# Patient Record
Sex: MALE | Race: WHITE | NOT HISPANIC OR LATINO | Employment: OTHER | URBAN - METROPOLITAN AREA
[De-identification: names, ages, dates, MRNs, and addresses within clinical notes are randomized per-mention and may not be internally consistent; named-entity substitution may affect disease eponyms.]

---

## 2017-02-06 ENCOUNTER — TELEPHONE (OUTPATIENT)
Dept: MEDICAL GROUP | Facility: MEDICAL CENTER | Age: 82
End: 2017-02-06

## 2017-02-06 DIAGNOSIS — I47.10 PAROXYSMAL SUPRAVENTRICULAR TACHYCARDIA: ICD-10-CM

## 2017-02-06 DIAGNOSIS — I47.10 SVT (SUPRAVENTRICULAR TACHYCARDIA): ICD-10-CM

## 2017-02-06 DIAGNOSIS — I47.10 SUPRAVENTRICULAR TACHYCARDIA: ICD-10-CM

## 2017-02-06 NOTE — TELEPHONE ENCOUNTER
1. Caller Name: Pt                                         Call Back Number: [phone] (home)       Patient approves a detailed voicemail message: no    2. SPECIFIC Action To Be Taken: Referral pending, please sign.    3. Diagnosis/Clinical Reason for Request: supraventricular tachycardia    4. Specialty & Provider Name/Lab/Imaging Location: Cardiology-Dr Velasquez    5. Is appointment scheduled for requested order/referral: no    Patient informed they will receive a return phone call from the office ONLY if there are any questions before processing their request. Advised to call back if they haven't received a call from the referral department in 5 days.

## 2017-02-08 ENCOUNTER — APPOINTMENT (OUTPATIENT)
Dept: RADIOLOGY | Facility: MEDICAL CENTER | Age: 82
End: 2017-02-08
Attending: PSYCHIATRY & NEUROLOGY
Payer: MEDICARE

## 2017-02-16 ENCOUNTER — HOSPITAL ENCOUNTER (OUTPATIENT)
Dept: RADIOLOGY | Facility: MEDICAL CENTER | Age: 82
End: 2017-02-16
Attending: PSYCHIATRY & NEUROLOGY
Payer: COMMERCIAL

## 2017-02-16 DIAGNOSIS — G40.409 CENTRENCEPHALIC EPILEPSY (HCC): ICD-10-CM

## 2017-02-16 PROCEDURE — 70551 MRI BRAIN STEM W/O DYE: CPT

## 2017-03-24 DIAGNOSIS — I47.10 SVT (SUPRAVENTRICULAR TACHYCARDIA): ICD-10-CM

## 2017-05-01 ENCOUNTER — OFFICE VISIT (OUTPATIENT)
Dept: CARDIOLOGY | Facility: MEDICAL CENTER | Age: 82
End: 2017-05-01
Payer: COMMERCIAL

## 2017-05-01 VITALS
BODY MASS INDEX: 26.52 KG/M2 | DIASTOLIC BLOOD PRESSURE: 68 MMHG | HEART RATE: 80 BPM | SYSTOLIC BLOOD PRESSURE: 130 MMHG | WEIGHT: 175 LBS | OXYGEN SATURATION: 95 % | HEIGHT: 68 IN

## 2017-05-01 DIAGNOSIS — I47.10 SVT (SUPRAVENTRICULAR TACHYCARDIA): ICD-10-CM

## 2017-05-01 DIAGNOSIS — I45.10 RBBB: ICD-10-CM

## 2017-05-01 DIAGNOSIS — I44.0 FIRST DEGREE ATRIOVENTRICULAR BLOCK: ICD-10-CM

## 2017-05-01 DIAGNOSIS — R01.1 UNDIAGNOSED CARDIAC MURMURS: ICD-10-CM

## 2017-05-01 PROCEDURE — 1036F TOBACCO NON-USER: CPT | Performed by: INTERNAL MEDICINE

## 2017-05-01 PROCEDURE — G8432 DEP SCR NOT DOC, RNG: HCPCS | Performed by: INTERNAL MEDICINE

## 2017-05-01 PROCEDURE — 99214 OFFICE O/P EST MOD 30 MIN: CPT | Performed by: INTERNAL MEDICINE

## 2017-05-01 PROCEDURE — G8419 CALC BMI OUT NRM PARAM NOF/U: HCPCS | Performed by: INTERNAL MEDICINE

## 2017-05-01 PROCEDURE — 1101F PT FALLS ASSESS-DOCD LE1/YR: CPT | Performed by: INTERNAL MEDICINE

## 2017-05-01 PROCEDURE — 4040F PNEUMOC VAC/ADMIN/RCVD: CPT | Performed by: INTERNAL MEDICINE

## 2017-05-01 ASSESSMENT — ENCOUNTER SYMPTOMS
FOCAL WEAKNESS: 0
FALLS: 0
CHILLS: 0
CLAUDICATION: 0
LOSS OF CONSCIOUSNESS: 0
HEADACHES: 0
BACK PAIN: 1
SHORTNESS OF BREATH: 0
FEVER: 0
WEAKNESS: 0
ABDOMINAL PAIN: 0
DIZZINESS: 0
BRUISES/BLEEDS EASILY: 0
PND: 0
SORE THROAT: 0
NAUSEA: 0
PALPITATIONS: 0
COUGH: 0
BLURRED VISION: 0

## 2017-05-01 NOTE — Clinical Note
[facility]   [address]   [address]  [place] [address]  Phone: [phone]  Fax: [phone]              Homa Alonso  4/9/1927    Encounter Date: 5/1/2017    Colin Garza M.D.          PROGRESS NOTE:  Subjective:   Homa Alonso is a 89 y.o. male who presents today seeking cardiology follow-up for abnormal EKG and heart murmur.  This very healthy 89-year-old man who continues physical exercise has no symptoms of chest discomfort and lightheadedness palpitations or shortness of breath with effort.  He is known to have a sinus rhythm with first-degree AV block and right bundle branch block. A heart murmur has been present for years.  After successful lumbar surgery in May 2012, he had a single episode of atrial flutter with 2-1 AV block with no evidence of clinical recurrence. Because this occurred after stress, he is not taking any oral anticoagulant that    An echo was last done 11 months ago.  This is unable to be reviewed at this time  Recent lab work reviewed and all appears satisfactory with an LDL of 50. EKG  demonstrates sinus rhythm with first-degree AV block and right bundle branch block. PVCs in a trigeminal pattern documented        Past Medical History   Diagnosis Date   • Gall stones    • Unspecified hemorrhagic conditions (CMS-HCC)      GI Bleed when taking Celebrex   • Allergy, unspecified not elsewhere classified    • Hyperlipidemia    • Arthritis      osteo finger joints lower back   • Cancer (CMS-[facility]) 1991     Prostate   • Unspecified cataract      oneal IOL surgery   • Glaucoma      right eye   • Pain      low back   • Seizure (CMS-[facility]) 06/13/2014     once  on Keppra     Past Surgical History   Procedure Laterality Date   • Other orthopedic surgery     • Prostatectomy, radial     • Aster by laparoscopy  12/3/2013     Performed by Braden Garner M.D. at [facility]   • Appendectomy     • Eye surgery      • Athroplasty     • Other       artificial urinary spincter   • Lumbar fusion o-arm Bilateral 5/18/2015     Procedure: LUMBAR FUSION O-ARM [83.10B] L3-5;  Surgeon: Francesco Joe M.D.;  Location: SURGERY [facility];  Service:    • Lumbar decompression  5/18/2015     Procedure: LUMBAR DECOMPRESSION;  Surgeon: Francesco Joe M.D.;  Location: SURGERY [facility];  Service:      Family History   Problem Relation Age of Onset   • Heart Disease Mother      STROKE   • Stroke Mother    • Cancer Father    • Arthritis Sister      History   Smoking status   • Former Smoker   • Quit date: 01/01/1964   Smokeless tobacco   • Never Used     Allergies   Allergen Reactions   • Pcn [Penicillins]      Outpatient Encounter Prescriptions as of 5/1/2017   Medication Sig Dispense Refill   • Cholecalciferol (VITAMIN D3) 2000 UNIT CAPS Take  by mouth.     • CALCIUM CITRATE PO Take 500 mg by mouth every day.     • levetiracetam (KEPPRA) 500 MG TABS Take 500 mg by mouth every day.     • tramadol (ULTRAM) 50 MG TABS Take  mg by mouth every 6 hours as needed.     • atorvastatin (LIPITOR) 20 MG TABS Take 20 mg by mouth every evening.     • bimatoprost (LUMIGAN) 0.01 % SOLN Place 1 Drop in right eye every bedtime.     • niacin (SLO-NIACIN) 500 MG tablet Take 1 Tab by mouth every day. 90 Each 3   • testosterone (ANDROGEL) 1 % GEL Apply 0.5 Packets to skin as directed every day. 90 Package 3   • VITAMIN C CR PO Take  by mouth. OTC VIT C      • MAGNESIUM CITRATE PO Take 250 mg by mouth every day.     • Cholecalciferol (VITAMIN D3) Take 2,000 Units by mouth every day.       No facility-administered encounter medications on file as of 5/1/2017.     Review of Systems   Constitutional: Negative for fever and chills.   HENT: Negative for sore throat.    Eyes: Negative for blurred vision.   Respiratory: Negative for cough and shortness of breath.    Cardiovascular: Negative for chest pain, palpitations, claudication, leg swelling  "and PND.   Gastrointestinal: Negative for nausea and abdominal pain.   Musculoskeletal: Positive for back pain (significantly improved) and joint pain. Negative for falls.   Skin: Negative for rash.   Neurological: Negative for dizziness, focal weakness, loss of consciousness, weakness and headaches.   Endo/Heme/Allergies: Does not bruise/bleed easily.        Objective:   /68 mmHg  Pulse 80  Ht 1.727 m (5' 7.99\")  Wt 79.379 kg (175 lb)  BMI 26.61 kg/m2  SpO2 95%    Physical Exam   Constitutional: He is oriented to person, place, and time. He appears well-developed and well-nourished.   HENT:   Head: Normocephalic and atraumatic.   Eyes: Conjunctivae and EOM are normal. No scleral icterus.   Neck: Neck supple. No JVD present. No thyromegaly present.   Cardiovascular: Normal rate and regular rhythm.  Exam reveals no gallop and no friction rub.    Murmur heard.   Systolic murmur is present with a grade of 2/6   Pulmonary/Chest: Effort normal and breath sounds normal. No respiratory distress. He has no wheezes. He has no rales. He exhibits no tenderness.   Abdominal: Soft. Bowel sounds are normal. He exhibits no distension and no mass. There is no tenderness.   Musculoskeletal: He exhibits edema.   Bilateral pitting edema of ankles, varicose veins right leg. Venous stripping in the remote past and left leg   Neurological: He is alert and oriented to person, place, and time. Coordination normal.   Skin: Skin is warm and dry. No rash noted. No pallor.   Psychiatric: He has a normal mood and affect. His behavior is normal. Judgment and thought content normal.       Assessment:     1. SVT (supraventricular tachycardia) (CMS-HCC)  [facility] EP[facility]ANY EKG (Clinic Performed)    CANCELED: EKG   2. First degree atrioventricular block     3. RBBB     4. Undiagnosed cardiac murmurs         Medical Decision Making:  Today's Assessment / Status / Plan:   We will obtain the echocardiogram done 11 months ago  No change in " medication  Return in 6 months for further evaluation.        Jomar Lazaro PA-C  [address]  [address]  [facility] [address]  VIA In Basket

## 2017-05-01 NOTE — PROGRESS NOTES
Subjective:   Homa Alonso is a 89 y.o. male who presents today seeking cardiology follow-up for abnormal EKG and heart murmur.  This very healthy 89-year-old man who continues physical exercise has no symptoms of chest discomfort and lightheadedness palpitations or shortness of breath with effort.  He is known to have a sinus rhythm with first-degree AV block and right bundle branch block. A heart murmur has been present for years.  After successful lumbar surgery in May 2012, he had a single episode of atrial flutter with 2-1 AV block with no evidence of clinical recurrence. Because this occurred after stress, he is not taking any oral anticoagulant that    An echo was last done 11 months ago.  This is unable to be reviewed at this time  Recent lab work reviewed and all appears satisfactory with an LDL of 50. EKG  demonstrates sinus rhythm with first-degree AV block and right bundle branch block. PVCs in a trigeminal pattern documented        Past Medical History   Diagnosis Date   • Gall stones    • Unspecified hemorrhagic conditions (CMS-HCC)      GI Bleed when taking Celebrex   • Allergy, unspecified not elsewhere classified    • Hyperlipidemia    • Arthritis      osteo finger joints lower back   • Cancer (CMS-HCC) 1991     Prostate   • Unspecified cataract      oneal IOL surgery   • Glaucoma      right eye   • Pain      low back   • Seizure (CMS-HCC) 06/13/2014     once  on Keppra     Past Surgical History   Procedure Laterality Date   • Other orthopedic surgery     • Prostatectomy, radial     • Aster by laparoscopy  12/3/2013     Performed by Braden [facility] M[facility]   • Appendectomy     • Eye surgery     • Athroplasty     • Other       artificial urinary spincter   • Lumbar fusion o-arm Bilateral 5/18/2015     Procedure: LUMBAR FUSION O-ARM [83.10B] L3-5;  Surgeon: Francesco Joe M.D.;  Location: [facility];  Service:    • Lumbar decompression  5/18/2015      Procedure: LUMBAR DECOMPRESSION;  Surgeon: Francesco Joe M.D.;  Location: SURGERY [facility];  Service:      Family History   Problem Relation Age of Onset   • Heart Disease Mother      STROKE   • Stroke Mother    • Cancer Father    • Arthritis Sister      History   Smoking status   • Former Smoker   • Quit date: 01/01/1964   Smokeless tobacco   • Never Used     Allergies   Allergen Reactions   • Pcn [Penicillins]      Outpatient Encounter Prescriptions as of 5/1/2017   Medication Sig Dispense Refill   • Cholecalciferol (VITAMIN D3) 2000 UNIT CAPS Take  by mouth.     • CALCIUM CITRATE PO Take 500 mg by mouth every day.     • levetiracetam (KEPPRA) 500 MG TABS Take 500 mg by mouth every day.     • tramadol (ULTRAM) 50 MG TABS Take  mg by mouth every 6 hours as needed.     • atorvastatin (LIPITOR) 20 MG TABS Take 20 mg by mouth every evening.     • bimatoprost (LUMIGAN) 0.01 % SOLN Place 1 Drop in right eye every bedtime.     • niacin (SLO-NIACIN) 500 MG tablet Take 1 Tab by mouth every day. 90 Each 3   • testosterone (ANDROGEL) 1 % GEL Apply 0.5 Packets to skin as directed every day. 90 Package 3   • VITAMIN C CR PO Take  by mouth. OTC VIT C      • MAGNESIUM CITRATE PO Take 250 mg by mouth every day.     • Cholecalciferol (VITAMIN D3) Take 2,000 Units by mouth every day.       No facility-administered encounter medications on file as of 5/1/2017.     Review of Systems   Constitutional: Negative for fever and chills.   HENT: Negative for sore throat.    Eyes: Negative for blurred vision.   Respiratory: Negative for cough and shortness of breath.    Cardiovascular: Negative for chest pain, palpitations, claudication, leg swelling and PND.   Gastrointestinal: Negative for nausea and abdominal pain.   Musculoskeletal: Positive for back pain (significantly improved) and joint pain. Negative for falls.   Skin: Negative for rash.   Neurological: Negative for dizziness, focal weakness, loss of consciousness,  "weakness and headaches.   Endo/Heme/Allergies: Does not bruise/bleed easily.        Objective:   /68 mmHg  Pulse 80  Ht 1.727 m (5' 7.99\")  Wt 79.379 kg (175 lb)  BMI 26.61 kg/m2  SpO2 95%    Physical Exam   Constitutional: He is oriented to person, place, and time. He appears well-developed and well-nourished.   HENT:   Head: Normocephalic and atraumatic.   Eyes: Conjunctivae and EOM are normal. No scleral icterus.   Neck: Neck supple. No JVD present. No thyromegaly present.   Cardiovascular: Normal rate and regular rhythm.  Exam reveals no gallop and no friction rub.    Murmur heard.   Systolic murmur is present with a grade of 2/6   Pulmonary/Chest: Effort normal and breath sounds normal. No respiratory distress. He has no wheezes. He has no rales. He exhibits no tenderness.   Abdominal: Soft. Bowel sounds are normal. He exhibits no distension and no mass. There is no tenderness.   Musculoskeletal: He exhibits edema.   Bilateral pitting edema of ankles, varicose veins right leg. Venous stripping in the remote past and left leg   Neurological: He is alert and oriented to person, place, and time. Coordination normal.   Skin: Skin is warm and dry. No rash noted. No pallor.   Psychiatric: He has a normal mood and affect. His behavior is normal. Judgment and thought content normal.       Assessment:     1. SVT (supraventricular tachycardia) (CMS-HCC)  [facility]ANY EKG (Clinic Performed)    CANCELED: EKG   2. First degree atrioventricular block     3. RBBB     4. Undiagnosed cardiac murmurs         Medical Decision Making:  Today's Assessment / Status / Plan:   We will obtain the echocardiogram done 11 months ago  No change in medication  Return in 6 months for further evaluation.    "

## 2017-05-01 NOTE — MR AVS SNAPSHOT
"        Homa Spearst   2017 8:30 AM   Office Visit   MRN: [id number]    Department:  Heart [facility]   Dept Phone:  [phone]    Description:  Male : 1927   Provider:  Colin Garza M.D.           Reason for Visit     New Patient           Allergies as of 2017     Allergen Noted Reactions    Pcn [Penicillins] 2009         You were diagnosed with     SVT (supraventricular tachycardia) (CMS-[facility])   [395406]       First degree atrioventricular block   [426.11.ICD-9-CM]       RBBB   [659665]       Undiagnosed cardiac murmurs   [785.2.ICD-9-CM]         Vital Signs     Blood Pressure Pulse Height Weight Body Mass Index Oxygen Saturation    130/68 mmHg 80 1.727 m (5' 7.99\") 79.379 kg (175 lb) 26.61 kg/m2 95%    Smoking Status                   Former Smoker           Basic Information     Date Of Birth Sex Race Ethnicity Preferred Language    1927 Male White Non- English      Your appointments     2017  9:00 AM   FOLLOW UP with Colin Garza M.D.   [facility]    [address]  [address]  [facility] [address] [phone]              Problem List              ICD-10-CM Priority Class Noted - Resolved    Dyslipidemia E78.5   2011 - Present    Chronic bilateral low back pain M54.5, G89.29 High  2015 - Present    SVT (supraventricular tachycardia) (CMS-HCC) I47.1 Medium  2015 - Present    History of seizure disorder Z86.69 Low  2015 - Present    Hypogonadism in male E29.1   2016 - Present    History of prostate cancer Z85.46   2016 - Present    Preventative health care Z00.00   2016 - Present    Glaucoma H40.9   2016 - Present      Health Maintenance        Date Due Completion Dates    IMM DTaP/Tdap/Td Vaccine (1 - Tdap) 1946 ---    IMM ZOSTER VACCINE 1987 ---    IMM PNEUMOCOCCAL 65+ (ADULT) LOW/MEDIUM RISK SERIES (2 of 2 - PCV13) 10/1/2015 10/1/2014  "    COLONOSCOPY 11/22/2026 11/22/2016 (Done)    Override on 11/22/2016: Done            Current Immunizations     Influenza TIV (IM) 10/1/2014    Influenza Vaccine Adult HD 10/20/2015    Influenza Vaccine Quad Inj (Pf) 12/7/2012    Influenza Vaccine Quad Inj (Preserved) 11/5/2014, 1/11/2014    Pneumococcal polysaccharide vaccine (PPSV-23) 10/1/2014      Below and/or attached are the medications your provider expects you to take. Review all of your home medications and newly ordered medications with your provider and/or pharmacist. Follow medication instructions as directed by your provider and/or pharmacist. Please keep your medication list with you and share with your provider. Update the information when medications are discontinued, doses are changed, or new medications (including over-the-counter products) are added; and carry medication information at all times in the event of emergency situations     Allergies:  PCN - (reactions not documented)               Medications  Valid as of: May 01, 2017 -  9:32 AM    Generic Name Brand Name Tablet Size Instructions for use    Ascorbic Acid   Take  by mouth. OTC VIT C         Atorvastatin Calcium (Tab) LIPITOR 20 MG Take 20 mg by mouth every evening.        Bimatoprost (Solution) LUMIGAN 0.01 % Place 1 Drop in right eye every bedtime.        Calcium Citrate   Take 500 mg by mouth every day.        Cholecalciferol   Take 2,000 Units by mouth every day.        Cholecalciferol (Cap) Vitamin D3 2000 UNIT Take  by mouth.        LevETIRAcetam (Tab) KEPPRA 500 MG Take 500 mg by mouth every day.        Magnesium Citrate   Take 250 mg by mouth every day.        Niacin (Tab CR) SLO-NIACIN 500 MG Take 1 Tab by mouth every day.        Testosterone (Gel) ANDROGEL 1 % Apply 0.5 Packets to skin as directed every day.        TraMADol HCl (Tab) ULTRAM 50 MG Take  mg by mouth every 6 hours as needed.        .                 Medicines prescribed today were sent to:     [id number]  [place] - [address] [facility]    [address] [place] [address] [place] [address]    Phone: [phone] Fax: [phone]    Open 24 Hours?: No      Medication refill instructions:       If your prescription bottle indicates you have medication refills left, it is not necessary to call your provider’s office. Please contact your pharmacy and they will refill your medication.    If your prescription bottle indicates you do not have any refills left, you may request refills at any time through one of the following ways: The online [facility] system (except Urgent Care), by calling your provider’s office, or by asking your pharmacy to contact your provider’s office with a refill request. Medication refills are processed only during regular business hours and may not be available until the next business day. Your provider may request additional information or to have a follow-up visit with you prior to refilling your medication.   *Please Note: Medication refills are assigned a new Rx number when refilled electronically. Your pharmacy may indicate that no refills were authorized even though a new prescription for the same medication is available at the pharmacy. Please request the medicine by name with the pharmacy before contacting your provider for a refill.           [facility] Access Code: Activation code not generated  Current [facility]und Status: Active

## 2017-05-19 DIAGNOSIS — E25.9 ANDROGENITAL SYNDROME (HCC): ICD-10-CM

## 2017-05-19 RX ORDER — TESTOSTERONE GEL, 1% 10 MG/G
5 GEL TRANSDERMAL DAILY
Qty: 90 EACH | Refills: 0 | Status: SHIPPED
Start: 2017-05-19 | End: 2017-08-29 | Stop reason: SDUPTHER

## 2017-05-19 RX ORDER — TESTOSTERONE GEL, 1% 10 MG/G
5 GEL TRANSDERMAL DAILY
Qty: 90 EACH | Refills: 0 | Status: SHIPPED | OUTPATIENT
Start: 2017-05-19 | End: 2017-05-19 | Stop reason: SDUPTHER

## 2017-05-19 NOTE — TELEPHONE ENCOUNTER
Was the patient seen in the last year in this department? Yes     Does patient have an active prescription for medications requested? No     Received Request Via: Patient     Last Visit: 11/22/16    Last Labs: 5/21/15

## 2017-05-22 ENCOUNTER — TELEPHONE (OUTPATIENT)
Dept: MEDICAL GROUP | Facility: MEDICAL CENTER | Age: 82
End: 2017-05-22

## 2017-05-22 NOTE — TELEPHONE ENCOUNTER
MEDICATION PRIOR AUTHORIZATION NEEDED:    1. Name of Medication: Androgel 50mg/5gm    2. Requested By (Name of Pharmacy): [facility]     3. Is insurance on file current? Yes    4. What is the name & phone number of the 3rd party payor? [employer]    DOCUMENTATION OF PAR STATUS:    1. Name of Medication & Dose: Androgel 50 mg/5 gram     2. Name of Prescription Coverage Company & phone #: [employer]    3. Date Prior Auth Submitted: 5/22/17    4. What information was given to obtain insurance decision? Diagnosis, amount, prescriber's information and if the patient had a trial sample.    5. Prior Auth Status? Pending    6. Patient Notified: yes

## 2017-05-30 NOTE — TELEPHONE ENCOUNTER
FINAL PRIOR AUTHORIZATION STATUS:    1.  Name of Medication & Dose: Androgel 50mg/5gm     2. Prior Auth Status: Approved through [employer]     3. Action Taken: Pharmacy Notified: yes Patient Notified: yes

## 2017-06-07 ENCOUNTER — TELEPHONE (OUTPATIENT)
Dept: MEDICAL GROUP | Facility: MEDICAL CENTER | Age: 82
End: 2017-06-07

## 2017-06-07 ENCOUNTER — PATIENT OUTREACH (OUTPATIENT)
Dept: HEALTH INFORMATION MANAGEMENT | Facility: OTHER | Age: 82
End: 2017-06-07

## 2017-06-07 NOTE — PROGRESS NOTES
Outcome: Patient called to schedule an annual physical due to an upcoming traffic court hearing. Patient was scheduled for an annual physical with PCP and message was sent to PCP to see if he would like to have lab work done prior to this appointment or if he would like to order labs after this visit.

## 2017-06-07 NOTE — TELEPHONE ENCOUNTER
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 He is requesting to know if you would like him to have labs done prior to his annual or if you will speak to him about them during his appointment.   He also wanted to thank the office for their timeliness and efficiency in assisting him on getting his last medication refill for testosterone.    Thank you,  Lorne BOLES  Health    [facility]

## 2017-06-12 ENCOUNTER — OFFICE VISIT (OUTPATIENT)
Dept: MEDICAL GROUP | Facility: MEDICAL CENTER | Age: 82
End: 2017-06-12
Payer: COMMERCIAL

## 2017-06-12 VITALS
HEIGHT: 68 IN | HEART RATE: 81 BPM | BODY MASS INDEX: 27.28 KG/M2 | WEIGHT: 180 LBS | DIASTOLIC BLOOD PRESSURE: 60 MMHG | OXYGEN SATURATION: 98 % | SYSTOLIC BLOOD PRESSURE: 110 MMHG | RESPIRATION RATE: 16 BRPM | TEMPERATURE: 98.4 F

## 2017-06-12 DIAGNOSIS — H40.9 GLAUCOMA OF RIGHT EYE, UNSPECIFIED GLAUCOMA: ICD-10-CM

## 2017-06-12 DIAGNOSIS — Z86.69 HISTORY OF SEIZURE DISORDER: ICD-10-CM

## 2017-06-12 DIAGNOSIS — E78.5 DYSLIPIDEMIA: ICD-10-CM

## 2017-06-12 DIAGNOSIS — Z85.46 HISTORY OF PROSTATE CANCER: ICD-10-CM

## 2017-06-12 DIAGNOSIS — E29.1 HYPOGONADISM IN MALE: ICD-10-CM

## 2017-06-12 DIAGNOSIS — Z00.00 MEDICARE ANNUAL WELLNESS VISIT, INITIAL: ICD-10-CM

## 2017-06-12 PROCEDURE — 99999 PR NO CHARGE: CPT | Performed by: PHYSICIAN ASSISTANT

## 2017-06-12 PROCEDURE — G0439 PPPS, SUBSEQ VISIT: HCPCS | Performed by: PHYSICIAN ASSISTANT

## 2017-06-12 ASSESSMENT — PATIENT HEALTH QUESTIONNAIRE - PHQ9: CLINICAL INTERPRETATION OF PHQ2 SCORE: 0
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
## 2017-08-29 DIAGNOSIS — E25.9 ANDROGENITAL SYNDROME (HCC): ICD-10-CM

## 2017-08-29 RX ORDER — TESTOSTERONE GEL, 1% 10 MG/G
5 GEL TRANSDERMAL DAILY
Qty: 90 EACH | Refills: 0 | Status: SHIPPED
Start: 2017-08-29 | End: 2017-12-20 | Stop reason: SDUPTHER

## 2017-08-29 NOTE — TELEPHONE ENCOUNTER
Was the patient seen in the last year in this department? Yes     Does patient have an active prescription for medications requested? No     Received Request Via: Patient     Last Visit: 6/12/17    Last Labs: 5/21/15

## 2017-11-01 ENCOUNTER — OFFICE VISIT (OUTPATIENT)
Dept: CARDIOLOGY | Facility: MEDICAL CENTER | Age: 82
End: 2017-11-01
Payer: COMMERCIAL

## 2017-11-01 VITALS
HEART RATE: 66 BPM | BODY MASS INDEX: 25.62 KG/M2 | OXYGEN SATURATION: 95 % | SYSTOLIC BLOOD PRESSURE: 132 MMHG | WEIGHT: 179 LBS | HEIGHT: 70 IN | DIASTOLIC BLOOD PRESSURE: 72 MMHG

## 2017-11-01 DIAGNOSIS — I35.8 AORTIC VALVE SCLEROSIS: ICD-10-CM

## 2017-11-01 DIAGNOSIS — R00.2 PALPITATIONS: ICD-10-CM

## 2017-11-01 PROCEDURE — 99213 OFFICE O/P EST LOW 20 MIN: CPT | Performed by: INTERNAL MEDICINE

## 2017-11-01 RX ORDER — CHLORAL HYDRATE 500 MG
1000 CAPSULE ORAL DAILY
COMMUNITY

## 2017-11-01 ASSESSMENT — ENCOUNTER SYMPTOMS
CLAUDICATION: 0
WEAKNESS: 0
SENSORY CHANGE: 1
FALLS: 0
DIZZINESS: 0
PND: 0
NAUSEA: 0
CHILLS: 0
HEADACHES: 0
BLURRED VISION: 0
ABDOMINAL PAIN: 0
FEVER: 0
SORE THROAT: 0
BACK PAIN: 1
PALPITATIONS: 1
SHORTNESS OF BREATH: 0
LOSS OF CONSCIOUSNESS: 0
BRUISES/BLEEDS EASILY: 0
COUGH: 0
FOCAL WEAKNESS: 0

## 2017-11-01 NOTE — LETTER
[facility]   [address]   [address]  [place] [address]  Phone: [phone]  Fax: [phone]              Homa Alonso  4/9/1927    Encounter Date: 11/1/2017    Colin Garza M.D.          PROGRESS NOTE:  Subjective:   Homa Alonso is a 89 y.o. male who presents todayIn follow-up for heart murmur. Echocardiogram in 2011 demonstrated aortic valve sclerosis and mild aortic regurgitation.  He continues to be physically very active.  Some residual numbness and left leg after successful lumbar surgery.  He is on Keppra     He mentions that he has occasional pauses in his heart rate at rest but no irregularity when he exercises. No symptoms to suggest symptoms of heart disease at this time  Echocardiogram of 2011 reviewed    Past Medical History:   Diagnosis Date   • Allergy, unspecified not elsewhere classified    • Arthritis     osteo finger joints lower back   • Cancer (CMS-HCC) 1991    Prostate   • Gall stones    • Glaucoma     right eye   • Hyperlipidemia    • Pain     low back   • Seizure (CMS-[facility]) 06/13/2014    once  on Keppra   • Unspecified cataract     oneal IOL surgery   • Unspecified hemorrhagic conditions     GI Bleed when taking Celebrex     Past Surgical History:   Procedure Laterality Date   • LUMBAR FUSION O-ARM Bilateral 5/18/2015    Procedure: LUMBAR FUSION O-ARM [83.10B] L3-5;  Surgeon: Francesco Joe M.D.;  Location: [facility];  Service:    • LUMBAR DECOMPRESSION  5/18/2015    Procedure: LUMBAR DECOMPRESSION;  Surgeon: Francesco Joe M.D.;  Location: [facility];  Service:    • CHRISTIE BY LAPAROSCOPY  12/3/2013    Performed by Braden Garner M.D. at [facility]   • APPENDECTOMY     • ATHROPLASTY     • EYE SURGERY     • OTHER      artificial urinary spincter   • OTHER ORTHOPEDIC SURGERY     • PROSTATECTOMY, RADIAL       Family History   Problem Relation Age of Onset   • Heart  Disease Mother      STROKE   • Stroke Mother    • Cancer Father    • Arthritis Sister      History   Smoking Status   • Former Smoker   • Quit date: 1/1/1964   Smokeless Tobacco   • Never Used     Allergies   Allergen Reactions   • Pcn [Penicillins]      Outpatient Encounter Prescriptions as of 11/1/2017   Medication Sig Dispense Refill   • Omega-3 Fatty Acids (FISH OIL) 1000 MG Cap capsule Take 1,000 mg by mouth 3 times a day, with meals.     • multivitamin (THERAGRAN) Tab Take 1 Tab by mouth every day.     • testosterone (ANDROGEL) 50 MG/5GM (1%) Gel gel Apply 1 Packet to skin as directed every day. 90 Each 0   • Cholecalciferol (VITAMIN D3) 2000 UNIT CAPS Take  by mouth.     • CALCIUM CITRATE PO Take 500 mg by mouth every day.     • levetiracetam (KEPPRA) 500 MG TABS Take 500 mg by mouth 2 times a day.     • MAGNESIUM CITRATE PO Take 250 mg by mouth every day.     • atorvastatin (LIPITOR) 20 MG TABS Take 20 mg by mouth every evening.     • bimatoprost (LUMIGAN) 0.01 % SOLN Place 1 Drop in right eye every bedtime.     • niacin (SLO-NIACIN) 500 MG tablet Take 1 Tab by mouth every day. 90 Each 3   • VITAMIN C CR PO Take  by mouth. OTC VIT C        No facility-administered encounter medications on file as of 11/1/2017.      Review of Systems   Constitutional: Negative for chills and fever.   HENT: Negative for sore throat.    Eyes: Negative for blurred vision.   Respiratory: Negative for cough and shortness of breath.    Cardiovascular: Positive for palpitations. Negative for chest pain, claudication, leg swelling and PND.   Gastrointestinal: Negative for abdominal pain and nausea.   Musculoskeletal: Positive for back pain and joint pain. Negative for falls.   Skin: Negative for rash.   Neurological: Positive for sensory change. Negative for dizziness, focal weakness, loss of consciousness, weakness and headaches.   Endo/Heme/Allergies: Does not bruise/bleed easily.        Objective:   /72   Pulse 66   Ht  "1.778 m (5' 10\")   Wt 81.2 kg (179 lb)   SpO2 95%   BMI 25.68 kg/m²      Physical Exam   Constitutional: He is oriented to person, place, and time. He appears well-developed and well-nourished.   HENT:   Head: Normocephalic and atraumatic.   Eyes: Conjunctivae and EOM are normal. No scleral icterus.   Neck: Neck supple. No JVD present. No thyromegaly present.   Cardiovascular: Normal rate and regular rhythm.  Frequent extrasystoles are present. Exam reveals no gallop and no friction rub.    Murmur heard.   Systolic murmur is present with a grade of 2/6   Premature beat every 3rd beat on exam     Pulmonary/Chest: Effort normal and breath sounds normal. No respiratory distress. He has no wheezes. He has no rales. He exhibits no tenderness.   Abdominal: Soft. Bowel sounds are normal. He exhibits no distension and no mass. There is no tenderness.   Musculoskeletal: He exhibits edema.   Bilateral pitting edema of ankles, varicose veins right leg. Venous stripping in the remote past and left leg   Neurological: He is alert and oriented to person, place, and time. Coordination normal.   Skin: Skin is warm and dry. No rash noted. No pallor.   Psychiatric: He has a normal mood and affect. His behavior is normal. Judgment and thought content normal.       Assessment:     1. Aortic valve sclerosis     2. Palpitations         Medical Decision Making:  Today's Assessment / Status / Plan:   Other murmurs consistent with aortic valve sclerosis, well documented 6 years ago on an echo  There is no need for repeat echo at this time based on the quality of the heart murmur auscultated today and the lack of symptoms  The premature beats which is felt clinically without any major symptoms and I do not think this needs to be pursued  We'll have him return in 9 months for auscultation of heart murmur.  I recommended stopping niacin based on his lack of efficacy      Ajay Austin M.D.  [address]  [address]  [place] " [id number]  VIA In Basket

## 2017-11-09 NOTE — TELEPHONE ENCOUNTER
Was the patient seen in the last year in this department? Yes     Does patient have an active prescription for medications requested? No     Received Request Via: Pharmacy     Last Visit: 6/2/17    Last Labs: 5/21/15

## 2017-11-10 RX ORDER — ATORVASTATIN CALCIUM 20 MG/1
20 TABLET, FILM COATED ORAL DAILY
Qty: 30 TAB | Refills: 0 | Status: SHIPPED | OUTPATIENT
Start: 2017-11-10 | End: 2017-12-13 | Stop reason: SDUPTHER

## 2017-12-12 DIAGNOSIS — E29.1 HYPOGONADISM IN MALE: ICD-10-CM

## 2017-12-13 RX ORDER — ATORVASTATIN CALCIUM 20 MG/1
TABLET, FILM COATED ORAL
Qty: 30 TAB | Refills: 0 | Status: SHIPPED | OUTPATIENT
Start: 2017-12-13 | End: 2018-01-10 | Stop reason: SDUPTHER

## 2017-12-18 ENCOUNTER — TELEPHONE (OUTPATIENT)
Dept: MEDICAL GROUP | Facility: MEDICAL CENTER | Age: 82
End: 2017-12-18

## 2017-12-18 DIAGNOSIS — E25.9 ANDROGENITAL SYNDROME (HCC): ICD-10-CM

## 2017-12-18 RX ORDER — TESTOSTERONE GEL, 1% 10 MG/G
5 GEL TRANSDERMAL DAILY
Qty: 150000 MG | Refills: 0 | Status: CANCELLED | OUTPATIENT
Start: 2017-12-18 | End: 2018-01-17

## 2017-12-18 NOTE — TELEPHONE ENCOUNTER
----- Message from Guerline Mendez sent at 12/18/2017  8:46 AM PST -----  Then why did christian approve his other med???    ----- Message -----  From: Socorro Wells, Med Ass't  Sent: 12/18/2017   8:21 AM  To: Guerline Mendez    Christian and I got a message via "[employer] stating he is establishing with an internist and won't be our patient. He would need an appointment for that.  ----- Message -----  From: Guerline Mendez  Sent: 12/15/2017   4:02 PM  To: [facility]    1. Caller Name: Homa Metcalf. Call Back Number: [phone]    3. Patient PCP: christian    4. What is the patient requesting: Refill of testosterone (ANDROGEL) 50 MG/5GM (1%) Gel gel, thank you    5. Does patient need immediate contact: no

## 2017-12-20 DIAGNOSIS — E25.9 ANDROGENITAL SYNDROME (HCC): ICD-10-CM

## 2017-12-20 RX ORDER — TESTOSTERONE GEL, 1% 10 MG/G
5 GEL TRANSDERMAL DAILY
Qty: 150000 MG | Refills: 0 | Status: SHIPPED | OUTPATIENT
Start: 2017-12-20 | End: 2018-01-10 | Stop reason: SDUPTHER

## 2018-01-10 ENCOUNTER — OFFICE VISIT (OUTPATIENT)
Dept: MEDICAL GROUP | Facility: MEDICAL CENTER | Age: 83
End: 2018-01-10
Payer: COMMERCIAL

## 2018-01-10 VITALS
TEMPERATURE: 98.2 F | SYSTOLIC BLOOD PRESSURE: 144 MMHG | HEIGHT: 68 IN | DIASTOLIC BLOOD PRESSURE: 78 MMHG | HEART RATE: 77 BPM | WEIGHT: 181.6 LBS | OXYGEN SATURATION: 97 % | BODY MASS INDEX: 27.52 KG/M2

## 2018-01-10 DIAGNOSIS — E29.1 HYPOGONADISM IN MALE: ICD-10-CM

## 2018-01-10 DIAGNOSIS — H40.9 GLAUCOMA OF RIGHT EYE, UNSPECIFIED GLAUCOMA TYPE: ICD-10-CM

## 2018-01-10 DIAGNOSIS — M54.5 CHRONIC BILATERAL LOW BACK PAIN, WITH SCIATICA PRESENCE UNSPECIFIED: ICD-10-CM

## 2018-01-10 DIAGNOSIS — E78.5 DYSLIPIDEMIA: ICD-10-CM

## 2018-01-10 DIAGNOSIS — I47.10 SVT (SUPRAVENTRICULAR TACHYCARDIA): ICD-10-CM

## 2018-01-10 DIAGNOSIS — Z86.69 HISTORY OF SEIZURE DISORDER: ICD-10-CM

## 2018-01-10 DIAGNOSIS — Z76.89 ENCOUNTER TO ESTABLISH CARE WITH NEW DOCTOR: ICD-10-CM

## 2018-01-10 DIAGNOSIS — G89.29 CHRONIC BILATERAL LOW BACK PAIN, WITH SCIATICA PRESENCE UNSPECIFIED: ICD-10-CM

## 2018-01-10 PROCEDURE — 99214 OFFICE O/P EST MOD 30 MIN: CPT | Performed by: FAMILY MEDICINE

## 2018-01-10 RX ORDER — ATORVASTATIN CALCIUM 20 MG/1
20 TABLET, FILM COATED ORAL
Qty: 90 TAB | Refills: 1 | Status: SHIPPED | OUTPATIENT
Start: 2018-01-10 | End: 2018-05-11 | Stop reason: SDUPTHER

## 2018-01-10 RX ORDER — TESTOSTERONE GEL, 1% 10 MG/G
5 GEL TRANSDERMAL DAILY
Qty: 150000 MG | Refills: 5 | Status: SHIPPED | OUTPATIENT
Start: 2018-01-10 | End: 2018-01-24 | Stop reason: SDUPTHER

## 2018-01-10 RX ORDER — LEVETIRACETAM 500 MG/1
500 TABLET ORAL 2 TIMES DAILY
Qty: 180 TAB | Refills: 1 | Status: SHIPPED | OUTPATIENT
Start: 2018-01-10 | End: 2018-05-11 | Stop reason: SDUPTHER

## 2018-01-10 NOTE — ASSESSMENT & PLAN NOTE
Patient with history of seizure disorder, states that this was diagnosed many years ago, had been seeing a neurologist, Dr. Valadez. He has been stable with no seizures on present dosage of Keppra at 500 mg by mouth twice a day

## 2018-01-13 NOTE — ASSESSMENT & PLAN NOTE
Chronic, stable, well controlled on testosterone, one packet daily.    Patient verbalizes understanding that taking testosterone can increase risk of CV disease, CVA, prostate cancer, male pattern baldness, shrinkage of testicles, breast enlargement, increased risk of blood clots, increased mood swings and/or aggression, decreased sperm count, infertility, polycythemia.  Patient willing to assume risks.

## 2018-01-13 NOTE — PROGRESS NOTES
Subjective:   Chief Complaint/History of Present Illness:  Homa Alonso is a 89 y.o. male established patient who presents today to establish primary medical care with me and to discuss the following medical conditions:    History of seizure disorder  Patient with history of seizure disorder, states that this was diagnosed many years ago, had been seeing a neurologist, Dr. Valadez. He has been stable with no seizures on present dosage of Keppra at 500 mg by mouth twice a day    Hypogonadism in male  Chronic, stable, well controlled on testosterone, one packet daily.    Patient verbalizes understanding that taking testosterone can increase risk of CV disease, CVA, prostate cancer, male pattern baldness, shrinkage of testicles, breast enlargement, increased risk of blood clots, increased mood swings and/or aggression, decreased sperm count, infertility, polycythemia.  Patient willing to assume risks.    SVT (supraventricular tachycardia)  Chronic, stable, well controlled, asymptomatic at present time.    ROS is NEGATIVE for dizziness, generalized weakness/fatigue, cold sweats, dizziness,  vision/hearing changes, jaw pain/paresthesias, BUE pain/paresthesias/numbness/weakness, chest pain/pressure, palpitations, dyspnea, nausea, RUQ abdominal pain, oliguria/anuria, BLE edema.    Glaucoma  Chronic, stable, well-controlled on eyedrops, this is managed by his ophthalmologist, Dr. Brisa Nowak.    Dyslipidemia  Patient and I discussed recent labs (see below) and that ASCVD risk is increased based on most recent lipid panel, current blood pressure (non-hypertensive), diabetes status (non-diabetic), and smoking status (non-smoker).    Patient and I then discussed necessary dietary changes to make to address dyslipidemia.  Patient verbalized understanding.    ROS is NEGATIVE for dizziness, generalized weakness/fatigue, vision/hearing changes, jaw pain/paresthesias, BUE pain/paresthesias/numbness/weakness, chest pain/pressure,  palpitations, dyspnea, RUQ abdominal pain, oliguria/anuria, BLE edema.    Lab Results   Component Value Date/Time    CHOLSTRLTOT 195 05/17/2011 09:17 AM     05/17/2011 09:17 AM    HDL 46 05/17/2011 09:17 AM    TRIGLYCERIDE 216 (H) 05/17/2011 09:17 AM       Chronic bilateral low back pain  Chronic, stable, controlled with oral analgesics.    ROS is NEGATIVE for BLE radicular pain, saddle paresthesias, bowel or bladder incontinence, gait instability      Patient Active Problem List    Diagnosis Date Noted   • Chronic bilateral low back pain 05/18/2015     Priority: High   • SVT (supraventricular tachycardia) (CMS-HCC) 05/21/2015     Priority: Medium   • History of seizure disorder 05/21/2015     Priority: Low   • Medicare annual wellness visit, initial 06/12/2017   • Hypogonadism in male 11/22/2016   • History of prostate cancer 11/22/2016   • Encounter to establish care with new doctor 11/22/2016   • Glaucoma 11/22/2016   • Dyslipidemia 07/21/2011       Additional History:   Allergies:    Pcn [penicillins]     Current Medications:     Current Outpatient Prescriptions   Medication Sig Dispense Refill   • testosterone (ANDROGEL) 50 MG/5GM (1%) Gel gel Apply 5,000 mg to skin as directed every day for 30 days. 535318 mg 5   • atorvastatin (LIPITOR) 20 MG Tab Take 1 Tab by mouth every day. 90 Tab 1   • levetiracetam (KEPPRA) 500 MG Tab Take 1 Tab by mouth 2 times a day. 180 Tab 1   • bimatoprost (LUMIGAN) 0.01 % SOLN Place 1 Drop in right eye every bedtime.     • niacin (SLO-NIACIN) 500 MG tablet Take 1 Tab by mouth every day. 90 Each 3   • Omega-3 Fatty Acids (FISH OIL) 1000 MG Cap capsule Take 1,000 mg by mouth 3 times a day, with meals.     • multivitamin (THERAGRAN) Tab Take 1 Tab by mouth every day.     • Cholecalciferol (VITAMIN D3) 2000 UNIT CAPS Take  by mouth.     • CALCIUM CITRATE PO Take 500 mg by mouth every day.     • MAGNESIUM CITRATE PO Take 250 mg by mouth every day.     • VITAMIN C CR PO Take  by  "mouth. OTC VIT C        No current facility-administered medications for this visit.         Social History:     Social History   Substance Use Topics   • Smoking status: Former Smoker     Quit date: 1/1/1964   • Smokeless tobacco: Never Used   • Alcohol use Yes      Comment: one drink per week or less       ROS:     - NOTE: All other systems reviewed and are negative, except as in HPI.     Objective:   Physical Exam:    Vitals: Blood pressure 144/78, pulse 77, temperature 36.8 °C (98.2 °F), height 1.727 m (5' 7.99\"), weight 82.4 kg (181 lb 9.6 oz), SpO2 97 %.   BMI: Body mass index is 27.62 kg/m².   General/Constitutional: Vitals as above, Well nourished, well developed male in no acute distress   Head/Eyes: Head is grossly normal & atraumatic, bilateral conjunctivae clear and not injected, bilateral EOMI, bilateral PERRL   ENT: Bilateral external ears grossly normal in appearance, Hearing grossly intact, External nares normal in appearance and without discharge/bleeding   Respiratory: No respiratory distress, bilateral lungs are clear to ausculation in all lung fields (anterior/lateral/posterior), no wheezing/rhonchi/rales   Cardiovascular: Regular rate and rhythm without murmur/gallops/rubs, distal pulses are intact and equal bilaterally (radial, posterior tibial), no bilateral lower extremity edema   MSK: Gait grossly normal & not antalgic   Integumentary: No apparent rashes   Psych: Judgment grossly appropriate, no apparent depression/anxiety    Health Maintenance:     - Not addressed this visit    Imaging/Labs:     - Chronic, unknown control, patient to get labs as ordered by previous PCP June 2017, as well as testosterone and I will    Assessment and Plan:   1. Encounter to establish care with new doctor  Chronic, stable, patient in great health overall for an 89-year-old male.    2. History of seizure disorder  Chronic, stable, well controlled. Continue Keppra as directed.    3. Hypogonadism in " male  Chronic, unknown control, testosterone lab symptomatic, continue AndroGel in the meantime is refilled below.   - TESTOSTERONE F&T MALE ADULT; Future   - testosterone (ANDROGEL) 50 MG/5GM (1%) Gel gel; Apply 5,000 mg to skin as directed every day for 30 days.  Dispense: 316905 mg; Refill: 5    4. SVT (supraventricular tachycardia) (CMS-HCC)  Chronic, stable, well controlled. Recently not auscultated on today's physical exam. Examine this patient is symptomatic and comes to clinic.    5. Glaucoma of right eye, unspecified glaucoma type  Chronic, unknown control, crest records from Dr. Chapito Nowak.    6. Dyslipidemia  Chronic, unknown control, continue Lipitor, omega-3 fatty acid.    7. Chronic bilateral low back pain, with sciatica presence unspecified  Chronic, stable, well controlled at present time.        RTC: Many of 2018 for Medicare annual wellness visit..    PLEASE NOTE: This dictation was created using voice recognition software. I have made every reasonable attempt to correct obvious errors, but I expect that there are errors of grammar and possibly content that I did not discover before finalizing the note.

## 2018-01-13 NOTE — ASSESSMENT & PLAN NOTE
Chronic, stable, well-controlled on eyedrops, this is managed by his ophthalmologist, Dr. Brisa Nowak.

## 2018-01-13 NOTE — ASSESSMENT & PLAN NOTE
Patient and I discussed recent labs (see below) and that ASCVD risk is increased based on most recent lipid panel, current blood pressure (non-hypertensive), diabetes status (non-diabetic), and smoking status (non-smoker).    Patient and I then discussed necessary dietary changes to make to address dyslipidemia.  Patient verbalized understanding.    ROS is NEGATIVE for dizziness, generalized weakness/fatigue, vision/hearing changes, jaw pain/paresthesias, BUE pain/paresthesias/numbness/weakness, chest pain/pressure, palpitations, dyspnea, RUQ abdominal pain, oliguria/anuria, BLE edema.    Lab Results   Component Value Date/Time    CHOLSTRLTOT 195 05/17/2011 09:17 AM     05/17/2011 09:17 AM    HDL 46 05/17/2011 09:17 AM    TRIGLYCERIDE 216 (H) 05/17/2011 09:17 AM

## 2018-01-13 NOTE — ASSESSMENT & PLAN NOTE
Chronic, stable, well controlled, asymptomatic at present time.    ROS is NEGATIVE for dizziness, generalized weakness/fatigue, cold sweats, dizziness,  vision/hearing changes, jaw pain/paresthesias, BUE pain/paresthesias/numbness/weakness, chest pain/pressure, palpitations, dyspnea, nausea, RUQ abdominal pain, oliguria/anuria, BLE edema.

## 2018-01-13 NOTE — ASSESSMENT & PLAN NOTE
Chronic, stable, controlled with oral analgesics.    ROS is NEGATIVE for BLE radicular pain, saddle paresthesias, bowel or bladder incontinence, gait instability

## 2018-01-17 LAB
TESTOST FREE SERPL-MCNC: 9.4 PG/ML (ref 6.6–18.1)
TESTOST SERPL-MCNC: 539 NG/DL (ref 264–916)

## 2018-01-24 ENCOUNTER — PATIENT MESSAGE (OUTPATIENT)
Dept: MEDICAL GROUP | Facility: MEDICAL CENTER | Age: 83
End: 2018-01-24

## 2018-01-24 DIAGNOSIS — E29.1 HYPOGONADISM IN MALE: ICD-10-CM

## 2018-01-24 RX ORDER — TESTOSTERONE GEL, 1% 10 MG/G
50 GEL TRANSDERMAL DAILY
Qty: 30 PACKET | Refills: 0 | Status: SHIPPED | OUTPATIENT
Start: 2018-01-24 | End: 2018-01-25 | Stop reason: SDUPTHER

## 2018-01-24 RX ORDER — TESTOSTERONE GEL, 1% 10 MG/G
50 GEL TRANSDERMAL DAILY
Qty: 30 PACKET | Refills: 0 | Status: SHIPPED | OUTPATIENT
Start: 2018-01-24 | End: 2018-01-24 | Stop reason: SDUPTHER

## 2018-01-25 NOTE — TELEPHONE ENCOUNTER
"From: Homa Alonso  To: Ajay Austin M.D.  Sent: 1/24/2018 1:06 PM PST  Subject: Prescription Question    Dear Dr. Austin:  I discovered, on arriving here on [facility] for two weeks that I had forgotten to bring my testosterone medication. Would it be possible for you to fax a 14-day special refill prescription to the local [facility] here, fax # [phone], address [address] [place] [address], as soon as possible. Please feel free to call or text me at my mobile phone, [phone], if you have any questions.  Thank you very much. Aside from \"occasional forgetfulness\" we are having a great time!  Homa Alonso  "

## 2018-04-04 ENCOUNTER — APPOINTMENT (OUTPATIENT)
Dept: MEDICAL GROUP | Facility: MEDICAL CENTER | Age: 83
End: 2018-04-04
Payer: COMMERCIAL

## 2018-06-06 ENCOUNTER — TELEPHONE (OUTPATIENT)
Dept: MEDICAL GROUP | Facility: MEDICAL CENTER | Age: 83
End: 2018-06-06

## 2018-06-06 NOTE — TELEPHONE ENCOUNTER
ESTABLISHED PATIENT PRE-VISIT PLANNING     Note: Patient will not be contacted if there is no indication to call.     1.  Reviewed notes from the last few office visits within the medical group: Yes 01/10/2018    2.  If any orders were placed at last visit or intended to be done for this visit (i.e. 6 mos follow-up), do we have Results/Consult Notes?        •  Labs - Labs ordered, NOT completed. Patient advised to complete prior to next appointment.Labs ordered 05/14/2018 pcp does not specify when labs need to be completed    Note: If patient appointment is for lab review and patient did not complete labs, check with provider if OK to reschedule patient until labs completed.       •  Imaging - Imaging was not ordered at last office visit.       •  Referrals - No referrals were ordered at last office visit.    3. Is this appointment scheduled as a Hospital Follow-Up? No    4.  Immunizations were updated in [facility] using WebIZ?: Yes       •  Web Iz Recommendations: PREVNAR (PCV13)  and TDAP    5.  Patient is due for the following Health Maintenance Topics:   Health Maintenance Due   Topic Date Due   • IMM DTaP/Tdap/Td Vaccine (1 - Tdap) 04/09/1946   • IMM PNEUMOCOCCAL 65+ (ADULT) LOW/MEDIUM RISK SERIES (2 of 2 - PCV13) 10/01/2015       6.  MDX printed for Provider? NO    7.  Patient was NOT informed to arrive 15 min prior to their scheduled appointment and bring in their medication bottles.

## 2018-06-11 DIAGNOSIS — E29.1 HYPOGONADISM IN MALE: ICD-10-CM

## 2018-06-11 DIAGNOSIS — E25.9 ANDROGENITAL SYNDROME (HCC): ICD-10-CM

## 2018-06-11 LAB
ALBUMIN SERPL-MCNC: 4.5 G/DL (ref 3.2–4.6)
ALBUMIN/GLOB SERPL: 1.5 {RATIO} (ref 1.2–2.2)
ALP SERPL-CCNC: 63 IU/L (ref 39–117)
ALT SERPL-CCNC: 26 IU/L (ref 0–44)
AST SERPL-CCNC: 28 IU/L (ref 0–40)
BASOPHILS # BLD AUTO: 0 X10E3/UL (ref 0–0.2)
BASOPHILS NFR BLD AUTO: 0 %
BILIRUB SERPL-MCNC: 0.5 MG/DL (ref 0–1.2)
BUN SERPL-MCNC: 19 MG/DL (ref 10–36)
BUN/CREAT SERPL: 16 (ref 10–24)
CALCIUM SERPL-MCNC: 9.4 MG/DL (ref 8.6–10.2)
CHLORIDE SERPL-SCNC: 104 MMOL/L (ref 96–106)
CHOLEST SERPL-MCNC: 161 MG/DL (ref 100–199)
CO2 SERPL-SCNC: 22 MMOL/L (ref 18–29)
CREAT SERPL-MCNC: 1.17 MG/DL (ref 0.76–1.27)
EOSINOPHIL # BLD AUTO: 0.1 X10E3/UL (ref 0–0.4)
EOSINOPHIL NFR BLD AUTO: 1 %
ERYTHROCYTE [DISTWIDTH] IN BLOOD BY AUTOMATED COUNT: 14.1 % (ref 12.3–15.4)
GFR SERPLBLD CREATININE-BSD FMLA CKD-EPI: 54 ML/MIN/1.73
GFR SERPLBLD CREATININE-BSD FMLA CKD-EPI: 63 ML/MIN/1.73
GLOBULIN SER CALC-MCNC: 3.1 G/DL (ref 1.5–4.5)
GLUCOSE SERPL-MCNC: 107 MG/DL (ref 65–99)
HCT VFR BLD AUTO: 51.5 % (ref 37.5–51)
HDLC SERPL-MCNC: 52 MG/DL
HGB BLD-MCNC: 17.5 G/DL (ref 13–17.7)
IMM GRANULOCYTES # BLD: 0 X10E3/UL (ref 0–0.1)
IMM GRANULOCYTES NFR BLD: 1 %
IMMATURE CELLS  115398: ABNORMAL
LABORATORY COMMENT REPORT: ABNORMAL
LDLC SERPL CALC-MCNC: 75 MG/DL (ref 0–99)
LYMPHOCYTES # BLD AUTO: 3.1 X10E3/UL (ref 0.7–3.1)
LYMPHOCYTES NFR BLD AUTO: 36 %
MCH RBC QN AUTO: 30.4 PG (ref 26.6–33)
MCHC RBC AUTO-ENTMCNC: 34 G/DL (ref 31.5–35.7)
MCV RBC AUTO: 90 FL (ref 79–97)
MONOCYTES # BLD AUTO: 0.9 X10E3/UL (ref 0.1–0.9)
MONOCYTES NFR BLD AUTO: 11 %
MORPHOLOGY BLD-IMP: ABNORMAL
NEUTROPHILS # BLD AUTO: 4.4 X10E3/UL (ref 1.4–7)
NEUTROPHILS NFR BLD AUTO: 51 %
NRBC BLD AUTO-RTO: ABNORMAL %
PLATELET # BLD AUTO: 180 X10E3/UL (ref 150–379)
POTASSIUM SERPL-SCNC: 4.7 MMOL/L (ref 3.5–5.2)
PROT SERPL-MCNC: 7.6 G/DL (ref 6–8.5)
PSA SERPL-MCNC: 0.8 NG/ML (ref 0–4)
RBC # BLD AUTO: 5.75 X10E6/UL (ref 4.14–5.8)
SODIUM SERPL-SCNC: 142 MMOL/L (ref 134–144)
TRIGL SERPL-MCNC: 172 MG/DL (ref 0–149)
VLDLC SERPL CALC-MCNC: 34 MG/DL (ref 5–40)
WBC # BLD AUTO: 8.6 X10E3/UL (ref 3.4–10.8)

## 2018-06-11 RX ORDER — TESTOSTERONE GEL, 1% 10 MG/G
5 GEL TRANSDERMAL DAILY
Qty: 450000 MG | Refills: 2 | Status: SHIPPED | OUTPATIENT
Start: 2018-06-11 | End: 2018-08-11 | Stop reason: SDUPTHER

## 2018-06-11 NOTE — TELEPHONE ENCOUNTER
Was the patient seen in the last year in this department? Yes     Does patient have an active prescription for medications requested? No    Received Request Via: Pharmacy

## 2018-06-12 ENCOUNTER — TELEPHONE (OUTPATIENT)
Dept: MEDICAL GROUP | Facility: MEDICAL CENTER | Age: 83
End: 2018-06-12

## 2018-06-12 NOTE — TELEPHONE ENCOUNTER
FINAL PRIOR AUTHORIZATION STATUS:    1.  Name of Medication & Dose: testosterone (ANDROGEL) 50 MG/5GM (1%) Gel gel      2. Prior Auth Status: Approved through 06/12/2019     3. Action Taken: Pharmacy Notified: yes Patient Notified: yes

## 2018-06-12 NOTE — TELEPHONE ENCOUNTER
MEDICATION PRIOR AUTHORIZATION NEEDED:    1. Name of Medication: testosterone (ANDROGEL) 50 MG/5GM (1%) Gel gel     2. Requested By (Name of Pharmacy): [facility]      3. Is insurance on file current? Yes    4. What is the name & phone number of the 3rd party payor?[employer]

## 2018-06-12 NOTE — TELEPHONE ENCOUNTER
DOCUMENTATION OF PAR STATUS:    1. Name of Medication & Dose: testosterone (ANDROGEL) 50 MG/5GM (1%) Gel gel      2. Name of Prescription Coverage Company & phone #: [employer]     3. Date Prior Auth Submitted: 06/12/2018    4. What information was given to obtain insurance decision? In Chart    5. Prior Auth Status? Approved     6. Patient Notified: yes

## 2018-06-13 ENCOUNTER — OFFICE VISIT (OUTPATIENT)
Dept: MEDICAL GROUP | Facility: MEDICAL CENTER | Age: 83
End: 2018-06-13
Payer: COMMERCIAL

## 2018-06-13 VITALS
SYSTOLIC BLOOD PRESSURE: 96 MMHG | HEIGHT: 70 IN | WEIGHT: 181 LBS | BODY MASS INDEX: 25.91 KG/M2 | TEMPERATURE: 98.5 F | DIASTOLIC BLOOD PRESSURE: 70 MMHG | HEART RATE: 82 BPM | OXYGEN SATURATION: 98 %

## 2018-06-13 DIAGNOSIS — R73.01 ELEVATED FASTING GLUCOSE: ICD-10-CM

## 2018-06-13 DIAGNOSIS — Z85.46 HISTORY OF PROSTATE CANCER: ICD-10-CM

## 2018-06-13 DIAGNOSIS — G89.29 CHRONIC BILATERAL LOW BACK PAIN, WITH SCIATICA PRESENCE UNSPECIFIED: ICD-10-CM

## 2018-06-13 DIAGNOSIS — G40.909 SEIZURE DISORDER (HCC): ICD-10-CM

## 2018-06-13 DIAGNOSIS — Z00.00 MEDICARE ANNUAL WELLNESS VISIT, SUBSEQUENT: Primary | ICD-10-CM

## 2018-06-13 DIAGNOSIS — E78.5 DYSLIPIDEMIA: ICD-10-CM

## 2018-06-13 DIAGNOSIS — M54.5 CHRONIC BILATERAL LOW BACK PAIN, WITH SCIATICA PRESENCE UNSPECIFIED: ICD-10-CM

## 2018-06-13 DIAGNOSIS — E29.1 HYPOGONADISM IN MALE: ICD-10-CM

## 2018-06-13 LAB
HBA1C MFR BLD: 5.7 % (ref ?–5.8)
INT CON NEG: NEGATIVE
INT CON POS: POSITIVE

## 2018-06-13 PROCEDURE — G0439 PPPS, SUBSEQ VISIT: HCPCS | Performed by: FAMILY MEDICINE

## 2018-06-13 PROCEDURE — 83036 HEMOGLOBIN GLYCOSYLATED A1C: CPT | Performed by: FAMILY MEDICINE

## 2018-06-13 RX ORDER — ATORVASTATIN CALCIUM 20 MG/1
20 TABLET, FILM COATED ORAL DAILY
Qty: 90 TAB | Refills: 3 | Status: SHIPPED | OUTPATIENT
Start: 2018-06-13 | End: 2019-08-22 | Stop reason: SDUPTHER

## 2018-06-13 ASSESSMENT — ENCOUNTER SYMPTOMS: GENERAL WELL-BEING: EXCELLENT

## 2018-06-13 ASSESSMENT — ACTIVITIES OF DAILY LIVING (ADL): BATHING_REQUIRES_ASSISTANCE: 0

## 2018-06-13 ASSESSMENT — PATIENT HEALTH QUESTIONNAIRE - PHQ9: CLINICAL INTERPRETATION OF PHQ2 SCORE: 0

## 2018-06-13 NOTE — PROGRESS NOTES
Chief Complaint   Patient presents with   • Annual Exam         HPI:  Homa Alonso is a 89 y.o. here for Medicare Annual Wellness Visit     Hypogonadism in male  Chronic, stable, well controlled on testosterone, one packet daily.     Patient verbalizes understanding that taking testosterone can increase risk of CV disease, CVA, prostate cancer, male pattern baldness, shrinkage of testicles, breast enlargement, increased risk of blood clots, increased mood swings and/or aggression, decreased sperm count, infertility, polycythemia.  Patient willing to assume risks.    History of prostate cancer  Chronic, stable, well controlled.  PSA within normal limits.    Seizure disorder (HCC)  Chronic, stable, well-controlled.  Patient continues to take Keppra 500 mg p.o. twice daily, and follows with his neurologist, Dr. Valadez.    Dyslipidemia  Patient and I discussed recent labs (see below; mixed DLD, uncontrolled).    Patient and I then discussed necessary dietary changes to make to address dyslipidemia.  Patient is currently taking cholesterol lowering medication: Atorvastatin, Fish Oil.  Patient verbalized understanding.    ROS is NEGATIVE for dizziness, generalized weakness/fatigue, vision/hearing changes, jaw pain/paresthesias, BUE pain/paresthesias/numbness/weakness, chest pain/pressure, palpitations, dyspnea, RUQ abdominal pain, oliguria/anuria, BLE edema.    Lab Results   Component Value Date/Time    CHOLSTRLTOT 161 06/05/2018 08:39 AM    CHOLSTRLTOT 195 05/17/2011 09:17 AM    LDL 75 06/05/2018 08:39 AM     05/17/2011 09:17 AM    HDL 52 06/05/2018 08:39 AM    HDL 46 05/17/2011 09:17 AM    TRIGLYCERIDE 172 (H) 06/05/2018 08:39 AM    TRIGLYCERIDE 216 (H) 05/17/2011 09:17 AM       Chronic bilateral low back pain  Chronic, stable, well-controlled with oral analgesics.    ROS is NEGATIVE for BLE radicular pain, saddle paresthesias, bowel or bladder incontinence, gait instability       Patient Active Problem List     Diagnosis Date Noted   • Chronic bilateral low back pain 05/18/2015     Priority: High   • SVT (supraventricular tachycardia) ([facility]) 05/21/2015     Priority: Medium   • Seizure disorder ([facility]) 05/21/2015     Priority: Low   • Hypogonadism in male 11/22/2016   • History of prostate cancer 11/22/2016   • Glaucoma 11/22/2016   • Dyslipidemia 07/21/2011       Current Outpatient Prescriptions   Medication Sig Dispense Refill   • atorvastatin (LIPITOR) 20 MG Tab Take 1 Tab by mouth every day. 90 Tab 3   • levETIRAcetam (KEPPRA) 500 MG Tab TAKE ONE TABLET BY MOUTH TWICE DAILY 180 Tab 0   • testosterone (ANDROGEL) 50 MG/5GM (1%) Gel gel Apply 5,000 mg to skin as directed every day for 270 days. [id number] mg 2   • Omega-3 Fatty Acids (FISH OIL) 1000 MG Cap capsule Take 1,000 mg by mouth 3 times a day, with meals.     • multivitamin (THERAGRAN) Tab Take 1 Tab by mouth every day.     • Cholecalciferol (VITAMIN D3) 2000 UNIT CAPS Take  by mouth.     • CALCIUM CITRATE PO Take 500 mg by mouth every day.     • MAGNESIUM CITRATE PO Take 250 mg by mouth every day.     • bimatoprost (LUMIGAN) 0.01 % SOLN Place 1 Drop in right eye every bedtime.     • niacin (SLO-NIACIN) 500 MG tablet Take 1 Tab by mouth every day. 90 Each 3   • VITAMIN C CR PO Take  by mouth. OTC VIT C        No current facility-administered medications for this visit.             Current supplements as per medication list.       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 He has never used smokeless tobacco. He reports that he drinks alcohol. He reports that he does not use drugs.  Counseling given: Not Answered      DPA/Advanced Directive:  Patient has Advanced Directive, Living Will and Durable Power of , but it is not on file. Instructed to bring  in a copy to scan into their chart.    ROS:    Gait: Uses no assistive device  Ostomy: No  Other tubes: No  Amputations: No  Chronic oxygen use: No  Last eye exam: 02/2018 with Ophthalmologist, Dr. Roberts  Wears hearing aids: No   : Denies any urinary leakage during the last 6 months    Screening:  Depression Screening  Little interest or pleasure in doing things?  0 - not at all  Feeling down, depressed , or hopeless? 0 - not at all  Patient Health Questionnaire Score: 0     If depressive symptoms identified deferred to follow up visit unless specifically addressed in assessment and plan.    Interpretation of PHQ-9 Total Score   Score Severity   1-4 No Depression   5-9 Mild Depression   10-14 Moderate Depression   15-19 Moderately Severe Depression   20-27 Severe Depression    Screening for Cognitive Impairment  Three Minute Recall (leader, season, table) 3/3    Wander clock face with all 12 numbers and set the hands to show 10 past 11.  Yes    Cognitive concerns identified deferred for follow up unless specifically addressed in assessment and plan.    Fall Risk Assessment  Has the patient had two or more falls in the last year or any fall with injury in the last year?  No    Safety Assessment  Throw rugs on floor.  No  Handrails on all stairs.  Yes  Good lighting in all hallways.  Yes  Difficulty hearing.  Yes  Patient counseled about all safety risks that were identified.    Functional Assessment ADLs  Are there any barriers preventing you from cooking for yourself or meeting nutritional needs?  No.    Are there any barriers preventing you from driving safely or obtaining transportation?  No.    Are there any barriers preventing you from using a telephone or calling for help?  No.    Are there any barriers preventing you from shopping?  No.    Are there any barriers preventing you from taking care of your own finances?  No.    Are there any barriers preventing you from managing your medications?  No.    Are  there any barriers preventing you from showering, bathing or dressing yourself?  No.    Are you currently engaging in any exercise or physical activity?  Yes.  40min walk 3x per week  What is your perception of your health?  Excellent.      Health Maintenance Summary                IMM DTaP/Tdap/Td Vaccine Overdue 4/9/1946     IMM PNEUMOCOCCAL 65+ (ADULT) LOW/MEDIUM RISK SERIES Overdue 10/1/2015      Done 10/1/2014 Imm Admin: Pneumococcal polysaccharide vaccine (PPSV-23)    Annual Wellness Visit Next Due 5/12/2019      Done 5/11/2018 Visit Dx: Medicare annual wellness visit, subsequent     Patient has more history with this topic...    COLONOSCOPY Next Due 11/22/2026      Done 11/22/2016           Patient Care Team:  Ajay Austin M.D. as PCP - General (Family Medicine)  Ankush Valadez M.D. as Consulting Physician (Neurology)  Brisa Nowak M.D. as Consulting Physician (Ophthalmology)  Colin Garza M.D. as Consulting Physician (Cardiology)        Social History   Substance Use Topics   • Smoking status: Former Smoker     Quit date: 1/1/1964   • Smokeless tobacco: Never Used   • Alcohol use Yes      Comment: one drink per week or less     Family History   Problem Relation Age of Onset   • Heart Disease Mother      STROKE   • Stroke Mother    • Cancer Father    • Arthritis Sister      He  has a past medical history of Allergy, unspecified not elsewhere classified; Arthritis; Cancer ([facility]) (1991); Gall stones; Glaucoma; Hyperlipidemia; Pain; Seizure ([facility]) (06/13/2014); Unspecified cataract; and Unspecified hemorrhagic conditions.   Past Surgical History:   Procedure Laterality Date   • LUMBAR FUSION O-ARM Bilateral 5/18/2015    Procedure: LUMBAR FUSION O-ARM [83.10B] L3-5;  Surgeon: Francesco Joe M.D.;  Location: SURGERY [facility];  Service:    • LUMBAR DECOMPRESSION  5/18/2015    Procedure: LUMBAR DECOMPRESSION;  Surgeon: Francesco Joe M.D.;  Location: SURGERY [facility];  Service:    •  "CHRISTIE BY LAPAROSCOPY  12/3/2013    Performed by Braden Garner M.D. at SURGERY [facility] ORS   • APPENDECTOMY     • ATHROPLASTY     • EYE SURGERY     • KNEE REPLACEMENT, TOTAL Bilateral    • OTHER      artificial urinary spincter   • OTHER ORTHOPEDIC SURGERY     • PROSTATECTOMY, RADIAL         Exam:   Blood pressure (!) 96/70, pulse 82, temperature 36.9 °C (98.5 °F), height 1.778 m (5' 10\"), weight 82.1 kg (181 lb), SpO2 98 %. Body mass index is 25.97 kg/m².    Hearing good.    Dentition good  Alert, oriented in no acute distress.  Eye contact is good, speech goal directed, affect calm    Assessment and Plan. The following treatment and monitoring plan is recommended:    1. Medicare annual wellness visit, subsequent  [place] ANNUAL WELLNESS VISIT-INCLUDES PPPS SUBSEQUE*   2. Seizure disorder (HCC)  [place] ANNUAL WELLNESS VISIT-INCLUDES PPPS SUBSEQUE*   3. Hypogonadism in male  [place] ANNUAL WELLNESS VISIT-INCLUDES PPPS SUBSEQUE*   4. History of prostate cancer  [place] ANNUAL WELLNESS VISIT-INCLUDES PPPS SUBSEQUE*   5. Dyslipidemia  atorvastatin (LIPITOR) 20 MG Tab    [place] ANNUAL WELLNESS VISIT-INCLUDES PPPS SUBSEQUE*   6. Chronic bilateral low back pain, with sciatica presence unspecified  [place] ANNUAL WELLNESS VISIT-INCLUDES PPPS SUBSEQUE*   7. Elevated fasting glucose  POCT  A1C    [place] ANNUAL WELLNESS VISIT-INCLUDES PPPS SUBSEQUE*         Services suggested: No services needed at this time  Health Care Screening: Age-appropriate preventive services recommended by USPTF and ACIP covered by Medicare were discussed today. Services ordered if indicated and agreed upon by the patient.  Referrals offered: Community-based lifestyle interventions to reduce health risks and promote self-management and wellness, fall prevention, nutrition, physical activity, tobacco-use cessation, weight loss, and mental health services as per orders if indicated.    Discussion today about general wellness and lifestyle habits:    · Prevent falls and reduce " trip hazards; Cautioned about securing or removing rugs.  · Have a working fire alarm and carbon monoxide detector;   · Engage in regular physical activity and social activities     Follow-up: Return in about 6 months (around 12/7/2018) for General Checkup.

## 2018-06-15 DIAGNOSIS — Z86.69 HISTORY OF SEIZURE DISORDER: ICD-10-CM

## 2018-06-17 RX ORDER — LEVETIRACETAM 500 MG/1
500 TABLET ORAL 2 TIMES DAILY
Qty: 180 TAB | Refills: 1 | Status: SHIPPED | OUTPATIENT
Start: 2018-06-17 | End: 2019-02-27 | Stop reason: SDUPTHER

## 2018-06-17 NOTE — ASSESSMENT & PLAN NOTE
Patient and I discussed recent labs (see below; mixed DLD, uncontrolled).    Patient and I then discussed necessary dietary changes to make to address dyslipidemia.  Patient is currently taking cholesterol lowering medication: Atorvastatin, Fish Oil.  Patient verbalized understanding.    ROS is NEGATIVE for dizziness, generalized weakness/fatigue, vision/hearing changes, jaw pain/paresthesias, BUE pain/paresthesias/numbness/weakness, chest pain/pressure, palpitations, dyspnea, RUQ abdominal pain, oliguria/anuria, BLE edema.    Lab Results   Component Value Date/Time    CHOLSTRLTOT 161 06/05/2018 08:39 AM    CHOLSTRLTOT 195 05/17/2011 09:17 AM    LDL 75 06/05/2018 08:39 AM     05/17/2011 09:17 AM    HDL 52 06/05/2018 08:39 AM    HDL 46 05/17/2011 09:17 AM    TRIGLYCERIDE 172 (H) 06/05/2018 08:39 AM    TRIGLYCERIDE 216 (H) 05/17/2011 09:17 AM

## 2018-06-17 NOTE — ASSESSMENT & PLAN NOTE
Chronic, stable, well-controlled.  Patient continues to take Keppra 500 mg p.o. twice daily, and follows with his neurologist, Dr. Valadez.

## 2018-06-17 NOTE — ASSESSMENT & PLAN NOTE
Chronic, stable, well-controlled with oral analgesics.    ROS is NEGATIVE for BLE radicular pain, saddle paresthesias, bowel or bladder incontinence, gait instability

## 2018-12-15 NOTE — LETTER
[facility]  Ajay Austin M.D.  [address]  [place] [address]  Fax: [phone]   Authorization for Release/Disclosure of   Protected Health Information   Name: HOMA WALLER : 1927 SSN: [id number]   Address: [address] [place] [address]  [place] [address] Phone:    [phone] (home)    I authorize the entity listed below to release/disclose the PHI below to:   [facility]/Ajay Austin M.D. and Ajay Austin M.D.   Provider or Entity Name:     Address   City, State, Zip   Phone:      Fax:     Reason for request: continuity of care   Information to be released:    [  ] LAST COLONOSCOPY,  including any PATH REPORT and follow-up  [  ] LAST FIT/COLOGUARD RESULT [  ] LAST DEXA  [  ] LAST MAMMOGRAM  [  ] LAST PAP  [  ] LAST LABS [  ] RETINA EXAM REPORT  [  ] IMMUNIZATION RECORDS  [  ] Release all info      [  ] Check here and initial the line next to each item to release ALL health information INCLUDING  _____ Care and treatment for drug and / or alcohol abuse  _____ HIV testing, infection status, or AIDS  _____ Genetic Testing    DATES OF SERVICE OR TIME PERIOD TO BE DISCLOSED: _____________  I understand and acknowledge that:  * This Authorization may be revoked at any time by you in writing, except if your health information has already been used or disclosed.  * Your health information that will be used or disclosed as a result of you signing this authorization could be re-disclosed by the recipient. If this occurs, your re-disclosed health information may no longer be protected by State or Federal laws.  * You may refuse to sign this Authorization. Your refusal will not affect your ability to obtain treatment.  * This Authorization becomes effective upon signing and will  on (date) __________.      If no date is indicated, this Authorization will  one (1) year from the signature date.    Name: Homa Waller    Signature:   Date:     1/10/2018       PLEASE FAX REQUESTED  RECORDS BACK TO: [phone]   complains of pain/discomfort

## 2019-02-27 ENCOUNTER — OFFICE VISIT (OUTPATIENT)
Dept: MEDICAL GROUP | Facility: MEDICAL CENTER | Age: 84
End: 2019-02-27
Payer: COMMERCIAL

## 2019-02-27 VITALS
HEIGHT: 70 IN | HEART RATE: 82 BPM | OXYGEN SATURATION: 94 % | WEIGHT: 179.2 LBS | DIASTOLIC BLOOD PRESSURE: 60 MMHG | BODY MASS INDEX: 25.65 KG/M2 | SYSTOLIC BLOOD PRESSURE: 100 MMHG | TEMPERATURE: 97.6 F

## 2019-02-27 DIAGNOSIS — E78.5 DYSLIPIDEMIA: ICD-10-CM

## 2019-02-27 DIAGNOSIS — Z85.46 HISTORY OF PROSTATE CANCER: ICD-10-CM

## 2019-02-27 DIAGNOSIS — E29.1 HYPOGONADISM IN MALE: ICD-10-CM

## 2019-02-27 DIAGNOSIS — Z00.00 MEDICARE ANNUAL WELLNESS VISIT, SUBSEQUENT: ICD-10-CM

## 2019-02-27 DIAGNOSIS — G40.909 SEIZURE DISORDER (HCC): ICD-10-CM

## 2019-02-27 DIAGNOSIS — Z90.79 H/O RADICAL PROSTATECTOMY: ICD-10-CM

## 2019-02-27 DIAGNOSIS — Z86.69 HISTORY OF SEIZURE DISORDER: ICD-10-CM

## 2019-02-27 PROCEDURE — 99214 OFFICE O/P EST MOD 30 MIN: CPT | Performed by: FAMILY MEDICINE

## 2019-02-27 RX ORDER — LEVETIRACETAM 500 MG/1
500 TABLET ORAL 2 TIMES DAILY
Qty: 180 TAB | Refills: 0 | Status: SHIPPED | OUTPATIENT
Start: 2019-02-27 | End: 2019-07-16

## 2019-02-27 RX ORDER — TESTOSTERONE GEL, 1% 10 MG/G
50 GEL TRANSDERMAL DAILY
Qty: 90 PACKET | Refills: 0 | Status: SHIPPED | OUTPATIENT
Start: 2019-02-27 | End: 2019-05-14 | Stop reason: SDUPTHER

## 2019-02-27 ASSESSMENT — PATIENT HEALTH QUESTIONNAIRE - PHQ9: CLINICAL INTERPRETATION OF PHQ2 SCORE: 0

## 2019-03-04 NOTE — ASSESSMENT & PLAN NOTE
Chronic, unknown control, previously uncontrolled based on labs from June 2018.  Patient was with hypertriglyceridemia at that time, however without gross hyperlipidemia.  Therefore, I am anticipating the patient should have similar upon this recheck.    Patient is without signs or symptoms of ischemic cardiomyopathy:ROS is NEGATIVE for dizziness, generalized weakness/fatigue, cold sweats,  vision/hearing changes, jaw pain/paresthesias, BUE pain/paresthesias/numbness/weakness, chest pain/pressure, palpitations, dyspnea, nausea, RUQ abdominal pain, oliguria/anuria, BLE edema.

## 2019-03-04 NOTE — ASSESSMENT & PLAN NOTE
Chronic, unknown control, patient states that he ran out of his testosterone medication within the last week.  However, we do not have current, up-to-date labs for his testosterone levels.  Last labs were drawn in January 17, 2018, and testosterone levels were normal at that time.    Patient verbalizes understanding that taking testosterone can increase risk of CV disease, CVA, prostate cancer, male pattern baldness, shrinkage of testicles, breast enlargement, increased risk of blood clots, increased mood swings and/or aggression, decreased sperm count, infertility, polycythemia.  Patient is willing to assume risks.    Patient is without signs or symptoms of cerebrovascular accident, nor of ischemic cardiomyopathy, nor of DVT/PE:  ROS is NEGATIVE for confusion, altered mentation, word finding difficulty, memory loss, diplopia, visual scotomas, facial droop, dysarthria, dysphagia, hemiplegia, gait instability, new/abnormal paresthesias/numbness.    ROS is NEGATIVE for dizziness, generalized weakness/fatigue, cold sweats,  vision/hearing changes, jaw pain/paresthesias, BUE pain/paresthesias/numbness/weakness, chest pain/pressure, palpitations, dyspnea, nausea, RUQ abdominal pain, oliguria/anuria, BLE edema.    ROS is NEGATIVE for fevers, chills, generalized weakness/fatigue, dizziness, vertigo, lightheadedness, tachypnea, dyspnea, pain on deep inspiration, tachycardia, palpitations, bilateral lower extremity pain/paresthesias/pallor/poikilothermia/weakness/paralysis.

## 2019-03-04 NOTE — PROGRESS NOTES
Subjective:   Chief Complaint/History of Present Illness:  Homa Alonso is a 89 y.o. male established patient who presents today to discuss medical problems as listed below    Diagnoses of Hypogonadism in male, Dyslipidemia, Seizure disorder (HCC), H/O radical prostatectomy, H/O prostate cancer, Medicare annual wellness visit, subsequent, and History of seizure disorder were pertinent to this visit.    Hypogonadism in male  Chronic, unknown control, patient states that he ran out of his testosterone medication within the last week.  However, we do not have current, up-to-date labs for his testosterone levels.  Last labs were drawn in January 17, 2018, and testosterone levels were normal at that time.    Patient verbalizes understanding that taking testosterone can increase risk of CV disease, CVA, prostate cancer, male pattern baldness, shrinkage of testicles, breast enlargement, increased risk of blood clots, increased mood swings and/or aggression, decreased sperm count, infertility, polycythemia.  Patient is willing to assume risks.    Patient is without signs or symptoms of cerebrovascular accident, nor of ischemic cardiomyopathy, nor of DVT/PE:  ROS is NEGATIVE for confusion, altered mentation, word finding difficulty, memory loss, diplopia, visual scotomas, facial droop, dysarthria, dysphagia, hemiplegia, gait instability, new/abnormal paresthesias/numbness.    ROS is NEGATIVE for dizziness, generalized weakness/fatigue, cold sweats,  vision/hearing changes, jaw pain/paresthesias, BUE pain/paresthesias/numbness/weakness, chest pain/pressure, palpitations, dyspnea, nausea, RUQ abdominal pain, oliguria/anuria, BLE edema.    ROS is NEGATIVE for fevers, chills, generalized weakness/fatigue, dizziness, vertigo, lightheadedness, tachypnea, dyspnea, pain on deep inspiration, tachycardia, palpitations, bilateral lower extremity pain/paresthesias/pallor/poikilothermia/weakness/paralysis.    Dyslipidemia  Chronic,  unknown control, previously uncontrolled based on labs from June 2018.  Patient was with hypertriglyceridemia at that time, however without gross hyperlipidemia.  Therefore, I am anticipating the patient should have similar upon this recheck.    Patient is without signs or symptoms of ischemic cardiomyopathy:ROS is NEGATIVE for dizziness, generalized weakness/fatigue, cold sweats,  vision/hearing changes, jaw pain/paresthesias, BUE pain/paresthesias/numbness/weakness, chest pain/pressure, palpitations, dyspnea, nausea, RUQ abdominal pain, oliguria/anuria, BLE edema.     (HCC) Seizure disorder  Chronic, stable, well-controlled per symptoms.  Patient continues to take Keppra as directed.  We do not have up-to-date labs for his Keppra.  Therefore, we will order them, as below.    ROS negative for loss of consciousness, confusion, uncontrollable movements, tongue biting behavior, bowel or bladder incontinence.    H/O radical prostatectomy  Chronic, stable, well-controlled per symptoms.  Patient's last PSA in 2018 were within normal limits.    H/O prostate cancer  Chronic, stable, well-controlled per symptoms.  Patient's last PSA in 2018 were within normal limits.      Patient Active Problem List    Diagnosis Date Noted   • Chronic bilateral low back pain 05/18/2015     Priority: High   • (HCC) SVT (supraventricular tachycardia) 05/21/2015     Priority: Medium   • (HCC) Seizure disorder 05/21/2015     Priority: Low   • H/O radical prostatectomy 02/27/2019   • Hypogonadism in male 11/22/2016   • H/O prostate cancer 11/22/2016   • Glaucoma 11/22/2016   • Dyslipidemia 07/21/2011       Additional History:   Allergies:    Pcn [penicillins]     Current Medications:     Current Outpatient Prescriptions   Medication Sig Dispense Refill   • levETIRAcetam (KEPPRA) 500 MG Tab Take 1 Tab by mouth 2 times a day. TWICE DAILY 180 Tab 0   • testosterone (TESTIM,ANDROGEL) 50 MG/5GM (1%) Gel gel Apply 50 mg to skin as directed every day  "for 90 days. 90 Packet 0   • atorvastatin (LIPITOR) 20 MG Tab Take 1 Tab by mouth every day. 90 Tab 3   • Omega-3 Fatty Acids (FISH OIL) 1000 MG Cap capsule Take 1,000 mg by mouth 3 times a day, with meals.     • multivitamin (THERAGRAN) Tab Take 1 Tab by mouth every day.     • Cholecalciferol (VITAMIN D3) 2000 UNIT CAPS Take  by mouth.     • CALCIUM CITRATE PO Take 500 mg by mouth every day.     • MAGNESIUM CITRATE PO Take 250 mg by mouth every day.     • bimatoprost (LUMIGAN) 0.01 % SOLN Place 1 Drop in right eye every bedtime.     • niacin (SLO-NIACIN) 500 MG tablet Take 1 Tab by mouth every day. 90 Each 3   • VITAMIN C CR PO Take  by mouth. OTC VIT C        No current facility-administered medications for this visit.         Social History:     Social History   Substance Use Topics   • Smoking status: Former Smoker     Quit date: 1/1/1964   • Smokeless tobacco: Never Used   • Alcohol use Yes      Comment: one drink per week or less       ROS:     - NOTE: All other systems reviewed and are negative, except as in HPI.     Objective:   Physical Exam:    Vitals: Blood pressure 100/60, pulse 82, temperature 36.4 °C (97.6 °F), height 1.778 m (5' 10\"), weight 81.3 kg (179 lb 3.2 oz), SpO2 94 %.   BMI: Body mass index is 25.71 kg/m².   General/Constitutional: Vitals as above, Well nourished, well developed male in no acute distress   Head/Eyes: Head is grossly normal & atraumatic, bilateral conjunctivae clear and not injected, bilateral EOMI, bilateral PERRL   ENT: Bilateral external ears grossly normal in appearance, Hearing grossly intact, External nares normal in appearance and without discharge/bleeding   Respiratory: No respiratory distress, bilateral lungs are clear to ausculation in all lung fields (anterior/lateral/posterior), no wheezing/rhonchi/rales   Cardiovascular: Regular rate and rhythm without murmur/gallops/rubs, distal pulses are intact and equal bilaterally (radial, posterior tibial), no bilateral " lower extremity edema   MSK: Gait grossly normal & not antalgic   Integumentary: No apparent rashes   Psych: Judgment grossly appropriate, no apparent depression/anxiety    Health Maintenance:     - Discussed vaccinations, patient will consider obtaining them at a future visit.    Imaging/Labs:     - 06/11/18 -- HTG, BEATRIZ vs. CKD (recheck BMP), elevated hematocrit, o/w labs WNL (PSA, CBC & Lipid profile & CMP apart from previous assessments)    - 01/17/18 -- testosterone levels are normal    Assessment and Plan:   1. Hypogonadism in male  Chronic, unknown control, evaluate with labs for testosterone levels as well as potential side effects, as below.   - TESTOSTERONE F&T MALE ADULT; Future   - Comp Metabolic Panel; Future   - CBC WITH DIFFERENTIAL; Future   - PROSTATE SPECIFIC AG DIAGNOSTIC; Future   - testosterone (TESTIM,ANDROGEL) 50 MG/5GM (1%) Gel gel; Apply 50 mg to skin as directed every day for 90 days.  Dispense: 90 Packet; Refill: 0    2. Dyslipidemia  Chronic, unknown control, evaluate with labs as below.   - Lipid Profile; Future    3. Seizure disorder (HCC)  4. History of seizure disorder  Chronic, stable, well-controlled.  Continue Keppra, evaluate with Keppra labs, as below.   - Comp Metabolic Panel; Future   - CBC WITH DIFFERENTIAL; Future   - KEPPRA; Future   - levETIRAcetam (KEPPRA) 500 MG Tab; Take 1 Tab by mouth 2 times a day. TWICE DAILY  Dispense: 180 Tab; Refill: 0    5. H/O radical prostatectomy  6. H/O prostate cancer  Chronic, stable, well-controlled per symptoms.  Evaluate with labs, as below.   - TESTOSTERONE F&T MALE ADULT; Future   - Comp Metabolic Panel; Future   - CBC WITH DIFFERENTIAL; Future   - PROSTATE SPECIFIC AG DIAGNOSTIC; Future     7. Medicare annual wellness visit, subsequent  Labs ordered for Medicare annual wellness   - TESTOSTERONE F&T MALE ADULT; Future   - Comp Metabolic Panel; Future   - Lipid Profile; Future   - CBC WITH DIFFERENTIAL; Future   - PROSTATE SPECIFIC AG  DIAGNOSTIC; Future   - KEPPRA; Future        RTC: In 2-1/2 months for Medicare annual wellness visit, testosterone refill.    PLEASE NOTE: This dictation was created using voice recognition software. I have made every reasonable attempt to correct obvious errors, but I expect that there are errors of grammar and possibly content that I did not discover before finalizing the note.

## 2019-03-04 NOTE — ASSESSMENT & PLAN NOTE
Chronic, stable, well-controlled per symptoms.  Patient's last PSA in 2018 were within normal limits.

## 2019-03-04 NOTE — ASSESSMENT & PLAN NOTE
Chronic, stable, well-controlled per symptoms.  Patient continues to take Keppra as directed.  We do not have up-to-date labs for his Keppra.  Therefore, we will order them, as below.    ROS negative for loss of consciousness, confusion, uncontrollable movements, tongue biting behavior, bowel or bladder incontinence.

## 2019-05-05 LAB
ALBUMIN SERPL-MCNC: 4.3 G/DL (ref 3.2–4.6)
ALBUMIN/GLOB SERPL: 1.3 {RATIO} (ref 1.2–2.2)
ALP SERPL-CCNC: 71 IU/L (ref 39–117)
ALT SERPL-CCNC: 23 IU/L (ref 0–44)
AST SERPL-CCNC: 29 IU/L (ref 0–40)
BASOPHILS # BLD AUTO: 0 X10E3/UL (ref 0–0.2)
BASOPHILS NFR BLD AUTO: 0 %
BILIRUB SERPL-MCNC: 0.9 MG/DL (ref 0–1.2)
BUN SERPL-MCNC: 18 MG/DL (ref 10–36)
BUN/CREAT SERPL: 16 (ref 10–24)
CALCIUM SERPL-MCNC: 9.5 MG/DL (ref 8.6–10.2)
CHLORIDE SERPL-SCNC: 103 MMOL/L (ref 96–106)
CHOLEST SERPL-MCNC: 132 MG/DL (ref 100–199)
CO2 SERPL-SCNC: 21 MMOL/L (ref 20–29)
CREAT SERPL-MCNC: 1.13 MG/DL (ref 0.76–1.27)
EOSINOPHIL # BLD AUTO: 0.1 X10E3/UL (ref 0–0.4)
EOSINOPHIL NFR BLD AUTO: 1 %
ERYTHROCYTE [DISTWIDTH] IN BLOOD BY AUTOMATED COUNT: 14 % (ref 12.3–15.4)
GLOBULIN SER CALC-MCNC: 3.2 G/DL (ref 1.5–4.5)
GLUCOSE SERPL-MCNC: 108 MG/DL (ref 65–99)
HCT VFR BLD AUTO: 49.1 % (ref 37.5–51)
HDLC SERPL-MCNC: 51 MG/DL
HGB BLD-MCNC: 16.6 G/DL (ref 13–17.7)
IMM GRANULOCYTES # BLD AUTO: 0 X10E3/UL (ref 0–0.1)
IMM GRANULOCYTES NFR BLD AUTO: 0 %
IMMATURE CELLS  115398: NORMAL
LABORATORY COMMENT REPORT: NORMAL
LDLC SERPL CALC-MCNC: 53 MG/DL (ref 0–99)
LEVETIRACETAM SERPL-MCNC: 9.5 UG/ML (ref 10–40)
LYMPHOCYTES # BLD AUTO: 2.5 X10E3/UL (ref 0.7–3.1)
LYMPHOCYTES NFR BLD AUTO: 29 %
MCH RBC QN AUTO: 31.6 PG (ref 26.6–33)
MCHC RBC AUTO-ENTMCNC: 33.8 G/DL (ref 31.5–35.7)
MCV RBC AUTO: 94 FL (ref 79–97)
MONOCYTES # BLD AUTO: 0.9 X10E3/UL (ref 0.1–0.9)
MONOCYTES NFR BLD AUTO: 10 %
MORPHOLOGY BLD-IMP: NORMAL
NEUTROPHILS # BLD AUTO: 5.1 X10E3/UL (ref 1.4–7)
NEUTROPHILS NFR BLD AUTO: 60 %
NRBC BLD AUTO-RTO: NORMAL %
PLATELET # BLD AUTO: 191 X10E3/UL (ref 150–379)
POTASSIUM SERPL-SCNC: 4.5 MMOL/L (ref 3.5–5.2)
PROT SERPL-MCNC: 7.5 G/DL (ref 6–8.5)
PSA SERPL-MCNC: 0.9 NG/ML (ref 0–4)
RBC # BLD AUTO: 5.25 X10E6/UL (ref 4.14–5.8)
SODIUM SERPL-SCNC: 141 MMOL/L (ref 134–144)
TESTOST FREE SERPL-MCNC: 7.2 PG/ML (ref 6.6–18.1)
TESTOST SERPL-MCNC: 297 NG/DL (ref 264–916)
TRIGL SERPL-MCNC: 139 MG/DL (ref 0–149)
VLDLC SERPL CALC-MCNC: 28 MG/DL (ref 5–40)
WBC # BLD AUTO: 8.6 X10E3/UL (ref 3.4–10.8)

## 2019-05-07 ENCOUNTER — TELEPHONE (OUTPATIENT)
Dept: MEDICAL GROUP | Facility: MEDICAL CENTER | Age: 84
End: 2019-05-07

## 2019-05-07 NOTE — TELEPHONE ENCOUNTER
Future Appointments       Provider Department Center    5/14/2019 11:20 AM Ajay Austin M.D.; [facility]          ANNUAL WELLNESS VISIT PRE-VISIT PLANNING WITHOUT OUTREACH    1.  Reviewed note from last office visit with PCP: YES    2.  If any orders were placed at last visit, do we have Results/Consult Notes?        •  Labs - Labs ordered, completed on 5/1/2019 and results are in chart.  Note: If patient appointment is for lab review and patient did not complete labs, check with provider if OK to reschedule patient until labs completed.       •  Imaging - Imaging was not ordered at last office visit.       •  Referrals - No referrals were ordered at last office visit.    3.  Immunizations were updated in [facility] using WebIZ?: Yes       •  WebIZ Recommendations: PREVNAR (PCV13) , TDAP, SHINGRIX (Shingles) and CPOX        •  Is patient due for Tdap? YES. Patient was notified of copay/out of pocket cost.       •  Is patient due for Shingrix? YES. Patient was notified of copay/out of pocket cost.     4.  Patient is due for the following Health Maintenance Topics:   Health Maintenance Due   Topic Date Due   • IMM DTaP/Tdap/Td Vaccine (1 - Tdap) 04/09/1946   • IMM ZOSTER VACCINES (1 of 2) 04/09/1977   • IMM PNEUMOCOCCAL 65+ (ADULT) LOW/MEDIUM RISK SERIES (2 of 2 - PCV13) 10/01/2015       - Patient already has appointment scheduled for Immunizations: TDAP.  - Patient declines Immunizations: PREVNAR (PCV13) .    5.  Reviewed/Updated the following with patient:       •   Preferred Pharmacy? YES       •   Preferred Lab? YES       •   Preferred Communication? YES       •   Allergies? YES       •   Medications? YES. Was Abstract Encounter opened and chart updated? YES       •   Social History? YES. Was Abstract Encounter opened and chart updated? YES       •   Family History (document living status of immediate family members and if + hx of  cancer,  diabetes, hypertension, hyperlipidemia, heart attack, stroke) YES. Was Abstract Encounter opened and chart updated? YES    6.  Care Team Updated:       •   DME Company (gait device, O2, CPAP, etc.): NO       •   Other Specialists (eye doctor, derm, GYN, cardiology, endo, etc): NO    7. Orders for overdue Health Maintenance topics pended in Pre-Charting? NO    8.  Patient has the following Care Path diagnoses on Problem List:  NONE    9.  Patient was advised: “This is a free wellness visit. The provider will screen for medical conditions to help you stay healthy. If you have other concerns to address you may be asked to discuss these at a separate visit or there may be an additional fee.”     10.  Patient was informed to arrive 15 min prior to their scheduled appointment and bring in their medication bottles.

## 2019-05-14 ENCOUNTER — OFFICE VISIT (OUTPATIENT)
Dept: MEDICAL GROUP | Facility: MEDICAL CENTER | Age: 84
End: 2019-05-14
Payer: COMMERCIAL

## 2019-05-14 VITALS
WEIGHT: 176.2 LBS | BODY MASS INDEX: 26.7 KG/M2 | HEART RATE: 62 BPM | HEIGHT: 68 IN | OXYGEN SATURATION: 94 % | SYSTOLIC BLOOD PRESSURE: 120 MMHG | TEMPERATURE: 98.4 F | DIASTOLIC BLOOD PRESSURE: 62 MMHG

## 2019-05-14 DIAGNOSIS — I35.1 NONRHEUMATIC AORTIC VALVE INSUFFICIENCY: ICD-10-CM

## 2019-05-14 DIAGNOSIS — H40.9 GLAUCOMA OF RIGHT EYE, UNSPECIFIED GLAUCOMA TYPE: ICD-10-CM

## 2019-05-14 DIAGNOSIS — Z85.46 HISTORY OF PROSTATE CANCER: ICD-10-CM

## 2019-05-14 DIAGNOSIS — Z90.79 H/O RADICAL PROSTATECTOMY: ICD-10-CM

## 2019-05-14 DIAGNOSIS — M54.5 CHRONIC BILATERAL LOW BACK PAIN, WITH SCIATICA PRESENCE UNSPECIFIED: ICD-10-CM

## 2019-05-14 DIAGNOSIS — E29.1 HYPOGONADISM IN MALE: ICD-10-CM

## 2019-05-14 DIAGNOSIS — Z23 NEED FOR VACCINATION: ICD-10-CM

## 2019-05-14 DIAGNOSIS — Z00.00 MEDICARE ANNUAL WELLNESS VISIT, SUBSEQUENT: Primary | ICD-10-CM

## 2019-05-14 DIAGNOSIS — E78.5 DYSLIPIDEMIA: ICD-10-CM

## 2019-05-14 DIAGNOSIS — I47.10 SVT (SUPRAVENTRICULAR TACHYCARDIA): ICD-10-CM

## 2019-05-14 DIAGNOSIS — G89.29 CHRONIC BILATERAL LOW BACK PAIN, WITH SCIATICA PRESENCE UNSPECIFIED: ICD-10-CM

## 2019-05-14 DIAGNOSIS — G40.909 SEIZURE DISORDER (HCC): ICD-10-CM

## 2019-05-14 PROCEDURE — G0439 PPPS, SUBSEQ VISIT: HCPCS | Performed by: FAMILY MEDICINE

## 2019-05-14 RX ORDER — TESTOSTERONE GEL, 1% 10 MG/G
50 GEL TRANSDERMAL DAILY
Qty: 90 PACKET | Refills: 0 | Status: SHIPPED | OUTPATIENT
Start: 2019-05-14 | End: 2019-08-06 | Stop reason: SDUPTHER

## 2019-05-14 ASSESSMENT — ENCOUNTER SYMPTOMS: GENERAL WELL-BEING: EXCELLENT

## 2019-05-14 ASSESSMENT — PAIN SCALES - GENERAL: PAINLEVEL: NO PAIN

## 2019-05-14 ASSESSMENT — ACTIVITIES OF DAILY LIVING (ADL): BATHING_REQUIRES_ASSISTANCE: 0

## 2019-05-14 ASSESSMENT — PATIENT HEALTH QUESTIONNAIRE - PHQ9: CLINICAL INTERPRETATION OF PHQ2 SCORE: 0

## 2019-05-14 NOTE — ASSESSMENT & PLAN NOTE
This problem is chronic, stable, and monitored appropriately by history/labs/imaging as needed, and is well-controlled on the current regimen.  Please continue current regimen on Keppra, despite having subtherapeutic level, patient is not having any symptoms.  Patient to have second opinion and establishment of care with [facility] based on patient preference.

## 2019-05-14 NOTE — PROGRESS NOTES
Chief Complaint   Patient presents with   • Annual Wellness Visit         HPI:  Homa is a 89 y.o. here for Medicare Annual Wellness Visit    (HCC) Seizure disorder  This problem is chronic, stable, and monitored appropriately by history/labs/imaging as needed, and is well-controlled on the current regimen.  Please continue current regimen on Keppra, despite having subtherapeutic level, patient is not having any symptoms.  Patient to have second opinion and establishment of care with [facility] based on patient preference.    (HCC) SVT (supraventricular tachycardia)  This problem is chronic, stable, and monitored appropriately by history/labs/imaging as needed, and is well-controlled on the current regimen.  Please continue current regimen, and referral to cardiology for reestablishment of cardiac care.      Nonrheumatic aortic valve insufficiency  This problem is chronic, stable, and monitored appropriately by history/labs/imaging as needed, and is well-controlled on the current regimen.  Please continue current regimen, and referral to cardiology for reestablishment of cardiac care.    Chronic bilateral low back pain  This problem is chronic, stable, and monitored appropriately by history/labs/imaging as needed, and is well-controlled on the current regimen.  Please continue current regimen    Dyslipidemia  This problem is chronic, stable, and monitored appropriately by history/labs/imaging as needed, and is well-controlled on the current regimen.  Please continue current regimen    Glaucoma  This problem is chronic, stable, and monitored appropriately by history/labs/imaging as needed, and is well-controlled on the current regimen.  Please continue current regimen    Hypogonadism in male  This problem is chronic, stable, and monitored appropriately by history/labs/imaging as needed, and is well-controlled on the current regimen.  Please continue current regimen    H/O prostate cancer  This problem is  chronic, stable, and monitored appropriately by history/labs/imaging as needed, and is well-controlled on the current regimen.  Please continue current regimen    H/O radical prostatectomy  This problem is chronic, stable, and monitored appropriately by history/labs/imaging as needed, and is well-controlled on the current regimen.  Please continue current regimen      Patient Active Problem List    Diagnosis Date Noted   • Chronic bilateral low back pain 05/18/2015     Priority: High   • (HCC) SVT (supraventricular tachycardia) 05/21/2015     Priority: Medium   • (HCC) Seizure disorder 05/21/2015     Priority: Low   • Nonrheumatic aortic valve insufficiency 05/14/2019   • H/O radical prostatectomy 02/27/2019   • Hypogonadism in male 11/22/2016   • H/O prostate cancer 11/22/2016   • Glaucoma 11/22/2016   • Dyslipidemia 07/21/2011       Current Outpatient Prescriptions   Medication Sig Dispense Refill   • B Complex Vitamins (B COMPLEX B-12 PO) Take  by mouth.     • tetanus-dipth-acell pertussis (ADACEL) 5-2-15.5 LF-MCG/0.5 Suspension 0.5 mL by Intramuscular route Once PRN for up to 1 dose. 0.5 mL 0   • pneumococcal 13-Harper Conj Vacc (PREVNAR 13) syringe 0.5 mL by Intramuscular route Once PRN for up to 1 dose. 0.5 Each 0   • testosterone (TESTIM,ANDROGEL) 50 MG/5GM (1%) Gel gel Apply 50 mg to skin as directed every day for 90 days. 90 Packet 0   • levETIRAcetam (KEPPRA) 500 MG Tab Take 1 Tab by mouth 2 times a day. TWICE DAILY 180 Tab 0   • atorvastatin (LIPITOR) 20 MG Tab Take 1 Tab by mouth every day. 90 Tab 3   • Omega-3 Fatty Acids (FISH OIL) 1000 MG Cap capsule Take 1,000 mg by mouth 3 times a day, with meals.     • multivitamin (THERAGRAN) Tab Take 1 Tab by mouth every day.     • Cholecalciferol (VITAMIN D3) 2000 UNIT CAPS Take  by mouth.     • CALCIUM CITRATE PO Take 500 mg by mouth every day.     • MAGNESIUM CITRATE PO Take 250 mg by mouth every day.     • bimatoprost (LUMIGAN) 0.01 % SOLN Place 1 Drop in right  eye every bedtime.     • VITAMIN C CR PO Take  by mouth. OTC VIT C      • niacin (SLO-NIACIN) 500 MG tablet Take 1 Tab by mouth every day. (Patient not taking: Reported on 5/9/2019) 90 Each 3     No current facility-administered medications for this visit.         Patient is taking medications as noted in medication list.  Current supplements as per medication list.     Allergies: Pcn [penicillins]    Current social contact/activities:      - Patient reports volunteering      - Sings in [employer] choir     - Meets with [employer] group for breakfast once per month    Is patient current with immunizations? No, due for TDAP. Patient is interested in receiving NONE today.    He  reports that he quit smoking about 55 years ago. He has never used smokeless tobacco. He reports that he drinks about 4.2 oz of alcohol per week . He reports that he does not use drugs.  Counseling given: Not Answered        DPA/Advanced directive: Patient has Living Will, but it is not on file. Instructed to bring in a copy to scan into their chart.    ROS:    Gait: Uses no assistive device   Ostomy: No   Other tubes: No   Amputations: No   Chronic oxygen use No   Last eye exam Patient reports about 6 months ago   Wears hearing aids: No   : Denies any urinary leakage during the last 6 months -- no changes to his normal        Depression Screening    Little interest or pleasure in doing things?  0 - not at all  Feeling down, depressed, or hopeless? 0 - not at all  Patient Health Questionnaire Score: 0    If depressive symptoms identified deferred to follow up visit unless specifically addressed in assessment and plan.    Interpretation of PHQ-9 Total Score   Score Severity   1-4 No Depression   5-9 Mild Depression   10-14 Moderate Depression   15-19 Moderately Severe Depression   20-27 Severe Depression    Screening for Cognitive Impairment    Three Minute Recall (village, kitchen, baby)  2/3    Wander clock face with all 12 numbers and set  the hands to show 10 past 10.  Yes 5/5  If cognitive concerns identified, deferred for follow up unless specifically addressed in assessment and plan.    Fall Risk Assessment    Has the patient had two or more falls in the last year or any fall with injury in the last year?  No  If fall risk identified, deferred for follow up unless specifically addressed in assessment and plan.    Safety Assessment    Throw rugs on floor.  No  Handrails on all stairs.  Yes  Good lighting in all hallways.  Yes  Difficulty hearing.  No  Patient counseled about all safety risks that were identified.    Functional Assessment ADLs    Are there any barriers preventing you from cooking for yourself or meeting nutritional needs?  No.    Are there any barriers preventing you from driving safely or obtaining transportation?  No.    Are there any barriers preventing you from using a telephone or calling for help?  No.    Are there any barriers preventing you from shopping?  No.    Are there any barriers preventing you from taking care of your own finances?  No.    Are there any barriers preventing you from managing your medications?  No.    Are there any barriers preventing you from showering, bathing or dressing yourself?  No.    Are you currently engaging in any exercise or physical activity?  Yes.  Patient reports 2 - 3 times a week 45 min walk  What is your perception of your health?  Excellent.    Health Maintenance Summary                IMM DTaP/Tdap/Td Vaccine Overdue 4/9/1946     IMM ZOSTER VACCINES Overdue 4/9/1977     IMM PNEUMOCOCCAL 65+ (ADULT) LOW/MEDIUM RISK SERIES Overdue 10/1/2015      Done 10/1/2014 Imm Admin: Pneumococcal polysaccharide vaccine (PPSV-23)    Annual Wellness Visit Next Due 2/28/2020      Done 2/27/2019 Visit Dx: Medicare annual wellness visit, subsequent     Patient has more history with this topic...    COLONOSCOPY Next Due 11/22/2026      Done 11/22/2016           Patient Care Team:  Ajay Austin M.D.  "as PCP - General (Family Medicine)  Ankush Valadez as Consulting Physician (Neurology)  Brisa Nowak M.D. as Consulting Physician (Ophthalmology)  Colin Garza M.D. as Consulting Physician (Cardiology)  Fabio Castro D.D.S. as Consulting Physician (Dentistry)    Social History   Substance Use Topics   • Smoking status: Former Smoker     Quit date: 1/1/1964   • Smokeless tobacco: Never Used   • Alcohol use 4.2 oz/week     7 Glasses of wine per week      Comment: one drink per week or less     Family History   Problem Relation Age of Onset   • Heart Disease Mother         STROKE   • Stroke Mother    • Cancer Father    • Arthritis Sister      He  has a past medical history of Allergy, unspecified not elsewhere classified; Arthritis; Cancer ([facility]) (1991); Gall stones; Glaucoma; Hyperlipidemia; Pain; Seizure ([facility]) (06/13/2014); Unspecified cataract; and Unspecified hemorrhagic conditions.   Past Surgical History:   Procedure Laterality Date   • LUMBAR FUSION O-ARM Bilateral 5/18/2015    Procedure: LUMBAR FUSION O-ARM [83.10B] L3-5;  Surgeon: Francesco Joe M.D.;  Location: [facility];  Service:    • LUMBAR DECOMPRESSION  5/18/2015    Procedure: LUMBAR DECOMPRESSION;  Surgeon: Francesco Joe M.D.;  Location: [facility];  Service:    • CHRISTIE BY LAPAROSCOPY  12/3/2013    Performed by Braden Garner M.D. at [facility]   • APPENDECTOMY     • ATHROPLASTY     • EYE SURGERY     • KNEE REPLACEMENT, TOTAL Bilateral    • OTHER      artificial urinary spincter   • OTHER ORTHOPEDIC SURGERY     • PROSTATECTOMY, RADICAL RETRO             Exam:     /62 (BP Location: Left arm, Patient Position: Sitting, BP Cuff Size: Adult)   Pulse 62   Temp 36.9 °C (98.4 °F) (Temporal)   Ht 1.737 m (5' 8.4\")   Wt 79.9 kg (176 lb 3.2 oz)   SpO2 94%  Body mass index is 26.48 kg/m².    Hearing good.    Dentition good -- sees dentist once every 6months  Alert, oriented in no acute " distress.  Eye contact is good, speech goal directed, affect calm      Assessment and Plan. The following treatment and monitoring plan is recommended:    1. Medicare annual wellness visit, subsequent  Subsequent Annual Wellness Visit - Includes PPPS ()   2. (HCC) Seizure disorder  REFERRAL TO NEUROLOGY    Subsequent Annual Wellness Visit - Includes PPPS ()    CANCELED: REFERRAL TO NEUROLOGY   3. (HCC) SVT (supraventricular tachycardia)  REFERRAL TO CARDIOLOGY    Subsequent Annual Wellness Visit - Includes PPPS ()   4. Nonrheumatic aortic valve insufficiency  REFERRAL TO CARDIOLOGY    Subsequent Annual Wellness Visit - Includes PPPS ()   5. Chronic bilateral low back pain, with sciatica presence unspecified  Subsequent Annual Wellness Visit - Includes PPPS ()   6. Dyslipidemia  Subsequent Annual Wellness Visit - Includes PPPS ()   7. Glaucoma of right eye, unspecified glaucoma type  Subsequent Annual Wellness Visit - Includes PPPS ()   8. Hypogonadism in male  Subsequent Annual Wellness Visit - Includes PPPS ()    testosterone (TESTIM,ANDROGEL) 50 MG/5GM (1%) Gel gel   9. H/O prostate cancer  Subsequent Annual Wellness Visit - Includes PPPS ()   10. H/O radical prostatectomy  Subsequent Annual Wellness Visit - Includes PPPS ()   11. Need for vaccination  tetanus-dipth-acell pertussis (ADACEL) 5-2-15.5 LF-MCG/0.5 Suspension    Subsequent Annual Wellness Visit - Includes PPPS ()    pneumococcal 13-Harper Conj Vacc (PREVNAR 13) syringe         Services suggested: No services needed at this time  Health Care Screening recommendations as per orders if indicated.  Referrals offered: PT/OT/Nutrition counseling/Behavioral Health/Smoking cessation as per orders if indicated.    Discussion today about general wellness and lifestyle habits:    · Prevent falls and reduce trip hazards; Cautioned about securing or removing rugs.  · Have a working fire alarm and carbon monoxide  detector;   · Engage in regular physical activity and social activities       Follow-up: Return in about 3 months (around 8/14/2019) for Testosterone Management, General Checkup (f/v on Neuro, Cardio).

## 2019-05-14 NOTE — ASSESSMENT & PLAN NOTE
This problem is chronic, stable, and monitored appropriately by history/labs/imaging as needed, and is well-controlled on the current regimen.  Please continue current regimen, and referral to cardiology for reestablishment of cardiac care.

## 2019-07-15 DIAGNOSIS — Z86.69 HISTORY OF SEIZURE DISORDER: ICD-10-CM

## 2019-07-16 RX ORDER — LEVETIRACETAM 500 MG/1
TABLET ORAL
Qty: 180 TAB | Refills: 1 | Status: SHIPPED | OUTPATIENT
Start: 2019-07-16 | End: 2020-04-21

## 2019-08-06 ENCOUNTER — OFFICE VISIT (OUTPATIENT)
Dept: MEDICAL GROUP | Facility: MEDICAL CENTER | Age: 84
End: 2019-08-06
Payer: COMMERCIAL

## 2019-08-06 VITALS
TEMPERATURE: 98.5 F | HEART RATE: 131 BPM | BODY MASS INDEX: 26.07 KG/M2 | HEIGHT: 68 IN | WEIGHT: 172 LBS | RESPIRATION RATE: 15 BRPM | OXYGEN SATURATION: 95 % | DIASTOLIC BLOOD PRESSURE: 78 MMHG | SYSTOLIC BLOOD PRESSURE: 128 MMHG

## 2019-08-06 DIAGNOSIS — I35.1 NONRHEUMATIC AORTIC VALVE INSUFFICIENCY: ICD-10-CM

## 2019-08-06 DIAGNOSIS — R09.89 BILATERAL CAROTID BRUITS: ICD-10-CM

## 2019-08-06 DIAGNOSIS — Z85.46 HISTORY OF PROSTATE CANCER: ICD-10-CM

## 2019-08-06 DIAGNOSIS — G40.909 SEIZURE DISORDER (HCC): ICD-10-CM

## 2019-08-06 DIAGNOSIS — Z90.79 H/O RADICAL PROSTATECTOMY: ICD-10-CM

## 2019-08-06 DIAGNOSIS — E29.1 HYPOGONADISM IN MALE: ICD-10-CM

## 2019-08-06 PROCEDURE — 99214 OFFICE O/P EST MOD 30 MIN: CPT | Performed by: FAMILY MEDICINE

## 2019-08-06 RX ORDER — TESTOSTERONE GEL, 1% 10 MG/G
50 GEL TRANSDERMAL DAILY
Qty: 90 PACKET | Refills: 3 | Status: SHIPPED | OUTPATIENT
Start: 2019-08-06 | End: 2019-09-03 | Stop reason: SDUPTHER

## 2019-08-06 NOTE — ASSESSMENT & PLAN NOTE
Chronic, stable, well-controlled, taking medication as directed.     Based on our previous conversation, we wanted to pursue further testing to see if patient has diagnostic criteria positive for seizure disorder and/or if he can discontinue his Keppra as this is at a relatively low dosage.      Last seizure was more than 5years ago, and this was during a neurological office visit as reported by his former neurologist.  However, he denies recalling any s/sx of seizures.    ROS negative for s/sx of seizures: loss of consciousness, confusion, uncontrollable movements, tongue biting behavior, bowel or bladder incontinence.

## 2019-08-06 NOTE — PROGRESS NOTES
Subjective:   Chief Complaint/History of Present Illness:  Homa Alonso is a 89 y.o. male established patient who presents today to discuss medical problems as listed below    Diagnoses of (HCC) Seizure disorder, Bilateral carotid bruits, Nonrheumatic aortic valve insufficiency, Hypogonadism in male, H/O prostate cancer, and H/O radical prostatectomy were pertinent to this visit.    (HCC) Seizure disorder  Chronic, stable, well-controlled, taking medication as directed.     Based on our previous conversation, we wanted to pursue further testing to see if patient has diagnostic criteria positive for seizure disorder and/or if he can discontinue his Keppra as this is at a relatively low dosage.      Last seizure was more than 5years ago, and this was during a neurological office visit as reported by his former neurologist.  However, he denies recalling any s/sx of seizures.    ROS negative for s/sx of seizures: loss of consciousness, confusion, uncontrollable movements, tongue biting behavior, bowel or bladder incontinence.     Bilateral carotid bruits  This is a new problem, uncontrolled, patient states that his ophthalmologist, Dr. Nowak, is concerned for his bilateral carotid bruits and that this may be causing him to have worsening vision over time.  Patient does not have any dizziness or presyncopal or syncopal episodes, however does have dyslipidemia.    Nonrheumatic aortic valve insufficiency  Chronic, uncontrolled, patient with increased exercise fatigue, decreased exercise capacity.  We discussed that this could be related to deconditioning over time, however given that he has aortic valve insufficiency diagnosed in the past, we should reevaluate him by echocardiogram to determine if there is a progression of his condition that may need intervention.    ROS is NEGATIVE for dizziness, generalized weakness/fatigue, cold sweats,  vision/hearing changes, jaw pain/paresthesias, BUE  pain/paresthesias/numbness/weakness, chest pain/pressure, palpitations, dyspnea, nausea, RUQ abdominal pain, oliguria/anuria, BLE edema.    Hypogonadism in male  Chronic, stable, well-controlled, taking medication as directed.        Patient Active Problem List    Diagnosis Date Noted   • Bilateral carotid bruits 08/06/2019   • Nonrheumatic aortic valve insufficiency 05/14/2019   • H/O radical prostatectomy 02/27/2019   • Hypogonadism in male 11/22/2016   • H/O prostate cancer 11/22/2016   • Glaucoma 11/22/2016   • (HCC) SVT (supraventricular tachycardia) 05/21/2015   • (HCC) Seizure disorder 05/21/2015   • Chronic bilateral low back pain 05/18/2015   • Dyslipidemia 07/21/2011       Additional History:   Allergies:    Pcn [penicillins]     Current Medications:     Current Outpatient Medications   Medication Sig Dispense Refill   • testosterone (TESTIM,ANDROGEL) 50 MG/5GM (1%) Gel gel Apply 50 mg to skin as directed every day for 90 days. 90 Packet 3   • levETIRAcetam (KEPPRA) 500 MG Tab TAKE 1 TABLET BY MOUTH TWICE DAILY 180 Tab 1   • B Complex Vitamins (B COMPLEX B-12 PO) Take  by mouth.     • tetanus-dipth-acell pertussis (ADACEL) 5-2-15.5 LF-MCG/0.5 Suspension 0.5 mL by Intramuscular route Once PRN for up to 1 dose. 0.5 mL 0   • pneumococcal 13-Harper Conj Vacc (PREVNAR 13) syringe 0.5 mL by Intramuscular route Once PRN for up to 1 dose. 0.5 Each 0   • atorvastatin (LIPITOR) 20 MG Tab Take 1 Tab by mouth every day. 90 Tab 3   • Omega-3 Fatty Acids (FISH OIL) 1000 MG Cap capsule Take 1,000 mg by mouth 3 times a day, with meals.     • multivitamin (THERAGRAN) Tab Take 1 Tab by mouth every day.     • Cholecalciferol (VITAMIN D3) 2000 UNIT CAPS Take  by mouth.     • CALCIUM CITRATE PO Take 500 mg by mouth every day.     • MAGNESIUM CITRATE PO Take 250 mg by mouth every day.     • bimatoprost (LUMIGAN) 0.01 % SOLN Place 1 Drop in right eye every bedtime.     • niacin (SLO-NIACIN) 500 MG tablet Take 1 Tab by mouth every  "day. (Patient not taking: Reported on 2019) 90 Each 3   • VITAMIN C CR PO Take  by mouth. OTC VIT C        No current facility-administered medications for this visit.         Social History:     Social History     Tobacco Use   • Smoking status: Former Smoker     Last attempt to quit: 1964     Years since quittin.6   • Smokeless tobacco: Never Used   Substance Use Topics   • Alcohol use: Yes     Alcohol/week: 4.2 oz     Types: 7 Glasses of wine per week     Comment: one drink per week or less   • Drug use: No       ROS:     - NOTE: All other systems reviewed and are negative, except as in HPI.     Objective:   Physical Exam:    Vitals: /78 (BP Location: Left arm, Patient Position: Sitting, BP Cuff Size: Adult)   Pulse (!) 131   Temp 36.9 °C (98.5 °F) (Temporal)   Resp 15   Ht 1.737 m (5' 8.4\")   Wt 78 kg (172 lb)   SpO2 95%    BMI: Body mass index is 25.85 kg/m².   General/Constitutional: Vitals as above, Well nourished, well developed male in no acute distress   Head/Eyes: Head is grossly normal & atraumatic, bilateral conjunctivae clear and not injected, bilateral EOMI, bilateral PERRL   ENT: Bilateral external ears grossly normal in appearance, Hearing grossly intact, External nares normal in appearance and without discharge/bleeding   Respiratory: No respiratory distress, bilateral lungs are clear to ausculation in all lung fields (anterior/lateral/posterior), no wheezing/rhonchi/rales   Cardiovascular: Regular rate and rhythm without murmur/gallops/rubs, distal pulses are intact and equal bilaterally (radial, posterior tibial), no bilateral lower extremity edema, bilateral carotid bruits (R>L)   MSK: Gait grossly normal & not antalgic   Integumentary: No apparent rashes   Psych: Judgment grossly appropriate, no apparent depression/anxiety    Health Maintenance:     - Not reviewed at this visit    Imaging/Labs:     - 19 -- Keppra levels are low, other labs are normal    Assessment " and Plan:   1. (HCC) Seizure disorder  Chronic, stable, well-controlled.  Continue taking medication as directed.  However, patient would like to discontinue his Keppra if this is no longer necessary.  Therefore, we will refer [facility] to [facility] for further consultation, evaluation, and management.   - REFERRAL TO NEUROLOGY    2. Bilateral carotid bruits  New problem, uncontrolled, patient has symptomatic bilateral carotid artery bruits.  Therefore, we will evaluate with carotid Doppler ultrasound, as below.   - US-CAROTID DOPPLER BILAT; Future    3. Nonrheumatic aortic valve insufficiency  Chronic problem, unknown control, evaluate with echocardiogram as below as patient has increase in symptoms.   - EC-ECHOCARDIOGRAM COMPLETE W/O CONT; Future    4. Hypogonadism in male  Chronic, stable, well-controlled.  Continue taking medication as directed.    - testosterone (TESTIM,ANDROGEL) 50 MG/5GM (1%) Gel gel; Apply 50 mg to skin as directed every day for 90 days.  Dispense: 90 Packet; Refill: 3    5. H/O prostate cancer  6. H/O radical prostatectomy  Chronic, stable, well-controlled.  Patient without any change in urinary symptoms.        RTC: In 4 months for general checkup and follow-up of the above conditions.    PLEASE NOTE: This dictation was created using voice recognition software. I have made every reasonable attempt to correct obvious errors, but I expect that there are errors of grammar and possibly content that I did not discover before finalizing the note.

## 2019-08-06 NOTE — ASSESSMENT & PLAN NOTE
This is a new problem, uncontrolled, patient states that his ophthalmologist, Dr. Nowak, is concerned for his bilateral carotid bruits and that this may be causing him to have worsening vision over time.  Patient does not have any dizziness or presyncopal or syncopal episodes, however does have dyslipidemia.

## 2019-08-06 NOTE — ASSESSMENT & PLAN NOTE
Chronic, uncontrolled, patient with increased exercise fatigue, decreased exercise capacity.  We discussed that this could be related to deconditioning over time, however given that he has aortic valve insufficiency diagnosed in the past, we should reevaluate him by echocardiogram to determine if there is a progression of his condition that may need intervention.    ROS is NEGATIVE for dizziness, generalized weakness/fatigue, cold sweats,  vision/hearing changes, jaw pain/paresthesias, BUE pain/paresthesias/numbness/weakness, chest pain/pressure, palpitations, dyspnea, nausea, RUQ abdominal pain, oliguria/anuria, BLE edema.

## 2019-08-20 ENCOUNTER — HOSPITAL ENCOUNTER (OUTPATIENT)
Dept: CARDIOLOGY | Facility: MEDICAL CENTER | Age: 84
End: 2019-08-20
Attending: FAMILY MEDICINE
Payer: COMMERCIAL

## 2019-08-20 ENCOUNTER — HOSPITAL ENCOUNTER (OUTPATIENT)
Dept: RADIOLOGY | Facility: MEDICAL CENTER | Age: 84
End: 2019-08-20
Attending: FAMILY MEDICINE
Payer: COMMERCIAL

## 2019-08-20 DIAGNOSIS — I35.1 NONRHEUMATIC AORTIC VALVE INSUFFICIENCY: ICD-10-CM

## 2019-08-20 DIAGNOSIS — R09.89 BILATERAL CAROTID BRUITS: ICD-10-CM

## 2019-08-20 LAB
LV EJECT FRACT  99904: 65
LV EJECT FRACT MOD 2C 99903: 60.47
LV EJECT FRACT MOD 4C 99902: 61.33
LV EJECT FRACT MOD BP 99901: 60.47

## 2019-08-20 PROCEDURE — 93880 EXTRACRANIAL BILAT STUDY: CPT

## 2019-08-20 PROCEDURE — 93306 TTE W/DOPPLER COMPLETE: CPT

## 2019-08-20 PROCEDURE — 93306 TTE W/DOPPLER COMPLETE: CPT | Mod: 26 | Performed by: INTERNAL MEDICINE

## 2019-08-22 DIAGNOSIS — E78.5 DYSLIPIDEMIA: ICD-10-CM

## 2019-08-23 RX ORDER — ATORVASTATIN CALCIUM 20 MG/1
TABLET, FILM COATED ORAL
Qty: 90 TAB | Refills: 2 | Status: SHIPPED | OUTPATIENT
Start: 2019-08-23 | End: 2020-08-24

## 2019-09-03 ENCOUNTER — OFFICE VISIT (OUTPATIENT)
Dept: MEDICAL GROUP | Facility: MEDICAL CENTER | Age: 84
End: 2019-09-03
Payer: COMMERCIAL

## 2019-09-03 VITALS
HEIGHT: 68 IN | WEIGHT: 175.04 LBS | TEMPERATURE: 97.7 F | OXYGEN SATURATION: 96 % | RESPIRATION RATE: 14 BRPM | DIASTOLIC BLOOD PRESSURE: 80 MMHG | HEART RATE: 70 BPM | SYSTOLIC BLOOD PRESSURE: 138 MMHG | BODY MASS INDEX: 26.53 KG/M2

## 2019-09-03 DIAGNOSIS — E78.5 DYSLIPIDEMIA: ICD-10-CM

## 2019-09-03 DIAGNOSIS — R73.01 IFG (IMPAIRED FASTING GLUCOSE): ICD-10-CM

## 2019-09-03 DIAGNOSIS — N18.30 CKD (CHRONIC KIDNEY DISEASE), STAGE III (HCC): ICD-10-CM

## 2019-09-03 DIAGNOSIS — Z90.79 H/O RADICAL PROSTATECTOMY: ICD-10-CM

## 2019-09-03 DIAGNOSIS — Z85.46 HISTORY OF PROSTATE CANCER: ICD-10-CM

## 2019-09-03 DIAGNOSIS — E29.1 HYPOGONADISM IN MALE: ICD-10-CM

## 2019-09-03 PROCEDURE — 99214 OFFICE O/P EST MOD 30 MIN: CPT | Performed by: INTERNAL MEDICINE

## 2019-09-03 RX ORDER — TESTOSTERONE GEL, 1% 10 MG/G
50 GEL TRANSDERMAL DAILY
Qty: 90 PACKET | Refills: 0 | Status: SHIPPED | OUTPATIENT
Start: 2019-09-03 | End: 2019-09-05 | Stop reason: SDUPTHER

## 2019-09-03 NOTE — PROGRESS NOTES
CHIEF COMPLAINT  Chief Complaint   Patient presents with   • Medication Refill   • Other     [place] patient     HPI  Homa Alonso is a 89 y.o. male who presents today for the following     Hypogonadysm  H/o prostatectomy/prostate cancer  The patient is 89 y/o, M, h/o prostate cancer and prostatectomy.    He has had low testosterone level, accompanied by fatigue;  - he has been on testosterone gel x ~ 10 yrs, improved sx.  Reviewed labs from 5/19:   Ref. Range 5/1/2019    Free Testosterone  6.6 - 18.1 pg/mL 7.2   Testosterone,Total 264 - 916 ng/dL 297     IFG  The patient had elevated FBG, and A1c.  No polydipsia, polyphagia, polyuria.  No abdominal pain, weight loss, fatigue.  Diet: regular  Exercise: as much as possible.  FH of DM: neg    CKD stage III  The patient had slightly decreased GFR with normal Cr/electrolytes.  He has not been on consistent NSAIDs use.  Fluid intke was insufficient, < 30 oz/d.    Dyslipidemia  The patient is on atorvastatin 20 mg, taking daily, as prescribed. No myalgias, muscle cramps or pain.   Diet /Exercise/BMI:  As above  FH: neg    Reviewed PMH, PSH, FH, SH, ALL, HCM/IMM, no changes  Reviewed MEDS, no changes    Patient Active Problem List    Diagnosis Date Noted   • Chronic bilateral low back pain 05/18/2015     Priority: High   • (HCC) SVT (supraventricular tachycardia) 05/21/2015     Priority: Medium   • (HCC) Seizure disorder 05/21/2015     Priority: Low   • Bilateral carotid bruits 08/06/2019   • Nonrheumatic aortic valve insufficiency 05/14/2019   • H/O radical prostatectomy 02/27/2019   • Hypogonadism in male 11/22/2016   • H/O prostate cancer 11/22/2016   • Glaucoma 11/22/2016   • Dyslipidemia 07/21/2011     CURRENT MEDICATIONS  Current Outpatient Medications   Medication Sig Dispense Refill   • testosterone (TESTIM,ANDROGEL) 50 MG/5GM (1%) Gel gel Apply 50 mg to skin as directed every day for 90 days. 90 Packet 0   • atorvastatin (LIPITOR) 20 MG Tab TAKE 1 TABLET BY MOUTH  ONCE DAILY 90 Tab 2   • levETIRAcetam (KEPPRA) 500 MG Tab TAKE 1 TABLET BY MOUTH TWICE DAILY 180 Tab 1   • B Complex Vitamins (B COMPLEX B-12 PO) Take  by mouth.     • tetanus-dipth-acell pertussis (ADACEL) 5-2-15.5 LF-MCG/0.5 Suspension 0.5 mL by Intramuscular route Once PRN for up to 1 dose. 0.5 mL 0   • pneumococcal 13-Harper Conj Vacc (PREVNAR 13) syringe 0.5 mL by Intramuscular route Once PRN for up to 1 dose. 0.5 Each 0   • Omega-3 Fatty Acids (FISH OIL) 1000 MG Cap capsule Take 1,000 mg by mouth 3 times a day, with meals.     • multivitamin (THERAGRAN) Tab Take 1 Tab by mouth every day.     • Cholecalciferol (VITAMIN D3) 2000 UNIT CAPS Take  by mouth.     • CALCIUM CITRATE PO Take 500 mg by mouth every day.     • MAGNESIUM CITRATE PO Take 250 mg by mouth every day.     • bimatoprost (LUMIGAN) 0.01 % SOLN Place 1 Drop in right eye every bedtime.     • niacin (SLO-NIACIN) 500 MG tablet Take 1 Tab by mouth every day. (Patient not taking: Reported on 5/9/2019) 90 Each 3   • VITAMIN C CR PO Take  by mouth. OTC VIT C        No current facility-administered medications for this visit.      ALLERGIES  Allergies: Pcn [penicillins]  PAST MEDICAL HISTORY  Past Medical History:   Diagnosis Date   • Seizure (HCC) 06/13/2014    once  on Keppra   • Cancer (HCC) 1991    Prostate   • Allergy, unspecified not elsewhere classified    • Arthritis     osteo finger joints lower back   • Gall stones    • Glaucoma     right eye   • Hyperlipidemia    • Pain     low back   • Unspecified cataract     oneal IOL surgery   • Unspecified hemorrhagic conditions     GI Bleed when taking Celebrex     SURGICAL HISTORY  He  has a past surgical history that includes other orthopedic surgery; manny by laparoscopy (12/3/2013); appendectomy; eye surgery; athroplasty; other; lumbar fusion o-arm (Bilateral, 5/18/2015); lumbar decompression (5/18/2015); knee replacement, total (Bilateral); and prostatectomy, radical retro.  SOCIAL HISTORY  Social History  "    Tobacco Use   • Smoking status: Former Smoker     Last attempt to quit: 1964     Years since quittin.7   • Smokeless tobacco: Never Used   Substance Use Topics   • Alcohol use: Yes     Alcohol/week: 4.2 oz     Types: 7 Glasses of wine per week     Comment: one drink per week or less   • Drug use: No     Social History     Social History Narrative   • Not on file     FAMILY HISTORY  Family History   Problem Relation Age of Onset   • Heart Disease Mother         STROKE   • Stroke Mother    • Cancer Father    • Arthritis Sister      Family Status   Relation Name Status   • Mo          STROKE   • Fa          PROSTATE CANCER   • Sis  Alive   • Sis  Alive     ROS   Constitutional: Negative for  fatigue.  HENT: Negative for congestion, sore throat.  Eyes: Negative for vision problems.   Respiratory: Negative for cough, shortness of breath.  Cardiovascular: Negative for chest pain, palpitations.   Gastrointestinal: Negative for heartburn, nausea, abdominal pain.   Genitourinary: Negative for dysuria.  Musculoskeletal: st post back surgery.   Skin: Negative for rash.   Neuro: Negative for dizziness, weakness and headaches.   Endo/Heme/Allergies: Does not bruise/bleed easily.   Psychiatric/Behavioral: Negative for depression.    PHYSICAL EXAM   Blood Pressure 138/80   Pulse 70   Temperature 36.5 °C (97.7 °F)   Respiration 14   Height 1.727 m (5' 8\")   Weight 79.4 kg (175 lb 0.7 oz)   Oxygen Saturation 96%  Body mass index is 26.62 kg/m².  General:  NAD, well appearing  HEENT:   NC/AT, PERRLA, EOMI, TMs are clear. Oropharyngeal mucosa is pink,  without lesions;  no cervical / supraclavicular  lymphadenopathy, no thyromegaly.    Cardiovascular: RRR.   No m/r/g.       Lungs:   CTAB, no w/r/r, no respiratory distress.  Abdomen: Soft, NT/ND; no hepatosplenomegaly.  Extremities:  2+ DP and radial pulses bilaterally.  No c/c/e.   Skin:  Warm, dry.  No erythema. No rash.   Neurologic: Alert & " oriented x 3. CN II-XII grossly intact. No focal deficits.  Psychiatric:  Affect normal, mood normal, judgment normal.    Labs     Labs are reviewed and discussed with a patient  Lab Results   Component Value Date/Time    CHOLSTRLTOT 132 05/01/2019 10:50 AM    CHOLSTRLTOT 195 05/17/2011 09:17 AM    LDL 53 05/01/2019 10:50 AM     05/17/2011 09:17 AM    HDL 51 05/01/2019 10:50 AM    HDL 46 05/17/2011 09:17 AM    TRIGLYCERIDE 139 05/01/2019 10:50 AM    TRIGLYCERIDE 216 (H) 05/17/2011 09:17 AM       Lab Results   Component Value Date/Time    SODIUM 141 05/01/2019 10:50 AM    SODIUM 136 05/21/2015 05:10 AM    POTASSIUM 4.5 05/01/2019 10:50 AM    POTASSIUM 3.7 05/21/2015 05:10 AM    CHLORIDE 103 05/01/2019 10:50 AM    CHLORIDE 105 05/21/2015 05:10 AM    CO2 21 05/01/2019 10:50 AM    CO2 22 05/21/2015 05:10 AM    GLUCOSE 108 (H) 05/01/2019 10:50 AM    GLUCOSE 106 (H) 05/21/2015 05:10 AM    BUN 18 05/01/2019 10:50 AM    BUN 12 05/21/2015 05:10 AM    CREATININE 1.13 05/01/2019 10:50 AM    CREATININE 0.78 05/21/2015 05:10 AM    CREATININE 0.89 04/23/2010 11:20 AM    BUNCREATRAT 16 05/01/2019 10:50 AM    BUNCREATRAT 25 04/23/2010 11:20 AM    GLOMRATE >59 04/23/2010 11:20 AM     Lab Results   Component Value Date/Time    ALKPHOSPHAT 71 05/01/2019 10:50 AM    ALKPHOSPHAT 52 06/13/2014 04:35 PM    ASTSGOT 29 05/01/2019 10:50 AM    ASTSGOT 24 06/13/2014 04:35 PM    ALTSGPT 23 05/01/2019 10:50 AM    ALTSGPT 17 06/13/2014 04:35 PM    TBILIRUBIN 0.9 05/01/2019 10:50 AM    TBILIRUBIN 0.4 06/13/2014 04:35 PM      Lab Results   Component Value Date/Time    HBA1C 5.7 06/13/2018 12:30 PM     No results found for: TSH  No results found for: FREET4    Lab Results   Component Value Date/Time    WBC 8.6 05/01/2019 10:50 AM    WBC 10.1 05/21/2015 05:10 AM    WBC 7.2 05/19/2010 11:24 AM    RBC 5.25 05/01/2019 10:50 AM    RBC 3.41 (L) 05/21/2015 05:10 AM    RBC 4.65 05/19/2010 11:24 AM    HEMOGLOBIN 16.6 05/01/2019 10:50 AM    HEMOGLOBIN  11.0 (L) 05/21/2015 05:10 AM    HEMATOCRIT 49.1 05/01/2019 10:50 AM    HEMATOCRIT 32.7 (L) 05/21/2015 05:10 AM    MCV 94 05/01/2019 10:50 AM    MCV 95.9 05/21/2015 05:10 AM    MCV 71 (L) 05/19/2010 11:24 AM    MCH 31.6 05/01/2019 10:50 AM    MCH 32.3 05/21/2015 05:10 AM    MCH 20.6 (L) 05/19/2010 11:24 AM    MCHC 33.8 05/01/2019 10:50 AM    MCHC 33.6 (L) 05/21/2015 05:10 AM    MPV 10.2 05/21/2015 05:10 AM    NEUTSPOLYS 60 05/01/2019 10:50 AM    NEUTSPOLYS 64.50 05/21/2015 05:10 AM    LYMPHOCYTES 29 05/01/2019 10:50 AM    LYMPHOCYTES 21.40 (L) 05/21/2015 05:10 AM    MONOCYTES 10 05/01/2019 10:50 AM    MONOCYTES 12.20 05/21/2015 05:10 AM    EOSINOPHILS 1 05/01/2019 10:50 AM    EOSINOPHILS 1.30 05/21/2015 05:10 AM    BASOPHILS 0 05/01/2019 10:50 AM    BASOPHILS 0.20 05/21/2015 05:10 AM        Imaging     None    Assessment and Plan     Homa Alonso is a 89 y.o. male    1. Hypogonadism in male  Controlled, continue current treatment.    Obtained and reviewed patient utilization report from [facility] pharmacy database on 9/3/2019 12:42 PM  prior to writing prescription for controlled substance II, III or IV per [place] bill . Based on assessment of the report, the prescription is medically necessary.     - testosterone (TESTIM,ANDROGEL) 50 MG/5GM (1%) Gel gel; Apply 50 mg to skin as directed every day for 90 days.  Dispense: 90 Packet; Refill: 0  - TESTOSTERONE SERUM; Future    2. H/O radical prostatectomy  3. H/O prostate cancer  PSA has been low.    4. IFG (impaired fasting glucose)  Discussed about risk to develop DM.   Advised low carb diet, exercise, watch for WT.   - Comp Metabolic Panel; Future  - HEMOGLOBIN A1C; Future  - MICROALBUMIN CREAT RATIO URINE; Future    5. CKD (chronic kidney disease), stage III (HCC)  Mild.  Advised to continue good fluid intake, avoid NSAIDs.  - Comp Metabolic Panel; Future    6. Dyslipidemia  Controlled, continue current treatment.  - Comp Metabolic Panel;  Future    Counseling:   - Smoking:  Nonsmoker    Followup: Return in about 4 weeks (around 10/1/2019), or if symptoms worsen or fail to improve.    All questions are answered.    Please note that this dictation was created using voice recognition software, and that there might be errors of joseph and possibly content.

## 2019-09-05 DIAGNOSIS — E29.1 HYPOGONADISM IN MALE: ICD-10-CM

## 2019-09-05 RX ORDER — TESTOSTERONE GEL, 1% 10 MG/G
50 GEL TRANSDERMAL DAILY
Qty: 90 PACKET | Refills: 0 | Status: ON HOLD | OUTPATIENT
Start: 2019-09-05 | End: 2019-10-03

## 2019-09-05 NOTE — TELEPHONE ENCOUNTER
MEDICATION PRIOR AUTHORIZATION NEEDED:    1. Name of Medication: Testosterone gel    2. Requested By (Name of Pharmacy): [facility]     3. Is insurance on file current? Yes Medicare    4. What is the name & phone number of the 3rd party payor? [place] for Medicare

## 2019-09-20 ENCOUNTER — HOSPITAL ENCOUNTER (INPATIENT)
Facility: MEDICAL CENTER | Age: 84
LOS: 17 days | DRG: 501 | End: 2019-10-09
Attending: EMERGENCY MEDICINE | Admitting: INTERNAL MEDICINE
Payer: COMMERCIAL

## 2019-09-20 ENCOUNTER — APPOINTMENT (OUTPATIENT)
Dept: RADIOLOGY | Facility: MEDICAL CENTER | Age: 84
DRG: 501 | End: 2019-09-20
Attending: EMERGENCY MEDICINE
Payer: COMMERCIAL

## 2019-09-20 DIAGNOSIS — S81.812A: ICD-10-CM

## 2019-09-20 DIAGNOSIS — L03.116 CELLULITIS OF LEFT LOWER EXTREMITY: ICD-10-CM
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
## 2019-09-20 DIAGNOSIS — I49.5 SICK SINUS SYNDROME (HCC): ICD-10-CM

## 2019-09-20 DIAGNOSIS — S01.01XA SCALP LACERATION, INITIAL ENCOUNTER: ICD-10-CM

## 2019-09-20 DIAGNOSIS — S82.52XA DISPLACED FRACTURE OF MEDIAL MALLEOLUS OF LEFT TIBIA, INITIAL ENCOUNTER FOR CLOSED FRACTURE: ICD-10-CM

## 2019-09-20 PROBLEM — S81.819A LEG LACERATION: Status: ACTIVE | Noted: 2019-09-20

## 2019-09-20 PROBLEM — I49.9 ARRHYTHMIA: Status: ACTIVE | Noted: 2019-09-20

## 2019-09-20 LAB
ALBUMIN SERPL BCP-MCNC: 3.4 G/DL (ref 3.2–4.9)
ALBUMIN/GLOB SERPL: 1.1 G/DL
ALP SERPL-CCNC: 62 U/L (ref 30–99)
ALT SERPL-CCNC: 18 U/L (ref 2–50)
ANION GAP SERPL CALC-SCNC: 14 MMOL/L (ref 0–11.9)
AST SERPL-CCNC: 25 U/L (ref 12–45)
BASOPHILS # BLD AUTO: 0.2 % (ref 0–1.8)
BASOPHILS # BLD: 0.02 K/UL (ref 0–0.12)
BILIRUB SERPL-MCNC: 0.3 MG/DL (ref 0.1–1.5)
BUN SERPL-MCNC: 21 MG/DL (ref 8–22)
CALCIUM SERPL-MCNC: 8.8 MG/DL (ref 8.4–10.2)
CHLORIDE SERPL-SCNC: 106 MMOL/L (ref 96–112)
CO2 SERPL-SCNC: 21 MMOL/L (ref 20–33)
CREAT SERPL-MCNC: 1.09 MG/DL (ref 0.5–1.4)
EKG IMPRESSION: NORMAL
EOSINOPHIL # BLD AUTO: 0.07 K/UL (ref 0–0.51)
EOSINOPHIL NFR BLD: 0.5 % (ref 0–6.9)
ERYTHROCYTE [DISTWIDTH] IN BLOOD BY AUTOMATED COUNT: 54.5 FL (ref 35.9–50)
GLOBULIN SER CALC-MCNC: 3.2 G/DL (ref 1.9–3.5)
GLUCOSE SERPL-MCNC: 157 MG/DL (ref 65–99)
HCT VFR BLD AUTO: 40.3 % (ref 42–52)
HGB BLD-MCNC: 12.8 G/DL (ref 14–18)
IMM GRANULOCYTES # BLD AUTO: 0.05 K/UL (ref 0–0.11)
IMM GRANULOCYTES NFR BLD AUTO: 0.4 % (ref 0–0.9)
LYMPHOCYTES # BLD AUTO: 2.2 K/UL (ref 1–4.8)
LYMPHOCYTES NFR BLD: 16.6 % (ref 22–41)
MAGNESIUM SERPL-MCNC: 2.1 MG/DL (ref 1.5–2.5)
MCH RBC QN AUTO: 28.4 PG (ref 27–33)
MCHC RBC AUTO-ENTMCNC: 31.8 G/DL (ref 33.7–35.3)
MCV RBC AUTO: 89.4 FL (ref 81.4–97.8)
MONOCYTES # BLD AUTO: 1.24 K/UL (ref 0–0.85)
MONOCYTES NFR BLD AUTO: 9.3 % (ref 0–13.4)
NEUTROPHILS # BLD AUTO: 9.71 K/UL (ref 1.82–7.42)
NEUTROPHILS NFR BLD: 73 % (ref 44–72)
NRBC # BLD AUTO: 0 K/UL
NRBC BLD-RTO: 0 /100 WBC
PLATELET # BLD AUTO: 135 K/UL (ref 164–446)
PMV BLD AUTO: 10.9 FL (ref 9–12.9)
POTASSIUM SERPL-SCNC: 4.2 MMOL/L (ref 3.6–5.5)
PROT SERPL-MCNC: 6.6 G/DL (ref 6–8.2)
RBC # BLD AUTO: 4.51 M/UL (ref 4.7–6.1)
SODIUM SERPL-SCNC: 141 MMOL/L (ref 135–145)
TROPONIN T SERPL-MCNC: 14 NG/L (ref 6–19)
TROPONIN T SERPL-MCNC: 15 NG/L (ref 6–19)
WBC # BLD AUTO: 13.3 K/UL (ref 4.8–10.8)

## 2019-09-20 PROCEDURE — 80053 COMPREHEN METABOLIC PANEL: CPT

## 2019-09-20 PROCEDURE — 99285 EMERGENCY DEPT VISIT HI MDM: CPT

## 2019-09-20 PROCEDURE — 97161 PT EVAL LOW COMPLEX 20 MIN: CPT

## 2019-09-20 PROCEDURE — 0Y9J0ZZ DRAINAGE OF LEFT LOWER LEG, OPEN APPROACH: ICD-10-PCS | Performed by: EMERGENCY MEDICINE

## 2019-09-20 PROCEDURE — 99220 PR INITIAL OBSERVATION CARE,LEVL III: CPT | Performed by: INTERNAL MEDICINE

## 2019-09-20 PROCEDURE — 700111 HCHG RX REV CODE 636 W/ 250 OVERRIDE (IP): Performed by: EMERGENCY MEDICINE

## 2019-09-20 PROCEDURE — A9270 NON-COVERED ITEM OR SERVICE: HCPCS | Performed by: INTERNAL MEDICINE

## 2019-09-20 PROCEDURE — 700101 HCHG RX REV CODE 250: Performed by: EMERGENCY MEDICINE

## 2019-09-20 PROCEDURE — 83735 ASSAY OF MAGNESIUM: CPT

## 2019-09-20 PROCEDURE — 84484 ASSAY OF TROPONIN QUANT: CPT | Mod: 91

## 2019-09-20 PROCEDURE — 85025 COMPLETE CBC W/AUTO DIFF WBC: CPT

## 2019-09-20 PROCEDURE — 700102 HCHG RX REV CODE 250 W/ 637 OVERRIDE(OP): Performed by: INTERNAL MEDICINE

## 2019-09-20 PROCEDURE — 96365 THER/PROPH/DIAG IV INF INIT: CPT

## 2019-09-20 PROCEDURE — 97605 NEG PRS WND THER DME<=50SQCM: CPT

## 2019-09-20 PROCEDURE — 700105 HCHG RX REV CODE 258: Performed by: EMERGENCY MEDICINE

## 2019-09-20 PROCEDURE — 306372 DRESSING,VAC SIMPLACE MED: Performed by: INTERNAL MEDICINE

## 2019-09-20 PROCEDURE — G0378 HOSPITAL OBSERVATION PER HR: HCPCS

## 2019-09-20 PROCEDURE — 306263 VAC CANNISTER W/GEL 500ML: Performed by: INTERNAL MEDICINE

## 2019-09-20 PROCEDURE — 97165 OT EVAL LOW COMPLEX 30 MIN: CPT

## 2019-09-20 PROCEDURE — 304999 HCHG REPAIR-SIMPLE/INTERMED LEVEL 1

## 2019-09-20 PROCEDURE — 70450 CT HEAD/BRAIN W/O DYE: CPT

## 2019-09-20 PROCEDURE — 93005 ELECTROCARDIOGRAM TRACING: CPT | Performed by: EMERGENCY MEDICINE

## 2019-09-20 PROCEDURE — 303747 HCHG EXTRA SUTURE

## 2019-09-20 PROCEDURE — 700105 HCHG RX REV CODE 258: Performed by: INTERNAL MEDICINE

## 2019-09-20 RX ORDER — SODIUM CHLORIDE 9 MG/ML
INJECTION, SOLUTION INTRAVENOUS CONTINUOUS
Status: DISCONTINUED | OUTPATIENT
Start: 2019-09-20 | End: 2019-09-20

## 2019-09-20 RX ORDER — CEPHALEXIN 500 MG/1
500 CAPSULE ORAL 4 TIMES DAILY
Qty: 40 CAP | Refills: 0 | Status: ON HOLD | OUTPATIENT
Start: 2019-09-20 | End: 2019-10-03

## 2019-09-20 RX ORDER — ENALAPRILAT 1.25 MG/ML
1.25 INJECTION INTRAVENOUS EVERY 6 HOURS PRN
Status: DISCONTINUED | OUTPATIENT
Start: 2019-09-20 | End: 2019-10-09 | Stop reason: HOSPADM

## 2019-09-20 RX ORDER — OXYCODONE HYDROCHLORIDE 5 MG/1
5 TABLET ORAL EVERY 4 HOURS PRN
Status: DISCONTINUED | OUTPATIENT
Start: 2019-09-20 | End: 2019-10-09 | Stop reason: HOSPADM

## 2019-09-20 RX ORDER — ONDANSETRON 2 MG/ML
4 INJECTION INTRAMUSCULAR; INTRAVENOUS EVERY 4 HOURS PRN
Status: DISCONTINUED | OUTPATIENT
Start: 2019-09-20 | End: 2019-10-09 | Stop reason: HOSPADM

## 2019-09-20 RX ORDER — POLYETHYLENE GLYCOL 3350 17 G/17G
1 POWDER, FOR SOLUTION ORAL
Status: DISCONTINUED | OUTPATIENT
Start: 2019-09-20 | End: 2019-10-09 | Stop reason: HOSPADM

## 2019-09-20 RX ORDER — ONDANSETRON 4 MG/1
4 TABLET, ORALLY DISINTEGRATING ORAL EVERY 4 HOURS PRN
Status: DISCONTINUED | OUTPATIENT
Start: 2019-09-20 | End: 2019-10-09 | Stop reason: HOSPADM

## 2019-09-20 RX ORDER — BISACODYL 10 MG
10 SUPPOSITORY, RECTAL RECTAL
Status: DISCONTINUED | OUTPATIENT
Start: 2019-09-20 | End: 2019-10-09 | Stop reason: HOSPADM

## 2019-09-20 RX ORDER — AMOXICILLIN 250 MG
2 CAPSULE ORAL 2 TIMES DAILY
Status: DISCONTINUED | OUTPATIENT
Start: 2019-09-20 | End: 2019-10-09 | Stop reason: HOSPADM

## 2019-09-20 RX ORDER — ACETAMINOPHEN 325 MG/1
650 TABLET ORAL EVERY 6 HOURS PRN
Status: DISCONTINUED | OUTPATIENT
Start: 2019-09-20 | End: 2019-10-09 | Stop reason: HOSPADM

## 2019-09-20 RX ORDER — LEVETIRACETAM 500 MG/1
500 TABLET ORAL EVERY 12 HOURS
Status: DISCONTINUED | OUTPATIENT
Start: 2019-09-20 | End: 2019-10-09 | Stop reason: HOSPADM

## 2019-09-20 RX ORDER — ATORVASTATIN CALCIUM 20 MG/1
20 TABLET, FILM COATED ORAL EVERY EVENING
Status: DISCONTINUED | OUTPATIENT
Start: 2019-09-20 | End: 2019-10-09 | Stop reason: HOSPADM

## 2019-09-20 RX ORDER — LIDOCAINE HYDROCHLORIDE AND EPINEPHRINE 10; 10 MG/ML; UG/ML
20 INJECTION, SOLUTION INFILTRATION; PERINEURAL ONCE
Status: COMPLETED | OUTPATIENT
Start: 2019-09-20 | End: 2019-09-20

## 2019-09-20 RX ADMIN — SODIUM CHLORIDE 1 G: 900 INJECTION INTRAVENOUS at 02:39

## 2019-09-20 RX ADMIN — LEVETIRACETAM 500 MG: 500 TABLET, FILM COATED ORAL at 16:28

## 2019-09-20 RX ADMIN — LIDOCAINE HYDROCHLORIDE AND EPINEPHRINE 20 ML: 10; 10 INJECTION, SOLUTION INFILTRATION; PERINEURAL at 01:30

## 2019-09-20 RX ADMIN — LEVETIRACETAM 500 MG: 500 TABLET, FILM COATED ORAL at 08:27

## 2019-09-20 RX ADMIN — SENNOSIDES AND DOCUSATE SODIUM 2 TABLET: 8.6; 5 TABLET ORAL at 16:28

## 2019-09-20 RX ADMIN — METOPROLOL TARTRATE 25 MG: 25 TABLET ORAL at 08:27

## 2019-09-20 RX ADMIN — METOPROLOL TARTRATE 25 MG: 25 TABLET ORAL at 16:28

## 2019-09-20 RX ADMIN — SENNOSIDES AND DOCUSATE SODIUM 2 TABLET: 8.6; 5 TABLET ORAL at 08:27

## 2019-09-20 RX ADMIN — SODIUM CHLORIDE: 9 INJECTION, SOLUTION INTRAVENOUS at 08:35

## 2019-09-20 RX ADMIN — OXYCODONE HYDROCHLORIDE 5 MG: 5 TABLET ORAL at 16:21

## 2019-09-20 RX ADMIN — ATORVASTATIN CALCIUM 20 MG: 10 TABLET, FILM COATED ORAL at 16:30

## 2019-09-20 ASSESSMENT — ENCOUNTER SYMPTOMS
WEAKNESS: 0
ABDOMINAL PAIN: 0
SPUTUM PRODUCTION: 0
CONSTIPATION: 0
DEPRESSION: 0
STRIDOR: 0
MYALGIAS: 0
TINGLING: 0
HEADACHES: 0
DIZZINESS: 0
SHORTNESS OF BREATH: 0
PALPITATIONS: 0
LOSS OF CONSCIOUSNESS: 0
FEVER: 0
FALLS: 1
VOMITING: 0
COUGH: 0
CHILLS: 0
NAUSEA: 0
DIARRHEA: 0

## 2019-09-20 ASSESSMENT — PATIENT HEALTH QUESTIONNAIRE - PHQ9
SUM OF ALL RESPONSES TO PHQ9 QUESTIONS 1 AND 2: 0
1. LITTLE INTEREST OR PLEASURE IN DOING THINGS: NOT AT ALL
2. FEELING DOWN, DEPRESSED, IRRITABLE, OR HOPELESS: NOT AT ALL

## 2019-09-20 ASSESSMENT — COGNITIVE AND FUNCTIONAL STATUS - GENERAL
DRESSING REGULAR LOWER BODY CLOTHING: A LITTLE
MOBILITY SCORE: 21
DAILY ACTIVITIY SCORE: 21
HELP NEEDED FOR BATHING: A LITTLE
WALKING IN HOSPITAL ROOM: A LITTLE
SUGGESTED CMS G CODE MODIFIER DAILY ACTIVITY: CH
MOBILITY SCORE: 24
SUGGESTED CMS G CODE MODIFIER DAILY ACTIVITY: CJ
STANDING UP FROM CHAIR USING ARMS: A LITTLE
CLIMB 3 TO 5 STEPS WITH RAILING: A LITTLE
SUGGESTED CMS G CODE MODIFIER MOBILITY: CJ
SUGGESTED CMS G CODE MODIFIER MOBILITY: CH
TOILETING: A LITTLE
DAILY ACTIVITIY SCORE: 24

## 2019-09-20 ASSESSMENT — LIFESTYLE VARIABLES
HAVE PEOPLE ANNOYED YOU BY CRITICIZING YOUR DRINKING: NO
TOTAL SCORE: 0
AVERAGE NUMBER OF DAYS PER WEEK YOU HAVE A DRINK CONTAINING ALCOHOL: 0
ON A TYPICAL DAY WHEN YOU DRINK ALCOHOL HOW MANY DRINKS DO YOU HAVE: 0
ALCOHOL_USE: NO
HOW MANY TIMES IN THE PAST YEAR HAVE YOU HAD 5 OR MORE DRINKS IN A DAY: 0
EVER FELT BAD OR GUILTY ABOUT YOUR DRINKING: NO
EVER HAD A DRINK FIRST THING IN THE MORNING TO STEADY YOUR NERVES TO GET RID OF A HANGOVER: NO
EVER_SMOKED: NEVER
TOTAL SCORE: 0
CONSUMPTION TOTAL: NEGATIVE
HAVE YOU EVER FELT YOU SHOULD CUT DOWN ON YOUR DRINKING: NO
ALCOHOL_USE: NO
TOTAL SCORE: 0

## 2019-09-20 ASSESSMENT — GAIT ASSESSMENTS
GAIT LEVEL OF ASSIST: STAND BY ASSIST
DISTANCE (FEET): 150
ASSISTIVE DEVICE: FRONT WHEEL WALKER

## 2019-09-20 ASSESSMENT — ACTIVITIES OF DAILY LIVING (ADL): TOILETING: INDEPENDENT

## 2019-09-20 NOTE — FACE TO FACE
Face to Face Supporting Documentation - Home Health    The encounter with this patient was in whole or in part the primary reason for home health admission.    Date of encounter:   Patient:                    MRN:                       YOB: 2019  Homa Alonso  [id number]  4/9/1927     Home health to see patient for:  Skilled Nursing care for assessment, interventions & education and Wound Care    Skilled need for:  New Onset Medical Diagnosis left leg skin tear    Skilled nursing interventions to include:  Wound Care    Homebound status evidenced by:  Need the aid of supportive devices such as crutches, canes, wheelchairs or walkers. Leaving home requires a considerable and taxing effort. There is a normal inability to leave the home.    Community Physician to provide follow up care: Ajay Austin M.D.     Optional Interventions? No      I certify the face to face encounter for this home health care referral meets the CMS requirements and the encounter/clinical assessment with the patient was, in whole, or in part, for the medical condition(s) listed above, which is the primary reason for home health care. Based on my clinical findings: the service(s) are medically necessary, support the need for home health care, and the homebound criteria are met.  I certify that this patient has had a face to face encounter by myself.  Valeri Romero M.D. - NPI: [id number]

## 2019-09-20 NOTE — PROGRESS NOTES
Telemetry Shift Summary    Rhythm Atrial fib/flutter w/BBB  HR Range   Ectopy frequent PVC, couplet,frequent trigeminy  Measurements -/0.14/-        Normal Values  Rhythm SR  HR Range    Measurements 0.12-0.20 / 0.06-0.10  / 0.30-0.52
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
## 2019-09-20 NOTE — ASSESSMENT & PLAN NOTE
Hx of SVT  No new, followed by cardiology outpatient, Premature contractions detected on EKG  He has been bradycardic, continue to hold metoprolol  No anticoagulation due to falls

## 2019-09-20 NOTE — CARE PLAN
Problem: Knowledge Deficit  Goal: Knowledge of disease process/condition, treatment plan, diagnostic tests, and medications will improve  Outcome: PROGRESSING AS EXPECTED  Understands POC     Problem: Pain Management  Goal: Pain level will decrease to patient's comfort goal  Outcome: PROGRESSING AS EXPECTED  Denies pain

## 2019-09-20 NOTE — ASSESSMENT & PLAN NOTE
Persistent  CXR and UA negative  No evidence of worsening fluid collection of left lower extremity  Repeat in AM  Continue IV Ancef
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
## 2019-09-21 ENCOUNTER — HOME HEALTH ADMISSION (OUTPATIENT)
Dept: HOME HEALTH SERVICES | Facility: HOME HEALTHCARE | Age: 84
End: 2019-09-21
Payer: COMMERCIAL

## 2019-09-21 LAB
ALBUMIN SERPL BCP-MCNC: 3.6 G/DL (ref 3.2–4.9)
ALBUMIN/GLOB SERPL: 1.2 G/DL
ALP SERPL-CCNC: 55 U/L (ref 30–99)
ALT SERPL-CCNC: 16 U/L (ref 2–50)
ANION GAP SERPL CALC-SCNC: 10 MMOL/L (ref 0–11.9)
AST SERPL-CCNC: 22 U/L (ref 12–45)
BILIRUB SERPL-MCNC: 0.7 MG/DL (ref 0.1–1.5)
BUN SERPL-MCNC: 21 MG/DL (ref 8–22)
CALCIUM SERPL-MCNC: 9 MG/DL (ref 8.4–10.2)
CHLORIDE SERPL-SCNC: 105 MMOL/L (ref 96–112)
CO2 SERPL-SCNC: 25 MMOL/L (ref 20–33)
CREAT SERPL-MCNC: 1.06 MG/DL (ref 0.5–1.4)
ERYTHROCYTE [DISTWIDTH] IN BLOOD BY AUTOMATED COUNT: 57.1 FL (ref 35.9–50)
GLOBULIN SER CALC-MCNC: 3.1 G/DL (ref 1.9–3.5)
GLUCOSE SERPL-MCNC: 115 MG/DL (ref 65–99)
HCT VFR BLD AUTO: 37.2 % (ref 42–52)
HGB BLD-MCNC: 11.8 G/DL (ref 14–18)
MCH RBC QN AUTO: 28.7 PG (ref 27–33)
MCHC RBC AUTO-ENTMCNC: 31.7 G/DL (ref 33.7–35.3)
MCV RBC AUTO: 90.5 FL (ref 81.4–97.8)
PLATELET # BLD AUTO: 132 K/UL (ref 164–446)
PMV BLD AUTO: 11.5 FL (ref 9–12.9)
POTASSIUM SERPL-SCNC: 4.8 MMOL/L (ref 3.6–5.5)
PROT SERPL-MCNC: 6.7 G/DL (ref 6–8.2)
RBC # BLD AUTO: 4.11 M/UL (ref 4.7–6.1)
SODIUM SERPL-SCNC: 140 MMOL/L (ref 135–145)
WBC # BLD AUTO: 12.1 K/UL (ref 4.8–10.8)

## 2019-09-21 PROCEDURE — 99225 PR SUBSEQUENT OBSERVATION CARE,LEVEL II: CPT | Performed by: INTERNAL MEDICINE

## 2019-09-21 PROCEDURE — A9270 NON-COVERED ITEM OR SERVICE: HCPCS | Performed by: INTERNAL MEDICINE

## 2019-09-21 PROCEDURE — 85027 COMPLETE CBC AUTOMATED: CPT

## 2019-09-21 PROCEDURE — G0378 HOSPITAL OBSERVATION PER HR: HCPCS

## 2019-09-21 PROCEDURE — 700102 HCHG RX REV CODE 250 W/ 637 OVERRIDE(OP): Performed by: INTERNAL MEDICINE

## 2019-09-21 PROCEDURE — 80053 COMPREHEN METABOLIC PANEL: CPT

## 2019-09-21 RX ADMIN — LEVETIRACETAM 500 MG: 500 TABLET, FILM COATED ORAL at 05:47

## 2019-09-21 RX ADMIN — OXYCODONE HYDROCHLORIDE 5 MG: 5 TABLET ORAL at 08:36

## 2019-09-21 RX ADMIN — ACETAMINOPHEN 650 MG: 325 TABLET, FILM COATED ORAL at 08:36

## 2019-09-21 RX ADMIN — METOPROLOL TARTRATE 25 MG: 25 TABLET ORAL at 17:19

## 2019-09-21 RX ADMIN — LEVETIRACETAM 500 MG: 500 TABLET, FILM COATED ORAL at 17:20

## 2019-09-21 RX ADMIN — ATORVASTATIN CALCIUM 20 MG: 10 TABLET, FILM COATED ORAL at 17:19

## 2019-09-21 RX ADMIN — METOPROLOL TARTRATE 25 MG: 25 TABLET ORAL at 05:47

## 2019-09-21 ASSESSMENT — ENCOUNTER SYMPTOMS
STRIDOR: 0
WEAKNESS: 0
COUGH: 0
TREMORS: 0
SHORTNESS OF BREATH: 0
ABDOMINAL PAIN: 0
DIAPHORESIS: 0
BACK PAIN: 0
MEMORY LOSS: 1
SINUS PAIN: 0
NAUSEA: 0
FEVER: 0
PALPITATIONS: 0
SPEECH CHANGE: 0
CHILLS: 0
CONSTIPATION: 0

## 2019-09-21 NOTE — PROGRESS NOTES
Received patient report from MARLI Busby. Patient resting comfortably in bed, no complaints at this time. Safety precautions in place. Will continue to monitor.

## 2019-09-21 NOTE — PROGRESS NOTES
Telemetry Shift Summary    Rhythm A flutter w/BBB  HR Range 80-92  Ectopy frequent PVC, frequent bigeminy/trigeminy and couplets  Measurements -/0.12/-        Normal Values  Rhythm SR  HR Range    Measurements 0.12-0.20 / 0.06-0.10  / 0.30-0.52

## 2019-09-21 NOTE — DISCHARGE PLANNING
ATTN: Case Management  RE: Referral for Home Health    Reason for referral denial: Unable to accept patient at this time. We will be unable to see this patient in a timely manner.                 Unfortunately, we are not able to accept this referral for the reason listed above. If further clarity is needed, our Transitional Care Specialists are available to discuss any barriers to service at [phone].      We look forward to collaborating with you in the future,  [facility] Team

## 2019-09-21 NOTE — DIETARY
"Nutrition services: Day 0 of admit.  Homa Alonso is a 89 y.o. male with admitting DX of Fall, Arrhythmia. Hx of cancer, hyperlipidemia and cholecystectomy    Consult received for MST score of 2 due to report of unknown weight loss. No report of poor PO intake. Pt has a large laceration on his left leg with wound vac.      Assessment:  Height: 172.7 cm (5' 8\")  Weight: 84.2 kg (185 lb 10 oz)  Body mass index is 28.22 kg/m²., BMI classification: overweight  Admit weight (9/20/19: 77.7 kg (171 lb 4.8 oz)  Diet/Intake: regular/% of meals    Evaluation:   1. Weights reviewed from prior medical records. Weight on 2/27/19 was 81.3 kg and on 8/6/19 was 78 kg. Based on admit weight of 77.7 kg pt may have lost 3.6 kg (4.4%) x 7 months. Weight has been stable x 1 month. Weight loss x 7 months is insignificant. Of note, per H&P, pt walks 1.5 miles on most days. Current BMI indicates pt is slightly overweight. BMI between 23 and 27 is recommended in the elderly.   2. Current recorded PO intake x 3 meals has been 50-75% x 1 and % x 2. PO intake is adequate and should encourage healing.  3. Skin: pt with large laceration on left leg with wound vac. It appears that wound occurred during a fall and is not related to poor nutrition.      Malnutrition Risk: Criteria not met for the presence of malnutrition.     Recommendations/Plan:  1. Provide regular diet as ordered.   2. Encourage intake of >50%  3. Document intake of all meals as % taken in ADL's to provide interdisciplinary communication across all shifts.   4. Monitor weight.  5. Nutrition rep will continue to see patient for ongoing meal and snack preferences.   6. RD will monitor.           "
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
## 2019-09-21 NOTE — CARE PLAN
Problem: Communication  Goal: The ability to communicate needs accurately and effectively will improve  Outcome: PROGRESSING AS EXPECTED  Note:   Patient updated on the plan of care. Encouraged to voice feelings and to ask questions. Answered all questions and concerns. Agrees with the plan of care.      Problem: Safety  Goal: Will remain free from injury  Outcome: PROGRESSING AS EXPECTED  Note:   Safety precautions in place. Bed alarm utilized. Will continue to monitor patient.   Goal: Will remain free from falls  Outcome: PROGRESSING AS EXPECTED     Problem: Infection  Goal: Will remain free from infection  Outcome: PROGRESSING AS EXPECTED     Problem: Pain Management  Goal: Pain level will decrease to patient's comfort goal  Outcome: PROGRESSING AS EXPECTED

## 2019-09-21 NOTE — PROGRESS NOTES
The patient was seen and examined by myself on the medical floor. I did evaluate his leg wound with wound care and plastic surgery, Dr. Kent, recommends wound vac and home health with healing by secondary intention.

## 2019-09-21 NOTE — PROGRESS NOTES
Patient sleeping. Comfortable in bed. Safety precautions in place. Will continue to monitor patient.

## 2019-09-21 NOTE — DISCHARGE PLANNING
Care Transition Team Assessment  Met with pt at bedside to complete assessment and discuss discharge plan. Pt confirmed information listed on facesheet. He states he lives with his spouse. PTA he was independent with all ADL's and IADL's and didn't require any assistive devices.   Discussed recommendation for HH for wound vac care. Pt agreeable to HH.   Choice form completed and faxed to [facility]    Information Source  Orientation : Disoriented to Event  Information Given By: Patient         Elopement Risk  Legal Hold: No  Ambulatory or Self Mobile in Wheelchair: No-Not an Elopement Risk  Elopement Risk: Not at Risk for Elopement    Interdisciplinary Discharge Planning  Lives with - Patient's Self Care Capacity: Spouse  Patient or legal guardian wants to designate a caregiver (see row info): No  Housing / Facility: 2 Story House(Pt is able to stay on the ground floor)  Prior Services: None, Home-Independent    Discharge Preparedness  What is your plan after discharge?: Home health care  What are your discharge supports?: Spouse  Prior Functional Level: Ambulatory, Independent with Activities of Daily Living, Independent with Medication Management  Difficulity with ADLs: None  Difficulity with IADLs: None    Functional Assesment  Prior Functional Level: Ambulatory, Independent with Activities of Daily Living, Independent with Medication Management    Finances  Financial Barriers to Discharge: No  Prescription Coverage: Yes    Vision / Hearing Impairment  Vision Impairment : No  Right Eye Vision: Wears Glasses, Impaired  Left Eye Vision: Wears Glasses, Impaired  Hearing Impairment : No              Domestic Abuse  Have you ever been the victim of abuse or violence?: No  Physical Abuse or Sexual Abuse: No  Verbal Abuse or Emotional Abuse: No  Possible Abuse Reported to:: Not Applicable    Psychological Assessment  History of Substance Abuse: None  History of Psychiatric Problems: No  Non-compliant with Treatment: No  Newly  Diagnosed Illness: Yes    Discharge Risks or Barriers  Discharge risks or barriers?: No    Anticipated Discharge Information  Anticipated discharge disposition: [facility]

## 2019-09-21 NOTE — PROGRESS NOTES
Leg full thickness laceration lateral left leg dressing clean, dry, intact. NPWT Pump in place. NPWT Pump Mode / Pressure - Continuous / 125 mmHg. Patient denies any pain. Safety precautions in place. Will continue to monitor patient.

## 2019-09-21 NOTE — PROGRESS NOTES
Telemetry Shift Summary     Rhythm AFIB/AFLUTTER  HR Range 71 - 101  Ectopy F PVC; F BIG; F TRIP; O COUP  Measurements - / 0.16 / -           Normal Values  Rhythm SR  HR Range    Measurements 0.12-0.20 / 0.06-0.10  / 0.30-0.52

## 2019-09-21 NOTE — CARE PLAN
Problem: Nutritional:  Goal: Achieve adequate nutritional intake  Description  Patient will consume >50% of meals   Outcome: PROGRESSING AS EXPECTED

## 2019-09-21 NOTE — CARE PLAN
Problem: Safety  Goal: Will remain free from falls  Outcome: PROGRESSING AS EXPECTED  Using walker to ambulate     Problem: Knowledge Deficit  Goal: Knowledge of disease process/condition, treatment plan, diagnostic tests, and medications will improve  Outcome: PROGRESSING AS EXPECTED  Understands POC

## 2019-09-21 NOTE — DISCHARGE PLANNING
Received Choice form at 6200  Agency/Facility Name: [facility]  Referral sent per Choice form @ 2122
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
## 2019-09-21 NOTE — DISCHARGE PLANNING
Agency/Facility Name: [facility]  Spoke To: Magalis  Outcome: Patient declined- unable to see patient in timely manner.    Received Choice form at 1032  Agency/Facility Name: [facility]  Referral sent per Choice form @ [phone]

## 2019-09-22 ENCOUNTER — APPOINTMENT (OUTPATIENT)
Dept: RADIOLOGY | Facility: MEDICAL CENTER | Age: 84
DRG: 501 | End: 2019-09-22
Attending: INTERNAL MEDICINE
Payer: COMMERCIAL

## 2019-09-22 LAB
ERYTHROCYTE [DISTWIDTH] IN BLOOD BY AUTOMATED COUNT: 57.5 FL (ref 35.9–50)
HCT VFR BLD AUTO: 38.1 % (ref 42–52)
HGB BLD-MCNC: 12 G/DL (ref 14–18)
MCH RBC QN AUTO: 28.6 PG (ref 27–33)
MCHC RBC AUTO-ENTMCNC: 31.5 G/DL (ref 33.7–35.3)
MCV RBC AUTO: 90.7 FL (ref 81.4–97.8)
PLATELET # BLD AUTO: 138 K/UL (ref 164–446)
PMV BLD AUTO: 11.5 FL (ref 9–12.9)
RBC # BLD AUTO: 4.2 M/UL (ref 4.7–6.1)
WBC # BLD AUTO: 11.5 K/UL (ref 4.8–10.8)

## 2019-09-22 PROCEDURE — 72125 CT NECK SPINE W/O DYE: CPT

## 2019-09-22 PROCEDURE — 73590 X-RAY EXAM OF LOWER LEG: CPT | Mod: LT

## 2019-09-22 PROCEDURE — 72128 CT CHEST SPINE W/O DYE: CPT

## 2019-09-22 PROCEDURE — 700117 HCHG RX CONTRAST REV CODE 255: Performed by: INTERNAL MEDICINE

## 2019-09-22 PROCEDURE — 700102 HCHG RX REV CODE 250 W/ 637 OVERRIDE(OP): Performed by: INTERNAL MEDICINE

## 2019-09-22 PROCEDURE — 99233 SBSQ HOSP IP/OBS HIGH 50: CPT | Performed by: INTERNAL MEDICINE

## 2019-09-22 PROCEDURE — 770006 HCHG ROOM/CARE - MED/SURG/GYN SEMI*

## 2019-09-22 PROCEDURE — 71260 CT THORAX DX C+: CPT

## 2019-09-22 PROCEDURE — 85027 COMPLETE CBC AUTOMATED: CPT

## 2019-09-22 PROCEDURE — A9270 NON-COVERED ITEM OR SERVICE: HCPCS | Performed by: INTERNAL MEDICINE

## 2019-09-22 PROCEDURE — 72131 CT LUMBAR SPINE W/O DYE: CPT

## 2019-09-22 RX ADMIN — OXYCODONE HYDROCHLORIDE 5 MG: 5 TABLET ORAL at 11:39

## 2019-09-22 RX ADMIN — METOPROLOL TARTRATE 25 MG: 25 TABLET ORAL at 17:48

## 2019-09-22 RX ADMIN — SENNOSIDES AND DOCUSATE SODIUM 2 TABLET: 8.6; 5 TABLET ORAL at 17:47

## 2019-09-22 RX ADMIN — LEVETIRACETAM 500 MG: 500 TABLET, FILM COATED ORAL at 05:17

## 2019-09-22 RX ADMIN — ACETAMINOPHEN 650 MG: 325 TABLET, FILM COATED ORAL at 11:39

## 2019-09-22 RX ADMIN — IOHEXOL 100 ML: 350 INJECTION, SOLUTION INTRAVENOUS at 11:45

## 2019-09-22 RX ADMIN — METOPROLOL TARTRATE 25 MG: 25 TABLET ORAL at 05:17

## 2019-09-22 RX ADMIN — ATORVASTATIN CALCIUM 20 MG: 10 TABLET, FILM COATED ORAL at 17:46

## 2019-09-22 RX ADMIN — SENNOSIDES AND DOCUSATE SODIUM 2 TABLET: 8.6; 5 TABLET ORAL at 05:17

## 2019-09-22 RX ADMIN — LEVETIRACETAM 500 MG: 500 TABLET, FILM COATED ORAL at 17:47

## 2019-09-22 ASSESSMENT — ENCOUNTER SYMPTOMS
WEAKNESS: 0
SPUTUM PRODUCTION: 0
SHORTNESS OF BREATH: 0
CONSTIPATION: 0
STRIDOR: 0
TREMORS: 0
MEMORY LOSS: 1
ORTHOPNEA: 0
SPEECH CHANGE: 0
NAUSEA: 0
NERVOUS/ANXIOUS: 1
BRUISES/BLEEDS EASILY: 1
MYALGIAS: 1
COUGH: 0
DIARRHEA: 0
FEVER: 0
ABDOMINAL PAIN: 0
DIAPHORESIS: 0
BACK PAIN: 0

## 2019-09-22 NOTE — PROGRESS NOTES
Telemetry Shift Summary    Rhythm Aflutter w/BBB  HR Range 76-91  Ectopy fPVC, o/rCouplet, o/rTriplet, fBigem, rTrigem  Measurements -/0.14/-        Normal Values  Rhythm SR  HR Range    Measurements 0.12-0.20 / 0.06-0.10  / 0.30-0.52

## 2019-09-22 NOTE — PROGRESS NOTES
Bedside report given to Davida CALLES. POC discussed. Pt resting comfortably in bed. Safety precautions in place.

## 2019-09-22 NOTE — CARE PLAN
Problem: Safety  Goal: Will remain free from injury  Outcome: PROGRESSING AS EXPECTED  Note:   Pt call light and belongings with in reach, bed in locked and low position, treaded socks on, bed rails up x2, bed alarm in use       Problem: Infection  Goal: Will remain free from infection  Outcome: PROGRESSING AS EXPECTED  Note:   Pt shows no s/s of infection, Pt is afebrile. Standard precautions in place, hand washing performed before and after pt care.

## 2019-09-22 NOTE — PROGRESS NOTES
Patient reports increased pain at left lower extremity with inability to bear weight, may need diagnostic xray to rule out fracture, made MD Dr. Romero aware

## 2019-09-22 NOTE — PROGRESS NOTES
Bedside report received from Herminia CALLES. Assumed care. POC discussed. Pt resting comfortably in bed. Safety precautions in place.

## 2019-09-22 NOTE — PROGRESS NOTES
Pt found to be in room without gown on and tele off. Pt put back on tele and gown put back on. Pt verbalizes understaing of wearing tele. Assessment completed, pt A&Ox3, disoriented to event. Pt has no c/o pain. Wound vac intact, minimal drainage. Safety precautions in place. No other needs at this time.

## 2019-09-22 NOTE — CARE PLAN
Problem: Knowledge Deficit  Goal: Knowledge of disease process/condition, treatment plan, diagnostic tests, and medications will improve  Outcome: PROGRESSING AS EXPECTED  Understands POC     Problem: Pain Management  Goal: Pain level will decrease to patient's comfort goal  Outcome: PROGRESSING AS EXPECTED  Having good relief with oxycodone PRN

## 2019-09-22 NOTE — PROGRESS NOTES
Report received. Assumed care of pt. A/O x4. VSS. Responds appropriately.  Assessment complete. Explained importance of calling before getting OOB. Call light and belongings within reach. Bed in the lowest position. Treaded socks in place. Hourly rounding in progress. Safety precautions in place
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
## 2019-09-22 NOTE — PROGRESS NOTES
Pt helped up to restroom and back to bed by JACKIE kim. All medications given as ordered. Pt has no c/o pain once laying back in bed. No other needs at this time.

## 2019-09-23 PROBLEM — S82.52XA DISPLACED FRACTURE OF MEDIAL MALLEOLUS OF LEFT TIBIA, INITIAL ENCOUNTER FOR CLOSED FRACTURE: Status: ACTIVE | Noted: 2019-09-23

## 2019-09-23 PROBLEM — L03.90 CELLULITIS: Status: ACTIVE | Noted: 2019-09-23

## 2019-09-23 PROCEDURE — 770006 HCHG ROOM/CARE - MED/SURG/GYN SEMI*

## 2019-09-23 PROCEDURE — 700111 HCHG RX REV CODE 636 W/ 250 OVERRIDE (IP): Performed by: INTERNAL MEDICINE

## 2019-09-23 PROCEDURE — A9270 NON-COVERED ITEM OR SERVICE: HCPCS | Performed by: INTERNAL MEDICINE

## 2019-09-23 PROCEDURE — 97605 NEG PRS WND THER DME<=50SQCM: CPT

## 2019-09-23 PROCEDURE — A6206 CONTACT LAYER <= 16 SQ IN: HCPCS | Performed by: INTERNAL MEDICINE

## 2019-09-23 PROCEDURE — 700105 HCHG RX REV CODE 258: Performed by: INTERNAL MEDICINE

## 2019-09-23 PROCEDURE — 700102 HCHG RX REV CODE 250 W/ 637 OVERRIDE(OP): Performed by: INTERNAL MEDICINE

## 2019-09-23 PROCEDURE — 99233 SBSQ HOSP IP/OBS HIGH 50: CPT | Performed by: INTERNAL MEDICINE

## 2019-09-23 RX ADMIN — CEFAZOLIN 2 G: 10 INJECTION, POWDER, FOR SOLUTION INTRAVENOUS; PARENTERAL at 10:34

## 2019-09-23 RX ADMIN — CEFAZOLIN 2 G: 10 INJECTION, POWDER, FOR SOLUTION INTRAVENOUS; PARENTERAL at 21:04

## 2019-09-23 RX ADMIN — METOPROLOL TARTRATE 25 MG: 25 TABLET ORAL at 17:05

## 2019-09-23 RX ADMIN — LEVETIRACETAM 500 MG: 500 TABLET, FILM COATED ORAL at 05:24

## 2019-09-23 RX ADMIN — SENNOSIDES AND DOCUSATE SODIUM 2 TABLET: 8.6; 5 TABLET ORAL at 05:24

## 2019-09-23 RX ADMIN — ATORVASTATIN CALCIUM 20 MG: 10 TABLET, FILM COATED ORAL at 17:05

## 2019-09-23 RX ADMIN — LEVETIRACETAM 500 MG: 500 TABLET, FILM COATED ORAL at 17:06

## 2019-09-23 RX ADMIN — SENNOSIDES AND DOCUSATE SODIUM 2 TABLET: 8.6; 5 TABLET ORAL at 17:05

## 2019-09-23 RX ADMIN — CEFAZOLIN 2 G: 10 INJECTION, POWDER, FOR SOLUTION INTRAVENOUS; PARENTERAL at 16:11

## 2019-09-23 ASSESSMENT — ENCOUNTER SYMPTOMS
SPUTUM PRODUCTION: 0
COUGH: 0
FEVER: 0
NAUSEA: 0
SHORTNESS OF BREATH: 0
BACK PAIN: 0
STRIDOR: 0
WEAKNESS: 0
DIAPHORESIS: 0
TREMORS: 0
SPEECH CHANGE: 0
NERVOUS/ANXIOUS: 1
ABDOMINAL PAIN: 0
MEMORY LOSS: 1
ORTHOPNEA: 0
CONSTIPATION: 0
BRUISES/BLEEDS EASILY: 1
MYALGIAS: 1
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
## 2019-09-23 NOTE — ASSESSMENT & PLAN NOTE
9/25 cx with enterococcus/Acinetobacter  unasyn completed for a full course of therapy  Wound debrided by plastic surgery Dr. Kent 10/1, vac in place  ROBIN's showing good arterial flow

## 2019-09-23 NOTE — DISCHARGE PLANNING
Agency/Facility Name: [facility]  Spoke To: Nola  Outcome: They are currently running auth.

## 2019-09-23 NOTE — DISCHARGE PLANNING
Received Choice form at [phone]  Agency/Facility Name: [facility]  Referral sent per Choice form @ [phone]

## 2019-09-23 NOTE — DISCHARGE PLANNING
TERAW received call from [facility] regarding wound vac. TERAW faxed facesheet, MD progress note, H&P, and wound care note. Anita will fax back vac paperwork to be signed.

## 2019-09-23 NOTE — CARE PLAN
Problem: Communication  Goal: The ability to communicate needs accurately and effectively will improve  Outcome: PROGRESSING AS EXPECTED  Note:   A&Ox3, forgetful of time.      Problem: Safety  Goal: Will remain free from injury  Outcome: PROGRESSING AS EXPECTED  Note:   Bed rest, turns self in bed, Boot applied to BLE, Alarms on for safety, impulsive, call light with in reach, pt educated on how to use call light.      Problem: Infection  Goal: Will remain free from infection  Outcome: PROGRESSING AS EXPECTED  Note:   LLE + 2 edema, reddened today, ABX per orders.      Problem: Venous Thromboembolism (VTW)/Deep Vein Thrombosis (DVT) Prevention:  Goal: Patient will participate in Venous Thrombosis (VTE)/Deep Vein Thrombosis (DVT)Prevention Measures  Outcome: PROGRESSING AS EXPECTED  Note:   SCD's on RLE while in bed.      Problem: Bowel/Gastric:  Goal: Normal bowel function is maintained or improved  Outcome: PROGRESSING AS EXPECTED  Note:   LBM: 9/21. Poor appetite, PO fluids encouraged. No n/v.      Problem: Discharge Barriers/Planning  Goal: Patient's continuum of care needs will be met  Outcome: PROGRESSING AS EXPECTED  Note:   Wound vac set up again this morning by Wound care team. pt pulled off wound vac at night (per NOC Rn).      Problem: Pain Management  Goal: Pain level will decrease to patient's comfort goal  Outcome: PROGRESSING AS EXPECTED  Note:   Denies pain.
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
## 2019-09-23 NOTE — ASSESSMENT & PLAN NOTE
Dr. Tracy recommends nonweightbearing With boot for protection and follow up at [facility] for imaging after discharge  Patient transferring with assistance

## 2019-09-23 NOTE — CARE PLAN
Problem: Nutritional:  Goal: Achieve adequate nutritional intake  Description  Patient will consume >50% of meals   9/23/2019 1522 by Nazia Mijares R.D.  Outcome: PROGRESSING AS EXPECTED    Recorded PO intake is % of most meals (3 out of 4) since 9/20. Of note, intake at lunch today was poor at <25%. Pt also has an order for Boost Plus TID. RD will continue to monitor PO trend.

## 2019-09-23 NOTE — PROGRESS NOTES
Telemetry Shift Summary    Rhythm A flutter w/ BBB  HR Range 76-92  Ectopy frequent PVC, frequent trigeminy/couplet  Measurements -/0.14/-        Normal Values  Rhythm SR  HR Range    Measurements 0.12-0.20 / 0.06-0.10  / 0.30-0.52

## 2019-09-23 NOTE — DISCHARGE PLANNING
Anticipated Discharge Disposition: SNF    Action: LSW spoke with pt at bedside regarding SNF. Pt stated he has been to [facility] [address] before. Pt signed choice for [place].     LSW faxed choice to [facility]    Barriers to Discharge: SNF acceptance, bed    Plan: LSW to f/u

## 2019-09-24 ENCOUNTER — APPOINTMENT (OUTPATIENT)
Dept: RADIOLOGY | Facility: MEDICAL CENTER | Age: 84
DRG: 501 | End: 2019-09-24
Attending: INTERNAL MEDICINE
Payer: COMMERCIAL

## 2019-09-24 LAB
ANION GAP SERPL CALC-SCNC: 13 MMOL/L (ref 0–11.9)
APPEARANCE UR: CLEAR
BASOPHILS # BLD AUTO: 0.2 % (ref 0–1.8)
BASOPHILS # BLD: 0.03 K/UL (ref 0–0.12)
BILIRUB UR QL STRIP.AUTO: NEGATIVE
BUN SERPL-MCNC: 23 MG/DL (ref 8–22)
CALCIUM SERPL-MCNC: 8.7 MG/DL (ref 8.4–10.2)
CHLORIDE SERPL-SCNC: 106 MMOL/L (ref 96–112)
CO2 SERPL-SCNC: 20 MMOL/L (ref 20–33)
COLOR UR: YELLOW
CREAT SERPL-MCNC: 0.97 MG/DL (ref 0.5–1.4)
EOSINOPHIL # BLD AUTO: 0.07 K/UL (ref 0–0.51)
EOSINOPHIL NFR BLD: 0.6 % (ref 0–6.9)
ERYTHROCYTE [DISTWIDTH] IN BLOOD BY AUTOMATED COUNT: 56.2 FL (ref 35.9–50)
GLUCOSE SERPL-MCNC: 112 MG/DL (ref 65–99)
GLUCOSE UR STRIP.AUTO-MCNC: NEGATIVE MG/DL
HCT VFR BLD AUTO: 34.8 % (ref 42–52)
HGB BLD-MCNC: 11.2 G/DL (ref 14–18)
IMM GRANULOCYTES # BLD AUTO: 0.06 K/UL (ref 0–0.11)
IMM GRANULOCYTES NFR BLD AUTO: 0.5 % (ref 0–0.9)
KETONES UR STRIP.AUTO-MCNC: NEGATIVE MG/DL
LEUKOCYTE ESTERASE UR QL STRIP.AUTO: NEGATIVE
LYMPHOCYTES # BLD AUTO: 2.07 K/UL (ref 1–4.8)
LYMPHOCYTES NFR BLD: 16.5 % (ref 22–41)
MCH RBC QN AUTO: 28.6 PG (ref 27–33)
MCHC RBC AUTO-ENTMCNC: 32.2 G/DL (ref 33.7–35.3)
MCV RBC AUTO: 88.8 FL (ref 81.4–97.8)
MICRO URNS: NORMAL
MONOCYTES # BLD AUTO: 1.33 K/UL (ref 0–0.85)
MONOCYTES NFR BLD AUTO: 10.6 % (ref 0–13.4)
NEUTROPHILS # BLD AUTO: 8.97 K/UL (ref 1.82–7.42)
NEUTROPHILS NFR BLD: 71.6 % (ref 44–72)
NITRITE UR QL STRIP.AUTO: NEGATIVE
NRBC # BLD AUTO: 0 K/UL
NRBC BLD-RTO: 0 /100 WBC
PH UR STRIP.AUTO: 7.5 [PH] (ref 5–8)
PLATELET # BLD AUTO: 149 K/UL (ref 164–446)
PMV BLD AUTO: 11.4 FL (ref 9–12.9)
POTASSIUM SERPL-SCNC: 4.1 MMOL/L (ref 3.6–5.5)
PROT UR QL STRIP: NEGATIVE MG/DL
RBC # BLD AUTO: 3.92 M/UL (ref 4.7–6.1)
RBC UR QL AUTO: NEGATIVE
SODIUM SERPL-SCNC: 139 MMOL/L (ref 135–145)
SP GR UR STRIP.AUTO: 1.01
WBC # BLD AUTO: 12.5 K/UL (ref 4.8–10.8)

## 2019-09-24 PROCEDURE — 81003 URINALYSIS AUTO W/O SCOPE: CPT

## 2019-09-24 PROCEDURE — 700102 HCHG RX REV CODE 250 W/ 637 OVERRIDE(OP): Performed by: INTERNAL MEDICINE

## 2019-09-24 PROCEDURE — 770006 HCHG ROOM/CARE - MED/SURG/GYN SEMI*

## 2019-09-24 PROCEDURE — A9270 NON-COVERED ITEM OR SERVICE: HCPCS | Performed by: INTERNAL MEDICINE

## 2019-09-24 PROCEDURE — 700105 HCHG RX REV CODE 258: Performed by: INTERNAL MEDICINE

## 2019-09-24 PROCEDURE — 99233 SBSQ HOSP IP/OBS HIGH 50: CPT | Performed by: INTERNAL MEDICINE

## 2019-09-24 PROCEDURE — 80048 BASIC METABOLIC PNL TOTAL CA: CPT

## 2019-09-24 PROCEDURE — 85025 COMPLETE CBC W/AUTO DIFF WBC: CPT

## 2019-09-24 PROCEDURE — 700111 HCHG RX REV CODE 636 W/ 250 OVERRIDE (IP): Performed by: INTERNAL MEDICINE

## 2019-09-24 PROCEDURE — 71045 X-RAY EXAM CHEST 1 VIEW: CPT

## 2019-09-24 RX ADMIN — CEFAZOLIN 2 G: 10 INJECTION, POWDER, FOR SOLUTION INTRAVENOUS; PARENTERAL at 05:41

## 2019-09-24 RX ADMIN — LEVETIRACETAM 500 MG: 500 TABLET, FILM COATED ORAL at 17:13

## 2019-09-24 RX ADMIN — OXYCODONE HYDROCHLORIDE 5 MG: 5 TABLET ORAL at 23:31

## 2019-09-24 RX ADMIN — CEFAZOLIN 2 G: 10 INJECTION, POWDER, FOR SOLUTION INTRAVENOUS; PARENTERAL at 13:26

## 2019-09-24 RX ADMIN — CEFAZOLIN 2 G: 10 INJECTION, POWDER, FOR SOLUTION INTRAVENOUS; PARENTERAL at 22:24

## 2019-09-24 RX ADMIN — ATORVASTATIN CALCIUM 20 MG: 10 TABLET, FILM COATED ORAL at 17:12

## 2019-09-24 RX ADMIN — METOPROLOL TARTRATE 25 MG: 25 TABLET ORAL at 05:41

## 2019-09-24 RX ADMIN — METOPROLOL TARTRATE 25 MG: 25 TABLET ORAL at 17:13

## 2019-09-24 RX ADMIN — SENNOSIDES AND DOCUSATE SODIUM 2 TABLET: 8.6; 5 TABLET ORAL at 17:14

## 2019-09-24 RX ADMIN — SENNOSIDES AND DOCUSATE SODIUM 2 TABLET: 8.6; 5 TABLET ORAL at 05:42

## 2019-09-24 RX ADMIN — LEVETIRACETAM 500 MG: 500 TABLET, FILM COATED ORAL at 05:42

## 2019-09-24 ASSESSMENT — ENCOUNTER SYMPTOMS
BACK PAIN: 0
FEVER: 0
ABDOMINAL PAIN: 0
WEAKNESS: 0
SPUTUM PRODUCTION: 0
DIAPHORESIS: 0
SPEECH CHANGE: 0
NAUSEA: 0
SHORTNESS OF BREATH: 0
ORTHOPNEA: 0
COUGH: 0
STRIDOR: 0
BRUISES/BLEEDS EASILY: 1
NERVOUS/ANXIOUS: 1
CONSTIPATION: 0
TREMORS: 0
MYALGIAS: 1
MEMORY LOSS: 1

## 2019-09-24 NOTE — CARE PLAN
Problem: Communication  Goal: The ability to communicate needs accurately and effectively will improve  Outcome: PROGRESSING AS EXPECTED  Note:   Patient updated on the plan of care. Encouraged to voice feelings and to ask questions. Answered all questions and concerns. Agrees with the plan of care.      Problem: Safety  Goal: Will remain free from injury  Outcome: PROGRESSING AS EXPECTED  Note:   Safety precautions in place. Bed alarm on. Will continue to monitor patient.   Goal: Will remain free from falls  Outcome: PROGRESSING AS EXPECTED     Problem: Infection  Goal: Will remain free from infection  Outcome: PROGRESSING AS EXPECTED     Problem: Bowel/Gastric:  Goal: Normal bowel function is maintained or improved  Outcome: PROGRESSING AS EXPECTED  Flowsheets (Taken 9/23/2019 1926)  Last BM: 09/23/19

## 2019-09-24 NOTE — PROGRESS NOTES
Hospital Medicine Daily Progress Note    Date of Service  9/24/2019    Chief Complaint  89 y.o. male admitted 9/20/2019 with left leg injury after a fall    Hospital Course    89 y.o. male who presented 9/20/2019 with fall.  Fell when getting out of the car, hit his head and left leg, noted to have large laceration as well as a medial malleolus fracture.  NWB LLE, wound care is following.      Interval Problem Update  9/24: WBC still elevated, pain is controlled.  Pending PT OT re-eval.  [facility]    Consultants/Specialty  Plastic surgery Dr. Kent  Orthopedic surgery Dr. Tracy    Code Status  dnr    Disposition  Likely to SNF pending pt/ot    Review of Systems  Review of Systems   Constitutional: Negative for diaphoresis and fever.   HENT: Negative for congestion.    Respiratory: Negative for cough, sputum production, shortness of breath and stridor.    Cardiovascular: Positive for leg swelling. Negative for chest pain and orthopnea.   Gastrointestinal: Negative for abdominal pain, constipation and nausea.   Genitourinary: Positive for urgency. Negative for dysuria and hematuria.   Musculoskeletal: Positive for myalgias. Negative for back pain and joint pain.   Skin: Negative for itching and rash.   Neurological: Negative for tremors, speech change and weakness.   Endo/Heme/Allergies: Bruises/bleeds easily.   Psychiatric/Behavioral: Positive for memory loss. The patient is nervous/anxious.         Physical Exam  Temp:  [36.5 °C (97.7 °F)-36.9 °C (98.4 °F)] 36.7 °C (98 °F)  Pulse:  [68-77] 77  Resp:  [18-20] 20  BP: (116-135)/(52-79) 116/79  SpO2:  [90 %-93 %] 92 %    Physical Exam   Constitutional: He is oriented to person, place, and time. No distress.   HENT:   Mouth/Throat: Oropharynx is clear and moist. No oropharyngeal exudate.   Eyes: Conjunctivae are normal. No scleral icterus.   Neck: No JVD present. No tracheal deviation present.   Cardiovascular: Normal rate and intact distal pulses. An irregularly  irregular rhythm present.   No murmur heard.  Pulmonary/Chest: Effort normal and breath sounds normal. He has no wheezes. He has no rales.   Abdominal: Soft. He exhibits no distension. There is no tenderness.   Musculoskeletal: He exhibits edema and tenderness.   Neurological: He is alert and oriented to person, place, and time. Coordination normal.   Skin: Skin is warm. Rash noted. He is not diaphoretic. There is erythema.   Left lower leg wound visualized with wound care today, new erythema noted over the skin anteriorly, no drainage or slough, loose skin flap over the lateral shin  Bruising showing color change consistent with healing, darkening and some purple color changes   Psychiatric: His speech is tangential. Cognition and memory are impaired.   Nursing note and vitals reviewed.      Fluids    Intake/Output Summary (Last 24 hours) at 9/24/2019 1430  Last data filed at 9/24/2019 1420  Gross per 24 hour   Intake 600 ml   Output 1425 ml   Net -825 ml       Laboratory  Recent Labs     09/22/19  0115 09/24/19  0240   WBC 11.5* 12.5*   RBC 4.20* 3.92*   HEMOGLOBIN 12.0* 11.2*   HEMATOCRIT 38.1* 34.8*   MCV 90.7 88.8   MCH 28.6 28.6   MCHC 31.5* 32.2*   RDW 57.5* 56.2*   PLATELETCT 138* 149*   MPV 11.5 11.4     Recent Labs     09/24/19  0240   SODIUM 139   POTASSIUM 4.1   CHLORIDE 106   CO2 20   GLUCOSE 112*   BUN 23*   CREATININE 0.97   CALCIUM 8.7                   Imaging  CT-LSPINE W/O PLUS RECONS   Final Result      Postsurgical and degenerative change without evidence of lumbar spine fracture.      CT-TSPINE W/O PLUS RECONS   Final Result         No acute fracture or subluxation of the thoracic spine.      CT-CHEST,ABDOMEN,PELVIS WITH   Final Result      1.  No CT evidence of acute traumatic injury in the chest, abdomen or pelvis.   2.  Spine is detailed separately.   3.  Small right pleural effusion.   4.  Mild mediastinal lymphadenopathy, statistically reactive.   5.  Small hiatal hernia.   6.  Colonic  diverticulosis.      CT-CSPINE WITHOUT PLUS RECONS   Final Result      Degenerative change without evidence of fracture.      DX-TIBIA AND FIBULA LEFT   Final Result      Minimally displaced fracture of the medial malleolus.                  INTERPRETING LOCATION:  [address], [place], [address]      CT-HEAD W/O   Final Result         1.  No acute intracranial abnormality is identified, there are nonspecific white matter changes, commonly associated with small vessel ischemic disease.  Associated mild cerebral atrophy is noted.   2.  Atherosclerosis.      DX-CHEST-PORTABLE (1 VIEW)    (Results Pending)        Assessment/Plan  * Leg laceration  Assessment & Plan  Occurred after a fall.  Plastic surgery stated no need for skin flap  Wound care is recommending a wound VAC  Patient accidentally removed this therefore needs to be replaced.    Arrhythmia  Assessment & Plan  No new, followed by cardiology outpatient, Premature contractions detected on EKG  Patient with persistent arrhythmia  Continue metoprolol  No anticoagulation due to falls      Fall  Assessment & Plan  CT shows no intracranial bleed  Left leg laceration nonsurgical per plastic surgery DR. Kent  Wound vac to remain in place but patient has removed this by accident      Displaced fracture of medial malleolus of left tibia, initial encounter for closed fracture  Assessment & Plan  Discussed with orthopedic surgery and nonweightbearing on the left recommended  With boot for protection and follow up at [facility] after discharge  Dr. Chente Tracy    Cellulitis  Assessment & Plan  New redness developing over left shin anteriorly, adjacent to skin tear and open wound  Check wound culture  Continue Ancef  Monitor white count    Leukocytosis  Assessment & Plan  Persistent  Check CXR and UA  Repeat in AM    Dyslipidemia- (present on admission)  Assessment & Plan  -Continue home statin    (HCC) Seizure disorder- (present on admission)  Assessment & Plan  Wife  expressed concern of possible seizure causing the patient's fall as he had not taken keppra the day of his injury  No seizures here  PT OT to eval for SNF  -Continue home Keppra       VTE prophylaxis: SCDs to avoid further hematoma in LLE

## 2019-09-24 NOTE — DISCHARGE PLANNING
Agency/Facility Name: [facility]  Spoke To: Audra  Outcome: Patient declined due to no skilled need.

## 2019-09-24 NOTE — PROGRESS NOTES
Received patient report from MARLI Hopkins. Patient resting comfortably in bed, no complaints at this time. Safety precautions in place. Will continue to monitor.

## 2019-09-24 NOTE — CARE PLAN
Problem: Communication  Goal: The ability to communicate needs accurately and effectively will improve  Outcome: PROGRESSING AS EXPECTED  Note:   A&Ox3, forgetful of time, impulsive.      Problem: Safety  Goal: Will remain free from injury  Outcome: PROGRESSING AS EXPECTED  Note:   Non wt bearing of LLE maintained. Turns self in bed, Boot applied to BLE when out of bed, Alarms on for safety, call light with in reach, pt educated on how to use call light.      Problem: Infection  Goal: Will remain free from infection  Outcome: PROGRESSING AS EXPECTED  Note:   LLE + 2 edema, remains reddened, ABX per orders.      Problem: Venous Thromboembolism (VTW)/Deep Vein Thrombosis (DVT) Prevention:  Goal: Patient will participate in Venous Thrombosis (VTE)/Deep Vein Thrombosis (DVT)Prevention Measures  Outcome: PROGRESSING AS EXPECTED  Note:   SCD's ordered.      Problem: Bowel/Gastric:  Goal: Normal bowel function is maintained or improved  Outcome: PROGRESSING AS EXPECTED  Note:   LBM: 9/23. Good appetite this morning, PO fluids encouraged. No n/v.      Problem: Discharge Barriers/Planning  Goal: Patient's continuum of care needs will be met  Outcome: PROGRESSING AS EXPECTED  Note:   Per NOC RN pt had pulled wound vac + foam off at night. Wet to dry dressing applied and remains intact, foam removed. Wound care RN notified (see wound note). Hips and thighs ecchymotic but pt denies pain.      Problem: Pain Management  Goal: Pain level will decrease to patient's comfort goal  Outcome: PROGRESSING AS EXPECTED  Note:   Denies pain.

## 2019-09-24 NOTE — WOUND TEAM
Called by RN that patient removed VAC dressing.  Per RN, wet to dry in place.  RN to confirm all foam is removed and appropriate dressing in place.  Patient may be transferring to SNF today.  If patient still inpatient will replace VAC dressing tomorrow.

## 2019-09-24 NOTE — PROGRESS NOTES
"Patient is confused and disoriented to situation. During hourly rounding, this writer found the wound vac dislodged from the patient's wound. The suction was disrupted for an undetermined amount of time. Per nursing dressing care order, this writer \"remove[d] drape and black foam, then replace[d] with wet-to-dry dressing (One continuous piece of roll gauze moistened with NS).\" Will continue to monitor patient.  "

## 2019-09-25 PROBLEM — L03.116 LEFT LEG CELLULITIS: Status: ACTIVE | Noted: 2019-09-23

## 2019-09-25 LAB
ALBUMIN SERPL BCP-MCNC: 3.2 G/DL (ref 3.2–4.9)
BASOPHILS # BLD AUTO: 0.2 % (ref 0–1.8)
BASOPHILS # BLD: 0.03 K/UL (ref 0–0.12)
BUN SERPL-MCNC: 22 MG/DL (ref 8–22)
CALCIUM SERPL-MCNC: 8.5 MG/DL (ref 8.4–10.2)
CHLORIDE SERPL-SCNC: 105 MMOL/L (ref 96–112)
CO2 SERPL-SCNC: 22 MMOL/L (ref 20–33)
CREAT SERPL-MCNC: 1.04 MG/DL (ref 0.5–1.4)
EOSINOPHIL # BLD AUTO: 0.08 K/UL (ref 0–0.51)
EOSINOPHIL NFR BLD: 0.7 % (ref 0–6.9)
ERYTHROCYTE [DISTWIDTH] IN BLOOD BY AUTOMATED COUNT: 57.5 FL (ref 35.9–50)
GLUCOSE SERPL-MCNC: 115 MG/DL (ref 65–99)
GRAM STN SPEC: NORMAL
HCT VFR BLD AUTO: 37.2 % (ref 42–52)
HGB BLD-MCNC: 11.6 G/DL (ref 14–18)
IMM GRANULOCYTES # BLD AUTO: 0.05 K/UL (ref 0–0.11)
IMM GRANULOCYTES NFR BLD AUTO: 0.4 % (ref 0–0.9)
LYMPHOCYTES # BLD AUTO: 1.6 K/UL (ref 1–4.8)
LYMPHOCYTES NFR BLD: 13.3 % (ref 22–41)
MCH RBC QN AUTO: 28.2 PG (ref 27–33)
MCHC RBC AUTO-ENTMCNC: 31.2 G/DL (ref 33.7–35.3)
MCV RBC AUTO: 90.5 FL (ref 81.4–97.8)
MONOCYTES # BLD AUTO: 1.44 K/UL (ref 0–0.85)
MONOCYTES NFR BLD AUTO: 11.9 % (ref 0–13.4)
NEUTROPHILS # BLD AUTO: 8.87 K/UL (ref 1.82–7.42)
NEUTROPHILS NFR BLD: 73.5 % (ref 44–72)
NRBC # BLD AUTO: 0 K/UL
NRBC BLD-RTO: 0 /100 WBC
PHOSPHATE SERPL-MCNC: 3 MG/DL (ref 2.5–4.5)
PLATELET # BLD AUTO: 166 K/UL (ref 164–446)
PMV BLD AUTO: 11.6 FL (ref 9–12.9)
POTASSIUM SERPL-SCNC: 4.4 MMOL/L (ref 3.6–5.5)
RBC # BLD AUTO: 4.11 M/UL (ref 4.7–6.1)
SIGNIFICANT IND 70042: NORMAL
SITE SITE: NORMAL
SODIUM SERPL-SCNC: 139 MMOL/L (ref 135–145)
SOURCE SOURCE: NORMAL
WBC # BLD AUTO: 12.1 K/UL (ref 4.8–10.8)

## 2019-09-25 PROCEDURE — 87186 SC STD MICRODIL/AGAR DIL: CPT | Mod: 91

## 2019-09-25 PROCEDURE — 99233 SBSQ HOSP IP/OBS HIGH 50: CPT | Performed by: INTERNAL MEDICINE

## 2019-09-25 PROCEDURE — 80069 RENAL FUNCTION PANEL: CPT

## 2019-09-25 PROCEDURE — 700105 HCHG RX REV CODE 258: Performed by: INTERNAL MEDICINE

## 2019-09-25 PROCEDURE — 87077 CULTURE AEROBIC IDENTIFY: CPT | Mod: 91

## 2019-09-25 PROCEDURE — 97164 PT RE-EVAL EST PLAN CARE: CPT

## 2019-09-25 PROCEDURE — 87205 SMEAR GRAM STAIN: CPT

## 2019-09-25 PROCEDURE — A9270 NON-COVERED ITEM OR SERVICE: HCPCS | Performed by: INTERNAL MEDICINE

## 2019-09-25 PROCEDURE — 85025 COMPLETE CBC W/AUTO DIFF WBC: CPT

## 2019-09-25 PROCEDURE — 97168 OT RE-EVAL EST PLAN CARE: CPT

## 2019-09-25 PROCEDURE — 700111 HCHG RX REV CODE 636 W/ 250 OVERRIDE (IP): Performed by: INTERNAL MEDICINE

## 2019-09-25 PROCEDURE — 700102 HCHG RX REV CODE 250 W/ 637 OVERRIDE(OP): Performed by: INTERNAL MEDICINE

## 2019-09-25 PROCEDURE — 87070 CULTURE OTHR SPECIMN AEROBIC: CPT

## 2019-09-25 PROCEDURE — 97606 NEG PRS WND THER DME>50 SQCM: CPT

## 2019-09-25 PROCEDURE — 770006 HCHG ROOM/CARE - MED/SURG/GYN SEMI*

## 2019-09-25 RX ADMIN — ATORVASTATIN CALCIUM 20 MG: 10 TABLET, FILM COATED ORAL at 17:53

## 2019-09-25 RX ADMIN — METOPROLOL TARTRATE 25 MG: 25 TABLET ORAL at 17:53

## 2019-09-25 RX ADMIN — METOPROLOL TARTRATE 25 MG: 25 TABLET ORAL at 05:15

## 2019-09-25 RX ADMIN — LEVETIRACETAM 500 MG: 500 TABLET, FILM COATED ORAL at 05:15

## 2019-09-25 RX ADMIN — CEFAZOLIN 2 G: 10 INJECTION, POWDER, FOR SOLUTION INTRAVENOUS; PARENTERAL at 12:25

## 2019-09-25 RX ADMIN — LEVETIRACETAM 500 MG: 500 TABLET, FILM COATED ORAL at 17:53

## 2019-09-25 RX ADMIN — CEFAZOLIN 2 G: 10 INJECTION, POWDER, FOR SOLUTION INTRAVENOUS; PARENTERAL at 05:15

## 2019-09-25 RX ADMIN — CEFAZOLIN 2 G: 10 INJECTION, POWDER, FOR SOLUTION INTRAVENOUS; PARENTERAL at 21:42

## 2019-09-25 ASSESSMENT — COGNITIVE AND FUNCTIONAL STATUS - GENERAL
MOVING FROM LYING ON BACK TO SITTING ON SIDE OF FLAT BED: A LITTLE
CLIMB 3 TO 5 STEPS WITH RAILING: TOTAL
MOBILITY SCORE: 17
WALKING IN HOSPITAL ROOM: A LOT
STANDING UP FROM CHAIR USING ARMS: A LITTLE
SUGGESTED CMS G CODE MODIFIER MOBILITY: CK

## 2019-09-25 ASSESSMENT — ENCOUNTER SYMPTOMS
SPUTUM PRODUCTION: 0
FALLS: 1
NAUSEA: 0
FEVER: 0
BACK PAIN: 0
CHILLS: 0
BRUISES/BLEEDS EASILY: 1
DIAPHORESIS: 0
WEAKNESS: 1
STRIDOR: 0
NERVOUS/ANXIOUS: 1
TREMORS: 0
COUGH: 0
CONSTIPATION: 0
MEMORY LOSS: 1
MYALGIAS: 1
SHORTNESS OF BREATH: 0
SPEECH CHANGE: 0
ORTHOPNEA: 0
ABDOMINAL PAIN: 0

## 2019-09-25 ASSESSMENT — GAIT ASSESSMENTS
ASSISTIVE DEVICE: FRONT WHEEL WALKER
DISTANCE (FEET): 5
DEVIATION: ANTALGIC;STEP TO;OTHER (COMMENT)
GAIT LEVEL OF ASSIST: MINIMAL ASSIST

## 2019-09-25 ASSESSMENT — ACTIVITIES OF DAILY LIVING (ADL): TOILETING: INDEPENDENT

## 2019-09-25 NOTE — CARE PLAN
Problem: Communication  Goal: The ability to communicate needs accurately and effectively will improve  Outcome: PROGRESSING AS EXPECTED  Note:   A&Ox2-3, forgetful of time, impulsive.      Problem: Safety  Goal: Will remain free from injury  Outcome: PROGRESSING AS EXPECTED  Note:   Non wt bearing of LLE maintained. Turns self in bed, Boot applied to BLE when out of bed, Alarms on for safety, call light with in reach, pt educated on how to use call light.      Problem: Infection  Goal: Will remain free from infection  Outcome: PROGRESSING AS EXPECTED  Note:   LLE + 1 edema, remains reddened, ABX per orders. Wound culture sent      Problem: Venous Thromboembolism (VTW)/Deep Vein Thrombosis (DVT) Prevention:  Goal: Patient will participate in Venous Thrombosis (VTE)/Deep Vein Thrombosis (DVT)Prevention Measures  Outcome: PROGRESSING AS EXPECTED  Note:   SCD's ordered.      Problem: Bowel/Gastric:  Goal: Normal bowel function is maintained or improved  Outcome: PROGRESSING AS EXPECTED  Note:   LBM: 9/24. Good appetite, PO fluids encouraged. No n/v.      Problem: Discharge Barriers/Planning  Goal: Patient's continuum of care needs will be met  Outcome: PROGRESSING AS EXPECTED  Note:   Wound care RN replaced VAC this morning.      Problem: Pain Management  Goal: Pain level will decrease to patient's comfort goal  Outcome: PROGRESSING AS EXPECTED  Note:   Denies pain.

## 2019-09-25 NOTE — CARE PLAN
Problem: Nutritional:  Goal: Achieve adequate nutritional intake  Description  Patient will consume >50% of meals   Outcome: PROGRESSING SLOWER THAN EXPECTED      Telephone Encounter by Kristine Chapa 

## 2019-09-25 NOTE — PROGRESS NOTES
Hospital Medicine Daily Progress Note    Date of Service  9/25/2019    Chief Complaint  89 y.o. male admitted 9/20/2019 with left leg injury after a fall    Hospital Course    89 y.o. male who presented 9/20/2019 with fall.  Fell when getting out of the car, hit his head and left leg, noted to have large laceration as well as a medial malleolus fracture.  NWB LLE, wound care is following.      Interval Problem Update  9/24: WBC still elevated, pain is controlled.  Pending PT OT re-eval.  NWB LLE  9/25: WBC unchanged, wound VAC replaced.  He remains agitated, confused and impulsive with poor insight    Consultants/Specialty  Plastic surgery Dr. Kent  Orthopedic surgery Dr. Tracy    Code Status  dnr    Disposition  SNF    Review of Systems  Review of Systems   Constitutional: Negative for chills, diaphoresis and fever.   HENT: Negative for congestion.    Respiratory: Negative for cough, sputum production, shortness of breath and stridor.    Cardiovascular: Positive for leg swelling. Negative for chest pain and orthopnea.   Gastrointestinal: Negative for abdominal pain, constipation and nausea.   Genitourinary: Negative for dysuria, hematuria and urgency.   Musculoskeletal: Positive for falls (Prior to admission) and myalgias. Negative for back pain and joint pain.   Skin: Negative for itching and rash.   Neurological: Positive for weakness. Negative for tremors and speech change.   Endo/Heme/Allergies: Bruises/bleeds easily.   Psychiatric/Behavioral: Positive for memory loss. The patient is nervous/anxious.         Physical Exam  Temp:  [36.2 °C (97.2 °F)-36.9 °C (98.5 °F)] 36.2 °C (97.2 °F)  Pulse:  [67-80] 80  Resp:  [20] 20  BP: (111-136)/(66-97) 111/97  SpO2:  [94 %-98 %] 98 %    Physical Exam   Constitutional: He is oriented to person, place, and time. No distress.   HENT:   Mouth/Throat: Oropharynx is clear and moist. No oropharyngeal exudate.   Eyes: Conjunctivae are normal. No scleral icterus.   Neck:  No JVD present. No tracheal deviation present.   Cardiovascular: Normal rate and intact distal pulses. An irregularly irregular rhythm present.   No murmur heard.  Pulmonary/Chest: Effort normal and breath sounds normal. He has no wheezes. He has no rales.   Abdominal: Soft. He exhibits no distension. There is no tenderness.   Musculoskeletal: He exhibits edema and tenderness.   Neurological: He is alert and oriented to person, place, and time. Coordination normal.   Skin: Skin is warm. Rash noted. He is not diaphoretic. There is erythema.   Left lower leg wound visualized with wound care today, deep fat layer is exposed loose skin flap over the lateral shin.  Bruising showing color change consistent with healing, darkening and some purple color changes   Psychiatric: His mood appears anxious. His speech is not tangential. He is agitated. Cognition and memory are impaired. He expresses impulsivity and inappropriate judgment. He exhibits abnormal recent memory.   Nursing note and vitals reviewed.      Fluids    Intake/Output Summary (Last 24 hours) at 9/25/2019 1654  Last data filed at 9/25/2019 1200  Gross per 24 hour   Intake --   Output 750 ml   Net -750 ml       Laboratory  Recent Labs     09/24/19  0240 09/25/19  0350   WBC 12.5* 12.1*   RBC 3.92* 4.11*   HEMOGLOBIN 11.2* 11.6*   HEMATOCRIT 34.8* 37.2*   MCV 88.8 90.5   MCH 28.6 28.2   MCHC 32.2* 31.2*   RDW 56.2* 57.5*   PLATELETCT 149* 166   MPV 11.4 11.6     Recent Labs     09/24/19  0240 09/25/19  0350   SODIUM 139 139   POTASSIUM 4.1 4.4   CHLORIDE 106 105   CO2 20 22   GLUCOSE 112* 115*   BUN 23* 22   CREATININE 0.97 1.04   CALCIUM 8.7 8.5                   Imaging  DX-CHEST-PORTABLE (1 VIEW)   Final Result         1. No pulmonary infiltrates or consolidations are noted.      2. Cardiomegaly.      CT-LSPINE W/O PLUS RECONS   Final Result      Postsurgical and degenerative change without evidence of lumbar spine fracture.      CT-TSPINE W/O PLUS RECONS    Final Result         No acute fracture or subluxation of the thoracic spine.      CT-CHEST,ABDOMEN,PELVIS WITH   Final Result      1.  No CT evidence of acute traumatic injury in the chest, abdomen or pelvis.   2.  Spine is detailed separately.   3.  Small right pleural effusion.   4.  Mild mediastinal lymphadenopathy, statistically reactive.   5.  Small hiatal hernia.   6.  Colonic diverticulosis.      CT-CSPINE WITHOUT PLUS RECONS   Final Result      Degenerative change without evidence of fracture.      DX-TIBIA AND FIBULA LEFT   Final Result      Minimally displaced fracture of the medial malleolus.                  INTERPRETING LOCATION:  [address] [facility], [place], [address]      CT-HEAD W/O   Final Result         1.  No acute intracranial abnormality is identified, there are nonspecific white matter changes, commonly associated with small vessel ischemic disease.  Associated mild cerebral atrophy is noted.   2.  Atherosclerosis.           Assessment/Plan  * Leg laceration  Assessment & Plan  Occurred after a fall.  Plastic surgery stated no need for skin flap  Wound care replaced wound VAC on 9/25  Continue Ancef  Trend WBC    Arrhythmia  Assessment & Plan  Hx of SVT  No new, followed by cardiology outpatient, Premature contractions detected on EKG  Continue metoprolol 25  No anticoagulation due to falls      Fall  Assessment & Plan  CT shows no intracranial bleed  Left leg laceration nonsurgical per plastic surgery DR. Kent  Wound vac to remain in place but patient has removed this by accident  Wound VAC was replaced 9/25      Displaced fracture of medial malleolus of left tibia, initial encounter for closed fracture  Assessment & Plan  Discussed with orthopedic surgery and nonweightbearing on the left recommended  With boot for protection and follow up at [facility] after discharge  Dr. Chente Tracy    Left leg cellulitis  Assessment & Plan  New redness developing over left shin anteriorly, adjacent to skin tear  and open wound  Continue Ancef  Send for culture  Monitor white count    Leukocytosis  Assessment & Plan  Persistent  CXR and UA negative  No evidence of worsening fluid collection of left lower extremity  Repeat in AM  Continue IV Ancef    Dyslipidemia- (present on admission)  Assessment & Plan  -Continue home statin    (HCC) Seizure disorder- (present on admission)  Assessment & Plan  Wife expressed concern of possible seizure causing the patient's fall as he had not taken keppra the day of his injury  No seizures here  PT OT rec SNF  -Continue home Keppra       VTE prophylaxis: SCDs to avoid further hematoma in LLE    Total time:  40 minutes.  I spent greater than 50% of the time for patient care, counseling, and coordination on this date, including unit/floor time, and face-to-face time with the patient as per interval events and assessment and plan above

## 2019-09-25 NOTE — PROGRESS NOTES
Patient in bed. Complains of left leg pain. Medication administered as ordered. Safety precautions in place. Will continue to monitor patient.

## 2019-09-25 NOTE — CARE PLAN
Problem: Communication  Goal: The ability to communicate needs accurately and effectively will improve  Outcome: PROGRESSING AS EXPECTED  Note:   Patient updated on the plan of care. Encouraged to voice feelings and to ask questions. Answered all questions and concerns. Agrees with the plan of care.      Problem: Safety  Goal: Will remain free from injury  Outcome: PROGRESSING AS EXPECTED  Note:   Safety precautions in place. Bed alarm utilized. Will continue to monitor patient.   Goal: Will remain free from falls  Outcome: PROGRESSING AS EXPECTED     Problem: Pain Management  Goal: Pain level will decrease to patient's comfort goal  Outcome: PROGRESSING AS EXPECTED     Problem: Skin Integrity  Goal: Risk for impaired skin integrity will decrease  Outcome: PROGRESSING AS EXPECTED

## 2019-09-25 NOTE — WOUND TEAM
"[facility]  Inpatient Services  Initial Wound and Skin Care Evaluation    Admission Date: 9/20/2019     Consult Date: 9/20/19   HPI, PMH, SH: Reviewed    Unit where seen by Wound Team: 3313/01     WOUND CONSULT RELATED TO:  VAC dressing placement LLE     SUBJECTIVE:  \"Can I make it easier for you?\"      Self Report / Pain Level:  Refused pain medication, tolerated well     OBJECTIVE:  Wet to dry in place after patient pulled off yesterday.  Appears to have evolving hematoma under skin flap.     WOUND TYPE, LOCATION, CHARACTERISTICS (Pressure Injuries: location, stage, POA or date identified)     Negative Pressure Wound Therapy Leg Left;Outer (Active)   NPWT Pump Mode / Pressure Setting Continuous;125 mmHg    Dressing Type Medium;Black foam    Number of Foam Pieces Used 2    Canister Changed No    Output (mL) 50 mL      Wound 09/20/19 Full Thickness Wound Other (Comment);Leg full thickness laceration lateral left leg (Active)   Wound Image      Site Assessment Red;Yellow    Celine-wound Assessment Dark edges    Margins Unattached edges    Wound Length (cm) 11 cm    Wound Width (cm) 3 cm    Wound Depth (cm) 0.5 cm    Wound Surface Area (cm^2) 33 cm^2    Tunneling 0 cm    Undermining 5 cm    Closure None    Drainage Amount Small    Drainage Description Sanguineous    Non-staged Wound Description Full thickness    Treatments Cleansed;Site care    Cleansing Normal Saline Irrigation    Periwound Protectant Skin Protectant wipes to Periwound;Drape    Dressing Options Wound Vac    Dressing Cleansing/Solutions Not Applicable    Dressing Changed Changed    Dressing Status Clean;Dry;Intact    Dressing Change Frequency Monday, Wednesday, Friday    NEXT Dressing Change  09/27/19    NEXT Weekly Photo (Inpatient Only) 10/02/19    WOUND NURSE ONLY - Odor None    WOUND NURSE ONLY - Pulses Left;1+;DP    WOUND NURSE ONLY - Exposed Structures None    WOUND NURSE ONLY - Tissue Type and Percentage mixed yellow/red     WOUND " NURSE ONLY - Time Spent with Patient (mins) 60         Vascular:      Dorsal Pedal pulses: [place]   Posterior tib pulses:  NA due to edema     ROBIN:  NA        Lab Values:    Lab Results   Component Value Date/Time    WBC 12.1 (H) 09/25/2019 03:50 AM    WBC 7.2 05/19/2010 11:24 AM    RBC 4.11 (L) 09/25/2019 03:50 AM    RBC 4.65 05/19/2010 11:24 AM    HEMOGLOBIN 11.6 (L) 09/25/2019 03:50 AM    HEMATOCRIT 37.2 (L) 09/25/2019 03:50 AM        Lab Results   Component Value Date/Time    HBA1C 5.7 06/13/2018 12:30 PM      Culture:  NA     INTERVENTIONS BY WOUND TEAM:  Previous wet to dry dressing in place.  Cleansed wound and under skin flap with NS and gauze.  New culture taken.  No sting skin prep to tammi wound, lateral edge of wound covered with large piece of mepitel one, covered with drape, drape to rest of tammi-wound, 1 piece of black foam under skin flap, 1 piece to cover rest of wound.  Sealed with drape.  Track pad applied.  Tubi  re-applied and tubing threaded threw and tacked down/floated with drape.  Heels floated. Dr. Neves in to assess.        Dressing Selection:    Tammi-wound: Mepitel One, Drape  WOUND: 2 pieces of black foam from a medium kit for NPWT at 125mmhg continuous.        Interdisciplinary consultation: Patient, Bedside RN, Dr. Neves     EVALUATION: Pt is an elderly gentleman who sustained a fall at home while getting out of his car. Pt is unclear regarding events of fall. Pt did not realize he had a wound to his leg until several hours later when he took his pants off for bed. Pt then presented to the ER at [facility] where a wound vac was placed by wound team. The tammi-wound tissue is fragile and now flap is not adhered to underlying tissue.  Appears to be evolving hematoma.     Factors affecting wound healing: elderly   Goals: Steady decrease in wound area and depth weekly.    NURSING PLAN OF CARE ORDERS (X):    Dressing changes: See Dressing Care orders: X  Skin care: See Skin Care  orders:   Rectal tube care: See Rectal Tube Care orders:   Other orders:    WOUND TEAM PLAN OF CARE (X):   NPWT change 3 x week:  X      Dressing changes by wound team:       Follow up as needed:       Other (explain):     Anticipated discharge plans (X):   SNF:  X  Home Care:        Outpatient Wound Center:            Self Care:            Other:

## 2019-09-25 NOTE — DISCHARGE PLANNING
Received Choice form at [phone]  Agency/Facility Name: [facility]  Referral sent per Choice form @ [phone]

## 2019-09-25 NOTE — DIETARY
Nutrition Services: Update   Day 3 of admit.  Homa Alonso is a 89 y.o. male with admitting DX of Fall, Arrhythmia. Pt also has a laceration on his left leg with wound vac and fracture on his medial malleolus on his left leg.    Pt is currently on regular diet with Boost Plus provided TID. PO intake of his meals is poor at 25-50% x 1 and < 25% x 1 (9/23/19 and 9/24/19). Recorded intake of supplement x 1 was %. Pt reports that his appetite is poor but he is drinking the Boost supplements because he knows that he need them. Wife said that pt is not eating as much as he used to at home, either. They have Muscle Milks at home for additional nutrition. Pt reports that he is feeling better today and he thinks his appetite is improving. RD encouraged protein first and listed foods high in protein. RD also encouraged PO intake >50%. Pt expressed understanding.     Pt said that his UBW is 174#. Wt on 9/23/19: 79.4 kg (175 lb 0.7 oz) via bed scale - Admit wt on 9/20/19 was 79 kg. Weight has been stable.     Malnutrition Risk: Criteria not met.    Recommendations/Plan:  1. Provide regular diet as ordered with Boost Plus TID at meals.    2. Encourage intake of meals  3. Document intake of all meals as % taken in ADL's to provide interdisciplinary communication across all shifts.   4. Monitor weight.  5. Nutrition rep will continue to see patient for ongoing meal and snack preferences.    RD following

## 2019-09-25 NOTE — THERAPY
"Physical Therapy Re- Evaluation completed.   Bed Mobility:  Supine to Sit: Supervised  Transfers: Sit to Stand: Minimal Assist  Gait: Level Of Assist: Minimal Assist with Front-Wheel Walker       Plan of Care: Will benefit from Physical Therapy 4 times per week  Discharge Recommendations: Equipment: Will Continue to Assess for Equipment Needs. Recommend post-acute placement for continued physical therapy services prior to discharge home. Patient can tolerate post-acute therapies at a 5x/week frequency.       See \"Rehab Therapy-Acute\" Patient Summary Report for complete documentation.     Pt was last seen by PT on 9/20 and was recommended no further acute care therapy services. However, since then the patient has had a change in WB status to NWB LLE due to L ankle fracture and presents with worsening cognition and saftey awareness during hospital stay. Pt was re-evaluated and presented with impaired balance, impaired coordination, weakness, pain, poor saftey awarenss, impaired gait, and dec activity tolerance. Pt was able to demo SPV for bed mobility, Min A for sit<>stand, and a few steps forward with FWW and Mod A for transfers. Pt required frequent cues, education, and demo for maintaining NWB on LLE. Pt was able to maintain NWB precautions for short distances, however, anticipate pt to do pooring with long distance ambulation at this time. Pt required frequent cues and education for saftey awareness as he tends to be impulsive at times. Pt will continue to benefit from skilled PT while in house, with recommendation for post acute therapy prior to d/c home given current objective findings, age, IPLOF, current WB status challenges, and limited social support. Will continue to follow.   "

## 2019-09-25 NOTE — THERAPY
"Occupational Therapy Re-Evaluation completed.   Functional Status:  Pt is now NWB LLE; L ankle fracture. Presents UI with increased confusion, poor safety awareness. A&Ox2. [place]. Pleasant and cooperative. Shows impulsivity. Donns socks with [place]. STS with Esther,FWW; able to maintain NWB. Limited standing tolerance due to LLE pain. Seated grooming with Sup. Gown change with Esther.         Plan of Care: Will benefit from Occupational Therapy 3 times per week  Discharge Recommendations:  Equipment: No Equipment Needed. Post-acute therapy Discharge to a transitional care facility for continued skilled therapy services. Lives with spouse.  See \"Rehab Therapy-Acute\" Patient Summary Report for complete documentation.    "

## 2019-09-26 LAB
ALBUMIN SERPL BCP-MCNC: 3.2 G/DL (ref 3.2–4.9)
BASOPHILS # BLD AUTO: 0.3 % (ref 0–1.8)
BASOPHILS # BLD: 0.03 K/UL (ref 0–0.12)
BUN SERPL-MCNC: 21 MG/DL (ref 8–22)
CALCIUM SERPL-MCNC: 8.7 MG/DL (ref 8.4–10.2)
CHLORIDE SERPL-SCNC: 103 MMOL/L (ref 96–112)
CO2 SERPL-SCNC: 22 MMOL/L (ref 20–33)
CREAT SERPL-MCNC: 0.9 MG/DL (ref 0.5–1.4)
EOSINOPHIL # BLD AUTO: 0.08 K/UL (ref 0–0.51)
EOSINOPHIL NFR BLD: 0.8 % (ref 0–6.9)
ERYTHROCYTE [DISTWIDTH] IN BLOOD BY AUTOMATED COUNT: 57.4 FL (ref 35.9–50)
GLUCOSE SERPL-MCNC: 104 MG/DL (ref 65–99)
HCT VFR BLD AUTO: 36.4 % (ref 42–52)
HGB BLD-MCNC: 11.8 G/DL (ref 14–18)
IMM GRANULOCYTES # BLD AUTO: 0.04 K/UL (ref 0–0.11)
IMM GRANULOCYTES NFR BLD AUTO: 0.4 % (ref 0–0.9)
LYMPHOCYTES # BLD AUTO: 1.88 K/UL (ref 1–4.8)
LYMPHOCYTES NFR BLD: 17.7 % (ref 22–41)
MAGNESIUM SERPL-MCNC: 2.2 MG/DL (ref 1.5–2.5)
MCH RBC QN AUTO: 29 PG (ref 27–33)
MCHC RBC AUTO-ENTMCNC: 32.4 G/DL (ref 33.7–35.3)
MCV RBC AUTO: 89.4 FL (ref 81.4–97.8)
MONOCYTES # BLD AUTO: 1.44 K/UL (ref 0–0.85)
MONOCYTES NFR BLD AUTO: 13.6 % (ref 0–13.4)
NEUTROPHILS # BLD AUTO: 7.13 K/UL (ref 1.82–7.42)
NEUTROPHILS NFR BLD: 67.2 % (ref 44–72)
NRBC # BLD AUTO: 0 K/UL
NRBC BLD-RTO: 0 /100 WBC
PHOSPHATE SERPL-MCNC: 2.9 MG/DL (ref 2.5–4.5)
PLATELET # BLD AUTO: 175 K/UL (ref 164–446)
PMV BLD AUTO: 11.4 FL (ref 9–12.9)
POTASSIUM SERPL-SCNC: 4.2 MMOL/L (ref 3.6–5.5)
RBC # BLD AUTO: 4.07 M/UL (ref 4.7–6.1)
SODIUM SERPL-SCNC: 138 MMOL/L (ref 135–145)
WBC # BLD AUTO: 10.6 K/UL (ref 4.8–10.8)

## 2019-09-26 PROCEDURE — 36415 COLL VENOUS BLD VENIPUNCTURE: CPT

## 2019-09-26 PROCEDURE — A9270 NON-COVERED ITEM OR SERVICE: HCPCS | Performed by: INTERNAL MEDICINE

## 2019-09-26 PROCEDURE — 700111 HCHG RX REV CODE 636 W/ 250 OVERRIDE (IP): Performed by: INTERNAL MEDICINE

## 2019-09-26 PROCEDURE — 700105 HCHG RX REV CODE 258: Performed by: INTERNAL MEDICINE

## 2019-09-26 PROCEDURE — 80069 RENAL FUNCTION PANEL: CPT

## 2019-09-26 PROCEDURE — 83735 ASSAY OF MAGNESIUM: CPT

## 2019-09-26 PROCEDURE — 99233 SBSQ HOSP IP/OBS HIGH 50: CPT | Performed by: INTERNAL MEDICINE

## 2019-09-26 PROCEDURE — 85025 COMPLETE CBC W/AUTO DIFF WBC: CPT

## 2019-09-26 PROCEDURE — 700102 HCHG RX REV CODE 250 W/ 637 OVERRIDE(OP): Performed by: INTERNAL MEDICINE

## 2019-09-26 PROCEDURE — 770006 HCHG ROOM/CARE - MED/SURG/GYN SEMI*

## 2019-09-26 RX ADMIN — LEVETIRACETAM 500 MG: 500 TABLET, FILM COATED ORAL at 17:38

## 2019-09-26 RX ADMIN — VANCOMYCIN HYDROCHLORIDE 2000 MG: 500 INJECTION, POWDER, LYOPHILIZED, FOR SOLUTION INTRAVENOUS at 18:37

## 2019-09-26 RX ADMIN — SENNOSIDES AND DOCUSATE SODIUM 2 TABLET: 8.6; 5 TABLET ORAL at 17:38

## 2019-09-26 RX ADMIN — CEFAZOLIN 2 G: 10 INJECTION, POWDER, FOR SOLUTION INTRAVENOUS; PARENTERAL at 05:08

## 2019-09-26 RX ADMIN — ATORVASTATIN CALCIUM 20 MG: 10 TABLET, FILM COATED ORAL at 17:38

## 2019-09-26 RX ADMIN — SENNOSIDES AND DOCUSATE SODIUM 2 TABLET: 8.6; 5 TABLET ORAL at 05:10

## 2019-09-26 RX ADMIN — LEVETIRACETAM 500 MG: 500 TABLET, FILM COATED ORAL at 05:09

## 2019-09-26 RX ADMIN — METOPROLOL TARTRATE 25 MG: 25 TABLET ORAL at 05:09

## 2019-09-26 RX ADMIN — METOPROLOL TARTRATE 25 MG: 25 TABLET ORAL at 17:39

## 2019-09-26 RX ADMIN — CEFAZOLIN 2 G: 10 INJECTION, POWDER, FOR SOLUTION INTRAVENOUS; PARENTERAL at 23:02

## 2019-09-26 RX ADMIN — CEFAZOLIN 2 G: 10 INJECTION, POWDER, FOR SOLUTION INTRAVENOUS; PARENTERAL at 17:34

## 2019-09-26 RX ADMIN — ACETAMINOPHEN 650 MG: 325 TABLET, FILM COATED ORAL at 23:02

## 2019-09-26 ASSESSMENT — ENCOUNTER SYMPTOMS
BRUISES/BLEEDS EASILY: 1
MYALGIAS: 1
NAUSEA: 0
CONSTIPATION: 0
ABDOMINAL PAIN: 0
DIAPHORESIS: 0
ORTHOPNEA: 0
COUGH: 0
SPUTUM PRODUCTION: 0
SPEECH CHANGE: 0
FEVER: 0
TREMORS: 0
MEMORY LOSS: 1
FALLS: 1
WEAKNESS: 1
STRIDOR: 0
CHILLS: 0
BACK PAIN: 0
SHORTNESS OF BREATH: 0

## 2019-09-26 NOTE — PROGRESS NOTES
Report received from MARLI Hopkins. Plan of care discussed. Patient resting comfortably in bed, declines any further needs at this time. Safety precautions in place.

## 2019-09-26 NOTE — PROGRESS NOTES
Assessment completed, patient is alert and oriented x 4, patient states his left leg pain is 3/10 at this time but states that the pain comes and goes and declines any pain medication at this time. Wound vac is in place and suctioning without any leakage present. New linens provided. Safety precautions in place.

## 2019-09-26 NOTE — PROGRESS NOTES
Pt assessed and plan of care disucussed.  Dressing over lower LE intact with wound vac in place.  Pt denies pain but says he has some pain with exertion when moving L leg.  All pt needs met at this time and fall precautions in place.

## 2019-09-26 NOTE — PROGRESS NOTES
Report received from Roger CALLES. Plan of care discussed. Patient resting comfortably in bed. Safety precautions in place.
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
## 2019-09-26 NOTE — CARE PLAN
Problem: Safety  Goal: Will remain free from injury  Outcome: PROGRESSING AS EXPECTED  Note:   Discussed importance of calling for assistance with pt.  Educated pt on the non-weight bearing status of L foot.  Oriented pt to call light.  Bed locked and low with alarm on.  Wound vac site covered with ace bandage and coban due to repeated removal by pt, discussed importance of not touching dressing with pt.  Pt in bed one, close to nursing station.      Problem: Knowledge Deficit  Goal: Knowledge of the prescribed therapeutic regimen will improve  Outcome: PROGRESSING AS EXPECTED  Note:   Discussed plan of care with pt, including wound care and IV abx.

## 2019-09-26 NOTE — CARE PLAN
Problem: Venous Thromboembolism (VTW)/Deep Vein Thrombosis (DVT) Prevention:  Goal: Patient will participate in Venous Thrombosis (VTE)/Deep Vein Thrombosis (DVT)Prevention Measures  Outcome: PROGRESSING AS EXPECTED  SCD is on place on right leg    Problem: Infection  Goal: Will remain free from infection  Outcome: PROGRESSING AS EXPECTED  WBC count is 10.6 this morning. Patient continues to be on antibiotics.     Problem: Safety  Goal: Will remain free from injury  Outcome: PROGRESSING AS EXPECTED  Safety precautions in place, bed alarm is on, call light is within reach, hourly rounding in place.

## 2019-09-27 LAB
ALBUMIN SERPL BCP-MCNC: 3 G/DL (ref 3.2–4.9)
BASOPHILS # BLD AUTO: 0.3 % (ref 0–1.8)
BASOPHILS # BLD: 0.03 K/UL (ref 0–0.12)
BUN SERPL-MCNC: 22 MG/DL (ref 8–22)
CALCIUM SERPL-MCNC: 8.2 MG/DL (ref 8.4–10.2)
CHLORIDE SERPL-SCNC: 107 MMOL/L (ref 96–112)
CO2 SERPL-SCNC: 19 MMOL/L (ref 20–33)
CREAT SERPL-MCNC: 0.92 MG/DL (ref 0.5–1.4)
EOSINOPHIL # BLD AUTO: 0.13 K/UL (ref 0–0.51)
EOSINOPHIL NFR BLD: 1.2 % (ref 0–6.9)
ERYTHROCYTE [DISTWIDTH] IN BLOOD BY AUTOMATED COUNT: 56.6 FL (ref 35.9–50)
GLUCOSE SERPL-MCNC: 113 MG/DL (ref 65–99)
HCT VFR BLD AUTO: 35.6 % (ref 42–52)
HGB BLD-MCNC: 11.2 G/DL (ref 14–18)
IMM GRANULOCYTES # BLD AUTO: 0.06 K/UL (ref 0–0.11)
IMM GRANULOCYTES NFR BLD AUTO: 0.6 % (ref 0–0.9)
LYMPHOCYTES # BLD AUTO: 1.89 K/UL (ref 1–4.8)
LYMPHOCYTES NFR BLD: 17.9 % (ref 22–41)
MCH RBC QN AUTO: 28.4 PG (ref 27–33)
MCHC RBC AUTO-ENTMCNC: 31.5 G/DL (ref 33.7–35.3)
MCV RBC AUTO: 90.1 FL (ref 81.4–97.8)
MONOCYTES # BLD AUTO: 1.36 K/UL (ref 0–0.85)
MONOCYTES NFR BLD AUTO: 12.9 % (ref 0–13.4)
NEUTROPHILS # BLD AUTO: 7.07 K/UL (ref 1.82–7.42)
NEUTROPHILS NFR BLD: 67.1 % (ref 44–72)
NRBC # BLD AUTO: 0 K/UL
NRBC BLD-RTO: 0 /100 WBC
PHOSPHATE SERPL-MCNC: 3 MG/DL (ref 2.5–4.5)
PLATELET # BLD AUTO: 183 K/UL (ref 164–446)
PMV BLD AUTO: 11 FL (ref 9–12.9)
POTASSIUM SERPL-SCNC: 4.3 MMOL/L (ref 3.6–5.5)
RBC # BLD AUTO: 3.95 M/UL (ref 4.7–6.1)
SODIUM SERPL-SCNC: 139 MMOL/L (ref 135–145)
WBC # BLD AUTO: 10.5 K/UL (ref 4.8–10.8)

## 2019-09-27 PROCEDURE — 700105 HCHG RX REV CODE 258: Performed by: INTERNAL MEDICINE

## 2019-09-27 PROCEDURE — A9270 NON-COVERED ITEM OR SERVICE: HCPCS | Performed by: INTERNAL MEDICINE

## 2019-09-27 PROCEDURE — 700111 HCHG RX REV CODE 636 W/ 250 OVERRIDE (IP): Performed by: INTERNAL MEDICINE

## 2019-09-27 PROCEDURE — 36415 COLL VENOUS BLD VENIPUNCTURE: CPT

## 2019-09-27 PROCEDURE — 700102 HCHG RX REV CODE 250 W/ 637 OVERRIDE(OP): Performed by: INTERNAL MEDICINE

## 2019-09-27 PROCEDURE — 80069 RENAL FUNCTION PANEL: CPT

## 2019-09-27 PROCEDURE — 99233 SBSQ HOSP IP/OBS HIGH 50: CPT | Performed by: INTERNAL MEDICINE

## 2019-09-27 PROCEDURE — 85025 COMPLETE CBC W/AUTO DIFF WBC: CPT

## 2019-09-27 PROCEDURE — 770006 HCHG ROOM/CARE - MED/SURG/GYN SEMI*

## 2019-09-27 RX ORDER — MORPHINE SULFATE 4 MG/ML
2 INJECTION, SOLUTION INTRAMUSCULAR; INTRAVENOUS EVERY 4 HOURS PRN
Status: DISCONTINUED | OUTPATIENT
Start: 2019-09-27 | End: 2019-09-27

## 2019-09-27 RX ORDER — SODIUM HYPOCHLORITE 1.25 MG/ML
SOLUTION TOPICAL DAILY
Status: DISCONTINUED | OUTPATIENT
Start: 2019-09-27 | End: 2019-10-02

## 2019-09-27 RX ORDER — MORPHINE SULFATE 4 MG/ML
2-4 INJECTION, SOLUTION INTRAMUSCULAR; INTRAVENOUS EVERY 4 HOURS PRN
Status: DISCONTINUED | OUTPATIENT
Start: 2019-09-27 | End: 2019-10-09 | Stop reason: HOSPADM

## 2019-09-27 RX ADMIN — SENNOSIDES AND DOCUSATE SODIUM 2 TABLET: 8.6; 5 TABLET ORAL at 18:21

## 2019-09-27 RX ADMIN — ACETAMINOPHEN 650 MG: 325 TABLET, FILM COATED ORAL at 23:31

## 2019-09-27 RX ADMIN — CEFAZOLIN 2 G: 10 INJECTION, POWDER, FOR SOLUTION INTRAVENOUS; PARENTERAL at 22:31

## 2019-09-27 RX ADMIN — SENNOSIDES AND DOCUSATE SODIUM 2 TABLET: 8.6; 5 TABLET ORAL at 05:36

## 2019-09-27 RX ADMIN — ATORVASTATIN CALCIUM 20 MG: 10 TABLET, FILM COATED ORAL at 18:21

## 2019-09-27 RX ADMIN — CEFAZOLIN 2 G: 10 INJECTION, POWDER, FOR SOLUTION INTRAVENOUS; PARENTERAL at 14:04

## 2019-09-27 RX ADMIN — LEVETIRACETAM 500 MG: 500 TABLET, FILM COATED ORAL at 05:36

## 2019-09-27 RX ADMIN — VANCOMYCIN HYDROCHLORIDE 1250 MG: 500 INJECTION, POWDER, LYOPHILIZED, FOR SOLUTION INTRAVENOUS at 16:23

## 2019-09-27 RX ADMIN — CEFAZOLIN 2 G: 10 INJECTION, POWDER, FOR SOLUTION INTRAVENOUS; PARENTERAL at 05:36

## 2019-09-27 RX ADMIN — LEVETIRACETAM 500 MG: 500 TABLET, FILM COATED ORAL at 18:21

## 2019-09-27 ASSESSMENT — ENCOUNTER SYMPTOMS
COUGH: 0
ORTHOPNEA: 0
MYALGIAS: 1
DIAPHORESIS: 0
CONSTIPATION: 0
FALLS: 1
SPUTUM PRODUCTION: 0
BACK PAIN: 0
SHORTNESS OF BREATH: 0
MEMORY LOSS: 1
NAUSEA: 0
STRIDOR: 0
SPEECH CHANGE: 0
CHILLS: 0
WEAKNESS: 1
TREMORS: 0
NERVOUS/ANXIOUS: 1
FEVER: 0
ABDOMINAL PAIN: 0
BRUISES/BLEEDS EASILY: 1

## 2019-09-27 ASSESSMENT — PATIENT HEALTH QUESTIONNAIRE - PHQ9
2. FEELING DOWN, DEPRESSED, IRRITABLE, OR HOPELESS: NOT AT ALL
1. LITTLE INTEREST OR PLEASURE IN DOING THINGS: NOT AT ALL
SUM OF ALL RESPONSES TO PHQ9 QUESTIONS 1 AND 2: 0
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
## 2019-09-27 NOTE — PROGRESS NOTES
Report received from MARLI Osullivan. Plan of care discussed. Patient resting comfortably in bed, declines any further needs at this time. Safety precautions in place.
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
## 2019-09-27 NOTE — PROGRESS NOTES
Pt transferring to [facility], room 209-2.  Report passed to MARLI Powers.  Pt's wife, Vanda updated on room change.
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
## 2019-09-27 NOTE — PROGRESS NOTES
Pt transferred from Med-Tele via regular bed, accompanied by RN and CNA. A/O X 4 , pleasant and coherent. Poc discussed to pt - safety thru use of call light, IV atb , pain mgm't, bed mobility/ verbalized understanding. Call light w/. In easy reach. Will continue to monitor.

## 2019-09-27 NOTE — PROGRESS NOTES
Hospital Medicine Daily Progress Note    Date of Service  9/27/2019    Chief Complaint  89 y.o. male admitted 9/20/2019 with left leg injury after a fall    Hospital Course    89 y.o. male who presented 9/20/2019 with fall.  Fell when getting out of the car, hit his head and left leg, noted to have large laceration as well as a medial malleolus fracture.  NWB LLE, wound care is following.      Interval Problem Update  9/24: WBC still elevated, pain is controlled.  Pending PT OT re-eval.  NWB LLE  9/25: WBC unchanged, wound VAC replaced.  He remains agitated, confused and impulsive with poor insight  9/26: Discussed with wound care, they feel debridement may be necessary.  Bradycardic, metoprolol discontinued  9/27: I discussed with orthopedic surgery, they will defer surgery to plastics.  I have placed a call to plastics, awaiting recommendations.  WBC improved.  Vanc added to cover enterococcus    Consultants/Specialty  Plastic surgery Dr. Kent, Dr. oL  Orthopedic surgery Dr. Tracy    Code Status  dnr    Disposition  SNF    Review of Systems  Review of Systems   Constitutional: Negative for chills, diaphoresis and fever.   HENT: Negative for congestion.    Respiratory: Negative for cough, sputum production, shortness of breath and stridor.    Cardiovascular: Positive for leg swelling (Slight improvement). Negative for chest pain and orthopnea.   Gastrointestinal: Negative for abdominal pain, constipation and nausea.   Genitourinary: Negative for dysuria, hematuria and urgency.   Musculoskeletal: Positive for falls (Prior to admission), joint pain (Discomfort L ankle) and myalgias. Negative for back pain.   Skin: Negative for itching and rash.        Large wound left leg   Neurological: Positive for weakness. Negative for tremors and speech change.   Endo/Heme/Allergies: Bruises/bleeds easily.   Psychiatric/Behavioral: Positive for memory loss. The patient is nervous/anxious.         Physical Exam  Temp:   [36.5 °C (97.7 °F)-36.6 °C (97.9 °F)] 36.5 °C (97.7 °F)  Pulse:  [] 85  Resp:  [17-18] 18  BP: (110-181)/(53-91) 110/82  SpO2:  [93 %-98 %] 94 %    Physical Exam   Constitutional: He is oriented to person, place, and time. No distress.   HENT:   Mouth/Throat: Oropharynx is clear and moist. No oropharyngeal exudate.   Eyes: Conjunctivae are normal. No scleral icterus.   Neck: No JVD present. No tracheal deviation present.   Cardiovascular: Intact distal pulses. An irregularly irregular rhythm present. Bradycardia present.   No murmur heard.  Pulmonary/Chest: Effort normal and breath sounds normal. He has no wheezes. He has no rales.   Abdominal: Soft. He exhibits no distension. There is no tenderness.   Musculoskeletal: He exhibits edema and tenderness.   Slight improvement of LE swelling   Neurological: He is alert and oriented to person, place, and time. Coordination normal.   Skin: He is not diaphoretic. There is erythema.   Necrotic skin present, deep fat layer is exposed loose skin flap over the lateral shin.  Surrounding erythema   Psychiatric: His mood appears anxious. Cognition and memory are impaired. He expresses impulsivity and inappropriate judgment. He exhibits abnormal recent memory.   Nursing note and vitals reviewed.       Fluids    Intake/Output Summary (Last 24 hours) at 9/27/2019 1438  Last data filed at 9/27/2019 1200  Gross per 24 hour   Intake 1180 ml   Output 475 ml   Net 705 ml       Laboratory  Recent Labs     09/25/19  0350 09/26/19  0308 09/27/19  0321   WBC 12.1* 10.6 10.5   RBC 4.11* 4.07* 3.95*   HEMOGLOBIN 11.6* 11.8* 11.2*   HEMATOCRIT 37.2* 36.4* 35.6*   MCV 90.5 89.4 90.1   MCH 28.2 29.0 28.4   MCHC 31.2* 32.4* 31.5*   RDW 57.5* 57.4* 56.6*   PLATELETCT 166 175 183   MPV 11.6 11.4 11.0     Recent Labs     09/25/19  0350 09/26/19  0308 09/27/19  0321   SODIUM 139 138 139   POTASSIUM 4.4 4.2 4.3   CHLORIDE 105 103 107   CO2 22 22 19*   GLUCOSE 115* 104* 113*   BUN 22 21 22    CREATININE 1.04 0.90 0.92   CALCIUM 8.5 8.7 8.2*                   Imaging  DX-CHEST-PORTABLE (1 VIEW)   Final Result         1. No pulmonary infiltrates or consolidations are noted.      2. Cardiomegaly.      CT-LSPINE W/O PLUS RECONS   Final Result      Postsurgical and degenerative change without evidence of lumbar spine fracture.      CT-TSPINE W/O PLUS RECONS   Final Result         No acute fracture or subluxation of the thoracic spine.      CT-CHEST,ABDOMEN,PELVIS WITH   Final Result      1.  No CT evidence of acute traumatic injury in the chest, abdomen or pelvis.   2.  Spine is detailed separately.   3.  Small right pleural effusion.   4.  Mild mediastinal lymphadenopathy, statistically reactive.   5.  Small hiatal hernia.   6.  Colonic diverticulosis.      CT-CSPINE WITHOUT PLUS RECONS   Final Result      Degenerative change without evidence of fracture.      DX-TIBIA AND FIBULA LEFT   Final Result      Minimally displaced fracture of the medial malleolus.                  INTERPRETING LOCATION:  [address] [facility], [facility], [address]      CT-HEAD W/O   Final Result         1.  No acute intracranial abnormality is identified, there are nonspecific white matter changes, commonly associated with small vessel ischemic disease.  Associated mild cerebral atrophy is noted.   2.  Atherosclerosis.           Assessment/Plan  * Left leg cellulitis  Assessment & Plan  Worsening redness developing over left shin anteriorly, adjacent to skin tear and open wound  Tissue appearing necrotic, will likely need washout  9/25 cx with enterococcus- continue ancef and vanco x10d total or until clinical improvement  Monitor white count    Arrhythmia  Assessment & Plan  Hx of SVT  No new, followed by cardiology outpatient, Premature contractions detected on EKG  He has been bradycardic, hold metoprolol for now  No anticoagulation due to falls      Fall  Assessment & Plan  CT head was negative  Left leg laceration nonsurgical per plastic  surgery on admission, but it has worsened  I have placed a call to re-consult plastic surgery  Wound vac to remain in place  Wound VAC was replaced 9/27      Displaced fracture of medial malleolus of left tibia, initial encounter for closed fracture  Assessment & Plan  Discussed with orthopedic surgery and nonweightbearing on the left recommended  With boot for protection and follow up at [facility] after discharge    Leg laceration  Assessment & Plan  Occurred after a fall.  Initially, plastic surgery stated no need for skin flap but wound is worsening, necrotic tissue forming  Wound care replaced wound VAC on 9/25  Continue Ancef  Added  Vanco for enterococcus coverage seen on cultures  Trend WBC  I spoke with orthopedic surgery and they will defer to plastic surgery  I have spoke with plastic surgery, waiting their recommendations    Dyslipidemia- (present on admission)  Assessment & Plan  -Continue home statin    (HCC) Seizure disorder- (present on admission)  Assessment & Plan  Wife expressed concern of possible seizure causing the patient's fall as he had not taken keppra the day of his injury  No seizures here  PT OT rec SNF  -Continue home Keppra     Total time:  40 minutes.  I spent greater than 50% of the time for patient care, counseling, and coordination on this date, including unit/floor time, and face-to-face time with the patient as per interval events and assessment and plan above      VTE prophylaxis: SCDs to avoid further hematoma in LLE

## 2019-09-27 NOTE — PROGRESS NOTES
2 RN skin assessment done; skin not WDL. See Wound flowsheet.Skin discoloration on both hip, left foot slightly swollen with fading greenish bruise, right antecubital area discolored / fading bruise/ right lower shin area scab( healing)/ left upper back of leg purplish color/ right elbow healing scab. All skin problem present upon arrival to unit. Pt transfer from [facility] floor.

## 2019-09-27 NOTE — CARE PLAN
Problem: Bowel/Gastric:  Goal: Normal bowel function is maintained or improved  Outcome: PROGRESSING AS EXPECTED    Pt with dailiy BMs. Denies concerns for bowel habits.      Problem: Pain Management  Goal: Pain level will decrease to patient's comfort goal  Outcome: PROGRESSING AS EXPECTED    Pt asked about pain, denies need for pain medication. Updated on medication available but states no needs.      Problem: Skin Integrity  Goal: Risk for impaired skin integrity will decrease  Outcome: PROGRESSING AS EXPECTED    Pt turns self, educated on need to turn to help with decreasing skin breakdown

## 2019-09-27 NOTE — PROGRESS NOTES
Wound nurse at bedside for wound vac change and clean. Hospitalist saw wound, pending plastics consult

## 2019-09-27 NOTE — PROGRESS NOTES
Report from LEONARD Powers RN. Pt resting at this time. Wound vac in place and working.     Call from Hospitalist, Pt to be NPO for possible washout today with ortho? Tray removed from room, pt is still resting and did not touch breakfast yet.

## 2019-09-27 NOTE — CARE PLAN
Problem: Safety  Goal: Will remain free from injury  Outcome: PROGRESSING AS EXPECTED  Goal: Will remain free from falls  Outcome: PROGRESSING AS EXPECTED  Note:   Patient will be free from injury or fall incident- instruction for use of call light given   Intervention: Implement fall precautions  Flowsheets (Taken 9/27/2019 0048)  Environmental Precautions: Bed in Low Position; Report Given to Other Health Care Providers Regarding Fall Risk; Communication Sign for Patients & Families; Mobility Assessed & Appropriate Sign Placed  Chair/Bed Strip Alarm: Yes - Alarm On  Note:   Patient will be free from injury or fall incident- instruction for use of call light given      Problem: Knowledge Deficit  Goal: Knowledge of disease process/condition, treatment plan, diagnostic tests, and medications will improve  Outcome: PROGRESSING AS EXPECTED  Intervention: Explain information regarding disease process/condition, treatment plan, diagnostic tests, and medications and document in education  Note:   Patient/ family member updated on Plan Of Care and Nurses communications with Doctors.

## 2019-09-28 LAB
ALBUMIN SERPL BCP-MCNC: 2.9 G/DL (ref 3.2–4.9)
BACTERIA WND AEROBE CULT: ABNORMAL
BASOPHILS # BLD AUTO: 0.3 % (ref 0–1.8)
BASOPHILS # BLD: 0.03 K/UL (ref 0–0.12)
BUN SERPL-MCNC: 21 MG/DL (ref 8–22)
CALCIUM SERPL-MCNC: 8.3 MG/DL (ref 8.4–10.2)
CHLORIDE SERPL-SCNC: 106 MMOL/L (ref 96–112)
CO2 SERPL-SCNC: 19 MMOL/L (ref 20–33)
CREAT SERPL-MCNC: 0.85 MG/DL (ref 0.5–1.4)
EOSINOPHIL # BLD AUTO: 0.13 K/UL (ref 0–0.51)
EOSINOPHIL NFR BLD: 1.3 % (ref 0–6.9)
ERYTHROCYTE [DISTWIDTH] IN BLOOD BY AUTOMATED COUNT: 56 FL (ref 35.9–50)
GLUCOSE SERPL-MCNC: 126 MG/DL (ref 65–99)
GRAM STN SPEC: ABNORMAL
HCT VFR BLD AUTO: 35.9 % (ref 42–52)
HGB BLD-MCNC: 11.3 G/DL (ref 14–18)
IMM GRANULOCYTES # BLD AUTO: 0.06 K/UL (ref 0–0.11)
IMM GRANULOCYTES NFR BLD AUTO: 0.6 % (ref 0–0.9)
LYMPHOCYTES # BLD AUTO: 1.62 K/UL (ref 1–4.8)
LYMPHOCYTES NFR BLD: 15.6 % (ref 22–41)
MCH RBC QN AUTO: 28.1 PG (ref 27–33)
MCHC RBC AUTO-ENTMCNC: 31.5 G/DL (ref 33.7–35.3)
MCV RBC AUTO: 89.3 FL (ref 81.4–97.8)
MONOCYTES # BLD AUTO: 1.35 K/UL (ref 0–0.85)
MONOCYTES NFR BLD AUTO: 13 % (ref 0–13.4)
NEUTROPHILS # BLD AUTO: 7.21 K/UL (ref 1.82–7.42)
NEUTROPHILS NFR BLD: 69.2 % (ref 44–72)
NRBC # BLD AUTO: 0 K/UL
NRBC BLD-RTO: 0 /100 WBC
PHOSPHATE SERPL-MCNC: 2.8 MG/DL (ref 2.5–4.5)
PLATELET # BLD AUTO: 215 K/UL (ref 164–446)
PMV BLD AUTO: 10.8 FL (ref 9–12.9)
POTASSIUM SERPL-SCNC: 4.4 MMOL/L (ref 3.6–5.5)
RBC # BLD AUTO: 4.02 M/UL (ref 4.7–6.1)
SIGNIFICANT IND 70042: ABNORMAL
SITE SITE: ABNORMAL
SODIUM SERPL-SCNC: 138 MMOL/L (ref 135–145)
SOURCE SOURCE: ABNORMAL
WBC # BLD AUTO: 10.4 K/UL (ref 4.8–10.8)

## 2019-09-28 PROCEDURE — 99232 SBSQ HOSP IP/OBS MODERATE 35: CPT | Performed by: INTERNAL MEDICINE

## 2019-09-28 PROCEDURE — 80069 RENAL FUNCTION PANEL: CPT

## 2019-09-28 PROCEDURE — A9270 NON-COVERED ITEM OR SERVICE: HCPCS | Performed by: INTERNAL MEDICINE

## 2019-09-28 PROCEDURE — 80202 ASSAY OF VANCOMYCIN: CPT

## 2019-09-28 PROCEDURE — 97535 SELF CARE MNGMENT TRAINING: CPT

## 2019-09-28 PROCEDURE — 85025 COMPLETE CBC W/AUTO DIFF WBC: CPT

## 2019-09-28 PROCEDURE — 700105 HCHG RX REV CODE 258: Performed by: INTERNAL MEDICINE

## 2019-09-28 PROCEDURE — 700111 HCHG RX REV CODE 636 W/ 250 OVERRIDE (IP): Performed by: INTERNAL MEDICINE

## 2019-09-28 PROCEDURE — 700102 HCHG RX REV CODE 250 W/ 637 OVERRIDE(OP): Performed by: INTERNAL MEDICINE

## 2019-09-28 PROCEDURE — 36415 COLL VENOUS BLD VENIPUNCTURE: CPT

## 2019-09-28 PROCEDURE — 770006 HCHG ROOM/CARE - MED/SURG/GYN SEMI*

## 2019-09-28 PROCEDURE — 97530 THERAPEUTIC ACTIVITIES: CPT

## 2019-09-28 RX ADMIN — LEVETIRACETAM 500 MG: 500 TABLET, FILM COATED ORAL at 17:44

## 2019-09-28 RX ADMIN — LEVETIRACETAM 500 MG: 500 TABLET, FILM COATED ORAL at 06:32

## 2019-09-28 RX ADMIN — CEFAZOLIN 2 G: 10 INJECTION, POWDER, FOR SOLUTION INTRAVENOUS; PARENTERAL at 13:32

## 2019-09-28 RX ADMIN — ATORVASTATIN CALCIUM 20 MG: 10 TABLET, FILM COATED ORAL at 17:44

## 2019-09-28 RX ADMIN — CEFAZOLIN 2 G: 10 INJECTION, POWDER, FOR SOLUTION INTRAVENOUS; PARENTERAL at 06:32

## 2019-09-28 RX ADMIN — VANCOMYCIN HYDROCHLORIDE 1250 MG: 500 INJECTION, POWDER, LYOPHILIZED, FOR SOLUTION INTRAVENOUS at 16:14

## 2019-09-28 ASSESSMENT — COGNITIVE AND FUNCTIONAL STATUS - GENERAL
TOILETING: A LITTLE
STANDING UP FROM CHAIR USING ARMS: A LITTLE
DAILY ACTIVITIY SCORE: 22
WALKING IN HOSPITAL ROOM: A LOT
MOBILITY SCORE: 17
SUGGESTED CMS G CODE MODIFIER MOBILITY: CK
MOVING FROM LYING ON BACK TO SITTING ON SIDE OF FLAT BED: A LITTLE
HELP NEEDED FOR BATHING: A LITTLE
CLIMB 3 TO 5 STEPS WITH RAILING: TOTAL
SUGGESTED CMS G CODE MODIFIER DAILY ACTIVITY: CJ

## 2019-09-28 ASSESSMENT — ENCOUNTER SYMPTOMS
WEAKNESS: 1
NAUSEA: 0
HALLUCINATIONS: 0
COUGH: 0
SPEECH CHANGE: 0
DIAPHORESIS: 0
MYALGIAS: 1
CHILLS: 0
FEVER: 0
BRUISES/BLEEDS EASILY: 1
TREMORS: 0
CONSTIPATION: 0
ORTHOPNEA: 0
ABDOMINAL PAIN: 0
MEMORY LOSS: 1
SHORTNESS OF BREATH: 0
STRIDOR: 0
FALLS: 0
SPUTUM PRODUCTION: 0
BACK PAIN: 0

## 2019-09-28 ASSESSMENT — PATIENT HEALTH QUESTIONNAIRE - PHQ9
SUM OF ALL RESPONSES TO PHQ9 QUESTIONS 1 AND 2: 0
2. FEELING DOWN, DEPRESSED, IRRITABLE, OR HOPELESS: NOT AT ALL
1. LITTLE INTEREST OR PLEASURE IN DOING THINGS: NOT AT ALL

## 2019-09-28 NOTE — CARE PLAN
Problem: Nutritional:  Goal: Achieve adequate nutritional intake  Description  Patient will consume >50% of meals   Outcome: PROGRESSING AS EXPECTED    Pt NPO yesterday, consumed % breakfast today per ADL flowsheet, weight trend down during admission. Pt receiving Boost Plus TID. Attempted visit, pt with MD x1, 2nd attempt - pt busy with other staff. RD will continue to monitor.

## 2019-09-28 NOTE — CARE PLAN
RE: S/P WOUND VAC REMOVAL ON 9/27; [facility] CHANGES LLE(WOUND CARE FOLLOWING); PLASTICS CONSULTED; PIVOT ASSIST TO COMMODE AND ASSIST WITH USE OF URINAL;FALLS PRECAUTIONS FOR INTERMITTENT CONFUSION; RA    PATIENT A/O X 4 AND DENIES PAIN. PATIENT C/O BEING COLD AND GIVEN EXTRA BLANKETS. LLE D[facility] C/D/I. PLAN OF CARE REVIEWED WITH PATIENT AND PATIENT VERBALIZES UNDERSTANDING.    FALL PRECAUTIONS ONGOING WITH HOURLY ROUNDING.

## 2019-09-28 NOTE — THERAPY
"Occupational Therapy Treatment completed with focus on ADLs, ADL transfers, patient education and upper extremity function.  Functional Status:  Pt was in bed, agreed to OT session. Pt was able to go from Supine to EOB with Supervision, Supervised bed to chair with good compliance to NWB L LE. Pt was able to complete UB self care in chair with set up, Max A for LB- due to pain, NWB. Pt was issued theraband, educated regarding exercises for UB . Will continue to benefit from OT services.   Plan of Care: Will benefit from Occupational Therapy 3 times per week  Discharge Recommendations:  Equipment Will Continue to Assess for Equipment Needs. Post-acute therapy Discharge to a transitional care facility for continued skilled therapy services.    See \"Rehab Therapy-Acute\" Patient Summary Report for complete documentation.   "

## 2019-09-28 NOTE — PROGRESS NOTES
Hospital Medicine Daily Progress Note    Date of Service  9/28/2019    Chief Complaint  89 y.o. male admitted 9/20/2019 with left leg injury after a fall    Hospital Course    89 y.o. male who presented 9/20/2019 with fall.  Fell when getting out of the car, hit his head and left leg, noted to have large laceration as well as a medial malleolus fracture.  NWB LLE, wound care is following.      Interval Problem Update  9/24: WBC still elevated, pain is controlled.  Pending PT OT re-eval.  NWB LLE  9/25: WBC unchanged, wound VAC replaced.  He remains agitated, confused and impulsive with poor insight  9/26: Discussed with wound care, they feel debridement may be necessary.  Bradycardic, metoprolol discontinued  9/27: I discussed with orthopedic surgery, they will defer surgery to plastics.  I have placed a call to plastics, awaiting recommendations.  WBC improved.  Vanc added to cover enterococcus  9/28: Reached plastics and they confirmed no consult available until Monday.  Pain controlled.  Cx with enterococcus/Acinetobacter, antibiotics adjusted.    Consultants/Specialty  Plastic surgery Dr. Kent  Orthopedic surgery Dr. Tracy    Code Status  dnr    Disposition  SNF    Review of Systems  Review of Systems   Constitutional: Negative for chills, diaphoresis and fever.   HENT: Negative for congestion.    Respiratory: Negative for cough, sputum production, shortness of breath and stridor.    Cardiovascular: Positive for leg swelling (Slight improvement). Negative for chest pain and orthopnea.   Gastrointestinal: Negative for abdominal pain, constipation and nausea.   Genitourinary: Negative for dysuria, hematuria and urgency.   Musculoskeletal: Positive for joint pain (Controlled) and myalgias. Negative for back pain and falls.   Skin: Negative for itching and rash.        Large wound left leg   Neurological: Positive for weakness. Negative for tremors and speech change.   Endo/Heme/Allergies: Bruises/bleeds  easily.   Psychiatric/Behavioral: Positive for memory loss. Negative for hallucinations.        Physical Exam  Temp:  [36.4 °C (97.5 °F)] 36.4 °C (97.5 °F)  Pulse:  [] 62  Resp:  [18] 18  BP: (109-136)/(74-86) 132/74  SpO2:  [95 %-96 %] 95 %    Physical Exam   Constitutional: He is oriented to person, place, and time. No distress.   HENT:   Mouth/Throat: Oropharynx is clear and moist. No oropharyngeal exudate.   Eyes: Conjunctivae are normal. No scleral icterus.   Neck: No JVD present. No tracheal deviation present.   Cardiovascular: Intact distal pulses. An irregularly irregular rhythm present. Bradycardia present.   No murmur heard.  Pulmonary/Chest: Effort normal and breath sounds normal. He has no wheezes. He has no rales.   Abdominal: Soft. He exhibits no distension. There is no tenderness.   Musculoskeletal: He exhibits edema and tenderness.   Slight improvement of LE swelling   Neurological: He is alert and oriented to person, place, and time. Coordination normal.   Skin: He is not diaphoretic. There is erythema.   Necrotic skin present, deep fat layer is exposed loose skin flap over the lateral shin.  Surrounding erythema.    Extensive bruising of L foot/swelling of toes improved   Psychiatric: His mood appears anxious. Cognition and memory are impaired. He expresses impulsivity and inappropriate judgment. He exhibits abnormal recent memory.   Nursing note and vitals reviewed.       Fluids    Intake/Output Summary (Last 24 hours) at 9/28/2019 1428  Last data filed at 9/28/2019 0945  Gross per 24 hour   Intake 790 ml   Output 325 ml   Net 465 ml       Laboratory  Recent Labs     09/26/19  0308 09/27/19  0321 09/28/19  0416   WBC 10.6 10.5 10.4   RBC 4.07* 3.95* 4.02*   HEMOGLOBIN 11.8* 11.2* 11.3*   HEMATOCRIT 36.4* 35.6* 35.9*   MCV 89.4 90.1 89.3   MCH 29.0 28.4 28.1   MCHC 32.4* 31.5* 31.5*   RDW 57.4* 56.6* 56.0*   PLATELETCT 175 183 215   MPV 11.4 11.0 10.8     Recent Labs     09/26/19  0308  09/27/19  0321 09/28/19  0416   SODIUM 138 139 138   POTASSIUM 4.2 4.3 4.4   CHLORIDE 103 107 106   CO2 22 19* 19*   GLUCOSE 104* 113* 126*   BUN 21 22 21   CREATININE 0.90 0.92 0.85   CALCIUM 8.7 8.2* 8.3*                   Imaging  DX-CHEST-PORTABLE (1 VIEW)   Final Result         1. No pulmonary infiltrates or consolidations are noted.      2. Cardiomegaly.      CT-LSPINE W/O PLUS RECONS   Final Result      Postsurgical and degenerative change without evidence of lumbar spine fracture.      CT-TSPINE W/O PLUS RECONS   Final Result         No acute fracture or subluxation of the thoracic spine.      CT-CHEST,ABDOMEN,PELVIS WITH   Final Result      1.  No CT evidence of acute traumatic injury in the chest, abdomen or pelvis.   2.  Spine is detailed separately.   3.  Small right pleural effusion.   4.  Mild mediastinal lymphadenopathy, statistically reactive.   5.  Small hiatal hernia.   6.  Colonic diverticulosis.      CT-CSPINE WITHOUT PLUS RECONS   Final Result      Degenerative change without evidence of fracture.      DX-TIBIA AND FIBULA LEFT   Final Result      Minimally displaced fracture of the medial malleolus.                  INTERPRETING LOCATION:  [address] [place] [address], [id number]      CT-HEAD W/O   Final Result         1.  No acute intracranial abnormality is identified, there are nonspecific white matter changes, commonly associated with small vessel ischemic disease.  Associated mild cerebral atrophy is noted.   2.  Atherosclerosis.           Assessment/Plan  * Left leg cellulitis  Assessment & Plan  Worsening redness developing over left shin anteriorly, adjacent to skin tear and open wound  9/25 cx with enterococcus/Acinetobacter  Change to ceftriaxone, vanc  Monitor white count    Arrhythmia  Assessment & Plan  Hx of SVT  No new, followed by cardiology outpatient, Premature contractions detected on EKG  He has been bradycardic, hold metoprolol for now  No anticoagulation due to  falls      Fall  Assessment & Plan  CT head was negative  Left leg laceration nonsurgical per plastic surgery on admission,  I have placed a call to re-consult plastic surgery, but they are not on call over the weekend.  His wound has overall been improving slowly but ultimately may need a flap  Wound vac to remain in place  Wound VAC was changed on 9/27    Displaced fracture of medial malleolus of left tibia, initial encounter for closed fracture  Assessment & Plan  Discussed with orthopedic surgery and nonweightbearing on the left recommended  With boot for protection and follow up at [facility] after discharge    Leg laceration  Assessment & Plan  Occurred after a fall.    Plastic surgery stated no need for skin flap but he will likely ultimately need this for full healing  Continue wound vac  Cultures: Enterococcus and Acinetobacter  Change abx to Ctx / Vanco (PCN allergy)  Trend WBC  I spoke with orthopedic surgery and they will defer to plastic surgery  Spoke with plastic surgery and no call /consult is available until Monday (at [facility])    Dyslipidemia- (present on admission)  Assessment & Plan  -Continue home statin    (HCC) Seizure disorder- (present on admission)  Assessment & Plan  Wife expressed concern of possible seizure causing the patient's fall as he had not taken keppra the day of his injury  No seizures here  PT OT rec SNF  -Continue home Keppra       VTE prophylaxis: SCDs to avoid further hematoma in LLE

## 2019-09-28 NOTE — CARE PLAN
Problem: Safety  Goal: Will remain free from falls  Outcome: PROGRESSING AS EXPECTED     Problem: Pain Management  Goal: Pain level will decrease to patient's comfort goal  Outcome: PROGRESSING AS EXPECTED     Problem: Skin Integrity  Goal: Risk for impaired skin integrity will decrease  Outcome: PROGRESSING AS EXPECTED     Pt turns self in bed and uses bedside commode when possible. Pt able to sit at edge of bed for urinal and meals when appropriate. Denies pain but has medication available.     Wound care seeing pt, pending plastics consult

## 2019-09-29 LAB
ALBUMIN SERPL BCP-MCNC: 2.7 G/DL (ref 3.2–4.9)
BASOPHILS # BLD AUTO: 0.4 % (ref 0–1.8)
BASOPHILS # BLD: 0.04 K/UL (ref 0–0.12)
BUN SERPL-MCNC: 17 MG/DL (ref 8–22)
CALCIUM SERPL-MCNC: 8.3 MG/DL (ref 8.4–10.2)
CHLORIDE SERPL-SCNC: 107 MMOL/L (ref 96–112)
CO2 SERPL-SCNC: 20 MMOL/L (ref 20–33)
CREAT SERPL-MCNC: 0.75 MG/DL (ref 0.5–1.4)
EOSINOPHIL # BLD AUTO: 0.11 K/UL (ref 0–0.51)
EOSINOPHIL NFR BLD: 1.1 % (ref 0–6.9)
ERYTHROCYTE [DISTWIDTH] IN BLOOD BY AUTOMATED COUNT: 58.1 FL (ref 35.9–50)
GLUCOSE SERPL-MCNC: 128 MG/DL (ref 65–99)
HCT VFR BLD AUTO: 37.8 % (ref 42–52)
HGB BLD-MCNC: 11.7 G/DL (ref 14–18)
IMM GRANULOCYTES # BLD AUTO: 0.06 K/UL (ref 0–0.11)
IMM GRANULOCYTES NFR BLD AUTO: 0.6 % (ref 0–0.9)
LYMPHOCYTES # BLD AUTO: 1.79 K/UL (ref 1–4.8)
LYMPHOCYTES NFR BLD: 18.5 % (ref 22–41)
MCH RBC QN AUTO: 28.5 PG (ref 27–33)
MCHC RBC AUTO-ENTMCNC: 31 G/DL (ref 33.7–35.3)
MCV RBC AUTO: 92.2 FL (ref 81.4–97.8)
MONOCYTES # BLD AUTO: 1.17 K/UL (ref 0–0.85)
MONOCYTES NFR BLD AUTO: 12.1 % (ref 0–13.4)
NEUTROPHILS # BLD AUTO: 6.53 K/UL (ref 1.82–7.42)
NEUTROPHILS NFR BLD: 67.3 % (ref 44–72)
NRBC # BLD AUTO: 0 K/UL
NRBC BLD-RTO: 0 /100 WBC
PHOSPHATE SERPL-MCNC: 2.5 MG/DL (ref 2.5–4.5)
PLATELET # BLD AUTO: 241 K/UL (ref 164–446)
PMV BLD AUTO: 10.7 FL (ref 9–12.9)
POTASSIUM SERPL-SCNC: 4.3 MMOL/L (ref 3.6–5.5)
RBC # BLD AUTO: 4.1 M/UL (ref 4.7–6.1)
SODIUM SERPL-SCNC: 137 MMOL/L (ref 135–145)
WBC # BLD AUTO: 9.7 K/UL (ref 4.8–10.8)

## 2019-09-29 PROCEDURE — 770006 HCHG ROOM/CARE - MED/SURG/GYN SEMI*

## 2019-09-29 PROCEDURE — 700111 HCHG RX REV CODE 636 W/ 250 OVERRIDE (IP): Performed by: INTERNAL MEDICINE

## 2019-09-29 PROCEDURE — A9270 NON-COVERED ITEM OR SERVICE: HCPCS | Performed by: INTERNAL MEDICINE

## 2019-09-29 PROCEDURE — 700102 HCHG RX REV CODE 250 W/ 637 OVERRIDE(OP): Performed by: INTERNAL MEDICINE

## 2019-09-29 PROCEDURE — 700111 HCHG RX REV CODE 636 W/ 250 OVERRIDE (IP): Performed by: HOSPITALIST

## 2019-09-29 PROCEDURE — 700105 HCHG RX REV CODE 258: Performed by: HOSPITALIST

## 2019-09-29 PROCEDURE — 99232 SBSQ HOSP IP/OBS MODERATE 35: CPT | Performed by: HOSPITALIST

## 2019-09-29 PROCEDURE — 85025 COMPLETE CBC W/AUTO DIFF WBC: CPT

## 2019-09-29 PROCEDURE — 36415 COLL VENOUS BLD VENIPUNCTURE: CPT

## 2019-09-29 PROCEDURE — 80069 RENAL FUNCTION PANEL: CPT

## 2019-09-29 PROCEDURE — 700105 HCHG RX REV CODE 258: Performed by: INTERNAL MEDICINE

## 2019-09-29 PROCEDURE — 700101 HCHG RX REV CODE 250: Performed by: HOSPITALIST

## 2019-09-29 RX ADMIN — AMPICILLIN AND SULBACTAM 3 G: 1; 2 INJECTION, POWDER, FOR SOLUTION INTRAMUSCULAR; INTRAVENOUS at 17:21

## 2019-09-29 RX ADMIN — AMPICILLIN AND SULBACTAM 3 G: 1; 2 INJECTION, POWDER, FOR SOLUTION INTRAMUSCULAR; INTRAVENOUS at 07:59

## 2019-09-29 RX ADMIN — ATORVASTATIN CALCIUM 20 MG: 10 TABLET, FILM COATED ORAL at 17:21

## 2019-09-29 RX ADMIN — AMPICILLIN AND SULBACTAM 3 G: 1; 2 INJECTION, POWDER, FOR SOLUTION INTRAMUSCULAR; INTRAVENOUS at 11:33

## 2019-09-29 RX ADMIN — LEVETIRACETAM 500 MG: 500 TABLET, FILM COATED ORAL at 06:48

## 2019-09-29 RX ADMIN — AMPICILLIN AND SULBACTAM 3 G: 1; 2 INJECTION, POWDER, FOR SOLUTION INTRAMUSCULAR; INTRAVENOUS at 23:50

## 2019-09-29 RX ADMIN — LEVETIRACETAM 500 MG: 500 TABLET, FILM COATED ORAL at 17:21

## 2019-09-29 RX ADMIN — SODIUM HYPOCHLORITE 1 ML: 1.25 SOLUTION TOPICAL at 10:29

## 2019-09-29 RX ADMIN — CEFTRIAXONE SODIUM 1 G: 1 INJECTION, POWDER, FOR SOLUTION INTRAMUSCULAR; INTRAVENOUS at 06:46

## 2019-09-29 ASSESSMENT — ENCOUNTER SYMPTOMS
NAUSEA: 0
DIZZINESS: 0
BLURRED VISION: 0
ABDOMINAL PAIN: 0
SHORTNESS OF BREATH: 0
SORE THROAT: 0
HEADACHES: 0
PALPITATIONS: 0
TINGLING: 0
FEVER: 0
COUGH: 0
DEPRESSION: 0
NECK PAIN: 0
VOMITING: 0
INSOMNIA: 0
BACK PAIN: 0
EYE PAIN: 0
CHILLS: 0

## 2019-09-29 NOTE — PROGRESS NOTES
Wound care orders unclear on frequency of dressing changes; unclear when dressing was changed last; called wound team for clarification. States she will update order - wants daily dressing changes

## 2019-09-29 NOTE — PROGRESS NOTES
Hospital Medicine Daily Progress Note    Date of Service  9/29/2019    Chief Complaint  89 y.o. male admitted 9/20/2019 with left leg injury after a fall    Hospital Course    89 y.o. male who presented 9/20/2019 with fall when getting out of the car. He hit his head and left leg and was noted to have a large laceration as well as a medial malleolus fracture. He wsad seen by plastics and ortho and provided with wound care, a wound vac and empiric antibiotics. .      Interval Problem Update  9/24: WBC still elevated, pain is controlled.  Pending PT OT re-eval.  NWB LLE  9/25: WBC unchanged, wound VAC replaced.  He remains agitated, confused and impulsive with poor insight  9/26: Discussed with wound care, they feel debridement may be necessary.  Bradycardic, metoprolol discontinued  9/27: I discussed with orthopedic surgery, they will defer surgery to plastics.  I have placed a call to plastics, awaiting recommendations.  WBC improved.  Vanc added to cover enterococcus  9/28: Reached plastics and they confirmed no consult available until Monday.  Pain controlled.  Cx with enterococcus/Acinetobacter, antibiotics adjusted.      9-29- feeling well. Dressing was changed today. We discussed plan for SNF placement.     Consultants/Specialty  Plastic surgery Dr. Kent  Orthopedic surgery Dr. Tracy    Code Status  dnr    Disposition  SNF    Review of Systems  Review of Systems   Constitutional: Negative for chills and fever.   HENT: Negative for sore throat.    Eyes: Negative for blurred vision and pain.   Respiratory: Negative for cough and shortness of breath.    Cardiovascular: Negative for chest pain and palpitations.   Gastrointestinal: Negative for abdominal pain, nausea and vomiting.   Genitourinary: Negative for dysuria and urgency.   Musculoskeletal: Negative for back pain and neck pain.   Skin: Negative for itching and rash.   Neurological: Negative for dizziness, tingling and headaches.    Psychiatric/Behavioral: Negative for depression. The patient does not have insomnia.    All other systems reviewed and are negative.       Physical Exam  Temp:  [36.3 °C (97.3 °F)-36.5 °C (97.7 °F)] 36.5 °C (97.7 °F)  Pulse:  [] 115  Resp:  [16-18] 17  BP: (117-136)/(74-86) 117/77  SpO2:  [94 %-95 %] 95 %    Physical Exam   Constitutional: He is oriented to person, place, and time. He appears well-developed and well-nourished. No distress.   Patient seen and examined  Plan discussed with RN   DARLYNT:   Right Ear: External ear normal.   Left Ear: External ear normal.   Nose: Nose normal.   Eyes: Right eye exhibits no discharge. Left eye exhibits no discharge. No scleral icterus.   Neck: No JVD present. No tracheal deviation present.   Cardiovascular: Normal rate, normal heart sounds and intact distal pulses.   No murmur heard.  Cap refill 2sec  Pulses 2+ throughout     Pulmonary/Chest: Effort normal and breath sounds normal. No respiratory distress. He has no wheezes. He has no rales.   Abdominal: Soft. Bowel sounds are normal. He exhibits no distension. There is no tenderness. There is no guarding.   Musculoskeletal: He exhibits no edema or tenderness.   Leg dressed with vac in place.    Neurological: He is alert and oriented to person, place, and time.   Skin: Skin is warm and dry. He is not diaphoretic. No erythema.   Normal skin color   Psychiatric: He has a normal mood and affect. His behavior is normal.   Nursing note and vitals reviewed.       Fluids    Intake/Output Summary (Last 24 hours) at 9/29/2019 0736  Last data filed at 9/28/2019 1800  Gross per 24 hour   Intake 1870 ml   Output 350 ml   Net 1520 ml       Laboratory  Recent Labs     09/27/19  0321 09/28/19  0416 09/29/19  0247   WBC 10.5 10.4 9.7   RBC 3.95* 4.02* 4.10*   HEMOGLOBIN 11.2* 11.3* 11.7*   HEMATOCRIT 35.6* 35.9* 37.8*   MCV 90.1 89.3 92.2   MCH 28.4 28.1 28.5   MCHC 31.5* 31.5* 31.0*   RDW 56.6* 56.0* 58.1*   PLATELETCT 183 120 857    MPV 11.0 10.8 10.7     Recent Labs     09/27/19  0321 09/28/19  0416 09/29/19  0247   SODIUM 139 138 137   POTASSIUM 4.3 4.4 4.3   CHLORIDE 107 106 107   CO2 19* 19* 20   GLUCOSE 113* 126* 128*   BUN 22 21 17   CREATININE 0.92 0.85 0.75   CALCIUM 8.2* 8.3* 8.3*                   Imaging  DX-CHEST-PORTABLE (1 VIEW)   Final Result         1. No pulmonary infiltrates or consolidations are noted.      2. Cardiomegaly.      CT-LSPINE W/O PLUS RECONS   Final Result      Postsurgical and degenerative change without evidence of lumbar spine fracture.      CT-TSPINE W/O PLUS RECONS   Final Result         No acute fracture or subluxation of the thoracic spine.      CT-CHEST,ABDOMEN,PELVIS WITH   Final Result      1.  No CT evidence of acute traumatic injury in the chest, abdomen or pelvis.   2.  Spine is detailed separately.   3.  Small right pleural effusion.   4.  Mild mediastinal lymphadenopathy, statistically reactive.   5.  Small hiatal hernia.   6.  Colonic diverticulosis.      CT-CSPINE WITHOUT PLUS RECONS   Final Result      Degenerative change without evidence of fracture.      DX-TIBIA AND FIBULA LEFT   Final Result      Minimally displaced fracture of the medial malleolus.                  INTERPRETING LOCATION:  [address] [place] [address], [id number]      CT-HEAD W/O   Final Result         1.  No acute intracranial abnormality is identified, there are nonspecific white matter changes, commonly associated with small vessel ischemic disease.  Associated mild cerebral atrophy is noted.   2.  Atherosclerosis.           Assessment/Plan  * Left leg cellulitis  Assessment & Plan  Worsening redness developing over left shin anteriorly, adjacent to skin tear and open wound  9/25 cx with enterococcus/Acinetobacter  Ricardo to uynasyn. pansensitive bugs.   Wound care.     Arrhythmia  Assessment & Plan  Hx of SVT  No new, followed by cardiology outpatient, Premature contractions detected on EKG  He has been bradycardic, continue to  hold metoprolol  No anticoagulation due to falls      Fall  Assessment & Plan  Resulting in a Left leg laceration      (HCC) SVT (supraventricular tachycardia)- (present on admission)  Assessment & Plan  Hx of SVT  No new, followed by cardiology outpatient, Premature contractions detected on EKG  He has been bradycardic, continue to hold metoprolol  No anticoagulation due to falls    Displaced fracture of medial malleolus of left tibia, initial encounter for closed fracture  Assessment & Plan  orthopedic surgery recommending nonweightbearing With boot for protection and follow up at [facility] after discharge    Leg laceration  Assessment & Plan   - nonsurgical per plastic surgery on admission,  Ortho has deferred to plastics for this case.   His wound has overall been improving slowly but ultimately may re-evaluation from plastics in the future if not improving.   Wound vac to remain in place  Cultures: Enterococcus and Acinetobacter  No need for vanco or ceftriaxone therapy. Change to unasyn and trial of pcn tolerance. Vague allergy in past. I discussed this with pharmacy and we will try to transition today.       Dyslipidemia- (present on admission)  Assessment & Plan  -Continue home statin    (HCC) Seizure disorder- (present on admission)  Assessment & Plan  Wife expressed concern of possible seizure causing the patient's fall as he had not taken keppra the day of his injury  No seizures here  PT OT rec SNF  -Continue home Keppra       VTE prophylaxis: SCDs to avoid further hematoma in LLE

## 2019-09-29 NOTE — PROGRESS NOTES
RE: BEDSIDE REPORT  REPORT ACCEPTED AND CARE ASSUMED    NEXT DRSG CHANGE LLE FOR Monday, 9/30/2019 PER CHANGE OF SHIFT REPORT

## 2019-09-29 NOTE — PROGRESS NOTES
"Pharmacy Kinetics 89 y.o. male on vancomycin day # 3 2019    Currently on Vancomycin 1250 mg IV q24hr     Indication for Treatment: left leg cellulitis     Pertinent history per medical record: Admitted on 2019 for left leg injury from GLF.  Patient started on Ancef , but tissue appeared necrotic and there was worsening of cellulitis in surrounding area.  Vancomycin was added to cover Enterococcus, and Ancef replaced with Rocephin to cover Acinetobacter.  Plastics to evaluate patient on Monday.     Other antibiotics: cefazolin     Allergies: Pcn [penicillins]      Concerns for renal function: Elderly, BUN/SCr ratio > 20:1     Pertinent Cultures to Date:  WCx Enterococcus durans/hirae, Acinetobacter lwoffi       Recent Labs     19  0308 19  0321 19  0416   WBC 10.6 10.5 10.4   NEUTSPOLYS 67.20 67.10 69.20     Recent Labs     19  0308 19  0321 19  0416   BUN 21 22 21   CREATININE 0.90 0.92 0.85   ALBUMIN 3.2 3.0* 2.9*     Recent Labs     19  1556   VANCOTROUGH 4.0*       Intake/Output Summary (Last 24 hours) at 2019 1724  Last data filed at 2019 1645  Gross per 24 hour   Intake 1830 ml   Output 425 ml   Net 1405 ml      Blood Pressure 132/74   Pulse 62   Temperature 36.4 °C (97.5 °F) (Oral)   Respiration 18   Height 1.727 m (5' 8\")   Weight 79.4 kg (175 lb 0.7 oz)   Oxygen Saturation 95%  Temp (24hrs), Av.4 °C (97.5 °F), Min:36.4 °C (97.5 °F), Max:36.4 °C (97.5 °F)      A/P          1. Vancomycin dose change: continue vancomycin 1250 mg IV q24h for now  1. Next vancomycin level: lab rerunning level from this afternoon  2. Goal trough: 12-16 mcg/mL  3. Comments: will adjust regimen if indicated    Nba Sheldon, PharmD      "

## 2019-09-29 NOTE — CARE PLAN
PLAN OF CARE REVIEWED WITH PATIENT, PATIENT VERBALIZED UNDERSTANDING. LLE DRSG C/DI WITH DRSG CHANGE DUE ON Monday. PLASTIC SURGEON CONSULT PENDING. PATIENT CONT. TO DENY PAIN. PATIENT ENCOURAGED TO REQUEST PAIN MEDICINE IF NEEDED. FALL PRECAUTIONS ONGOING.

## 2019-09-30 PROCEDURE — 700102 HCHG RX REV CODE 250 W/ 637 OVERRIDE(OP): Performed by: INTERNAL MEDICINE

## 2019-09-30 PROCEDURE — 700105 HCHG RX REV CODE 258: Performed by: HOSPITALIST

## 2019-09-30 PROCEDURE — 700111 HCHG RX REV CODE 636 W/ 250 OVERRIDE (IP): Performed by: HOSPITALIST

## 2019-09-30 PROCEDURE — 770006 HCHG ROOM/CARE - MED/SURG/GYN SEMI*

## 2019-09-30 PROCEDURE — 97110 THERAPEUTIC EXERCISES: CPT

## 2019-09-30 PROCEDURE — 99233 SBSQ HOSP IP/OBS HIGH 50: CPT | Performed by: HOSPITALIST

## 2019-09-30 PROCEDURE — 97530 THERAPEUTIC ACTIVITIES: CPT

## 2019-09-30 PROCEDURE — A9270 NON-COVERED ITEM OR SERVICE: HCPCS | Performed by: INTERNAL MEDICINE

## 2019-09-30 RX ADMIN — AMPICILLIN AND SULBACTAM 3 G: 1; 2 INJECTION, POWDER, FOR SOLUTION INTRAMUSCULAR; INTRAVENOUS at 05:17

## 2019-09-30 RX ADMIN — ATORVASTATIN CALCIUM 20 MG: 10 TABLET, FILM COATED ORAL at 17:19

## 2019-09-30 RX ADMIN — LEVETIRACETAM 500 MG: 500 TABLET, FILM COATED ORAL at 17:19

## 2019-09-30 RX ADMIN — AMPICILLIN AND SULBACTAM 3 G: 1; 2 INJECTION, POWDER, FOR SOLUTION INTRAMUSCULAR; INTRAVENOUS at 11:48

## 2019-09-30 RX ADMIN — OXYCODONE HYDROCHLORIDE 5 MG: 5 TABLET ORAL at 23:44

## 2019-09-30 RX ADMIN — LEVETIRACETAM 500 MG: 500 TABLET, FILM COATED ORAL at 05:20

## 2019-09-30 RX ADMIN — AMPICILLIN AND SULBACTAM 3 G: 1; 2 INJECTION, POWDER, FOR SOLUTION INTRAMUSCULAR; INTRAVENOUS at 17:19

## 2019-09-30 RX ADMIN — SODIUM HYPOCHLORITE 1 ML: 1.25 SOLUTION TOPICAL at 09:00

## 2019-09-30 ASSESSMENT — ENCOUNTER SYMPTOMS
SORE THROAT: 0
HEADACHES: 0
TINGLING: 0
COUGH: 0
EYE PAIN: 0
INSOMNIA: 0
VOMITING: 0
NECK PAIN: 0
SHORTNESS OF BREATH: 0
DIZZINESS: 0
CHILLS: 0
FEVER: 0
PALPITATIONS: 0
BACK PAIN: 0
NAUSEA: 0
BLURRED VISION: 0
DEPRESSION: 0
ABDOMINAL PAIN: 0

## 2019-09-30 ASSESSMENT — COGNITIVE AND FUNCTIONAL STATUS - GENERAL
PERSONAL GROOMING: A LITTLE
TOILETING: A LITTLE
MOVING FROM LYING ON BACK TO SITTING ON SIDE OF FLAT BED: A LITTLE
DAILY ACTIVITIY SCORE: 18
SUGGESTED CMS G CODE MODIFIER MOBILITY: CK
DRESSING REGULAR UPPER BODY CLOTHING: A LITTLE
MOBILITY SCORE: 18
HELP NEEDED FOR BATHING: A LITTLE
EATING MEALS: A LITTLE
STANDING UP FROM CHAIR USING ARMS: A LITTLE
CLIMB 3 TO 5 STEPS WITH RAILING: TOTAL
SUGGESTED CMS G CODE MODIFIER DAILY ACTIVITY: CK
DRESSING REGULAR LOWER BODY CLOTHING: A LITTLE
WALKING IN HOSPITAL ROOM: A LITTLE

## 2019-09-30 ASSESSMENT — GAIT ASSESSMENTS
DISTANCE (FEET): 10
ASSISTIVE DEVICE: FRONT WHEEL WALKER
DEVIATION: ANTALGIC;STEP TO;OTHER (COMMENT)
GAIT LEVEL OF ASSIST: MINIMAL ASSIST

## 2019-09-30 NOTE — PROGRESS NOTES
Pt AAOx4. Pt wet his pads and gown. New pads and gown provided. Denies any pain, SOB, dizziness, nausea, chills. Dressing to LLE clean dry intact. Next dressing schedule 9/30. Bruising and redness to bilat extremities and hip observed. Also, wound vac is off, and will be on hold until plastics consult per handoff report. Safety and comfort measures in place. Pt now resting in bed. Call light and belongings w/n reach.  No additional needs at this time.

## 2019-09-30 NOTE — THERAPY
"Occupational Therapy Treatment completed with focus on ADLs and ADL transfers.  Functional Status:  Pt denied need for grooming, toileting or LB dressing today- Just wanting to get BTB after being up a better part of the day.  Pt seated in recliner.  Esther sit to stand up to FWW, Esther to pivot back to EOB.  Supervised sit to supine back into bed.  Pt asking lots of questions, needs redirection and reminders to proceed with activity. Pt given warm blankets for comfort.  Pt tolerated transfer easily and was able to maintain NWB on LLE.  Motivated to keep moving and get stronger.  Will benefit from further therapy in a SNF setting.  OT will follow while in house.    Plan of Care: Will benefit from Occupational Therapy 3 times per week  Discharge Recommendations:  Equipment Will Continue to Assess for Equipment Needs. Post-acute therapy Discharge to a transitional care facility for continued skilled therapy services.    See \"Rehab Therapy-Acute\" Patient Summary Report for complete documentation.   "

## 2019-09-30 NOTE — THERAPY
"Physical Therapy Treatment completed.   Bed Mobility:  Supine to Sit: Supervised  Transfers: Sit to Stand: Contact Guard Assist  Gait: Level Of Assist: Minimal Assist with Front-Wheel Walker       Plan of Care: Will benefit from Physical Therapy 4 times per week  Discharge Recommendations: Equipment: Will Continue to Assess for Equipment Needs. Recommend post-acute placement for continued physical therapy services prior to discharge home. Patient can tolerate post-acute therapies at a 5x/week frequency.       See \"Rehab Therapy-Acute\" Patient Summary Report for complete documentation.     Pt is progressing well with therapy and demonstrating with improved balance, gait mechaincs, and activity tolerance. Pt was able to maintain NWB on LLE throughout ambulation with good mechanics using UE to off load RLE for forward progression. Pt was only able to tolerate short distances due to reports of increased LLE pain and fatigue. Pt required SPV for bed mobility, however, requires occasional cues to avoid using LLE to unload buttocks at times. Pt was able to demo CGA for sit<>stands, and Min A for ambulation and transfers. Pt educated on seated and supine ther-ex and able to complete with good mechanics. Pt educated on keeping LLE elevated and assisted into position. Pt will continue to benefit from skilled PT while in house to address poor activity tolernace, strength, and balance. Will conitnue to recommend post acute therapy prior to d/c home.   "

## 2019-09-30 NOTE — PROGRESS NOTES
Hospital Medicine Daily Progress Note    Date of Service  9/30/2019    Chief Complaint  89 y.o. male admitted 9/20/2019 with left leg injury after a fall    Hospital Course    89 y.o. male who presented 9/20/2019 with fall when getting out of the car. He hit his head and left leg and was noted to have a large laceration as well as a medial malleolus fracture. He wsad seen by plastics and ortho and provided with wound care, a wound vac and empiric antibiotics. .      Interval Problem Update  9/24: WBC still elevated, pain is controlled.  Pending PT OT re-eval.  NWB LLE  9/25: WBC unchanged, wound VAC replaced.  He remains agitated, confused and impulsive with poor insight  9/26: Discussed with wound care, they feel debridement may be necessary.  Bradycardic, metoprolol discontinued  9/27: I discussed with orthopedic surgery, they will defer surgery to plastics.  I have placed a call to plastics, awaiting recommendations.  WBC improved.  Vanc added to cover enterococcus  9/28: Reached plastics and they confirmed no consult available until Monday.  Pain controlled.  Cx with enterococcus/Acinetobacter, antibiotics adjusted.      9-29- feeling well. Dressing was changed today. We discussed plan for surgical consult. I discussed him later with Dr Kent who will come by to look at the wound and make further recommendations.     Consultants/Specialty  Plastic surgery Dr. Kent  Orthopedic surgery Dr. Tracy    Code Status  dnr    Disposition  SNF    Review of Systems  Review of Systems   Constitutional: Negative for chills and fever.   HENT: Negative for sore throat.    Eyes: Negative for blurred vision and pain.   Respiratory: Negative for cough and shortness of breath.    Cardiovascular: Negative for chest pain and palpitations.   Gastrointestinal: Negative for abdominal pain, nausea and vomiting.   Genitourinary: Negative for dysuria and urgency.   Musculoskeletal: Negative for back pain and neck pain.   Skin:  Negative for itching and rash.   Neurological: Negative for dizziness, tingling and headaches.   Psychiatric/Behavioral: Negative for depression. The patient does not have insomnia.    All other systems reviewed and are negative.       Physical Exam  Temp:  [36.7 °C (98 °F)] 36.7 °C (98 °F)  Pulse:  [60-73] 62  Resp:  [18] 18  BP: (111-130)/(53-69) 130/53  SpO2:  [92 %-99 %] 94 %    Physical Exam   Constitutional: He is oriented to person, place, and time. He appears well-developed and well-nourished. No distress.   Patient seen and examined  Plan discussed with RN   DARLYNT:   Right Ear: External ear normal.   Left Ear: External ear normal.   Nose: Nose normal.   Eyes: Right eye exhibits no discharge. Left eye exhibits no discharge. No scleral icterus.   Neck: No JVD present. No tracheal deviation present.   Cardiovascular: Normal rate, normal heart sounds and intact distal pulses.   No murmur heard.  Cap refill 2sec  Pulses 2+ throughout     Pulmonary/Chest: Effort normal and breath sounds normal. No respiratory distress. He has no wheezes. He has no rales.   Abdominal: Soft. Bowel sounds are normal. He exhibits no distension. There is no tenderness. There is no guarding.   Musculoskeletal: He exhibits no edema or tenderness.   Leg dressed with vac in place.    Neurological: He is alert and oriented to person, place, and time.   Skin: Skin is warm and dry. He is not diaphoretic. No erythema.   Normal skin color   Psychiatric: He has a normal mood and affect. His behavior is normal.   Nursing note and vitals reviewed.       Fluids    Intake/Output Summary (Last 24 hours) at 9/30/2019 0742  Last data filed at 9/30/2019 0020  Gross per 24 hour   Intake 340 ml   Output 400 ml   Net -60 ml       Laboratory  Recent Labs     09/28/19  0416 09/29/19  0247   WBC 10.4 9.7   RBC 4.02* 4.10*   HEMOGLOBIN 11.3* 11.7*   HEMATOCRIT 35.9* 37.8*   MCV 89.3 92.2   MCH 28.1 28.5   MCHC 31.5* 31.0*   RDW 56.0* 58.1*   PLATELETCT 215 241    MPV 10.8 10.7     Recent Labs     09/28/19  0416 09/29/19  0247   SODIUM 138 137   POTASSIUM 4.4 4.3   CHLORIDE 106 107   CO2 19* 20   GLUCOSE 126* 128*   BUN 21 17   CREATININE 0.85 0.75   CALCIUM 8.3* 8.3*                   Imaging  DX-CHEST-PORTABLE (1 VIEW)   Final Result         1. No pulmonary infiltrates or consolidations are noted.      2. Cardiomegaly.      CT-LSPINE W/O PLUS RECONS   Final Result      Postsurgical and degenerative change without evidence of lumbar spine fracture.      CT-TSPINE W/O PLUS RECONS   Final Result         No acute fracture or subluxation of the thoracic spine.      CT-CHEST,ABDOMEN,PELVIS WITH   Final Result      1.  No CT evidence of acute traumatic injury in the chest, abdomen or pelvis.   2.  Spine is detailed separately.   3.  Small right pleural effusion.   4.  Mild mediastinal lymphadenopathy, statistically reactive.   5.  Small hiatal hernia.   6.  Colonic diverticulosis.      CT-CSPINE WITHOUT PLUS RECONS   Final Result      Degenerative change without evidence of fracture.      DX-TIBIA AND FIBULA LEFT   Final Result      Minimally displaced fracture of the medial malleolus.                  INTERPRETING LOCATION:  [address] [facility], [facility], [id number]      CT-HEAD W/O   Final Result         1.  No acute intracranial abnormality is identified, there are nonspecific white matter changes, commonly associated with small vessel ischemic disease.  Associated mild cerebral atrophy is noted.   2.  Atherosclerosis.           Assessment/Plan  * Leg laceration  Assessment & Plan  I have discussed having plastics evaluate his wound given the large size of the defect and the necrosis now surrounding it.   Ortho has deferred to plastics for this case.   Wound vac to remain in place  Cultures: Enterococcus and Acinetobacter  Continue unasyn    Arrhythmia  Assessment & Plan  Hx of SVT  No new, followed by cardiology outpatient, Premature contractions detected on EKG  He has been bradycardic,  continue to hold metoprolol  No anticoagulation due to falls      Fall  Assessment & Plan  Resulting in a Left leg laceration      (HCC) SVT (supraventricular tachycardia)- (present on admission)  Assessment & Plan  Hx of SVT- no events.   followed by cardiology outpatient      Displaced fracture of medial malleolus of left tibia, initial encounter for closed fracture  Assessment & Plan  orthopedic surgery recommending nonweightbearing With boot for protection and follow up at [facility] after discharge    Left leg cellulitis  Assessment & Plan  Worsening redness developing over left shin anteriorly, adjacent to skin tear and open wound  9/25 cx with enterococcus/Acinetobacter  Ricardo to uynasyn. pansensitive bugs.   Wound care.     Dyslipidemia- (present on admission)  Assessment & Plan  -Continue home statin    (HCC) Seizure disorder- (present on admission)  Assessment & Plan  Wife expressed concern of possible seizure causing the patient's fall as he had not taken keppra the day of his injury  No seizures here  PT OT rec SNF  -Continue home Keppra       VTE prophylaxis: SCDs to avoid further hematoma in LLE

## 2019-09-30 NOTE — PROGRESS NOTES
Showed MD Pérez px leg wound at bedside. Dressing hard to pull off wound due to drying, necrotic. MD stated he will speak with surgeon again

## 2019-09-30 NOTE — WOUND TEAM
Spoke with RN this morning to clarify dressing changes.  Wound VAC on hold until plastics consult.  Daily 1/4 strength dakins soaked strip packing to be placed in undermining and to cover wound bed.  Frequency is once daily, nursing order has frequency at the top of the order.  Please call wound team if there are any other questions.  [id number]

## 2019-09-30 NOTE — CARE PLAN
Problem: Communication  Goal: The ability to communicate needs accurately and effectively will improve  Outcome: PROGRESSING AS EXPECTED    Pt able to communicate needs appropriately to staff. Plan of care discussed to pt. Questions and concerns answered. Pt. Verbalized understanding. Communication board updated.     Problem: Infection  Goal: Will remain free from infection  Outcome: PROGRESSING AS EXPECTED   Pt afebrile and denies chills. On IV antibiotic.

## 2019-10-01 PROCEDURE — 700102 HCHG RX REV CODE 250 W/ 637 OVERRIDE(OP): Performed by: INTERNAL MEDICINE

## 2019-10-01 PROCEDURE — 700105 HCHG RX REV CODE 258: Performed by: HOSPITALIST

## 2019-10-01 PROCEDURE — 700101 HCHG RX REV CODE 250

## 2019-10-01 PROCEDURE — 99232 SBSQ HOSP IP/OBS MODERATE 35: CPT | Performed by: INTERNAL MEDICINE

## 2019-10-01 PROCEDURE — 770006 HCHG ROOM/CARE - MED/SURG/GYN SEMI*

## 2019-10-01 PROCEDURE — 700111 HCHG RX REV CODE 636 W/ 250 OVERRIDE (IP): Performed by: INTERNAL MEDICINE

## 2019-10-01 PROCEDURE — 700111 HCHG RX REV CODE 636 W/ 250 OVERRIDE (IP): Performed by: HOSPITALIST

## 2019-10-01 PROCEDURE — A9270 NON-COVERED ITEM OR SERVICE: HCPCS | Performed by: INTERNAL MEDICINE

## 2019-10-01 RX ORDER — LIDOCAINE HYDROCHLORIDE 10 MG/ML
INJECTION, SOLUTION INFILTRATION; PERINEURAL
Status: COMPLETED
Start: 2019-10-01 | End: 2019-10-01

## 2019-10-01 RX ADMIN — LEVETIRACETAM 500 MG: 500 TABLET, FILM COATED ORAL at 17:10

## 2019-10-01 RX ADMIN — SENNOSIDES AND DOCUSATE SODIUM 2 TABLET: 8.6; 5 TABLET ORAL at 06:00

## 2019-10-01 RX ADMIN — ATORVASTATIN CALCIUM 20 MG: 10 TABLET, FILM COATED ORAL at 17:10

## 2019-10-01 RX ADMIN — LEVETIRACETAM 500 MG: 500 TABLET, FILM COATED ORAL at 06:00

## 2019-10-01 RX ADMIN — AMPICILLIN AND SULBACTAM 3 G: 1; 2 INJECTION, POWDER, FOR SOLUTION INTRAMUSCULAR; INTRAVENOUS at 06:00

## 2019-10-01 RX ADMIN — AMPICILLIN AND SULBACTAM 3 G: 1; 2 INJECTION, POWDER, FOR SOLUTION INTRAMUSCULAR; INTRAVENOUS at 11:45

## 2019-10-01 RX ADMIN — SODIUM HYPOCHLORITE 1 ML: 1.25 SOLUTION TOPICAL at 11:49

## 2019-10-01 RX ADMIN — AMPICILLIN AND SULBACTAM 3 G: 1; 2 INJECTION, POWDER, FOR SOLUTION INTRAMUSCULAR; INTRAVENOUS at 17:10

## 2019-10-01 RX ADMIN — LIDOCAINE HYDROCHLORIDE: 10 INJECTION, SOLUTION INFILTRATION; PERINEURAL at 16:55

## 2019-10-01 RX ADMIN — MORPHINE SULFATE 2 MG: 4 INJECTION INTRAVENOUS at 16:55

## 2019-10-01 RX ADMIN — AMPICILLIN AND SULBACTAM 3 G: 1; 2 INJECTION, POWDER, FOR SOLUTION INTRAMUSCULAR; INTRAVENOUS at 00:00

## 2019-10-01 ASSESSMENT — ENCOUNTER SYMPTOMS
TINGLING: 0
COUGH: 0
PALPITATIONS: 0
HEARTBURN: 0
NAUSEA: 0
STRIDOR: 0
TREMORS: 0
MYALGIAS: 0
DIARRHEA: 0
DIZZINESS: 0
FEVER: 0
CHILLS: 0
SHORTNESS OF BREATH: 0
HEADACHES: 0
SINUS PAIN: 0

## 2019-10-01 ASSESSMENT — PAIN SCALES - WONG BAKER: WONGBAKER_NUMERICALRESPONSE: DOESN'T HURT AT ALL

## 2019-10-01 NOTE — PROGRESS NOTES
Took down px wound dressing. Dressing dried to wound - was difficult to remove.     Wound appears to be getting worse and more necrotic.   Brought MD Romero into room to assess.   L foot edematous - px states numbness present but that is not new for him.   Called wound team for potential different orders for dressing changes - stated to put adaptic on wound with ABD and kerlix wrap - wound team will assess tomorrow.   Waiting on plastics and surgery to see patient.   MD states she also wants vascular to see px.     Update 1700 - plastics MD Kent at bedside, debrided wound on LLE. Plastics re-dressed wound with dakins and ABD and kerlix. States px wound benefit from wound vac now that the dead tissue is removed. He also recommends vascular coming to see patient.   RN updated wound team.

## 2019-10-01 NOTE — CARE PLAN
Problem: Nutritional:  Goal: Achieve adequate nutritional intake  Description  Patient will consume >50% of meals   Outcome: MET     Recorded PO intake good at % of most meals (6 out of 7) since 9/28/19. Pt also continues with order for Boost Plus. Current PO intake is adequate and should encourage wound healing.

## 2019-10-01 NOTE — PROGRESS NOTES
Pt slept aprox 4 hours. Pt remains continent and incontinent of bladder. Pt is wearing a pad and underwear to assist in dribbling of urine. Assist x1 standing at bedside to urinate. Alert and oriented x3. Pt is forgetful . Pleasant. Med compliant.

## 2019-10-01 NOTE — PROGRESS NOTES
Assumed care. Pt resting quietly in bed. Alert and oriented x3 but can be forgetful .easily redirectable.able to make needs known. Pt urinating in urinal .

## 2019-10-02 ENCOUNTER — APPOINTMENT (OUTPATIENT)
Dept: RADIOLOGY | Facility: MEDICAL CENTER | Age: 84
DRG: 501 | End: 2019-10-02
Attending: INTERNAL MEDICINE
Payer: COMMERCIAL

## 2019-10-02 PROCEDURE — 700102 HCHG RX REV CODE 250 W/ 637 OVERRIDE(OP): Performed by: INTERNAL MEDICINE

## 2019-10-02 PROCEDURE — A9270 NON-COVERED ITEM OR SERVICE: HCPCS | Performed by: INTERNAL MEDICINE

## 2019-10-02 PROCEDURE — 99232 SBSQ HOSP IP/OBS MODERATE 35: CPT | Performed by: INTERNAL MEDICINE

## 2019-10-02 PROCEDURE — 93922 UPR/L XTREMITY ART 2 LEVELS: CPT

## 2019-10-02 PROCEDURE — 700105 HCHG RX REV CODE 258: Performed by: HOSPITALIST

## 2019-10-02 PROCEDURE — 306372 DRESSING,VAC SIMPLACE MED: Performed by: INTERNAL MEDICINE

## 2019-10-02 PROCEDURE — 97110 THERAPEUTIC EXERCISES: CPT

## 2019-10-02 PROCEDURE — 770006 HCHG ROOM/CARE - MED/SURG/GYN SEMI*

## 2019-10-02 PROCEDURE — A6206 CONTACT LAYER <= 16 SQ IN: HCPCS | Performed by: INTERNAL MEDICINE

## 2019-10-02 PROCEDURE — 306263 VAC CANNISTER W/GEL 500ML: Performed by: INTERNAL MEDICINE

## 2019-10-02 PROCEDURE — 97530 THERAPEUTIC ACTIVITIES: CPT

## 2019-10-02 PROCEDURE — 97606 NEG PRS WND THER DME>50 SQCM: CPT

## 2019-10-02 PROCEDURE — 700111 HCHG RX REV CODE 636 W/ 250 OVERRIDE (IP): Performed by: HOSPITALIST

## 2019-10-02 RX ADMIN — ATORVASTATIN CALCIUM 20 MG: 10 TABLET, FILM COATED ORAL at 17:40

## 2019-10-02 RX ADMIN — LEVETIRACETAM 500 MG: 500 TABLET, FILM COATED ORAL at 05:52

## 2019-10-02 RX ADMIN — OXYCODONE HYDROCHLORIDE 5 MG: 5 TABLET ORAL at 08:50

## 2019-10-02 RX ADMIN — AMPICILLIN AND SULBACTAM 3 G: 1; 2 INJECTION, POWDER, FOR SOLUTION INTRAMUSCULAR; INTRAVENOUS at 05:52

## 2019-10-02 RX ADMIN — AMPICILLIN AND SULBACTAM 3 G: 1; 2 INJECTION, POWDER, FOR SOLUTION INTRAMUSCULAR; INTRAVENOUS at 23:11

## 2019-10-02 RX ADMIN — AMPICILLIN AND SULBACTAM 3 G: 1; 2 INJECTION, POWDER, FOR SOLUTION INTRAMUSCULAR; INTRAVENOUS at 00:11

## 2019-10-02 RX ADMIN — LEVETIRACETAM 500 MG: 500 TABLET, FILM COATED ORAL at 17:41

## 2019-10-02 RX ADMIN — AMPICILLIN AND SULBACTAM 3 G: 1; 2 INJECTION, POWDER, FOR SOLUTION INTRAMUSCULAR; INTRAVENOUS at 11:13

## 2019-10-02 RX ADMIN — AMPICILLIN AND SULBACTAM 3 G: 1; 2 INJECTION, POWDER, FOR SOLUTION INTRAMUSCULAR; INTRAVENOUS at 17:42

## 2019-10-02 RX ADMIN — OXYCODONE HYDROCHLORIDE 5 MG: 5 TABLET ORAL at 03:57

## 2019-10-02 ASSESSMENT — ENCOUNTER SYMPTOMS
MYALGIAS: 0
TINGLING: 0
SHORTNESS OF BREATH: 0
NAUSEA: 0
SPUTUM PRODUCTION: 0
STRIDOR: 0
DIARRHEA: 0
TREMORS: 0
SINUS PAIN: 0
DIZZINESS: 0
FEVER: 0
HEARTBURN: 0
COUGH: 0
ABDOMINAL PAIN: 0
HEADACHES: 0

## 2019-10-02 ASSESSMENT — PATIENT HEALTH QUESTIONNAIRE - PHQ9
1. LITTLE INTEREST OR PLEASURE IN DOING THINGS: NOT AT ALL
SUM OF ALL RESPONSES TO PHQ9 QUESTIONS 1 AND 2: 0
2. FEELING DOWN, DEPRESSED, IRRITABLE, OR HOPELESS: NOT AT ALL

## 2019-10-02 ASSESSMENT — COGNITIVE AND FUNCTIONAL STATUS - GENERAL
MOVING FROM LYING ON BACK TO SITTING ON SIDE OF FLAT BED: A LITTLE
CLIMB 3 TO 5 STEPS WITH RAILING: TOTAL
MOBILITY SCORE: 18
SUGGESTED CMS G CODE MODIFIER MOBILITY: CK
STANDING UP FROM CHAIR USING ARMS: A LITTLE
WALKING IN HOSPITAL ROOM: A LITTLE

## 2019-10-02 ASSESSMENT — GAIT ASSESSMENTS
GAIT LEVEL OF ASSIST: MINIMAL ASSIST
DISTANCE (FEET): 10
ASSISTIVE DEVICE: FRONT WHEEL WALKER
DEVIATION: ANTALGIC;STEP TO

## 2019-10-02 NOTE — CARE PLAN
Problem: Safety  Goal: Will remain free from falls  Outcome: PROGRESSING AS EXPECTED  Intervention: Implement fall precautions  Flowsheets (Taken 10/1/2019 1945)  Environmental Precautions: Treaded Slipper Socks on Patient;Personal Belongings, Wastebasket, Call Bell etc. in Easy Reach;Transferred to Stronger Side;Report Given to Other Health Care Providers Regarding Fall Risk;Bed in Low Position;Communication Sign for Patients & Families;Mobility Assessed & Appropriate Sign Placed  Note:   Patient calls and waits for assistance appropriately.     Problem: Skin Integrity  Goal: Risk for impaired skin integrity will decrease  Outcome: PROGRESSING SLOWER THAN EXPECTED  Note:   Skin on buttocks and groin are red. Barrier ointment applied. Assessed frequently for moisture.

## 2019-10-02 NOTE — PROGRESS NOTES
Hospital Medicine Daily Progress Note    Date of Service  10/2/2019    Chief Complaint  89 y.o. male admitted 9/20/2019 with left leg wound after trauma    Hospital Course    89 y.o. male who presented 9/20/2019 with fall when getting out of the car. He hit his head and left leg and was noted to have a large laceration as well as a medial malleolus fracture. He was seen by plastic surgery and orthopedic surgery and provided with wound care, a wound vac and empiric antibiotics.      Interval Problem Update  The patient's left leg wound was debrided by plastic surgery last night  Today ROBIN's are done showing good arterial flow    Consultants/Specialty  Plastic surgery Dr. Kent  Ortho Dr. Tracy    Code Status  DNR    Disposition  SNF    Review of Systems  Review of Systems   Constitutional: Negative for fever and malaise/fatigue.   HENT: Negative for sinus pain.    Respiratory: Negative for cough, sputum production, shortness of breath and stridor.    Cardiovascular: Negative for chest pain and leg swelling.   Gastrointestinal: Negative for abdominal pain, diarrhea, heartburn and nausea.   Genitourinary: Negative for dysuria and hematuria.   Musculoskeletal: Negative for myalgias.   Skin: Negative for rash.   Neurological: Negative for dizziness, tingling, tremors and headaches.        Physical Exam  Temp:  [36.9 °C (98.5 °F)-37 °C (98.6 °F)] 37 °C (98.6 °F)  Pulse:  [41-87] 87  Resp:  [16-18] 18  BP: (103-140)/(55-62) 115/62  SpO2:  [91 %-94 %] 94 %    Physical Exam   Constitutional: He is oriented to person, place, and time. No distress.   HENT:   Mouth/Throat: No oropharyngeal exudate.   Eyes: Pupils are equal, round, and reactive to light. EOM are normal. No scleral icterus.   Neck: Neck supple. No tracheal deviation present.   Cardiovascular: Normal rate, regular rhythm and intact distal pulses.   No murmur heard.  Pulmonary/Chest: Effort normal and breath sounds normal. He has no wheezes.   Abdominal:  Soft. Bowel sounds are normal. He exhibits no distension.   Musculoskeletal: He exhibits no edema.   Neurological: He is alert and oriented to person, place, and time.   Skin: Skin is dry. No rash noted. He is not diaphoretic. No erythema.   Necrotic tissue removed from left lower leg   Psychiatric: His behavior is normal.   Nursing note and vitals reviewed.      Fluids    Intake/Output Summary (Last 24 hours) at 10/2/2019 1505  Last data filed at 10/2/2019 0800  Gross per 24 hour   Intake 240 ml   Output 500 ml   Net -260 ml       Laboratory                        Imaging  US-ROBIN SINGLE LEVEL BILAT   Final Result      DX-CHEST-PORTABLE (1 VIEW)   Final Result         1. No pulmonary infiltrates or consolidations are noted.      2. Cardiomegaly.      CT-LSPINE W/O PLUS RECONS   Final Result      Postsurgical and degenerative change without evidence of lumbar spine fracture.      CT-TSPINE W/O PLUS RECONS   Final Result         No acute fracture or subluxation of the thoracic spine.      CT-CHEST,ABDOMEN,PELVIS WITH   Final Result      1.  No CT evidence of acute traumatic injury in the chest, abdomen or pelvis.   2.  Spine is detailed separately.   3.  Small right pleural effusion.   4.  Mild mediastinal lymphadenopathy, statistically reactive.   5.  Small hiatal hernia.   6.  Colonic diverticulosis.      CT-CSPINE WITHOUT PLUS RECONS   Final Result      Degenerative change without evidence of fracture.      DX-TIBIA AND FIBULA LEFT   Final Result      Minimally displaced fracture of the medial malleolus.                  INTERPRETING LOCATION:  [address] [place] [address], [id number]      CT-HEAD W/O   Final Result         1.  No acute intracranial abnormality is identified, there are nonspecific white matter changes, commonly associated with small vessel ischemic disease.  Associated mild cerebral atrophy is noted.   2.  Atherosclerosis.           Assessment/Plan  * Leg laceration  Assessment & Plan  Continue wound  care    Arrhythmia  Assessment & Plan  Hx of SVT  No new, followed by cardiology outpatient, Premature contractions detected on EKG  He has been bradycardic, continue to hold metoprolol  No anticoagulation due to falls      Fall  Assessment & Plan  Continue physical therapy to improve mobility      ([facility]) SVT (supraventricular tachycardia)- (present on admission)  Assessment & Plan  Hx of SVT- no events this admission  followed by cardiology outpatient      Displaced fracture of medial malleolus of left tibia, initial encounter for closed fracture  Assessment & Plan  orthopedic surgery recommending nonweightbearing With boot for protection and follow up at [facility] after discharge      Left leg cellulitis  Assessment & Plan  Worsening redness developing over left shin anteriorly, adjacent to skin tear and open wound  9/25 cx with enterococcus/Acinetobacter  Continue unasyn  Wound debrided by plastic surgery Dr. Kent 10/1, will continue wound vac  ROBIN's done today showing good arterial flow    Dyslipidemia- (present on admission)  Assessment & Plan   statin    ([facility]) Seizure disorder- (present on admission)  Assessment & Plan  Controlled on keppra         VTE prophylaxis: lovenox after debridement

## 2019-10-02 NOTE — THERAPY
"Physical Therapy Treatment completed.   Bed Mobility:  Supine to Sit: Supervised  Transfers: Sit to Stand: Minimal Assist  Gait: Level Of Assist: Minimal Assist with Front-Wheel Walker       Plan of Care: Will benefit from Physical Therapy 4 times per week  Discharge Recommendations: Equipment: Will Continue to Assess for Equipment Needs. Post-acute therapy Discharge to a transitional care facility for continued skilled therapy services.    Pt seen today for PT treatment session. Pt now with wound vac to L LE. Pt agreeable to PT session. Completed bed mobility at SPV level. Pt then requested to use urinal once standing. Pt with poor safety awareness in standing as he was trying to pull down underwear and maintain NWB without use of UE's on FWW for support. Pt cues to maintain grasp on FWW while PT managed underwear. Pt then requested to use commode, pushed FWW out of the way and completed stand pivot on R LE only. Pt again cued for safety and use of FWW. Pt only ambulated a few feet at EOB with FWW. Pt fatigues quickly and requires cues to maintain upright posture and increased use of UE's to offload R LE. Pt returned to bed and completed supine ther ex. Pt with limited L LE strength and fatigued quickly with exercises. Pt would benefit from ongoing PT intervention while in the acute care setting to address deficits and improve mobility prior to DC     See \"Rehab Therapy-Acute\" Patient Summary Report for complete documentation.       "

## 2019-10-02 NOTE — CARE PLAN
Problem: Safety  Goal: Will remain free from falls  Outcome: PROGRESSING AS EXPECTED  Intervention: Implement fall precautions  Flowsheets (Taken 10/2/2019 0800)  Environmental Precautions: Treaded Slipper Socks on Patient;Personal Belongings, Wastebasket, Call Bell etc. in Easy Reach;Report Given to Other Health Care Providers Regarding Fall Risk;Bed in Low Position;Communication Sign for Patients & Families;Mobility Assessed & Appropriate Sign Placed  Note:   Fall precautions and hourly rounding in place. Pt instructed to call for assistance before attempting to mobilize. Pt verbalized understanding. Bedside commode in place.        Problem: Pain Management  Goal: Pain level will decrease to patient's comfort goal  Outcome: PROGRESSING AS EXPECTED  Intervention: Follow pain managment plan developed in collaboration with patient and Interdisciplinary Team  Note:   Pt denies pain at rest; prn pain medication given before wound dressing change per pt request. Pt denies any breakthrough or intolerable pain.

## 2019-10-02 NOTE — OP REPORT
DATE OF SERVICE:  10/01/2019    PREOPERATIVE DIAGNOSIS:  Complex left lower lateral leg wound.    POSTOPERATIVE DIAGNOSIS:  Complex left lower lateral leg wound.    PROCEDURES:  Excisional debridement of left lower lateral leg wound including   skin, subcutaneous tissue, muscle, and fascia, total area debrided 100 cm2.    ATTENDING SURGEON:  Leo Kent MD    ANESTHESIA:  Local anesthetic only.    ESTIMATED BLOOD LOSS:  Minimal.    COMPLICATIONS:  No apparent.    INDICATIONS FOR PROCEDURE:  The patient is a 89-year-old gentleman who had a   ground level fall who sustained a complex degloving wound to his left lower   lateral leg.  Despite wound care, the wound had progressively got worse.    Plastic surgery was consulted for evaluation of this wound.  I did feel that   he needed to have a debridement of a grossly necrotic tissue.  I also wanted   to try to avoid general anesthetic given the patient's age and general   comorbidities.  The patient agrees with this plan.    DESCRIPTION OF PROCEDURE:  After the operative and nonoperative options   including the alternatives were discussed, appropriate verbal consent was   obtained.  The leg was then prepped and draped in usual sterile fashion.    Local anesthetic was then injected widely around the necrotic tissue to   anesthetize the area.  Following this, then a 15-blade and iris scissors were   then used to sharply debride the wound, which included skin, subcutaneous   tissue, muscle, and fascia.  The wound did extend down to near the lateral   malleolus and fibula, but did appear to have periosteum that was still   present.  There was significant undermining, but after debridement, this was   all opened up.  As much of the wound was debrided back to healthy bleeding   base as possible with local anesthetic.  Compression was used to obtain   hemostasis.  Then, a saline moistened gauze was then placed in the wound and   he was placed in a compressive wrap.  The  patient will a wound VAC tomorrow.    Patient tolerated the procedure well.  At the end of procedure, all sponge,   instrument, and needle counts were correct.       ____________________________________     MD MARYJANE SPRAGUE / DON    DD:  10/01/2019 17:23:15  DT:  10/01/2019 17:32:37    D#:  [id number]  Job#:  [id number]

## 2019-10-02 NOTE — PROGRESS NOTES
Hospital Medicine Daily Progress Note    Date of Service  10/1/2019    Chief Complaint  89 y.o. male admitted 9/20/2019 with left leg wound after trauma    Hospital Course    89 y.o. male who presented 9/20/2019 with fall when getting out of the car. He hit his head and left leg and was noted to have a large laceration as well as a medial malleolus fracture. He was seen by plastic surgery and orthopedic surgery and provided with wound care, a wound vac and empiric antibiotics.      Interval Problem Update  The patient's left leg wound was debrided by plastic surgery today    Consultants/Specialty  Plastic surgery Dr. Kent  Ortho Dr. Tracy    Code Status  DNR    Disposition  SNF    Review of Systems  Review of Systems   Constitutional: Negative for chills, fever and malaise/fatigue.   HENT: Negative for sinus pain.    Respiratory: Negative for cough, shortness of breath and stridor.    Cardiovascular: Negative for chest pain, palpitations and leg swelling.   Gastrointestinal: Negative for diarrhea, heartburn and nausea.   Genitourinary: Negative for dysuria, hematuria and urgency.   Musculoskeletal: Negative for myalgias.   Skin: Negative for itching and rash.   Neurological: Negative for dizziness, tingling, tremors and headaches.        Physical Exam  Temp:  [36.4 °C (97.6 °F)-36.6 °C (97.9 °F)] 36.4 °C (97.6 °F)  Pulse:  [51-90] 54  Resp:  [16-18] 18  BP: (116-118)/(70-73) 116/70  SpO2:  [93 %-97 %] 93 %    Physical Exam   Constitutional: He is oriented to person, place, and time. No distress.   HENT:   Mouth/Throat: Oropharynx is clear and moist. No oropharyngeal exudate.   Eyes: Pupils are equal, round, and reactive to light. Conjunctivae and EOM are normal.   Neck: Neck supple. No tracheal deviation present.   Cardiovascular: Normal rate, regular rhythm and intact distal pulses.   No murmur heard.  Pulmonary/Chest: Effort normal and breath sounds normal. He has no wheezes.   Abdominal: Soft. Bowel sounds  are normal.   Musculoskeletal: He exhibits no edema.   Neurological: He is alert and oriented to person, place, and time.   Skin: Skin is warm and dry. He is not diaphoretic. No erythema.   Necrotic tissue over left leg   Nursing note and vitals reviewed.      Fluids    Intake/Output Summary (Last 24 hours) at 10/1/2019 1744  Last data filed at 10/1/2019 1400  Gross per 24 hour   Intake 940 ml   Output 200 ml   Net 740 ml       Laboratory  Recent Labs     09/29/19  0247   WBC 9.7   RBC 4.10*   HEMOGLOBIN 11.7*   HEMATOCRIT 37.8*   MCV 92.2   MCH 28.5   MCHC 31.0*   RDW 58.1*   PLATELETCT 241   MPV 10.7     Recent Labs     09/29/19  0247   SODIUM 137   POTASSIUM 4.3   CHLORIDE 107   CO2 20   GLUCOSE 128*   BUN 17   CREATININE 0.75   CALCIUM 8.3*                   Imaging  DX-CHEST-PORTABLE (1 VIEW)   Final Result         1. No pulmonary infiltrates or consolidations are noted.      2. Cardiomegaly.      CT-LSPINE W/O PLUS RECONS   Final Result      Postsurgical and degenerative change without evidence of lumbar spine fracture.      CT-TSPINE W/O PLUS RECONS   Final Result         No acute fracture or subluxation of the thoracic spine.      CT-CHEST,ABDOMEN,PELVIS WITH   Final Result      1.  No CT evidence of acute traumatic injury in the chest, abdomen or pelvis.   2.  Spine is detailed separately.   3.  Small right pleural effusion.   4.  Mild mediastinal lymphadenopathy, statistically reactive.   5.  Small hiatal hernia.   6.  Colonic diverticulosis.      CT-CSPINE WITHOUT PLUS RECONS   Final Result      Degenerative change without evidence of fracture.      DX-TIBIA AND FIBULA LEFT   Final Result      Minimally displaced fracture of the medial malleolus.                  INTERPRETING LOCATION:  [address] [place] [address], [id number]      CT-HEAD W/O   Final Result         1.  No acute intracranial abnormality is identified, there are nonspecific white matter changes, commonly associated with small vessel ischemic disease.   Associated mild cerebral atrophy is noted.   2.  Atherosclerosis.      US-EXTREMITY ARTERY LOWER BILAT W/ROBIN (COMBO)    (Results Pending)        Assessment/Plan  * Leg laceration  Assessment & Plan  Continue wound care    Arrhythmia  Assessment & Plan  Hx of SVT  No new, followed by cardiology outpatient, Premature contractions detected on EKG  He has been bradycardic, continue to hold metoprolol  No anticoagulation due to falls      Fall  Assessment & Plan  Resulting in a Left leg laceration      ([facility]) SVT (supraventricular tachycardia)- (present on admission)  Assessment & Plan  Hx of SVT- no events.   followed by cardiology outpatient      Displaced fracture of medial malleolus of left tibia, initial encounter for closed fracture  Assessment & Plan  orthopedic surgery recommending nonweightbearing With boot for protection and follow up at [facility] after discharge    Left leg cellulitis  Assessment & Plan  Worsening redness developing over left shin anteriorly, adjacent to skin tear and open wound  9/25 cx with enterococcus/Acinetobacter  Continue unasyn  Wound debrided by plastic surgery Dr. Kent today, will continue wound vac  Will check ROBIN's for blood flow    Dyslipidemia- (present on admission)  Assessment & Plan   statin    ([facility]) Seizure disorder- (present on admission)  Assessment & Plan  Controlled on keppra, will continue  PT OT rec SNF           VTE prophylaxis: none for debridement today

## 2019-10-02 NOTE — PROGRESS NOTES
Report received from day shift RN,POC and meds reviewed with pt. Pt verbalized understanding, dressing C/D/I, pt denies pain, SOB, or dizziness, irregular heart rhythm but is asymptomatic, will continue to monitor. Safety measures and hourly rounding in place.

## 2019-10-02 NOTE — CONSULTS
DATE OF SERVICE:  10/01/2019    REASON FOR CONSULTATION:  Left lower leg wound.    HISTORY OF PRESENT ILLNESS:  The patient is a 89-year-old gentleman who   presented with a fall on 09/20/2019 and sustained a significant wound to his   left lower lateral leg.  The patient was seen in the emergency room where he   underwent evaluation of this wound.  Patient was ultimately admitted and   placed on antibiotics and wound care.  He was also found to have an arrhythmia   while in the emergency room.  The patient is very active overall and does   walk on a regular basis.  He states that he does have aortic issues.  Plastic   surgery was consulted due to the worsening nature of this leg wound.  The   patient did have a wound VAC on this for a period of time, but this was   subsequently stopped and Dakin solution was being used.    PAST MEDICAL HISTORY:  Arthritis, prostate cancer, gallstones, glaucoma,   hyperlipidemia, pain, seizures, and cataracts.    PAST SURGICAL HISTORY:  He had orthopedic surgery, laparoscopic   cholecystectomy, appendectomy, eye surgery, lumbar fusion, lumbar   decompression, knee replacements and prostatectomy.    FAMILY HISTORY:  Significant for arthritis, cancer in his father, heart   disease in his mother.    SOCIAL HISTORY:  He quit smoking 55 years ago.  Drinks a small amount of   alcohol per week.  He does not use any drugs.  He is .    ALLERGIES:  TO PENICILLIN.    CURRENT MEDICATIONS:  Reviewed and in EPIC.  He is on Unasyn currently.    REVIEW OF SYSTEMS:  As mentioned in history of present illness, otherwise   negative per AMA and CMS guidelines.    PHYSICAL EXAMINATION:  VITAL SIGNS:  Temperature of 36.4, pulse 54, respirations of 18, blood   pressure 116/70.  He is 93% on room air.  GENERAL:  He is alert, oriented and appropriate.  NECK:  Negative.  HEAD AND NECK:  Exam reveals to be normocephalic and atraumatic.  RESPIRATORY:  He is unlabored.  CARDIAC:  He had a regular rate  and rhythm.  ABDOMEN:  Soft and nontender.  He does have significant dermatitis in his   inguinal region bilaterally.  His buttock and back exam are unremarkable.  EXTREMITIES:  Reveals a large degloving type of injury involving the mid and   lower third of the left lower leg that extends down to the periosteum along   the fibula near the lateral malleolus.  There was significant undermining   prior to debridement.  There is also a more superficial wound over the medial   malleolus.  His extremity is edematous and has pitting edema.  I am not able   to palpate dorsalis or posterior tibialis pulses.  He does have significant   ecchymoses that is present.  His range of motion is limited.  His right lower   extremity is unremarkable.  NEUROLOGIC:  He has no gross deficits.  PSYCHIATRIC:  He has normal mood and affect.    LABORATORY DATA:  Revealed a white blood cell count of 10.6, hematocrit 36.4,   platelets 175, and creatinine of 0.90.    DIAGNOSTIC IMAGING:  X-ray of his left lower leg shows a minimally displaced   fracture of the medial malleolus.    ASSESSMENT:  Complex left lower leg wound with associated medial malleolar   fracture.    RECOMMENDATIONS:  From a plastic surgery standpoint, the patient does have a   fairly extensive wound.  Given his age and questionable history of aortic   disease and possible arrhythmia, I want to try to avoid the general   anesthetic.  I did discuss doing a bedside debridement with him including the   risks, benefits, and alternatives.  The patient did give verbal permission to   proceed.  Please see separate procedure note.  I was able to do a fairly   extensive debridement at bedside and get rid of the grossly necrotic tissue.    Now that the necrotic tissue is removed, I do feel a vacuum-assisted closure   device would be beneficial.  The patient had been getting Dakin's which was   reasonable up until this point.  I do also feel the patient should have a   thorough vascular  workup, including ABIs to look at flow as it is difficult to   feel distal pulses given the edema.  Patient will likely have a wound VAC   and/or need wound care for many months and will likely possibly need a skin   graft in the future.  He does have a fracture that appears to be nonoperative   per orthopedics.  Given his age and the severity of the wound, it will take a   long time to fully heal this.  I did discuss this in depth with the patient   who appeared to be very well informed.  All of his questions were answered.  I   will continue to follow along peripherally with the wound care team and   evaluate the wound every few days.       ____________________________________     MD MARYJANE SPRAGUE / DON    DD:  10/01/2019 17:20:39  DT:  10/01/2019 22:00:35    D#:  [id number]  Job#:  [id number]

## 2019-10-02 NOTE — PROGRESS NOTES
Bedside report received from night RN. Assumed care of patient. Daily plan of care discussed. Pt resting comfortably in bed at this time, no signs of distress noted. Hourly rounding in place.

## 2019-10-02 NOTE — DISCHARGE PLANNING
Anticipated Discharge Disposition:   · SNF    Action:   · Per provider, Dr. Romero, pt is medically clear to transfer to SNF.   · Per chart review pt was accepted at [facility].   · LSW requested [facility] to follow up with [facility] to determine if facility is able to accept pt today.     Barriers to Discharge:   · Bed available     Plan:   · Care coordination will continue to follow up and provide assistance with discharge plans/barriers.
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
## 2019-10-03 PROCEDURE — A9270 NON-COVERED ITEM OR SERVICE: HCPCS | Performed by: INTERNAL MEDICINE

## 2019-10-03 PROCEDURE — 97535 SELF CARE MNGMENT TRAINING: CPT

## 2019-10-03 PROCEDURE — 700102 HCHG RX REV CODE 250 W/ 637 OVERRIDE(OP): Performed by: INTERNAL MEDICINE

## 2019-10-03 PROCEDURE — 0KBT0ZZ EXCISION OF LEFT LOWER LEG MUSCLE, OPEN APPROACH: ICD-10-PCS | Performed by: PLASTIC SURGERY

## 2019-10-03 PROCEDURE — 99232 SBSQ HOSP IP/OBS MODERATE 35: CPT | Performed by: INTERNAL MEDICINE

## 2019-10-03 PROCEDURE — 700111 HCHG RX REV CODE 636 W/ 250 OVERRIDE (IP): Performed by: HOSPITALIST

## 2019-10-03 PROCEDURE — 700111 HCHG RX REV CODE 636 W/ 250 OVERRIDE (IP): Performed by: INTERNAL MEDICINE

## 2019-10-03 PROCEDURE — 700105 HCHG RX REV CODE 258: Performed by: HOSPITALIST

## 2019-10-03 PROCEDURE — 770006 HCHG ROOM/CARE - MED/SURG/GYN SEMI*

## 2019-10-03 RX ORDER — DOXYCYCLINE 100 MG/1
100 CAPSULE ORAL 2 TIMES DAILY
Qty: 14 CAP | Refills: 0 | Status: SHIPPED | OUTPATIENT
Start: 2019-10-03 | End: 2019-10-09

## 2019-10-03 RX ORDER — AMOXICILLIN 250 MG
2 CAPSULE ORAL 2 TIMES DAILY
Qty: 30 TAB | Refills: 0
Start: 2019-10-03 | End: 2020-12-09

## 2019-10-03 RX ORDER — OXYCODONE HYDROCHLORIDE 5 MG/1
5 TABLET ORAL EVERY 4 HOURS PRN
Qty: 15 TAB | Refills: 0 | Status: SHIPPED | OUTPATIENT
Start: 2019-10-03 | End: 2019-10-09

## 2019-10-03 RX ORDER — AMOXICILLIN AND CLAVULANATE POTASSIUM 875; 125 MG/1; MG/1
1 TABLET, FILM COATED ORAL 2 TIMES DAILY
Qty: 14 TAB | Refills: 0 | Status: SHIPPED | OUTPATIENT
Start: 2019-10-03 | End: 2019-10-09

## 2019-10-03 RX ORDER — ACETAMINOPHEN 325 MG/1
650 TABLET ORAL EVERY 6 HOURS PRN
Qty: 30 TAB | Refills: 0 | Status: ON HOLD
Start: 2019-10-03 | End: 2022-09-06

## 2019-10-03 RX ADMIN — AMPICILLIN AND SULBACTAM 3 G: 1; 2 INJECTION, POWDER, FOR SOLUTION INTRAMUSCULAR; INTRAVENOUS at 12:27

## 2019-10-03 RX ADMIN — ATORVASTATIN CALCIUM 20 MG: 10 TABLET, FILM COATED ORAL at 18:34

## 2019-10-03 RX ADMIN — OXYCODONE HYDROCHLORIDE 5 MG: 5 TABLET ORAL at 06:03

## 2019-10-03 RX ADMIN — ENOXAPARIN SODIUM 40 MG: 100 INJECTION SUBCUTANEOUS at 05:14

## 2019-10-03 RX ADMIN — AMPICILLIN AND SULBACTAM 3 G: 1; 2 INJECTION, POWDER, FOR SOLUTION INTRAMUSCULAR; INTRAVENOUS at 05:13

## 2019-10-03 RX ADMIN — LEVETIRACETAM 500 MG: 500 TABLET, FILM COATED ORAL at 05:13

## 2019-10-03 RX ADMIN — LEVETIRACETAM 500 MG: 500 TABLET, FILM COATED ORAL at 18:34

## 2019-10-03 RX ADMIN — AMPICILLIN AND SULBACTAM 3 G: 1; 2 INJECTION, POWDER, FOR SOLUTION INTRAMUSCULAR; INTRAVENOUS at 18:34

## 2019-10-03 ASSESSMENT — ENCOUNTER SYMPTOMS
ABDOMINAL PAIN: 0
STRIDOR: 0
SHORTNESS OF BREATH: 0
HEARTBURN: 0
PALPITATIONS: 0
SPUTUM PRODUCTION: 0
FEVER: 0
COUGH: 0
DIZZINESS: 0
SORE THROAT: 0
CONSTIPATION: 0
TREMORS: 0
CHILLS: 0
DIARRHEA: 0
DIAPHORESIS: 0
MYALGIAS: 0

## 2019-10-03 NOTE — DISCHARGE PLANNING
Received Choice form at 1400  Agency/Facility Name: [facility]  Referral sent per Choice form at 1405

## 2019-10-03 NOTE — WOUND TEAM
"[facility]  Inpatient Services  Wound and Skin Care Progress Note    Admission Date:  9/20/2019   HPI, PMH, SH: Reviewed  Unit where seen by Wound Team: 2209/02    WOUND TEAM FOLLOW UP: NPWT Application    SUBJECTIVE: \"You've got to do what you've got to do.\"     Self Report / Pain Level: premedicated by primary RN      OBJECTIVE: drsg with strike-through  WOUND TYPE, LOCATION, CHARACTERISTICS (Pressure ulcers: location, stage, POA or date identified)     Negative Pressure Wound Therapy Leg Left;Lower (Active)   NPWT Pump Mode / Pressure Setting Continuous;125 mmHg    Dressing Type Medium;Black foam    Number of Foam Pieces Used 2    Canister Changed Yes      Wound 09/20/19 Full Thickness Wound Other (Comment);Leg full thickness laceration lateral left leg (Active)       10/2/2019 10:35 AM   Site Assessment Red;Yellow;Brown    Celine-wound Assessment Fragile;Hyperpigmented    Margins Defined edges    Wound Length (cm) 19.3 cm medial 4 10/2/2019 10:35 AM   Wound Width (cm) 8 cm  medial 5 10/2/2019 10:35 AM   Wound Depth (cm) 1 cm 10/2/2019 10:35 AM   Wound Surface Area (cm^2) 154.4 cm^2 10/2/2019 10:35 AM   Tunneling 0 cm 10/2/2019 10:35 AM   Undermining 0 cm 10/2/2019 10:35 AM   Closure Secondary intention    Drainage Amount Moderate    Drainage Description Serosanguineous    Non-staged Wound Description Full thickness    Treatments Cleansed    Cleansing Normal Saline Irrigation    Periwound Protectant Benzoin;Drape    Dressing Options Wound Vac    Dressing Cleansing/Solutions Not Applicable    Dressing Changed Changed    Dressing Status Intact    Dressing Change Frequency Monday, Wednesday, Friday    NEXT Dressing Change  10/04/19    NEXT Weekly Photo (Inpatient Only) 10/09/19    Odor None     Pulses 1+;DP     Exposed Structures Bone;Muscle     Tissue Type and Percentage red,pink, yellow, brown         Vascular:  ROBIN:   10/2/19  \"RIGHT     Waveform            Systolic BPs (mmHg)                       " "       111           Brachial   Triphasic                                Common Femoral   Triphasic                  165           Posterior Tibial   Triphasic                  159           Dorsalis Pedis                                            Peroneal                              1.42          ROBIN                                            TBI                          LEFT   Waveform        Systolic BPs (mmHg)                              116           Brachial   Triphasic                                Common Femoral   Triphasic                  175           Posterior Tibial   Triphasic                  160           Dorsalis Pedis                                            Peroneal                              1.51          ROBIN                                            TBI       Findings   Doppler waveform of the common femoral and popliteal arteries are high    amplitude and triphasic.    Doppler waveforms at the ankle are brisk and triphasic.    Ankle-brachial index is elevated above 1.3 suggesting tibial artery    calcification.   There is no evidence of major arterial disease demonstrated bilaterally.   Toe-brachial index (TBI): right: 0.82    left:  0.80   TBI's are normal.     Additional testing was not performed in accordance with lower extremity    arterial evaluation protocol. \"    Lab Values:    WBC:       Lab Results   Component Value Date/Time    WBC 9.7 09/29/2019 02:47 AM    WBC 7.2 05/19/2010 11:24 AM    RBC 4.10 (L) 09/29/2019 02:47 AM    RBC 4.65 05/19/2010 11:24 AM      AIC:      Lab Results   Component Value Date/Time    HBA1C 5.7 06/13/2018 12:30 PM          Culture:   Already Completed     INTERVENTIONS BY WOUND TEAM: removed drsg, cleaned wounds with NS, dried. Benzoin and drape to tammi-wound. Black foam x 1 piece into lateral wound, one piece of foam to cover medial wound and bridge to lateral wound. Sealed and TRAC pad placed. NPWT started @ 125 mmHg continuous. 2 pieces of foam " used  Leg full thickness laceration lateral left leg-Dressing Options: Wound Vac    Interdisciplinary consultation:   With Nursing;With Patient    EVALUATION: pt's wounds improved from I&D by Dr Kent. Lateral wound to bone @ distal edge. Should improve with NPWT. May consider Veraflo therapy setting next change.     Factors affecting wound healing: age, cellulitis  Goals:  Slow steady decrease in wound area and depth weekly    NURSING PLAN OF CARE:    Dressing changes: Continue previous Dressing Maintenance orders:        See new Dressing Maintenance orders:    x   Skin care: See Skin Care orders:        Rectal tube care: See Rectal Tube Care orders:      Other orders:           WOUND TEAM PLAN OF CARE (X):   NPWT change 3 x week: x       Dressing changes:       Follow up as needed:       Other:    Anticipated discharge plans (X):  SNF:           Home Care:           Outpatient Wound Center:            Self Care:            Other: will need VAC drsg changes

## 2019-10-03 NOTE — THERAPY
Attempted PT txt, however, pt is currently refusing reporting he would just like to lay in bed until he is d/c to SNF. Will hold therapy today as he is anticipated to d/c to SNF and requesting to rest today.

## 2019-10-03 NOTE — THERAPY
"Occupational Therapy-  OT treatment attempted at 0915, pt currently refusing, stating, \"I just want to sit here and finish my breakfast and then relax a bit.\"  Pt then proceeded have OT rearrange all his belongings in his room while asking OT to explain the purpose of therapy.  Pt educated on role of therapy for increasing strength and activity tolerance while LLE is healing.  Pt offered again to work with OT and still refused.  Education only.  Attempted a second time to see pt later in am, he is planning for d/c to SNF later today so OT will hold at this time.    "

## 2019-10-03 NOTE — DISCHARGE PLANNING
Anticipated Discharge Disposition:   · SNF: [facility] and [facility]     Action:   · Pt medically clear per Dr. Romero to discharge today, discharge order placed and discharge summary.   · [facility] reported [facility] has bed available and can accept pt today at 1400--transport scheduled.    · COBRA and transfer packed completed. Provided to BSN.   · LSW met with pt at bedside to inform. Pt agreeable with plan and signed COBRA and initialed 2nd IMM.   · Outreach call to spouse per pt's request to notify of transfer. Wife voiced agreement and understanding of plan.     Barriers to Discharge:   · No identified barriers to discharge at this time.     Plan:   · Pt to transfer to [facility] today 10/03/2019 at 1400.

## 2019-10-03 NOTE — CARE PLAN
Problem: Communication  Goal: The ability to communicate needs accurately and effectively will improve  Outcome: PROGRESSING AS EXPECTED  Note:   Pt is a/ox4 and can express needs, concerns and pain/comfort level to staff when prompted.  Pt demonstrated proper use of call bell and agrees to call for assistance when needed.     Problem: Safety  Goal: Will remain free from injury  Outcome: PROGRESSING AS EXPECTED  Note:   Frequent rounding performed to ensure safety.  During rounds skin, IV site and wound/wound vac assessed for breakdown or complications.  Call bell and personal items with in reach.  Room is well lit and RN straighten room to reduce clutter.  Walker available for ambulation.  Goal: Will remain free from falls  Outcome: PROGRESSING AS EXPECTED  Note:   Fall precautions in place     Problem: Infection  Goal: Will remain free from infection  Outcome: PROGRESSING AS EXPECTED  Note:   Pt is afebrile and labs wnl.  Wound site not exhibiting s/s of infection but assessment is guarded r/t wound vac placement.     Problem: Knowledge Deficit  Goal: Knowledge of disease process/condition, treatment plan, diagnostic tests, and medications will improve  Outcome: PROGRESSING AS EXPECTED  Note:   Educated pt on purpose and design of wound vac and how it allows for better wound healing and results.  Pt's had no questions and RN available during shift for any other education needs.     Problem: Pain Management  Goal: Pain level will decrease to patient's comfort goal  Outcome: PROGRESSING AS EXPECTED  Note:   Pain managed with prn medications.  Medications given prior to wound treatments.     Problem: Respiratory:  Goal: Respiratory status will improve  Outcome: PROGRESSING AS EXPECTED  Note:   Pt on RA and no s/s of respiratory distress noted upon exam.  BBS clear and equal.     Problem: Psychosocial Needs:  Goal: Level of anxiety will decrease  Outcome: PROGRESSING AS EXPECTED  Note:   Pt calm and no signs of  anxiety noted upon exam.  Advised pt to try TV or other distraction and to alert RN if pt begins to feel anxious or restless.     Problem: Skin Integrity  Goal: Risk for impaired skin integrity will decrease  Outcome: PROGRESSING SLOWER THAN EXPECTED  Note:   Wound vac in place for treatment of wound     Problem: Urinary Elimination:  Goal: Ability to reestablish a normal urinary elimination pattern will improve  Outcome: PROGRESSING SLOWER THAN EXPECTED  Note:   Based of past hx, no change in patterns per pt.

## 2019-10-03 NOTE — DISCHARGE PLANNING
Anticipated Discharge Disposition:   · SNF    Action:   · LSW received notice from [facility] that [facility] did not run insurance prior to accepting and currently reporting they are not able to accept pt because facility is not in-network with his insurance. Transportation cancelled.   · LSW met with pt at bedside to discuss and determine alternative options. Pt is agreeable to SNF referral to all local facilities to determine what facilities maybe in-network and to decide from accepting facilities.   · New CHOICE form completed for all facilities and faxed to [facility] ext [phone].  · Outreach call to pt's wife to provide update, there was no answer. Left  requesting call back at [phone].    Barriers to Discharge:   · Obtaining accepting SNF    Plan:   · Care coordination will continue to follow up and provide assistance with discharge plans/barriers.

## 2019-10-03 NOTE — DISCHARGE SUMMARY
Discharge Summary    CHIEF COMPLAINT ON ADMISSION  Chief Complaint   Patient presents with   • GLF   • Leg Laceration     active bleed, LLE       Reason for Admission  Fall with leg injury    Admission Date  9/20/2019    CODE STATUS  DNAR/DNI    HPI & HOSPITAL COURSE   89 y.o. male who presented 9/20/2019 with fall when getting out of the car. He hit his head and left leg and was noted to have a large laceration as well as a medial malleolus fracture. He was seen by plastic surgery and orthopedic surgery and provided with wound care, a wound vac and empiric antibiotics. The patient developed cellulitis and some skin necrosis that was debrided by plastic surgery. His cellulitis improved on iv unasyn.    Therefore, he is discharged in fair and stable condition to skilled nursing facility.    The patient met 2-midnight criteria for an inpatient stay at the time of discharge.    Discharge Date  10/3/2019    FOLLOW UP ITEMS POST DISCHARGE  Primary care provider upon discharge  Plastic surgery Dr. Kent as needed for wound debridement  Follow up orthopedic surgery Dr. Tracy in 2-4 weeks for follow up xrays    DISCHARGE DIAGNOSES  Principal Problem:    Leg laceration POA: Yes  Active Problems:    (HCC) SVT (supraventricular tachycardia) POA: Yes    Dyslipidemia POA: Yes    Left leg cellulitis POA: No    Displaced fracture of medial malleolus of left tibia, initial encounter for closed fracture POA: Yes    (HCC) Seizure disorder POA: Yes  Resolved Problems:    Fall POA: Yes      FOLLOW UP  Future Appointments   Date Time Provider Department Center   10/15/2019 10:20 AM SUZANNE Galvez   4/6/2020  9:20 AM SUZANNE Galvez M.D.  [address]  [address]  [facility] [address]  [phone]    Schedule an appointment as soon as possible for a visit in 2 days  For wound re-check    Wound Care Center  [address]  [facility]  [address]  [phone]  Schedule an appointment as soon as possible for a visit in 3 days  Call first thing Monday morning to schedule appointment with [facility] wound care    [address] [address]  [phone]          MEDICATIONS ON DISCHARGE     Medication List      START taking these medications      Instructions   acetaminophen 325 MG Tabs  Commonly known as:  TYLENOL   Take 2 Tabs by mouth every 6 hours as needed (Mild Pain; (Pain scale 1-3); Temp greater than 100.5 F).  Dose:  650 mg     amoxicillin-clavulanate 875-125 MG Tabs  Commonly known as:  AUGMENTIN   Take 1 Tab by mouth 2 times a day for 7 days.  Dose:  1 Tab     doxycycline 100 MG capsule  Commonly known as:  MONODOX   Take 1 Cap by mouth 2 times a day for 7 days.  Dose:  100 mg     oxyCODONE immediate-release 5 MG Tabs  Commonly known as:  ROXICODONE   Take 1 Tab by mouth every four hours as needed for up to 3 days.  Dose:  5 mg     senna-docusate 8.6-50 MG Tabs  Commonly known as:  PERICOLACE or SENOKOT S   Take 2 Tabs by mouth 2 Times a Day.  Dose:  2 Tab        CONTINUE taking these medications      Instructions   atorvastatin 20 MG Tabs  Commonly known as:  LIPITOR   TAKE 1 TABLET BY MOUTH ONCE DAILY     B COMPLEX B-12 PO   Take  by mouth.     CALCIUM CITRATE PO   Take 500 mg by mouth every day.  Dose:  500 mg     fish oil 1000 MG Caps capsule   Take 1,000 mg by mouth 3 times a day, with meals.  Dose:  1,000 mg     levETIRAcetam 500 MG Tabs  Commonly known as:  KEPPRA   TAKE 1 TABLET BY MOUTH TWICE DAILY     LUMIGAN 0.01 % Soln  Generic drug:  bimatoprost   Place 1 Drop in right eye every bedtime.  Dose:  1 Drop     MAGNESIUM CITRATE PO   Take 250 mg by mouth every day.  Dose:  250 mg     multivitamin Tabs   Take 1 Tab by mouth every day.  Dose:  1 Tab     niacin 500 MG tablet  Commonly known as:  SLO-NIACIN   Take 1 Tab by mouth every day.  Dose:  500 mg     pneumococcal 13-Harper Conj Vacc  syringe  Commonly known as:  PREVNAR 13   0.5 mL by Intramuscular route Once PRN for up to 1 dose.  Dose:  0.5 mL     VITAMIN C ER PO   Take  by mouth. OTC VIT C     Vitamin D3 2000 UNIT Caps   Take  by mouth.        STOP taking these medications    cephALEXin 500 MG Caps  Commonly known as:  KEFLEX     testosterone 50 MG/5GM (1%) Gel gel  Commonly known as:  TESTIM,ANDROGEL     tetanus-dipth-acell pertussis 5-2-15.5 LF-MCG/0.5 Susp  Commonly known as:  ADACEL            Allergies  Allergies   Allergen Reactions   • Pcn [Penicillins]        DIET  Orders Placed This Encounter   Procedures   • Diet Order Regular     Standing Status:   Standing     Number of Occurrences:   1     Order Specific Question:   Diet:     Answer:   Regular [1]       ACTIVITY  As tolerated and directed by skilled nursing.  Toe touch LEFT leg    CONSULTATIONS  Orthopedic surgery Dr. Tracy    PROCEDURES  Wound debridement left leg with Dr. Kent 10/1    LABORATORY  Lab Results   Component Value Date    SODIUM 137 09/29/2019    POTASSIUM 4.3 09/29/2019    CHLORIDE 107 09/29/2019    CO2 20 09/29/2019    GLUCOSE 128 (H) 09/29/2019    BUN 17 09/29/2019    CREATININE 0.75 09/29/2019    CREATININE 0.89 04/23/2010    GLOMRATE >59 04/23/2010        Lab Results   Component Value Date    WBC 9.7 09/29/2019    WBC 7.2 05/19/2010    HEMOGLOBIN 11.7 (L) 09/29/2019    HEMATOCRIT 37.8 (L) 09/29/2019    PLATELETCT 241 09/29/2019        Total time of the discharge process exceeds 52 minutes.

## 2019-10-03 NOTE — DISCHARGE PLANNING
Received Transport Form at 0945  Spoke to Audra at [facility]    Transport is scheduled for 10/3 at 1400  going to [facility].     JEREL Flood notified.

## 2019-10-03 NOTE — DISCHARGE PLANNING
Agency/Facility Name: [facility]  Spoke To: Audra  Outcome: Pending transport confirmation time.     JEREL Flood notified.

## 2019-10-03 NOTE — DISCHARGE PLANNING
Agency/Facility Name: [facility]  Spoke To: Audra  Outcome: Facility submitted insurance, facesheet listed as medicare, but patient primary insurance is a BCBS policy. Facility is not contracted, transport is cancelled.     JEREL Flood notified.

## 2019-10-04 PROCEDURE — 700105 HCHG RX REV CODE 258: Performed by: HOSPITALIST

## 2019-10-04 PROCEDURE — 306372 DRESSING,VAC SIMPLACE MED: Performed by: PLASTIC SURGERY

## 2019-10-04 PROCEDURE — 700102 HCHG RX REV CODE 250 W/ 637 OVERRIDE(OP): Performed by: INTERNAL MEDICINE

## 2019-10-04 PROCEDURE — A9270 NON-COVERED ITEM OR SERVICE: HCPCS | Performed by: INTERNAL MEDICINE

## 2019-10-04 PROCEDURE — 700111 HCHG RX REV CODE 636 W/ 250 OVERRIDE (IP): Performed by: INTERNAL MEDICINE

## 2019-10-04 PROCEDURE — 99232 SBSQ HOSP IP/OBS MODERATE 35: CPT | Performed by: INTERNAL MEDICINE

## 2019-10-04 PROCEDURE — 700111 HCHG RX REV CODE 636 W/ 250 OVERRIDE (IP): Performed by: HOSPITALIST

## 2019-10-04 PROCEDURE — 97530 THERAPEUTIC ACTIVITIES: CPT

## 2019-10-04 PROCEDURE — 770006 HCHG ROOM/CARE - MED/SURG/GYN SEMI*

## 2019-10-04 PROCEDURE — 97110 THERAPEUTIC EXERCISES: CPT

## 2019-10-04 PROCEDURE — 97606 NEG PRS WND THER DME>50 SQCM: CPT

## 2019-10-04 RX ORDER — MICONAZOLE NITRATE 20 MG/G
CREAM TOPICAL 2 TIMES DAILY
Status: DISCONTINUED | OUTPATIENT
Start: 2019-10-04 | End: 2019-10-09 | Stop reason: HOSPADM

## 2019-10-04 RX ADMIN — AMPICILLIN AND SULBACTAM 3 G: 1; 2 INJECTION, POWDER, FOR SOLUTION INTRAMUSCULAR; INTRAVENOUS at 23:23

## 2019-10-04 RX ADMIN — MICONAZOLE NITRATE: 20 CREAM TOPICAL at 18:54

## 2019-10-04 RX ADMIN — AMPICILLIN AND SULBACTAM 3 G: 1; 2 INJECTION, POWDER, FOR SOLUTION INTRAMUSCULAR; INTRAVENOUS at 05:24

## 2019-10-04 RX ADMIN — AMPICILLIN AND SULBACTAM 3 G: 1; 2 INJECTION, POWDER, FOR SOLUTION INTRAMUSCULAR; INTRAVENOUS at 00:12

## 2019-10-04 RX ADMIN — AMPICILLIN AND SULBACTAM 3 G: 1; 2 INJECTION, POWDER, FOR SOLUTION INTRAMUSCULAR; INTRAVENOUS at 18:53

## 2019-10-04 RX ADMIN — ATORVASTATIN CALCIUM 20 MG: 10 TABLET, FILM COATED ORAL at 18:54

## 2019-10-04 RX ADMIN — ENOXAPARIN SODIUM 40 MG: 100 INJECTION SUBCUTANEOUS at 05:25

## 2019-10-04 RX ADMIN — LEVETIRACETAM 500 MG: 500 TABLET, FILM COATED ORAL at 18:54

## 2019-10-04 RX ADMIN — SENNOSIDES AND DOCUSATE SODIUM 2 TABLET: 8.6; 5 TABLET ORAL at 18:54

## 2019-10-04 RX ADMIN — AMPICILLIN AND SULBACTAM 3 G: 1; 2 INJECTION, POWDER, FOR SOLUTION INTRAMUSCULAR; INTRAVENOUS at 11:50

## 2019-10-04 RX ADMIN — LEVETIRACETAM 500 MG: 500 TABLET, FILM COATED ORAL at 05:35

## 2019-10-04 RX ADMIN — MICONAZOLE NITRATE: 20 CREAM TOPICAL at 11:51

## 2019-10-04 ASSESSMENT — GAIT ASSESSMENTS
GAIT LEVEL OF ASSIST: SUPERVISED
DISTANCE (FEET): 5
ASSISTIVE DEVICE: FRONT WHEEL WALKER

## 2019-10-04 ASSESSMENT — COGNITIVE AND FUNCTIONAL STATUS - GENERAL
CLIMB 3 TO 5 STEPS WITH RAILING: A LOT
WALKING IN HOSPITAL ROOM: A LITTLE
SUGGESTED CMS G CODE MODIFIER MOBILITY: CJ
STANDING UP FROM CHAIR USING ARMS: A LITTLE
MOBILITY SCORE: 20

## 2019-10-04 ASSESSMENT — ENCOUNTER SYMPTOMS
HEADACHES: 0
COUGH: 0
ORTHOPNEA: 0
CONSTIPATION: 0
DIAPHORESIS: 0
DIZZINESS: 0
MYALGIAS: 0
ABDOMINAL PAIN: 0
TREMORS: 0
STRIDOR: 0
FEVER: 0
SHORTNESS OF BREATH: 0
HEARTBURN: 0
NAUSEA: 0

## 2019-10-04 NOTE — PROGRESS NOTES
Hospital Medicine Daily Progress Note    Date of Service  10/4/2019    Chief Complaint  89 y.o. male admitted 9/20/2019 with left leg wound after trauma    Hospital Course    89 y.o. male who presented 9/20/2019 with fall when getting out of the car. He hit his head and left leg and was noted to have a large laceration as well as a medial malleolus fracture. He was seen by plastic surgery and orthopedic surgery and provided with wound care, a wound vac and empiric antibiotics.      Interval Problem Update  The patient is eating well and afebrile  He is sleeping well    Consultants/Specialty  Plastic surgery Dr. Kent  Ortho Dr. Tracy    Code Status  DNR    Disposition  SNF    Review of Systems  Review of Systems   Constitutional: Negative for diaphoresis, fever and malaise/fatigue.   HENT: Negative for congestion.    Respiratory: Negative for cough, shortness of breath and stridor.    Cardiovascular: Negative for chest pain, orthopnea and leg swelling.   Gastrointestinal: Negative for abdominal pain, constipation, heartburn and nausea.   Genitourinary: Negative for dysuria and hematuria.   Musculoskeletal: Negative for myalgias.   Skin: Negative for rash.   Neurological: Negative for dizziness, tremors and headaches.        Physical Exam  Temp:  [36.2 °C (97.2 °F)-36.3 °C (97.3 °F)] 36.2 °C (97.2 °F)  Pulse:  [51-57] 52  Resp:  [18] 18  BP: (128-143)/(61-70) 143/61  SpO2:  [94 %-98 %] 98 %    Physical Exam   Constitutional: He is oriented to person, place, and time.   HENT:   Mouth/Throat: No oropharyngeal exudate.   Eyes: Pupils are equal, round, and reactive to light. EOM are normal. No scleral icterus.   Neck: No tracheal deviation present.   Cardiovascular: Regular rhythm and intact distal pulses.   No murmur heard.  Pulmonary/Chest: Breath sounds normal. No stridor. No respiratory distress. He has no wheezes. He has no rales.   Abdominal: Soft. Bowel sounds are normal. He exhibits no distension. There is  no tenderness.   Musculoskeletal: He exhibits no edema.   Neurological: He is alert and oriented to person, place, and time.   Skin: No rash noted. He is not diaphoretic. No erythema.   Psychiatric: His behavior is normal.   Nursing note and vitals reviewed.      Fluids    Intake/Output Summary (Last 24 hours) at 10/4/2019 1556  Last data filed at 10/4/2019 1100  Gross per 24 hour   Intake 1200 ml   Output 800 ml   Net 400 ml       Laboratory                        Imaging  US-ROBIN SINGLE LEVEL BILAT   Final Result      DX-CHEST-PORTABLE (1 VIEW)   Final Result         1. No pulmonary infiltrates or consolidations are noted.      2. Cardiomegaly.      CT-LSPINE W/O PLUS RECONS   Final Result      Postsurgical and degenerative change without evidence of lumbar spine fracture.      CT-TSPINE W/O PLUS RECONS   Final Result         No acute fracture or subluxation of the thoracic spine.      CT-CHEST,ABDOMEN,PELVIS WITH   Final Result      1.  No CT evidence of acute traumatic injury in the chest, abdomen or pelvis.   2.  Spine is detailed separately.   3.  Small right pleural effusion.   4.  Mild mediastinal lymphadenopathy, statistically reactive.   5.  Small hiatal hernia.   6.  Colonic diverticulosis.      CT-CSPINE WITHOUT PLUS RECONS   Final Result      Degenerative change without evidence of fracture.      DX-TIBIA AND FIBULA LEFT   Final Result      Minimally displaced fracture of the medial malleolus.                  INTERPRETING LOCATION:  [facility], [address]      CT-HEAD W/O   Final Result         1.  No acute intracranial abnormality is identified, there are nonspecific white matter changes, commonly associated with small vessel ischemic disease.  Associated mild cerebral atrophy is noted.   2.  Atherosclerosis.           Assessment/Plan  * Leg laceration- (present on admission)  Assessment & Plan  Continue wound care    Arrhythmia  Assessment & Plan  Hx of SVT  No new, followed by cardiology  outpatient, Premature contractions detected on EKG  He has been bradycardic, continue to hold metoprolol  No anticoagulation due to falls      (HCC) SVT (supraventricular tachycardia)- (present on admission)  Assessment & Plan  Historically, no events this admission  Follow up with cardiology after discharge      Displaced fracture of medial malleolus of left tibia, initial encounter for closed fracture- (present on admission)  Assessment & Plan  orthopedic surgery recommending nonweightbearing With boot for protection and follow up at [facility] for imaging after discharge      Left leg cellulitis  Assessment & Plan  9/25 cx with enterococcus/Acinetobacter  Continue unasyn through 10/6  Wound debrided by plastic surgery Dr. Kent 10/1, will continue wound vac  ROBIN's showing good arterial flow    Dyslipidemia- (present on admission)  Assessment & Plan   statin    ([facility]) Seizure disorder- (present on admission)  Assessment & Plan  Controlled on keppra         VTE prophylaxis: lovenox

## 2019-10-04 NOTE — CARE PLAN
Problem: Safety  Goal: Will remain free from injury  Outcome: PROGRESSING AS EXPECTED   Patient call light appropriate.  He is encouraged to call before getting out of bed.    Problem: Knowledge Deficit  Goal: Knowledge of the prescribed therapeutic regimen will improve  Outcome: PROGRESSING AS EXPECTED   Plan of care reviewed and questions answered.

## 2019-10-04 NOTE — PROGRESS NOTES
Hospital Medicine Daily Progress Note    Date of Service  10/3/2019    Chief Complaint  89 y.o. male admitted 9/20/2019 with left leg wound after trauma    Hospital Course    89 y.o. male who presented 9/20/2019 with fall when getting out of the car. He hit his head and left leg and was noted to have a large laceration as well as a medial malleolus fracture. He was seen by plastic surgery and orthopedic surgery and provided with wound care, a wound vac and empiric antibiotics.      Interval Problem Update  The patient's transfer to skilled nursing facility is cancelled due to insurance coverage  He is afebrile and wound vac is in place    Consultants/Specialty  Plastic surgery Dr. Kent  Ortho Dr. Tracy    Code Status  DNR    Disposition  SNF    Review of Systems  Review of Systems   Constitutional: Negative for chills, diaphoresis, fever and malaise/fatigue.   HENT: Negative for sore throat.    Respiratory: Negative for cough, sputum production, shortness of breath and stridor.    Cardiovascular: Negative for chest pain, palpitations and leg swelling.   Gastrointestinal: Negative for abdominal pain, constipation, diarrhea and heartburn.   Genitourinary: Negative for dysuria, hematuria and urgency.   Musculoskeletal: Negative for myalgias.   Skin: Negative for itching and rash.   Neurological: Negative for dizziness and tremors.        Physical Exam  Temp:  [36.7 °C (98.1 °F)-37.1 °C (98.7 °F)] 36.7 °C (98.1 °F)  Pulse:  [42-48] 48  Resp:  [18] 18  BP: (107-128)/(52-84) 107/63  SpO2:  [94 %-95 %] 94 %    Physical Exam   Constitutional: He is oriented to person, place, and time. No distress.   HENT:   Mouth/Throat: No oropharyngeal exudate.   Eyes: Pupils are equal, round, and reactive to light. EOM are normal. No scleral icterus.   Neck: Neck supple. No tracheal deviation present.   Cardiovascular: Normal rate, regular rhythm and intact distal pulses.   No murmur heard.  Pulmonary/Chest: Effort normal and  breath sounds normal. He has no wheezes. He has no rales.   Abdominal: Soft. He exhibits no distension. There is no tenderness.   Musculoskeletal: He exhibits no edema.   Neurological: He is alert and oriented to person, place, and time.   Skin: Skin is warm and dry. No rash noted. He is not diaphoretic.   Psychiatric: His behavior is normal.   Nursing note and vitals reviewed.      Fluids    Intake/Output Summary (Last 24 hours) at 10/3/2019 1727  Last data filed at 10/3/2019 1500  Gross per 24 hour   Intake 1980 ml   Output 320 ml   Net 1660 ml       Laboratory                        Imaging  US-ROBIN SINGLE LEVEL BILAT   Final Result      DX-CHEST-PORTABLE (1 VIEW)   Final Result         1. No pulmonary infiltrates or consolidations are noted.      2. Cardiomegaly.      CT-LSPINE W/O PLUS RECONS   Final Result      Postsurgical and degenerative change without evidence of lumbar spine fracture.      CT-TSPINE W/O PLUS RECONS   Final Result         No acute fracture or subluxation of the thoracic spine.      CT-CHEST,ABDOMEN,PELVIS WITH   Final Result      1.  No CT evidence of acute traumatic injury in the chest, abdomen or pelvis.   2.  Spine is detailed separately.   3.  Small right pleural effusion.   4.  Mild mediastinal lymphadenopathy, statistically reactive.   5.  Small hiatal hernia.   6.  Colonic diverticulosis.      CT-CSPINE WITHOUT PLUS RECONS   Final Result      Degenerative change without evidence of fracture.      DX-TIBIA AND FIBULA LEFT   Final Result      Minimally displaced fracture of the medial malleolus.                  INTERPRETING LOCATION:  [address] [place] [address], [id number]      CT-HEAD W/O   Final Result         1.  No acute intracranial abnormality is identified, there are nonspecific white matter changes, commonly associated with small vessel ischemic disease.  Associated mild cerebral atrophy is noted.   2.  Atherosclerosis.           Assessment/Plan  * Leg laceration- (present on  admission)  Assessment & Plan  Continue wound care    Arrhythmia  Assessment & Plan  Hx of SVT  No new, followed by cardiology outpatient, Premature contractions detected on EKG  He has been bradycardic, continue to hold metoprolol  No anticoagulation due to falls      (HCC) SVT (supraventricular tachycardia)- (present on admission)  Assessment & Plan  Historically, no events this admission  followed by cardiology outpatient      Displaced fracture of medial malleolus of left tibia, initial encounter for closed fracture- (present on admission)  Assessment & Plan  orthopedic surgery recommending nonweightbearing With boot for protection and follow up at [facility] after discharge      Left leg cellulitis  Assessment & Plan  9/25 cx with enterococcus/Acinetobacter  Continue unasyn  Wound debrided by plastic surgery Dr. Kent 10/1, will continue wound vac  ROBIN's showing good arterial flow    Dyslipidemia- (present on admission)  Assessment & Plan   statin    (HCC) Seizure disorder- (present on admission)  Assessment & Plan  Controlled on keppra         VTE prophylaxis: lovenox

## 2019-10-04 NOTE — WOUND TEAM
"[facility]  Inpatient Services  Wound and Skin Care Progress Note    Admission Date:  9/20/2019   HPI, PMH, SH: Reviewed  Unit where seen by Wound Team: 2209/02    WOUND TEAM FOLLOW UP:  Change VAC    SUBJECTIVE:  States it hasn't been hurting, but will \"let you know\"     Self Report / Pain Level:  Reports \"sore\" area posteriorlly       OBJECTIVE:  Up to chair, legs elevated    WOUND TYPE, LOCATION, CHARACTERISTICS (Pressure ulcers: location, stage, POA or date identified)        Negative Pressure Wound Therapy Leg Left;Lower (Active)   NPWT Pump Mode / Pressure Setting 125 mmHg;Continuous    Dressing Type Medium;Gray foam    Number of Foam Pieces Used 5    Canister Changed Yes    VAC VeraFlo Irrigant Normal Saline    VAC VeraFlo Soak Time (mins) 10    VAC VeraFlo Instill Volume (ml) 40    VAC VeraFlo - Therapy Time (hrs) 2    VAC VeraFlo Pressure (mm/Hg) 125 mmHg      Wound 09/20/19 Full Thickness Wound Other (Comment);Leg full thickness laceration lateral left leg (Active)   Wound Image    10/2/2019   Site Assessment Yellow;Red    Celine-wound Assessment Fragile    Margins Attached edges    Wound Length (cm) 19.3 cm Measured 10/2/2019   Wound Width (cm) 8 cm    Wound Depth (cm) 1 cm    Wound Surface Area (cm^2) 154.4 cm^2    Drainage Amount Moderate    Drainage Description Serosanguineous    Non-staged Wound Description Full thickness    Treatments Cleansed    Cleansing Normal Saline Irrigation    Periwound Protectant Benzoin;Drape, paste ring    Dressing Options Wound Vac    Dressing Cleansing/Solutions Normal Saline    Dressing Changed Changed    Dressing Change Frequency Monday, Wednesday, Friday    NEXT Dressing Change  10/07/19    NEXT Weekly Photo (Inpatient Only) 10/02/19    WOUND NURSE ONLY - Odor None    WOUND NURSE ONLY - Tissue Type and Percentage 50 red 50 yellow/black        Wound 10/02/19 Full Thickness Wound Ankle L medial ankle area (Active)     10/2/19   Site Assessment Yellow;Red  "   Celine-wound Assessment Fragile    Margins Attached edges    Wound Length (cm) 2.5 cm    Wound Width (cm) 2.5 cm    Wound Surface Area (cm^2) 6.25 cm^2    Drainage Amount Small    Drainage Description Serosanguineous    Non-staged Wound Description Full thickness    Cleansing Normal Saline Irrigation    Periwound Protectant Benzoin;Drape    Dressing Options Wound Vac    Dressing Cleansing/Solutions Normal Saline    Dressing Changed Changed    Dressing Change Frequency Monday, Wednesday, Friday    NEXT Dressing Change  10/07/19    NEXT Weekly Photo (Inpatient Only) 10/09/19    WOUND NURSE ONLY - Odor None    WOUND NURSE ONLY - Tissue Type and Percentage yellow 80 pink 20         Lab Values:    WBC:     @LABLAST(WBC)@  AIC:      Lab Results   Component Value Date/Time    HBA1C 5.7 06/13/2018 12:30 PM         Culture:   Completed 9/25/19    INTERVENTIONS BY WOUND TEAM:  Called [employer] rep, regarding using Cleanse Choice Veraflo VAC dressing. She provided information and consultation. Removed previous VAC dressing. Cleaned wound with NS. Applied benzoin to periowund skin. Applied flat paste ring around each wound bed. Applied VAC drape between both wound for bridge. Covered paste ring and periwound skin with drape. Applied Cleanse Choice waffle dressing to each wound bed, then covered each with solid grey foam tracked between both wound. Obtain VAC seal, then started Veraflo soak with NS at 40 ml instillation, 10 minute soak, every 2 hours, with VAC at 125 mmHg continuous.    Interdisciplinary consultation:  RN, patient, [employer]     EVALUATION:  Wound bed should benefit with the cleanse Veraflo due to remaining yellow/black. Using forceps scraped the yellow tissue on the ankle wound to loosen adherent slough. Saline soak may not extend complete via track, but it will benefit from the VAC suction.      Factors affecting wound healing:  Injury from fall, age  Goals:  Steady decrease in wound area and depth weekly      NURSING PLAN OF CARE ORDERS (X):    Dressing changes: See Dressing Care orders:  X   Skin care: See Skin Care orders:   Rectal tube care: See Rectal Tube Care orders:   Other orders:      WOUND TEAM PLAN OF CARE (X):   NPWT change 3 x week:  X      Dressing changes by wound team:       Follow up as needed:       Other (explain):    Anticipated discharge plans (X):  SNF:   X        Home Care:           Outpatient Wound Center:            Self Care:            Other:

## 2019-10-04 NOTE — CARE PLAN
Wound vac changed today josh well, up in chair for meals NWB to LLE, tammi care given after each episode of urine incontinence and barrier and anti fungal cream applied to red areas of groin and buttocks.

## 2019-10-04 NOTE — PROGRESS NOTES
Pt is A&Ox4. Vss. No pain at this time. Dressing on LLE is clean, dry, and intact. Wound vac in place. Pt denies any sensation to LLE. But is able to slightly move toes.  Pt has no other issues at this time. Fall precautions in place.

## 2019-10-04 NOTE — THERAPY
"Physical Therapy Treatment Note    Bed Mobility:  Supine to Sit: Supervised with HOB elevated, however pt has a mechanical bed at home  Transfers: Sit to Stand: Supervised  Gait: Level Of Assist: Supervised with Front-Wheel Walker   x5 feet along bed and forward/backward; gait distance limited by lines, but pt able to maintain NWB   Plan of Care: Will benefit from Physical Therapy 4 times per week  Discharge Recommendations: Recommend post-acute placement for continued physical therapy services prior to discharge home. Patient can tolerate post-acute therapies at a 5x/week frequency.       pt agreeable to PT; states,\"I'm more capable than people think I am\". He was able to perform all bed mobility with supervision; HOB elevated, however he has a mechanical bed at home. He continues to be impulsive and has self proclaimed \"impatience\", so required cues to slow pace. However, in standing and during gait he was able to easily maintain LLE NWB w/o cues and was able to verbalize his NWB status voluntarily.  He demonstrated the ability to offset his weight to his arms and hop along the side of bed and forward/backward. Gait distance was limited by multiple lines and no addtional tech support, so safety with lines was the barrier. It's likely the patient could demonstrate the ability to hop further given his progress this visit.     Ani Peterson, PT    See \"Rehab Therapy-Acute\" Patient Summary Report for complete documentation.       "

## 2019-10-04 NOTE — DISCHARGE PLANNING
Agency/Facility Name: [facility]  Spoke To: Correspondence  Outcome: Patient accepted pending insurance auth.     Agency/Facility Name: [facility]  Spoke To: Correspondence  Outcome: Patient accepted pending insurance auth    Agency/Facility Name: [facility]  Spoke To: Ana  Outcome: Patient declined due to noncontracted insurance auth.     Agency/Facility Name: [facility]  Spoke To: Correspondence  Outcome: Patient declined due to non contracted insurance provider.     Agency/Facility Name: [facility]  Spoke To: Correspondence  Outcome: Patient declined due to non contracted insurance provider     Agency/Facility Name: [facility]  Spoke To: Correspondence  Outcome: Patient declined due to noncontracted insurance provider.    Agency/Facility Name: [facility]  Spoke To: Correspondence  Outcome: Patient declined due to noncontracted insurance provider.     Agency/Facility Name: [facility]  Spoke To: Correspondence  Outcome: Patient declined due to care exceeds capacity

## 2019-10-05 PROCEDURE — 700111 HCHG RX REV CODE 636 W/ 250 OVERRIDE (IP): Performed by: INTERNAL MEDICINE

## 2019-10-05 PROCEDURE — 770006 HCHG ROOM/CARE - MED/SURG/GYN SEMI*

## 2019-10-05 PROCEDURE — A9270 NON-COVERED ITEM OR SERVICE: HCPCS | Performed by: INTERNAL MEDICINE

## 2019-10-05 PROCEDURE — 700111 HCHG RX REV CODE 636 W/ 250 OVERRIDE (IP): Performed by: HOSPITALIST

## 2019-10-05 PROCEDURE — 99232 SBSQ HOSP IP/OBS MODERATE 35: CPT | Performed by: INTERNAL MEDICINE

## 2019-10-05 PROCEDURE — 700105 HCHG RX REV CODE 258: Performed by: HOSPITALIST

## 2019-10-05 PROCEDURE — 700102 HCHG RX REV CODE 250 W/ 637 OVERRIDE(OP): Performed by: INTERNAL MEDICINE

## 2019-10-05 RX ADMIN — AMPICILLIN AND SULBACTAM 3 G: 1; 2 INJECTION, POWDER, FOR SOLUTION INTRAMUSCULAR; INTRAVENOUS at 11:32

## 2019-10-05 RX ADMIN — ATORVASTATIN CALCIUM 20 MG: 10 TABLET, FILM COATED ORAL at 18:03

## 2019-10-05 RX ADMIN — LEVETIRACETAM 500 MG: 500 TABLET, FILM COATED ORAL at 05:49

## 2019-10-05 RX ADMIN — AMPICILLIN AND SULBACTAM 3 G: 1; 2 INJECTION, POWDER, FOR SOLUTION INTRAMUSCULAR; INTRAVENOUS at 05:49

## 2019-10-05 RX ADMIN — ENOXAPARIN SODIUM 40 MG: 100 INJECTION SUBCUTANEOUS at 05:51

## 2019-10-05 RX ADMIN — SENNOSIDES AND DOCUSATE SODIUM 2 TABLET: 8.6; 5 TABLET ORAL at 05:49

## 2019-10-05 RX ADMIN — AMPICILLIN AND SULBACTAM 3 G: 1; 2 INJECTION, POWDER, FOR SOLUTION INTRAMUSCULAR; INTRAVENOUS at 23:49

## 2019-10-05 RX ADMIN — AMPICILLIN AND SULBACTAM 3 G: 1; 2 INJECTION, POWDER, FOR SOLUTION INTRAMUSCULAR; INTRAVENOUS at 18:03

## 2019-10-05 RX ADMIN — MICONAZOLE NITRATE: 20 CREAM TOPICAL at 20:09

## 2019-10-05 RX ADMIN — LEVETIRACETAM 500 MG: 500 TABLET, FILM COATED ORAL at 18:03

## 2019-10-05 ASSESSMENT — ENCOUNTER SYMPTOMS
BACK PAIN: 0
HEADACHES: 0
ORTHOPNEA: 0
FEVER: 0
NAUSEA: 0
SORE THROAT: 0
MYALGIAS: 0
CHILLS: 0
SHORTNESS OF BREATH: 0
SPUTUM PRODUCTION: 0
TREMORS: 0
DIZZINESS: 0
COUGH: 0
STRIDOR: 0
CONSTIPATION: 0
HEARTBURN: 0
DIARRHEA: 0

## 2019-10-05 NOTE — PROGRESS NOTES
Hospital Medicine Daily Progress Note    Date of Service  10/5/2019    Chief Complaint  89 y.o. male admitted 9/20/2019 with left leg wound after trauma    Hospital Course    89 y.o. male who presented 9/20/2019 with fall when getting out of the car. He hit his head and left leg and was noted to have a large laceration as well as a medial malleolus fracture. He was seen by plastic surgery and orthopedic surgery and provided with wound care, a wound vac and empiric antibiotics.      Interval Problem Update  Wife is at the bedside and anxious for snf transfer for the patient    Consultants/Specialty  Plastic surgery Dr. Kent  Ortho Dr. Tracy    Code Status  DNR    Disposition  SNF    Review of Systems  Review of Systems   Constitutional: Negative for chills, fever and malaise/fatigue.   HENT: Negative for sore throat.    Respiratory: Negative for cough, sputum production, shortness of breath and stridor.    Cardiovascular: Negative for orthopnea and leg swelling.   Gastrointestinal: Negative for constipation, diarrhea, heartburn and nausea.   Genitourinary: Negative for dysuria, frequency and hematuria.   Musculoskeletal: Negative for back pain and myalgias.   Skin: Negative for itching and rash.   Neurological: Negative for dizziness, tremors and headaches.        Physical Exam  Temp:  [36.3 °C (97.3 °F)] 36.3 °C (97.3 °F)  Pulse:  [39-60] 39  Resp:  [18] 18  BP: (102-134)/(47-72) 132/53  SpO2:  [94 %-96 %] 94 %    Physical Exam   Constitutional: He is oriented to person, place, and time. No distress.   HENT:   Mouth/Throat: No oropharyngeal exudate.   Eyes: Pupils are equal, round, and reactive to light. EOM are normal. No scleral icterus.   Neck: No tracheal deviation present.   Cardiovascular: Normal rate, regular rhythm and intact distal pulses.   No murmur heard.  Pulmonary/Chest: Effort normal and breath sounds normal. No stridor. No respiratory distress.   Abdominal: Soft. He exhibits no distension.  There is no tenderness.   Musculoskeletal: He exhibits no edema.   Neurological: He is alert and oriented to person, place, and time.   Skin: Skin is warm. No rash noted. He is not diaphoretic.   Psychiatric: His behavior is normal.   Nursing note and vitals reviewed.      Fluids    Intake/Output Summary (Last 24 hours) at 10/5/2019 1537  Last data filed at 10/5/2019 1140  Gross per 24 hour   Intake 600 ml   Output 500 ml   Net 100 ml       Laboratory                        Imaging  US-ROBIN SINGLE LEVEL BILAT   Final Result      DX-CHEST-PORTABLE (1 VIEW)   Final Result         1. No pulmonary infiltrates or consolidations are noted.      2. Cardiomegaly.      CT-LSPINE W/O PLUS RECONS   Final Result      Postsurgical and degenerative change without evidence of lumbar spine fracture.      CT-TSPINE W/O PLUS RECONS   Final Result         No acute fracture or subluxation of the thoracic spine.      CT-CHEST,ABDOMEN,PELVIS WITH   Final Result      1.  No CT evidence of acute traumatic injury in the chest, abdomen or pelvis.   2.  Spine is detailed separately.   3.  Small right pleural effusion.   4.  Mild mediastinal lymphadenopathy, statistically reactive.   5.  Small hiatal hernia.   6.  Colonic diverticulosis.      CT-CSPINE WITHOUT PLUS RECONS   Final Result      Degenerative change without evidence of fracture.      DX-TIBIA AND FIBULA LEFT   Final Result      Minimally displaced fracture of the medial malleolus.                  INTERPRETING LOCATION:  [facility], [address]      CT-HEAD W/O   Final Result         1.  No acute intracranial abnormality is identified, there are nonspecific white matter changes, commonly associated with small vessel ischemic disease.  Associated mild cerebral atrophy is noted.   2.  Atherosclerosis.           Assessment/Plan  * Leg laceration- (present on admission)  Assessment & Plan  Continue wound care    Arrhythmia  Assessment & Plan  Hx of SVT  No new, followed by  cardiology outpatient, Premature contractions detected on EKG  He has been bradycardic, continue to hold metoprolol  No anticoagulation due to falls      ([facility]) SVT (supraventricular tachycardia)- (present on admission)  Assessment & Plan  Historically, no events this admission  Follow up with cardiology       Displaced fracture of medial malleolus of left tibia, initial encounter for closed fracture- (present on admission)  Assessment & Plan  Dr. Tracy recommends nonweightbearing With boot for protection and follow up at [facility] for imaging after discharge      Left leg cellulitis  Assessment & Plan  9/25 cx with enterococcus/Acinetobacter  Continue unasyn through 10/6  Wound debrided by plastic surgery Dr. Kent 10/1, will continue wound vac and skin care  ROBIN's showing good arterial flow    Dyslipidemia- (present on admission)  Assessment & Plan   statin    ([facility]) Seizure disorder- (present on admission)  Assessment & Plan  Controlled on keppra         VTE prophylaxis: lovenox

## 2019-10-05 NOTE — PROGRESS NOTES
Pt is A&Ox4. Vss. Dressing to LLE is clean, dry, and intact. Wound vac is draining serosanguineous fluid. Pt is requesting to rest. Fall precautions in place.

## 2019-10-05 NOTE — CARE PLAN
Problem: Safety  Goal: Will remain free from falls  Outcome: PROGRESSING AS EXPECTED  Intervention: Implement fall precautions  Flowsheets (Taken 10/5/2019 0800)  Environmental Precautions: Treaded Slipper Socks on Patient;Personal Belongings, Wastebasket, Call Bell etc. in Easy Reach;Report Given to Other Health Care Providers Regarding Fall Risk;Bed in Low Position;Communication Sign for Patients & Families;Mobility Assessed & Appropriate Sign Placed  Note:   Environmental fall precautions and hourly rounding in place. Pt instructed to call for assistance before attempting to mobilize. Pt verbalized understanding, calls appropriately for assistance.        Problem: Pain Management  Goal: Pain level will decrease to patient's comfort goal  Outcome: PROGRESSING AS EXPECTED  Intervention: Follow pain managment plan developed in collaboration with patient and Interdisciplinary Team  Note:   Pt currently denies pain. Prn Tylenol, oxycodone, and morphine available. Pt encouraged to ask for prn pain medication if needed. Pt verbalized understanding.

## 2019-10-05 NOTE — PROGRESS NOTES
Bedside report received from night RN. Assumed care of patient. Daily plan of care discussed. Pt awake and alert, pt anxious regarding discharge plan. Pt denies other needs at this time. Hourly rounding in place.

## 2019-10-05 NOTE — CARE PLAN
Problem: Safety  Goal: Will remain free from falls  Outcome: PROGRESSING AS EXPECTED     Problem: Knowledge Deficit  Goal: Knowledge of disease process/condition, treatment plan, diagnostic tests, and medications will improve  Outcome: PROGRESSING AS EXPECTED

## 2019-10-06 LAB
ANION GAP SERPL CALC-SCNC: 15 MMOL/L (ref 0–11.9)
BASOPHILS # BLD AUTO: 0.4 % (ref 0–1.8)
BASOPHILS # BLD: 0.04 K/UL (ref 0–0.12)
BUN SERPL-MCNC: 17 MG/DL (ref 8–22)
CALCIUM SERPL-MCNC: 8.4 MG/DL (ref 8.4–10.2)
CHLORIDE SERPL-SCNC: 104 MMOL/L (ref 96–112)
CO2 SERPL-SCNC: 21 MMOL/L (ref 20–33)
CREAT SERPL-MCNC: 0.99 MG/DL (ref 0.5–1.4)
EOSINOPHIL # BLD AUTO: 0.23 K/UL (ref 0–0.51)
EOSINOPHIL NFR BLD: 2.6 % (ref 0–6.9)
ERYTHROCYTE [DISTWIDTH] IN BLOOD BY AUTOMATED COUNT: 57.1 FL (ref 35.9–50)
GLUCOSE SERPL-MCNC: 107 MG/DL (ref 65–99)
HCT VFR BLD AUTO: 39.2 % (ref 42–52)
HGB BLD-MCNC: 12.1 G/DL (ref 14–18)
IMM GRANULOCYTES # BLD AUTO: 0.05 K/UL (ref 0–0.11)
IMM GRANULOCYTES NFR BLD AUTO: 0.6 % (ref 0–0.9)
LYMPHOCYTES # BLD AUTO: 1.67 K/UL (ref 1–4.8)
LYMPHOCYTES NFR BLD: 18.8 % (ref 22–41)
MCH RBC QN AUTO: 27.9 PG (ref 27–33)
MCHC RBC AUTO-ENTMCNC: 30.9 G/DL (ref 33.7–35.3)
MCV RBC AUTO: 90.3 FL (ref 81.4–97.8)
MONOCYTES # BLD AUTO: 0.71 K/UL (ref 0–0.85)
MONOCYTES NFR BLD AUTO: 8 % (ref 0–13.4)
NEUTROPHILS # BLD AUTO: 6.19 K/UL (ref 1.82–7.42)
NEUTROPHILS NFR BLD: 69.6 % (ref 44–72)
NRBC # BLD AUTO: 0 K/UL
NRBC BLD-RTO: 0 /100 WBC
PLATELET # BLD AUTO: 321 K/UL (ref 164–446)
PMV BLD AUTO: 10 FL (ref 9–12.9)
POTASSIUM SERPL-SCNC: 4.4 MMOL/L (ref 3.6–5.5)
RBC # BLD AUTO: 4.34 M/UL (ref 4.7–6.1)
SODIUM SERPL-SCNC: 140 MMOL/L (ref 135–145)
WBC # BLD AUTO: 8.9 K/UL (ref 4.8–10.8)

## 2019-10-06 PROCEDURE — 700102 HCHG RX REV CODE 250 W/ 637 OVERRIDE(OP): Performed by: INTERNAL MEDICINE

## 2019-10-06 PROCEDURE — 85025 COMPLETE CBC W/AUTO DIFF WBC: CPT

## 2019-10-06 PROCEDURE — 700111 HCHG RX REV CODE 636 W/ 250 OVERRIDE (IP): Performed by: INTERNAL MEDICINE

## 2019-10-06 PROCEDURE — 36415 COLL VENOUS BLD VENIPUNCTURE: CPT

## 2019-10-06 PROCEDURE — 99232 SBSQ HOSP IP/OBS MODERATE 35: CPT | Performed by: INTERNAL MEDICINE

## 2019-10-06 PROCEDURE — 770006 HCHG ROOM/CARE - MED/SURG/GYN SEMI*

## 2019-10-06 PROCEDURE — 80048 BASIC METABOLIC PNL TOTAL CA: CPT

## 2019-10-06 PROCEDURE — A9270 NON-COVERED ITEM OR SERVICE: HCPCS | Performed by: INTERNAL MEDICINE

## 2019-10-06 RX ADMIN — ATORVASTATIN CALCIUM 20 MG: 10 TABLET, FILM COATED ORAL at 17:16

## 2019-10-06 RX ADMIN — OXYCODONE HYDROCHLORIDE 5 MG: 5 TABLET ORAL at 22:10

## 2019-10-06 RX ADMIN — MICONAZOLE NITRATE: 20 CREAM TOPICAL at 17:17

## 2019-10-06 RX ADMIN — ENOXAPARIN SODIUM 40 MG: 100 INJECTION SUBCUTANEOUS at 05:36

## 2019-10-06 RX ADMIN — LEVETIRACETAM 500 MG: 500 TABLET, FILM COATED ORAL at 17:16

## 2019-10-06 RX ADMIN — LEVETIRACETAM 500 MG: 500 TABLET, FILM COATED ORAL at 05:36

## 2019-10-06 RX ADMIN — SENNOSIDES AND DOCUSATE SODIUM 2 TABLET: 8.6; 5 TABLET ORAL at 05:37

## 2019-10-06 ASSESSMENT — ENCOUNTER SYMPTOMS
WHEEZING: 0
SORE THROAT: 0
MYALGIAS: 0
CONSTIPATION: 0
DIARRHEA: 0
WEAKNESS: 1
ORTHOPNEA: 0
COUGH: 0
FEVER: 0
SHORTNESS OF BREATH: 0
TREMORS: 0
HEARTBURN: 0
CHILLS: 0
ABDOMINAL PAIN: 0
BACK PAIN: 0
DIZZINESS: 0

## 2019-10-06 ASSESSMENT — PAIN SCALES - WONG BAKER: WONGBAKER_NUMERICALRESPONSE: DOESN'T HURT AT ALL

## 2019-10-06 NOTE — CARE PLAN
Problem: Skin Integrity  Goal: Risk for impaired skin integrity will decrease  Outcome: PROGRESSING AS EXPECTED   Patient encouraged to call if rash worsens or if drainage from TOM changes color or increases.

## 2019-10-06 NOTE — PROGRESS NOTES
Hospital Medicine Daily Progress Note    Date of Service  10/6/2019    Chief Complaint  89 y.o. male admitted 9/20/2019 with left leg wound after trauma    Hospital Course    89 y.o. male who presented 9/20/2019 with fall when getting out of the car. He hit his head and left leg and was noted to have a large laceration as well as a medial malleolus fracture. He was seen by plastic surgery and orthopedic surgery and provided with wound care, a wound vac and empiric antibiotics.      Interval Problem Update  The patient is up to the bedside commode today with moderate assistance    Consultants/Specialty  Plastic surgery Dr. Kent  Ortho Dr. Tracy    Code Status  DNR    Disposition  SNF    Review of Systems  Review of Systems   Constitutional: Positive for malaise/fatigue. Negative for chills and fever.   HENT: Negative for congestion and sore throat.    Respiratory: Negative for cough, shortness of breath and wheezing.    Cardiovascular: Negative for orthopnea and leg swelling.   Gastrointestinal: Negative for abdominal pain, constipation, diarrhea and heartburn.   Genitourinary: Negative for dysuria and frequency.   Musculoskeletal: Negative for back pain and myalgias.   Skin: Negative for itching and rash.   Neurological: Positive for weakness. Negative for dizziness and tremors.        Physical Exam  Temp:  [36.3 °C (97.3 °F)-36.6 °C (97.8 °F)] 36.6 °C (97.8 °F)  Pulse:  [] 61  Resp:  [18] 18  BP: ()/(61-79) 99/61  SpO2:  [94 %-96 %] 96 %    Physical Exam   Constitutional: He is oriented to person, place, and time. No distress.   HENT:   Mouth/Throat: Oropharynx is clear and moist. No oropharyngeal exudate.   Eyes: Pupils are equal, round, and reactive to light. No scleral icterus.   Neck: Neck supple.   Cardiovascular: Normal rate, regular rhythm and intact distal pulses.   No murmur heard.  Pulmonary/Chest: Effort normal and breath sounds normal. No stridor. No respiratory distress.   Abdominal:  Soft. Bowel sounds are normal. He exhibits no distension. There is no tenderness.   Musculoskeletal: He exhibits no edema.   Neurological: He is alert and oriented to person, place, and time.   Skin: Skin is warm and dry. No rash noted. He is not diaphoretic. There is erythema.   Some excoriations over the buttocks   Psychiatric: His behavior is normal.   Nursing note and vitals reviewed.      Fluids    Intake/Output Summary (Last 24 hours) at 10/6/2019 1148  Last data filed at 10/6/2019 1100  Gross per 24 hour   Intake 220 ml   Output 400 ml   Net -180 ml       Laboratory  Recent Labs     10/06/19  0340   WBC 8.9   RBC 4.34*   HEMOGLOBIN 12.1*   HEMATOCRIT 39.2*   MCV 90.3   MCH 27.9   MCHC 30.9*   RDW 57.1*   PLATELETCT 321   MPV 10.0     Recent Labs     10/06/19  0340   SODIUM 140   POTASSIUM 4.4   CHLORIDE 104   CO2 21   GLUCOSE 107*   BUN 17   CREATININE 0.99   CALCIUM 8.4                   Imaging  US-ROBIN SINGLE LEVEL BILAT   Final Result      DX-CHEST-PORTABLE (1 VIEW)   Final Result         1. No pulmonary infiltrates or consolidations are noted.      2. Cardiomegaly.      CT-LSPINE W/O PLUS RECONS   Final Result      Postsurgical and degenerative change without evidence of lumbar spine fracture.      CT-TSPINE W/O PLUS RECONS   Final Result         No acute fracture or subluxation of the thoracic spine.      CT-CHEST,ABDOMEN,PELVIS WITH   Final Result      1.  No CT evidence of acute traumatic injury in the chest, abdomen or pelvis.   2.  Spine is detailed separately.   3.  Small right pleural effusion.   4.  Mild mediastinal lymphadenopathy, statistically reactive.   5.  Small hiatal hernia.   6.  Colonic diverticulosis.      CT-CSPINE WITHOUT PLUS RECONS   Final Result      Degenerative change without evidence of fracture.      DX-TIBIA AND FIBULA LEFT   Final Result      Minimally displaced fracture of the medial malleolus.                  INTERPRETING LOCATION:  [address] [place] [address], [address]       CT-HEAD W/O   Final Result         1.  No acute intracranial abnormality is identified, there are nonspecific white matter changes, commonly associated with small vessel ischemic disease.  Associated mild cerebral atrophy is noted.   2.  Atherosclerosis.           Assessment/Plan  * Leg laceration- (present on admission)  Assessment & Plan  wound care    Arrhythmia  Assessment & Plan  Hx of SVT  No new, followed by cardiology outpatient, Premature contractions detected on EKG  He has been bradycardic, continue to hold metoprolol  No anticoagulation due to falls      ([facility]) SVT (supraventricular tachycardia)- (present on admission)  Assessment & Plan  Historically, no events this admission  Follow up with cardiology       Displaced fracture of medial malleolus of left tibia, initial encounter for closed fracture- (present on admission)  Assessment & Plan  Dr. Tracy recommends nonweightbearing With boot for protection and follow up at [facility] for imaging after discharge  Patient transferring to bedside comode with moderate assistance      Left leg cellulitis  Assessment & Plan  9/25 cx with enterococcus/Acinetobacter  Continue unasyn through 10/6, today  Wound debrided by plastic surgery Dr. Kent 10/1, vac in place  ROBIN's showing good arterial flow    Dyslipidemia- (present on admission)  Assessment & Plan   statin    (HCC) Seizure disorder- (present on admission)  Assessment & Plan  Controlled on keppra         VTE prophylaxis: lovenox

## 2019-10-06 NOTE — PROGRESS NOTES
Received report from Day RN and assumed care of patient.  He denies pain, nausea, and diarrhea.  Wound vac on and dressing intact.  Red rash noted around torso and on buttocks.  Cream applied.  Rash throughout body has worsened since two nights ago.  According to previous nurse, physician is aware.  Plan of care reviewed and questions answered.  Hourly rounding in place.  Report given to Rogelio RN for further care.

## 2019-10-07 PROCEDURE — 770006 HCHG ROOM/CARE - MED/SURG/GYN SEMI*

## 2019-10-07 PROCEDURE — 97110 THERAPEUTIC EXERCISES: CPT

## 2019-10-07 PROCEDURE — 700102 HCHG RX REV CODE 250 W/ 637 OVERRIDE(OP): Performed by: INTERNAL MEDICINE

## 2019-10-07 PROCEDURE — 99232 SBSQ HOSP IP/OBS MODERATE 35: CPT | Performed by: INTERNAL MEDICINE

## 2019-10-07 PROCEDURE — A9270 NON-COVERED ITEM OR SERVICE: HCPCS | Performed by: INTERNAL MEDICINE

## 2019-10-07 PROCEDURE — 97530 THERAPEUTIC ACTIVITIES: CPT

## 2019-10-07 PROCEDURE — 97606 NEG PRS WND THER DME>50 SQCM: CPT

## 2019-10-07 PROCEDURE — 97535 SELF CARE MNGMENT TRAINING: CPT

## 2019-10-07 PROCEDURE — 700111 HCHG RX REV CODE 636 W/ 250 OVERRIDE (IP): Performed by: INTERNAL MEDICINE

## 2019-10-07 RX ADMIN — ATORVASTATIN CALCIUM 20 MG: 10 TABLET, FILM COATED ORAL at 17:50

## 2019-10-07 RX ADMIN — ENOXAPARIN SODIUM 40 MG: 100 INJECTION SUBCUTANEOUS at 06:00

## 2019-10-07 RX ADMIN — LEVETIRACETAM 500 MG: 500 TABLET, FILM COATED ORAL at 17:50

## 2019-10-07 RX ADMIN — SENNOSIDES AND DOCUSATE SODIUM 2 TABLET: 8.6; 5 TABLET ORAL at 17:50

## 2019-10-07 RX ADMIN — MICONAZOLE NITRATE: 20 CREAM TOPICAL at 06:00

## 2019-10-07 RX ADMIN — LEVETIRACETAM 500 MG: 500 TABLET, FILM COATED ORAL at 06:00

## 2019-10-07 RX ADMIN — OXYCODONE HYDROCHLORIDE 5 MG: 5 TABLET ORAL at 06:32

## 2019-10-07 RX ADMIN — MICONAZOLE NITRATE: 20 CREAM TOPICAL at 17:51

## 2019-10-07 ASSESSMENT — ENCOUNTER SYMPTOMS
SORE THROAT: 0
ORTHOPNEA: 0
COUGH: 0
HEADACHES: 0
BACK PAIN: 0
HEARTBURN: 0
WEAKNESS: 1
DIAPHORESIS: 0
DIZZINESS: 0
CONSTIPATION: 0
SHORTNESS OF BREATH: 0
FEVER: 0
MYALGIAS: 0
ABDOMINAL PAIN: 0
NAUSEA: 0

## 2019-10-07 ASSESSMENT — GAIT ASSESSMENTS
ASSISTIVE DEVICE: FRONT WHEEL WALKER
GAIT LEVEL OF ASSIST: STAND BY ASSIST
DISTANCE (FEET): 10
DEVIATION: STEP TO

## 2019-10-07 NOTE — DISCHARGE PLANNING
LSW spoke with [facility] admissions team member Fred regarding insurance auth. LSW requested they check to make sure SNF is in pts network as pts insurance ([employer]) can take 2-4 days. Fred stated he would be contacting insurance today to get an update.

## 2019-10-07 NOTE — PROGRESS NOTES
Hospital Medicine Daily Progress Note    Date of Service  10/7/2019    Chief Complaint  89 y.o. male admitted 9/20/2019 with left leg wound after trauma    Hospital Course    89 y.o. male who presented 9/20/2019 with fall when getting out of the car. He hit his head and left leg and was noted to have a large laceration as well as a medial malleolus fracture. He was seen by plastic surgery and orthopedic surgery and provided with wound care, a wound vac and empiric antibiotics.      Interval Problem Update  The patient is afebrile, blood pressure is mildly elevated    Consultants/Specialty  Plastic surgery Dr. Kent  Ortho Dr. Tracy    Code Status  DNR    Disposition  SNF    Review of Systems  Review of Systems   Constitutional: Positive for malaise/fatigue. Negative for diaphoresis and fever.   HENT: Negative for congestion and sore throat.    Respiratory: Negative for cough and shortness of breath.    Cardiovascular: Negative for chest pain and orthopnea.   Gastrointestinal: Negative for abdominal pain, constipation, heartburn and nausea.   Genitourinary: Negative for dysuria, frequency and urgency.   Musculoskeletal: Negative for back pain and myalgias.   Skin: Negative for itching.   Neurological: Positive for weakness. Negative for dizziness and headaches.        Physical Exam  Temp:  [36.2 °C (97.2 °F)-36.4 °C (97.5 °F)] 36.4 °C (97.5 °F)  Pulse:  [51-67] 67  Resp:  [18] 18  BP: (109-148)/(62-79) 148/79  SpO2:  [94 %-96 %] 94 %    Physical Exam   Constitutional: He is oriented to person, place, and time. No distress.   HENT:   Head: Normocephalic and atraumatic.   Mouth/Throat: Oropharynx is clear and moist.   Eyes: Pupils are equal, round, and reactive to light. Conjunctivae are normal.   Neck: Neck supple.   Cardiovascular: Normal rate, regular rhythm and intact distal pulses.   No murmur heard.  Pulmonary/Chest: Effort normal. No stridor. No respiratory distress. He has no wheezes.   Abdominal: Soft.  He exhibits no distension. There is no tenderness.   Musculoskeletal: He exhibits no edema.   Neurological: He is alert and oriented to person, place, and time.   Skin: Skin is warm. No rash noted. He is not diaphoretic. There is erythema.   Some excoriations over the buttocks   Psychiatric: His behavior is normal.   Nursing note and vitals reviewed.      Fluids    Intake/Output Summary (Last 24 hours) at 10/7/2019 1504  Last data filed at 10/7/2019 0700  Gross per 24 hour   Intake 700 ml   Output 750 ml   Net -50 ml       Laboratory  Recent Labs     10/06/19  0340   WBC 8.9   RBC 4.34*   HEMOGLOBIN 12.1*   HEMATOCRIT 39.2*   MCV 90.3   MCH 27.9   MCHC 30.9*   RDW 57.1*   PLATELETCT 321   MPV 10.0     Recent Labs     10/06/19  0340   SODIUM 140   POTASSIUM 4.4   CHLORIDE 104   CO2 21   GLUCOSE 107*   BUN 17   CREATININE 0.99   CALCIUM 8.4                   Imaging  US-ROBIN SINGLE LEVEL BILAT   Final Result      DX-CHEST-PORTABLE (1 VIEW)   Final Result         1. No pulmonary infiltrates or consolidations are noted.      2. Cardiomegaly.      CT-LSPINE W/O PLUS RECONS   Final Result      Postsurgical and degenerative change without evidence of lumbar spine fracture.      CT-TSPINE W/O PLUS RECONS   Final Result         No acute fracture or subluxation of the thoracic spine.      CT-CHEST,ABDOMEN,PELVIS WITH   Final Result      1.  No CT evidence of acute traumatic injury in the chest, abdomen or pelvis.   2.  Spine is detailed separately.   3.  Small right pleural effusion.   4.  Mild mediastinal lymphadenopathy, statistically reactive.   5.  Small hiatal hernia.   6.  Colonic diverticulosis.      CT-CSPINE WITHOUT PLUS RECONS   Final Result      Degenerative change without evidence of fracture.      DX-TIBIA AND FIBULA LEFT   Final Result      Minimally displaced fracture of the medial malleolus.                  INTERPRETING LOCATION:  [facility], [address]      CT-HEAD W/O   Final Result         1.  No  acute intracranial abnormality is identified, there are nonspecific white matter changes, commonly associated with small vessel ischemic disease.  Associated mild cerebral atrophy is noted.   2.  Atherosclerosis.           Assessment/Plan  * Leg laceration- (present on admission)  Assessment & Plan  wound care    Arrhythmia  Assessment & Plan  Hx of SVT  No new, followed by cardiology outpatient, Premature contractions detected on EKG  He has been bradycardic, continue to hold metoprolol  No anticoagulation due to falls      (HCC) SVT (supraventricular tachycardia)- (present on admission)  Assessment & Plan  Historically, no current issues  Follow up with cardiology as needed      Displaced fracture of medial malleolus of left tibia, initial encounter for closed fracture- (present on admission)  Assessment & Plan  Dr. Tracy recommends nonweightbearing With boot for protection and follow up at [facility] for imaging after discharge  Patient transferring with assistance      Left leg cellulitis  Assessment & Plan  9/25 cx with enterococcus/Acinetobacter  unasyn completed for a full course of therapy  Wound debrided by plastic surgery Dr. Kent 10/1, vac in place  ROBIN's showing good arterial flow    Dyslipidemia- (present on admission)  Assessment & Plan   statin    (HCC) Seizure disorder- (present on admission)  Assessment & Plan  Controlled on keppra         VTE prophylaxis: lovenox

## 2019-10-07 NOTE — PROGRESS NOTES
Received bedside shift report regarding patient and assumed care. Patient is awake, calm and stable, currently positioned in bed for comfort and safety; call light within reach. Pt was pre-medicated with pain meds prior to wound care. Will continue to monitor.

## 2019-10-07 NOTE — THERAPY
"Physical Therapy Treatment completed.   Bed Mobility:  Supine to Sit: Supervised  Transfers: Sit to Stand: Supervised  Gait: Level Of Assist: Stand by Assist with Front-Wheel Walker       Plan of Care: Will benefit from Physical Therapy 4 times per week  Discharge Recommendations: Equipment: Front-Wheel Walker. Post-acute therapy Discharge to a transitional care facility for continued skilled therapy services.    Pt tolerated 10 minutes of ambulation and standing exercises prior to seated rest break needed.  No more than SUPV during functional mobility, assistance with equipment management, and use of FWW. Pt demonstrates limited left ankle ROM and left knee range but improves with AROM and exercsies. Pt reports that he is fatigued following session. Pt was given seated, standing, and supine ther ex. Pt instructed to do supine and seated 3 x per day. Reviewed need for pt to use call light when get out of bed. Pt is agreeable.  Further skilled therapy indicated to improve endurance and safety with FWW while maintaining weight bearing restrictions and managing medical equipment (wound vac).     See \"Rehab Therapy-Acute\" Patient Summary Report for complete documentation.       "

## 2019-10-07 NOTE — PROGRESS NOTES
Assumed care. Pt incontinent of urine. Cleaned up and assisted back to bed. Pt is forgetful ,asks same question or forgets he asked question. Wound vac dry and intact. Denies pain at this time.

## 2019-10-07 NOTE — DISCHARGE PLANNING
Anticipated Discharge Disposition: SNF    Action: LSW discussed SNF accepting facilities, pt selected [facility] saying [facility] was closer to his house.    Barriers to Discharge: SNF Acceptance    Plan: Transfer to SNF upon medical clearance

## 2019-10-07 NOTE — PROGRESS NOTES
Pt incontinent of bladder most of night. Continent with alerting staff but pt dribbles continuously requiring brief. Rash noted inside creases of groin area. Moisture barrier applied. Wound vac saline changed, cartridge also changed. Vac working as ordered.

## 2019-10-07 NOTE — CARE PLAN
Problem: Safety  Goal: Will remain free from injury  Outcome: PROGRESSING AS EXPECTED    Pt uses call light for assistance. No unsafe behaviors noted. Call light and belongings within reach. Hourly rounds in place.      Problem: Pain Management  Goal: Pain level will decrease to patient's comfort goal  Outcome: PROGRESSING AS EXPECTED    Pt denies any pain or discomfort at this time. Pt was medicated prior to wound care. Will continue to monitor.      Problem: Skin Integrity  Goal: Risk for impaired skin integrity will decrease  Outcome: PROGRESSING AS EXPECTED     Pt able to turn and reposition self in bed for skin integrity. Wound vac to the LLE dressing changed by wound care. Will continue to monitor.

## 2019-10-07 NOTE — WOUND TEAM
"[facility]  Inpatient Services  Wound and Skin Care Progress Note    Admission Date:  9/20/2019   HPI, PMH, SH: Reviewed  Unit where seen by Wound Team: 2209/02    WOUND TEAM FOLLOW UP: NPWT Change    SUBJECTIVE:  \"Are you going to hurt me?\"     Self Report / Pain Level:premedicated by primary RN      OBJECTIVE: drsg intact to LLE  WOUND TYPE, LOCATION, CHARACTERISTICS (Pressure ulcers: location, stage, POA or date identified)    Negative Pressure Wound Therapy Leg Left;Lower (Active)   NPWT Pump Mode / Pressure Setting 125 mmHg    Dressing Type Medium;Gray foam    Number of Foam Pieces Used 1    Canister Changed No    VAC VeraFlo Irrigant Normal Saline    VAC VeraFlo Soak Time (mins) 10    VAC VeraFlo Instill Volume (ml) 40    VAC VeraFlo - Therapy Time (hrs) 2    VAC VeraFlo Pressure (mm/Hg) 125 mmHg;Continuous      Wound 09/20/19 Full Thickness Wound Other (Comment);Leg full thickness laceration lateral left leg (Active)      10/7/2019 10:30 AM   Site Assessment Red;Yellow;White    Celine-wound Assessment Intact    Margins Defined edges    Wound Length (cm) 18.5 cm 10/7/2019 10:30 AM   Wound Width (cm) 8 cm 10/7/2019 10:30 AM   Wound Depth (cm) 1.8 cm 10/7/2019 10:30 AM   Wound Surface Area (cm^2) 148 cm^2 10/7/2019 10:30 AM   Tunneling 0 cm 10/7/2019 10:30 AM   Undermining 0 cm 10/7/2019 10:30 AM   Closure Secondary intention    Drainage Amount Small    Drainage Description Serosanguineous    Non-staged Wound Description Full thickness    Treatments Cleansed    Cleansing Normal Saline Irrigation    Periwound Protectant Benzoin;Drape    Dressing Options Wound Vac    Dressing Cleansing/Solutions Normal Saline    Dressing Changed Changed    Dressing Status Intact    Dressing Change Frequency Monday, Wednesday, Friday    NEXT Dressing Change  10/09/19    NEXT Weekly Photo (Inpatient Only) 10/14/19    Odor Mild     Pulses 1+;DP     Exposed Structures Bone     Tissue Type and Percentage 90% red, 10% " white and yellow        Wound 10/02/19 Full Thickness Wound Ankle L medial ankle area (Active)      10/7/2019 10:30 AM   Site Assessment Red;Yellow    Tammi-wound Assessment Intact    Margins Attached edges    Wound Length (cm) 3.3 cm 10/7/2019 10:30 AM   Wound Width (cm) 2.7 cm 10/7/2019 10:30 AM   Wound Depth (cm) 0 cm 10/7/2019 10:30 AM   Wound Surface Area (cm^2) 8.91 cm^2 10/7/2019 10:30 AM   Tunneling 0 cm 10/7/2019 10:30 AM   Undermining 0 cm 10/7/2019 10:30 AM   Drainage Amount Scant    Drainage Description Serosanguineous    Non-staged Wound Description Full thickness    Treatments Cleansed    Cleansing Normal Saline Irrigation    Periwound Protectant Benzoin;Drape    Dressing Options Wound Vac    Dressing Cleansing/Solutions Normal Saline    Dressing Changed Changed    Dressing Status Clean;Intact    Dressing Change Frequency Monday, Wednesday, Friday    NEXT Dressing Change  10/09/19    NEXT Weekly Photo (Inpatient Only) 10/14/19    Odor None     Exposed Structures None     Tissue Type and Percentage 85% yellow, 15% red             Vascular:  Wounds not r/t vascular problem  Lab Values:    WBC:       Lab Results   Component Value Date/Time    WBC 8.9 10/06/2019 03:40 AM    WBC 7.2 05/19/2010 11:24 AM    RBC 4.34 (L) 10/06/2019 03:40 AM    RBC 4.65 05/19/2010 11:24 AM      AIC:      Lab Results   Component Value Date/Time    HBA1C 5.7 06/13/2018 12:30 PM          Culture:   Chinedu Completed    INTERVENTIONS BY WOUND TEAM:soaked drsg, removed. 3 pieces of grey foam and one piece of adaptic. Cleaned wounds with NS, dried. Applied benzoin, paste rings and drape to tammi-wound. 1 strip of adaptic to center of lateral wound. Grey waffle cleanse choice into each wound, then covered with one piece of thin grey foam with bridge connected so that all one piece to medial ankle wound. 3 pieces of grey foam and one piece of adaptic used. Sealed and Veraflo resumed @ 125 mmHg for 2 H, 40 ml for 10 min dwell.   Dressing  Options: Wound Vac    Interdisciplinary consultation:   With Nursing;With Patient     EVALUATION: pt's wound improved with Veraflo, granulation tissue starting to form, decreased non-viable tissue present.     Factors affecting wound healing: age, cellulitis  Goals:  Slow steady decrease in wound area and depth weekly    NURSING PLAN OF CARE:    Dressing changes: Continue previous Dressing Maintenance orders:  x      See new Dressing Maintenance orders:       Skin care: See Skin Care orders:        Rectal tube care: See Rectal Tube Care orders:      Other orders:           WOUND TEAM PLAN OF CARE (X):   NPWT change 3 x week:  x      Dressing changes:       Follow up as needed:       Other:    Anticipated discharge plans (X):  SNF: x one that performs Veraflo drsg changes would be helpful          Home Care:           Outpatient Wound Center:            Self Care:            Other:

## 2019-10-07 NOTE — THERAPY
"Occupational Therapy Treatment completed with focus on ADLs, ADL transfers, patient education and upper extremity function.  Functional Status:  A&Ox3. Pleasant and cooperative. Performs STS with close Sup. Toileting with urinal -Esther. Esther with hygiene. Sup with grooming. BTB with Mod Indep.    Plan of Care: Will benefit from Occupational Therapy 3 times per week  Discharge Recommendations:  Equipment No Equipment Needed. Post-acute therapy Discharge to a transitional care facility for continued skilled therapy services.  See \"Rehab Therapy-Acute\" Patient Summary Report for complete documentation.   "

## 2019-10-08 PROCEDURE — 700102 HCHG RX REV CODE 250 W/ 637 OVERRIDE(OP): Performed by: INTERNAL MEDICINE

## 2019-10-08 PROCEDURE — 700111 HCHG RX REV CODE 636 W/ 250 OVERRIDE (IP): Performed by: INTERNAL MEDICINE

## 2019-10-08 PROCEDURE — A9270 NON-COVERED ITEM OR SERVICE: HCPCS | Performed by: INTERNAL MEDICINE

## 2019-10-08 PROCEDURE — 770006 HCHG ROOM/CARE - MED/SURG/GYN SEMI*

## 2019-10-08 PROCEDURE — 99231 SBSQ HOSP IP/OBS SF/LOW 25: CPT | Performed by: INTERNAL MEDICINE

## 2019-10-08 RX ADMIN — MICONAZOLE NITRATE: 20 CREAM TOPICAL at 17:19

## 2019-10-08 RX ADMIN — ATORVASTATIN CALCIUM 20 MG: 10 TABLET, FILM COATED ORAL at 17:18

## 2019-10-08 RX ADMIN — MICONAZOLE NITRATE: 20 CREAM TOPICAL at 06:00

## 2019-10-08 RX ADMIN — ENOXAPARIN SODIUM 40 MG: 100 INJECTION SUBCUTANEOUS at 06:00

## 2019-10-08 RX ADMIN — LEVETIRACETAM 500 MG: 500 TABLET, FILM COATED ORAL at 17:18

## 2019-10-08 RX ADMIN — SENNOSIDES AND DOCUSATE SODIUM 2 TABLET: 8.6; 5 TABLET ORAL at 06:00

## 2019-10-08 RX ADMIN — SENNOSIDES AND DOCUSATE SODIUM 2 TABLET: 8.6; 5 TABLET ORAL at 17:18

## 2019-10-08 RX ADMIN — LEVETIRACETAM 500 MG: 500 TABLET, FILM COATED ORAL at 06:00

## 2019-10-08 ASSESSMENT — ENCOUNTER SYMPTOMS
FEVER: 0
NERVOUS/ANXIOUS: 1
HEADACHES: 0
COUGH: 0
SORE THROAT: 0
DIZZINESS: 0
VOMITING: 0
PALPITATIONS: 0
NAUSEA: 0
ABDOMINAL PAIN: 0
SHORTNESS OF BREATH: 0
DIARRHEA: 0
CHILLS: 0

## 2019-10-08 NOTE — PROGRESS NOTES
Hospital Medicine Daily Progress Note    Date of Service  10/8/2019    Chief Complaint  Falls    Hospital Course    *89-year-old male with history of PSVT fell getting out of the car the evening of admission, he sustained a laceration to his head as well as injury to his left leg, ultimately he was seen by plastic surgery, had debridement and wound VAC placement.*      Interval Problem Update  No new events, eager for discharge to skilled  Pending acceptance and authorization    Consultants/Specialty  Plastic surgery-Jan[facility]    Code Status  Queried on admission-full code    Disposition  Skilled nursing pending acceptance    Review of Systems  Review of Systems   Constitutional: Negative for chills and fever.   HENT: Negative for congestion and sore throat.    Respiratory: Negative for cough and shortness of breath.    Cardiovascular: Negative for chest pain and palpitations.   Gastrointestinal: Negative for abdominal pain, diarrhea, nausea and vomiting.   Genitourinary: Negative for dysuria and urgency.   Skin: Negative for itching and rash.   Neurological: Negative for dizziness and headaches.   Psychiatric/Behavioral: The patient is nervous/anxious.         Physical Exam  Temp:  [36.3 °C (97.4 °F)-36.4 °C (97.6 °F)] 36.4 °C (97.5 °F)  Pulse:  [43-89] 89  Resp:  [16-18] 18  BP: (138-142)/() 142/74  SpO2:  [94 %-96 %] 95 %    Physical Exam   Constitutional: He is oriented to person, place, and time. He appears well-developed. No distress.   Spry elderly male   HENT:   Head: Normocephalic and atraumatic.   Mouth/Throat: Oropharynx is clear and moist.   Eyes: Pupils are equal, round, and reactive to light. Conjunctivae and EOM are normal. Right eye exhibits no discharge. Left eye exhibits no discharge. No scleral icterus.   Neck: Neck supple.   Cardiovascular: Normal rate and regular rhythm.   Pulmonary/Chest: Effort normal and breath sounds normal.   Abdominal: Soft. Bowel sounds are normal. He exhibits no  distension. There is no tenderness.   Musculoskeletal:   Left lower extremity attached to wound VAC   Neurological: He is alert and oriented to person, place, and time. No cranial nerve deficit.   Skin: Skin is warm and dry. He is not diaphoretic.   Psychiatric: He has a normal mood and affect.   Nursing note and vitals reviewed.      Fluids    Intake/Output Summary (Last 24 hours) at 10/8/2019 1506  Last data filed at 10/8/2019 1200  Gross per 24 hour   Intake 360 ml   Output 1050 ml   Net -690 ml       Laboratory  Recent Labs     10/06/19  0340   WBC 8.9   RBC 4.34*   HEMOGLOBIN 12.1*   HEMATOCRIT 39.2*   MCV 90.3   MCH 27.9   MCHC 30.9*   RDW 57.1*   PLATELETCT 321   MPV 10.0     Recent Labs     10/06/19  0340   SODIUM 140   POTASSIUM 4.4   CHLORIDE 104   CO2 21   GLUCOSE 107*   BUN 17   CREATININE 0.99   CALCIUM 8.4                   Imaging  US-ROBIN SINGLE LEVEL BILAT   Final Result      DX-CHEST-PORTABLE (1 VIEW)   Final Result         1. No pulmonary infiltrates or consolidations are noted.      2. Cardiomegaly.      CT-LSPINE W/O PLUS RECONS   Final Result      Postsurgical and degenerative change without evidence of lumbar spine fracture.      CT-TSPINE W/O PLUS RECONS   Final Result         No acute fracture or subluxation of the thoracic spine.      CT-CHEST,ABDOMEN,PELVIS WITH   Final Result      1.  No CT evidence of acute traumatic injury in the chest, abdomen or pelvis.   2.  Spine is detailed separately.   3.  Small right pleural effusion.   4.  Mild mediastinal lymphadenopathy, statistically reactive.   5.  Small hiatal hernia.   6.  Colonic diverticulosis.      CT-CSPINE WITHOUT PLUS RECONS   Final Result      Degenerative change without evidence of fracture.      DX-TIBIA AND FIBULA LEFT   Final Result      Minimally displaced fracture of the medial malleolus.                  INTERPRETING LOCATION:  [address] [facility] [place], [address]      CT-HEAD W/O   Final Result         1.  No acute intracranial  abnormality is identified, there are nonspecific white matter changes, commonly associated with small vessel ischemic disease.  Associated mild cerebral atrophy is noted.   2.  Atherosclerosis.           Assessment/Plan  * Leg laceration- (present on admission)  Assessment & Plan  wound care    Arrhythmia  Assessment & Plan  Hx of SVT  No new, followed by cardiology outpatient, Premature contractions detected on EKG  He has been bradycardic, continue to hold metoprolol  No anticoagulation due to falls      (HCC) SVT (supraventricular tachycardia)- (present on admission)  Assessment & Plan  Historically, no current issues  Follow up with cardiology as needed      Displaced fracture of medial malleolus of left tibia, initial encounter for closed fracture- (present on admission)  Assessment & Plan  Dr. Tracy recommends nonweightbearing With boot for protection and follow up at [facility] for imaging after discharge  Patient transferring with assistance      Left leg cellulitis  Assessment & Plan  9/25 cx with enterococcus/Acinetobacter  unasyn completed for a full course of therapy  Wound debrided by plastic surgery Dr. Kent 10/1, vac in place  ROBIN's showing good arterial flow    Dyslipidemia- (present on admission)  Assessment & Plan   statin    (HCC) Seizure disorder- (present on admission)  Assessment & Plan  Controlled on keppra           VTE prophylaxis: Lovenox

## 2019-10-08 NOTE — DISCHARGE PLANNING
Agency/Facility Name: [facility]  Spoke To: Rebecca  Outcome: Facility requested admission order and discharge order to be faxed.

## 2019-10-08 NOTE — PROGRESS NOTES
Pt slept aprox 4 hours intermittently. Frequent bathroom trips. Incontinent of bladder. Brief in place. Med compliant. Cream to groin rash as directed.

## 2019-10-08 NOTE — DISCHARGE PLANNING
LSW spoke with admissions at [facility]. They are checking to make sure pt is in network and will call LSW back. [facility] still has not received insurance auth at this time.

## 2019-10-08 NOTE — DISCHARGE PLANNING
TERAW received call from [facility]. Pt is in network at [facility], they are still working on insurance auth.     Addendum:    TERAW spoke with spouse earlier to inform her that [facility] is seeking insurance auth and provided contact information for [facility].

## 2019-10-09 VITALS
HEART RATE: 73 BPM | TEMPERATURE: 97.6 F | OXYGEN SATURATION: 97 % | WEIGHT: 175.04 LBS | RESPIRATION RATE: 20 BRPM | DIASTOLIC BLOOD PRESSURE: 73 MMHG | HEIGHT: 68 IN | SYSTOLIC BLOOD PRESSURE: 115 MMHG | BODY MASS INDEX: 26.53 KG/M2

## 2019-10-09 PROCEDURE — 700102 HCHG RX REV CODE 250 W/ 637 OVERRIDE(OP): Performed by: INTERNAL MEDICINE

## 2019-10-09 PROCEDURE — 97606 NEG PRS WND THER DME>50 SQCM: CPT

## 2019-10-09 PROCEDURE — 99239 HOSP IP/OBS DSCHRG MGMT >30: CPT | Performed by: INTERNAL MEDICINE

## 2019-10-09 PROCEDURE — 700111 HCHG RX REV CODE 636 W/ 250 OVERRIDE (IP): Performed by: INTERNAL MEDICINE

## 2019-10-09 PROCEDURE — A9270 NON-COVERED ITEM OR SERVICE: HCPCS | Performed by: INTERNAL MEDICINE

## 2019-10-09 PROCEDURE — 97535 SELF CARE MNGMENT TRAINING: CPT

## 2019-10-09 PROCEDURE — 97112 NEUROMUSCULAR REEDUCATION: CPT

## 2019-10-09 RX ORDER — OXYCODONE HYDROCHLORIDE 5 MG/1
5 TABLET ORAL EVERY 8 HOURS PRN
Qty: 9 TAB | Refills: 0 | Status: SHIPPED | OUTPATIENT
Start: 2019-10-09 | End: 2019-10-12

## 2019-10-09 RX ADMIN — MICONAZOLE NITRATE: 20 CREAM TOPICAL at 05:25

## 2019-10-09 RX ADMIN — LEVETIRACETAM 500 MG: 500 TABLET, FILM COATED ORAL at 05:23

## 2019-10-09 RX ADMIN — OXYCODONE HYDROCHLORIDE 5 MG: 5 TABLET ORAL at 09:08

## 2019-10-09 RX ADMIN — ENOXAPARIN SODIUM 40 MG: 100 INJECTION SUBCUTANEOUS at 05:23

## 2019-10-09 RX ADMIN — SENNOSIDES AND DOCUSATE SODIUM 2 TABLET: 8.6; 5 TABLET ORAL at 05:23

## 2019-10-09 RX ADMIN — OXYCODONE HYDROCHLORIDE 5 MG: 5 TABLET ORAL at 13:18

## 2019-10-09 NOTE — THERAPY
"Occupational Therapy Treatment completed with focus on ADLs, ADL transfers, patient education and upper extremity function.  Functional Status:  Wound vac in place. A&Ox3. Pt performs bed mobility with Sup. ADL transfers with Esther. Improved safety.  Toileting at [facility] with [place] for hygiene. LB dressing with Esther. Grooming with Sup. BTB post session.     Plan of Care: Patient with no further skilled OT needs in the acute care setting at this time  Discharge Recommendations:  Equipment No Equipment Needed. Post-acute therapy Discharge to a transitional care facility for continued skilled therapy services.  See \"Rehab Therapy-Acute\" Patient Summary Report for complete documentation.   "

## 2019-10-09 NOTE — DISCHARGE INSTRUCTIONS
Discharge Instructions per Valeri Romero M.D.    Follow up with primary care provider upon discharge from skilled nursing facility  Follow up with plastic surgery Dr. Kent as needed for wound debridement    DIET: regular    ACTIVITY: as tolerated with touch down weight bearing left foot    DIAGNOSIS: left lateral malloelar fracture, left leg skin tear with deep infected wound    Return to ER if fever develops or confusion          Follow instructions per wound care and follow-up with them for dressing changes and wound recheck  Take the antibiotics until completely gone  Return to the ER if fever greater than 101, increased redness, swelling, signs of infection.  Stay off of your leg is much as possible, keep it elevated above the level of your heart.      Discharge Instructions    Discharged to other by medical transportation with escort. Discharged via wheelchair, hospital escort: Yes.  Special equipment needed: Wound VAC    Be sure to schedule a follow-up appointment with your primary care doctor or any specialists as instructed.     Discharge Plan:   Influenza Vaccine Indication: Not indicated: Previously immunized this influenza season and > 8 years of age    I understand that a diet low in cholesterol, fat, and sodium is recommended for good health. Unless I have been given specific instructions below for another diet, I accept this instruction as my diet prescription.   Other diet: Regular diet    Special Instructions: Discharge instructions for the Orthopedic Patient    Follow up with Primary Care Physician within 2 weeks of discharge to home, regarding:  Review of medications and diagnostic testing.  Surveillance for medical complications.  Workup and treatment of osteoporosis, if appropriate.     -Is this a Joint Replacement patient? No    -Is this patient being discharged with medication to prevent blood clots?  No     · Is patient discharged on Warfarin / Coumadin?   No     Depression / Suicide  Risk    As you are discharged from this [facility], it is important to learn how to keep safe from harming yourself.    Recognize the warning signs:  · Abrupt changes in personality, positive or negative- including increase in energy   · Giving away possessions  · Change in eating patterns- significant weight changes-  positive or negative  · Change in sleeping patterns- unable to sleep or sleeping all the time   · Unwillingness or inability to communicate  · Depression  · Unusual sadness, discouragement and loneliness  · Talk of wanting to die  · Neglect of personal appearance   · Rebelliousness- reckless behavior  · Withdrawal from people/activities they love  · Confusion- inability to concentrate     If you or a loved one observes any of these behaviors or has concerns about self-harm, here's what you can do:  · Talk about it- your feelings and reasons for harming yourself  · Remove any means that you might use to hurt yourself (examples: pills, rope, extension cords, firearm)  · Get professional help from the community (Mental Health, Substance Abuse, psychological counseling)  · Do not be alone:Call your Safe Contact- someone whom you trust who will be there for you.  · Call your local CRISIS HOTLINE 545-5944 or 411-434-9334  · Call your local Children's Mobile Crisis Response Team [place] (548) 550-6545 or www.Clinithink  · Call the toll free National Suicide Prevention Hotlines   · National Suicide Prevention Lifeline 192-993-JSMC (2304)  · National Hope Line Network 800-SUICIDE (555-6768)

## 2019-10-09 NOTE — DISCHARGE PLANNING
Anticipated Discharge Disposition: [facility] @ 1500    Action: LSW discussed transfer with pt, pt signed COBRA, pt denied needing LSW to notify any family members of transfer.    LSW informed bedside RN and attending MD of transfer.    Barriers to Discharge: None at this time    Plan: Transfer to [facility]

## 2019-10-09 NOTE — DISCHARGE SUMMARY
Discharge Summary    CHIEF COMPLAINT ON ADMISSION  Chief Complaint   Patient presents with   • GLF   • Leg Laceration     active bleed, LLE       Reason for Admission  GLF     CODE STATUS  DNAR/DNI    HPI & HOSPITAL COURSE  *89-year-old male with history of PSVT fell getting out of the car the evening of admission, he sustained a laceration to his head as well as injury to his left leg, ultimately he was seen by plastic surgery, had debridement and wound VAC placement.* Patient has completed his course of antibiotics but still requires wound care and wound VAC changes.  Apparently local home health agencies were unable to take on a new patient with these needs and referral to skilled nursing was obtained.  Patient was ultimately approved for a bed at [facility] and will transfer this afternoon.    He has been clinically stable apart from his wound.    Therefore, he is discharged in good and stable condition to skilled nursing facility.    The patient met 2-midnight criteria for an inpatient stay at the time of discharge.      FOLLOW UP ITEMS POST DISCHARGE  PCP  Plastic surgery    DISCHARGE DIAGNOSES  Principal Problem:    Leg laceration POA: Yes  Active Problems:    (HCC) SVT (supraventricular tachycardia) POA: Yes    Dyslipidemia POA: Yes    Left leg cellulitis POA: No    Displaced fracture of medial malleolus of left tibia, initial encounter for closed fracture POA: Yes    (HCC) Seizure disorder POA: Yes  Resolved Problems:    Fall POA: Yes      FOLLOW UP  Future Appointments   Date Time Provider Department Center   10/15/2019 10:20 AM SUZANNE Galvez   4/6/2020  9:20 AM SUZANNE Galvez M.D.  [address] [facility] [address]  [place] [address]  [phone]    Schedule an appointment as soon as possible for a visit in 2 days  For wound re-check, following discharge from skilled nursing facility    Wound Care  Center  [address]  [facility] [address]  [phone]  Schedule an appointment as soon as possible for a visit in 3 days  Call first thing Monday morning to schedule appointment with [facility] wound care    Leo Kent M.D.  [address]  [facility] [address]  [phone]    Schedule an appointment as soon as possible for a visit  For follow up appointment with plastic surgery, as needed for wound debridement    Chente Tracy M.D.  [address] [facility] [id number] [phone]    Schedule an appointment as soon as possible for a visit  For follow up imaging with [facility]      MEDICATIONS ON DISCHARGE     Medication List      START taking these medications      Instructions   acetaminophen 325 MG Tabs  Commonly known as:  TYLENOL   Take 2 Tabs by mouth every 6 hours as needed (Mild Pain; (Pain scale 1-3); Temp greater than 100.5 F).  Dose:  650 mg     oxyCODONE immediate-release 5 MG Tabs  Commonly known as:  ROXICODONE   Take 1 Tab by mouth every 8 hours as needed for up to 3 days.  Dose:  5 mg     senna-docusate 8.6-50 MG Tabs  Commonly known as:  PERICOLACE or SENOKOT S   Take 2 Tabs by mouth 2 Times a Day.  Dose:  2 Tab        CONTINUE taking these medications      Instructions   atorvastatin 20 MG Tabs  Commonly known as:  LIPITOR   TAKE 1 TABLET BY MOUTH ONCE DAILY     B COMPLEX B-12 PO   Take  by mouth.     CALCIUM CITRATE PO   Take 500 mg by mouth every day.  Dose:  500 mg     fish oil 1000 MG Caps capsule   Take 1,000 mg by mouth 3 times a day, with meals.  Dose:  1,000 mg     levETIRAcetam 500 MG Tabs  Commonly known as:  KEPPRA   TAKE 1 TABLET BY MOUTH TWICE DAILY     LUMIGAN 0.01 % Soln  Generic drug:  bimatoprost   Place 1 Drop in right eye every bedtime.  Dose:  1 Drop     MAGNESIUM CITRATE PO   Take 250 mg by mouth every day.  Dose:  250 mg     multivitamin Tabs   Take 1 Tab by mouth every day.  Dose:  1 Tab     niacin 500 MG tablet  Commonly  known as:  SLO-NIACIN   Take 1 Tab by mouth every day.  Dose:  500 mg     pneumococcal 13-Harper Conj Vacc syringe  Commonly known as:  PREVNAR 13   0.5 mL by Intramuscular route Once PRN for up to 1 dose.  Dose:  0.5 mL     VITAMIN C ER PO   Take  by mouth. OTC VIT C     Vitamin D3 2000 UNIT Caps   Take  by mouth.        STOP taking these medications    cephALEXin 500 MG Caps  Commonly known as:  KEFLEX     testosterone 50 MG/5GM (1%) Gel gel  Commonly known as:  TESTIM,ANDROGEL     tetanus-dipth-acell pertussis 5-2-15.5 LF-MCG/0.5 Susp  Commonly known as:  ADACEL            Allergies  Allergies   Allergen Reactions   • Pcn [Penicillins]        DIET  Orders Placed This Encounter   Procedures   • Diet Order Regular     Standing Status:   Standing     Number of Occurrences:   1     Order Specific Question:   Diet:     Answer:   Regular [1]       ACTIVITY  Per PT    LINES, DRAINS, AND WOUNDS  This is an automated list. Peripheral IVs will be removed prior to discharge.  Peripheral IV 10/04/19 22 G Right Wrist (Active)   Site Assessment Intact;Dry;Clean 10/8/2019  8:00 PM   Dressing Type Transparent 10/8/2019  8:00 PM   Line Status Flushed;Saline locked 10/8/2019  8:00 PM   Dressing Status Clean;Dry;Intact 10/8/2019  8:00 PM   Dressing Intervention N/A 10/5/2019  8:00 AM   Date Primary Tubing Changed 10/05/19 10/5/2019  7:30 PM   Date Secondary Tubing Changed 10/05/19 10/5/2019  7:30 PM   NEXT Primary Tubing Change  10/09/19 10/5/2019  7:30 PM   NEXT Secondary Tubing Change  10/06/19 10/5/2019  7:30 PM   Infiltration Grading ([facility], [facility]) 0 10/8/2019  8:00 PM   Phlebitis Scale ([facility] Only) 0 10/8/2019  8:00 PM       Wound 09/20/19 Full Thickness Wound Other (Comment);Leg full thickness laceration lateral left leg (Active)   Wound Image   10/7/2019 10:30 AM   Site Assessment MICK 10/8/2019  8:03 PM   Celine-wound Assessment Pink;Red 10/8/2019  8:03 PM   Margins MICK 10/8/2019  8:03 PM   Wound Length (cm) 18.5 cm 10/7/2019  10:30 AM   Wound Width (cm) 8 cm 10/7/2019 10:30 AM   Wound Depth (cm) 1.8 cm 10/7/2019 10:30 AM   Wound Surface Area (cm^2) 148 cm^2 10/7/2019 10:30 AM   Tunneling 0 cm 10/7/2019 10:30 AM   Undermining 0 cm 10/7/2019 10:30 AM   Closure MICK 10/8/2019  8:03 PM   Drainage Amount MICK 10/8/2019  8:03 PM   Drainage Description Serosanguineous 10/8/2019  8:03 PM   Non-staged Wound Description Full thickness 10/7/2019  7:00 PM   Treatments Cleansed 10/7/2019 10:30 AM   Cleansing Normal Saline Irrigation 10/7/2019  7:00 PM   Periwound Protectant Benzoin;Drape 10/8/2019  8:03 PM   Dressing Options Wound Vac 10/8/2019  8:03 PM   Dressing Cleansing/Solutions Not Applicable 10/8/2019  8:03 PM   Dressing Changed Reinforced 10/8/2019  8:03 PM   Dressing Status Clean;Dry;Intact 10/8/2019  8:03 PM   Dressing Change Frequency Monday, Wednesday, Friday 10/8/2019  8:03 PM   NEXT Dressing Change  10/09/19 10/7/2019 10:30 AM   NEXT Weekly Photo (Inpatient Only) 10/14/19 10/8/2019  8:03 PM   WOUND NURSE ONLY - Odor Mild 10/7/2019 10:30 AM   WOUND NURSE ONLY - Pulses 1+;DP 10/2/2019 10:35 AM   WOUND NURSE ONLY - Exposed Structures Bone 10/7/2019 10:30 AM   WOUND NURSE ONLY - Tissue Type and Percentage 90% red, 10% white and yellow 10/7/2019 10:30 AM   WOUND NURSE ONLY - Time Spent with Patient (mins) 115 10/7/2019 10:30 AM       Wound 10/02/19 Full Thickness Wound Ankle L medial ankle area (Active)   Wound Image   10/7/2019 10:30 AM   Site Assessment Red;Yellow 10/7/2019  7:00 PM   Celine-wound Assessment Intact 10/7/2019  7:00 PM   Margins Attached edges 10/7/2019  7:00 PM   Wound Length (cm) 3.3 cm 10/7/2019 10:30 AM   Wound Width (cm) 2.7 cm 10/7/2019 10:30 AM   Wound Depth (cm) 0 cm 10/7/2019 10:30 AM   Wound Surface Area (cm^2) 8.91 cm^2 10/7/2019 10:30 AM   Tunneling 0 cm 10/7/2019 10:30 AM   Undermining 0 cm 10/7/2019 10:30 AM   Drainage Amount Scant 10/7/2019  7:00 PM   Drainage Description Serosanguineous 10/7/2019  7:00 PM    Non-staged Wound Description Full thickness 10/7/2019  7:00 PM   Treatments Cleansed 10/7/2019 10:30 AM   Cleansing Normal Saline Irrigation 10/7/2019  7:00 PM   Periwound Protectant Benzoin;Drape 10/7/2019  7:00 PM   Dressing Options Wound Vac 10/8/2019  8:28 AM   Dressing Cleansing/Solutions Normal Saline 10/7/2019  7:00 PM   Dressing Changed Changed 10/7/2019 10:30 AM   Dressing Status Clean;Intact 10/8/2019  8:28 AM   Dressing Change Frequency Monday, Wednesday, Friday 10/7/2019  7:00 PM   NEXT Dressing Change  10/09/19 10/7/2019 10:30 AM   NEXT Weekly Photo (Inpatient Only) 10/14/19 10/7/2019 10:30 AM   WOUND NURSE ONLY - Odor None 10/7/2019 10:30 AM   WOUND NURSE ONLY - Exposed Structures None 10/7/2019 10:30 AM   WOUND NURSE ONLY - Tissue Type and Percentage 85% yellow, 15% red 10/7/2019 10:30 AM       Moisture Associated Skin Damage Thigh;Groin;Buttock (Active)   Drainage Amount None 10/8/2019  8:03 PM   Celine-wound Assessment Pink 10/8/2019  8:03 PM   Periwound Protectant Antifungal Therapy 10/8/2019  8:03 PM       Peripheral IV 10/04/19 22 G Right Wrist (Active)   Site Assessment Intact;Dry;Clean 10/8/2019  8:00 PM   Dressing Type Transparent 10/8/2019  8:00 PM   Line Status Flushed;Saline locked 10/8/2019  8:00 PM   Dressing Status Clean;Dry;Intact 10/8/2019  8:00 PM   Dressing Intervention N/A 10/5/2019  8:00 AM   Date Primary Tubing Changed 10/05/19 10/5/2019  7:30 PM   Date Secondary Tubing Changed 10/05/19 10/5/2019  7:30 PM   NEXT Primary Tubing Change  10/09/19 10/5/2019  7:30 PM   NEXT Secondary Tubing Change  10/06/19 10/5/2019  7:30 PM   Infiltration Grading ([facility], [facility]) 0 10/8/2019  8:00 PM   Phlebitis Scale ([facility] Only) 0 10/8/2019  8:00 PM               MENTAL STATUS ON TRANSFER  Level of Consciousness: Alert  Orientation : Oriented x 4  Speech: Speech Clear    CONSULTATIONS  Plastic Surgery    PROCEDURES  DATE OF SERVICE:  10/01/2019     PREOPERATIVE DIAGNOSIS:  Complex left lower lateral  leg wound.     POSTOPERATIVE DIAGNOSIS:  Complex left lower lateral leg wound.     PROCEDURES:  Excisional debridement of left lower lateral leg wound including   skin, subcutaneous tissue, muscle, and fascia, total area debrided 100 cm2.     ATTENDING SURGEON:  Leo Kent MD     ANESTHESIA:  Local anesthetic only.     ESTIMATED BLOOD LOSS:  Minimal.     COMPLICATIONS:  No apparent.     INDICATIONS FOR PROCEDURE:  The patient is a 89-year-old gentleman who had a   ground level fall who sustained a complex degloving wound to his left lower   lateral leg.  Despite wound care, the wound had progressively got worse.    Plastic surgery was consulted for evaluation of this wound.  I did feel that   he needed to have a debridement of a grossly necrotic tissue.  I also wanted   to try to avoid general anesthetic given the patient's age and general   comorbidities.  The patient agrees with this plan.     DESCRIPTION OF PROCEDURE:  After the operative and nonoperative options   including the alternatives were discussed, appropriate verbal consent was   obtained.  The leg was then prepped and draped in usual sterile fashion.    Local anesthetic was then injected widely around the necrotic tissue to   anesthetize the area.  Following this, then a 15-blade and iris scissors were   then used to sharply debride the wound, which included skin, subcutaneous   tissue, muscle, and fascia.  The wound did extend down to near the lateral   malleolus and fibula, but did appear to have periosteum that was still   present.  There was significant undermining, but after debridement, this was   all opened up.  As much of the wound was debrided back to healthy bleeding   base as possible with local anesthetic.  Compression was used to obtain   hemostasis.  Then, a saline moistened gauze was then placed in the wound and   he was placed in a compressive wrap.  The patient will a wound VAC tomorrow.    Patient tolerated the procedure well.   At the end of procedure, all sponge,   instrument, and needle counts were correct.        ____________________________________     BAUTISTA OCAMPO MD       LABORATORY  Lab Results   Component Value Date    SODIUM 140 10/06/2019    POTASSIUM 4.4 10/06/2019    CHLORIDE 104 10/06/2019    CO2 21 10/06/2019    GLUCOSE 107 (H) 10/06/2019    BUN 17 10/06/2019    CREATININE 0.99 10/06/2019    CREATININE 0.89 04/23/2010    GLOMRATE >59 04/23/2010        Lab Results   Component Value Date    WBC 8.9 10/06/2019    WBC 7.2 05/19/2010    HEMOGLOBIN 12.1 (L) 10/06/2019    HEMATOCRIT 39.2 (L) 10/06/2019    PLATELETCT 321 10/06/2019        Total time of the discharge process exceeds 35 minutes.

## 2019-10-09 NOTE — CARE PLAN
Problem: Safety  Goal: Will remain free from falls  Outcome: PROGRESSING AS EXPECTED  Note:   Call light and personal belongings within reach, bed in low locked position, treaded slipper socks in place, room free of clutter. Discussed importance of calling for assistance prior to getting up and pt demonstrates understanding. Hourly rounding in place to ensure pt safety and needs are met.        Problem: Skin Integrity  Goal: Risk for impaired skin integrity will decrease  Outcome: PROGRESSING AS EXPECTED  Intervention: Assess and monitor skin integrity, appearance and/or temperature  Note:   Wound vac in place to LLE, holding suction to 125 mmHg. Rash to groin, inner thighs, and buttocks appearing improved with Inzo cream. Pt incontinent of urine and stool, soiled linens promptly changed and perineal care provided.

## 2019-10-09 NOTE — DISCHARGE PLANNING
Received Transport Form @ 0909  Spoke to Guera @ [facility]    Transport is scheduled for 10/09 @1500 going to [facility].

## 2019-10-09 NOTE — WOUND TEAM
"[facility]  Inpatient Services   Wound and Skin Care Progress note     Admission Date:  9/20/2019   HPI, PMH, SH: Reviewed  Unit where seen by Wound Team: 2209/02    WOUND CONSULT RELATED TO: Negative Pressure Wound Therapy change    SUBJECTIVE:  \"Is it getting better?\"     Self Report / Pain Level: c/o pain with drsg removal, it has decreased each time      OBJECTIVE: drsg intact, family @ bs  WOUND TYPE, LOCATION, CHARACTERISTICS (Pressure ulcers: location, stage, POA or date identified)    Negative Pressure Wound Therapy Leg Left;Lower (Active)   NPWT Pump Mode / Pressure Setting 125 mmHg    Dressing Type Medium;Gray foam    Number of Foam Pieces Used 3    Canister Changed No    VAC VeraFlo Irrigant Normal Saline    VAC VeraFlo Soak Time (mins) 10    VAC VeraFlo Instill Volume (ml) 40    VAC VeraFlo - Therapy Time (hrs) 2    VAC VeraFlo Pressure (mm/Hg) 125 mmHg;Continuous      Wound 09/20/19 Full Thickness Wound Other (Comment);Leg full thickness laceration lateral left leg (Active)      10/7/2019 10:30 AM   Site Assessment Red;Yellow;White    Celine-wound Assessment Intact    Margins Defined edges    Wound Length (cm) 18.5 cm 10/7/2019 10:30 AM   Wound Width (cm) 8 cm 10/7/2019 10:30 AM   Wound Depth (cm) 1.8 cm 10/7/2019 10:30 AM   Wound Surface Area (cm^2) 148 cm^2 10/7/2019 10:30 AM   Tunneling 0 cm 10/7/2019 10:30 AM   Undermining 0 cm 10/7/2019 10:30 AM   Closure Secondary intention    Drainage Amount Small    Drainage Description Serosanguineous    Non-staged Wound Description Full thickness    Treatments Cleansed    Cleansing Normal Saline Irrigation    Periwound Protectant Benzoin;Drape    Dressing Options Wound Vac    Dressing Cleansing/Solutions Normal Saline    Dressing Changed Changed    Dressing Status Intact    Dressing Change Frequency Monday, Wednesday, Friday    NEXT Dressing Change  10/11/19    NEXT Weekly Photo (Inpatient Only) 10/14/19    Odor Mild     Pulses 1+;DP     Exposed " Structures Bone;Tendon     Tissue Type and Percentage 90% red, 10% yellow        Wound 10/02/19 Full Thickness Wound Ankle L medial ankle area (Active)      10/7/2019 10:30 AM   Site Assessment Red;Yellow    Tammi-wound Assessment Intact    Margins Attached edges    Wound Length (cm) 3.3 cm 10/7/2019 10:30 AM   Wound Width (cm) 2.7 cm 10/7/2019 10:30 AM   Wound Depth (cm) 0 cm 10/7/2019 10:30 AM   Wound Surface Area (cm^2) 8.91 cm^2 10/7/2019 10:30 AM   Tunneling 0 cm 10/7/2019 10:30 AM   Undermining 0 cm 10/7/2019 10:30 AM   Closure Secondary intention    Drainage Amount Scant    Drainage Description Serosanguineous    Non-staged Wound Description Full thickness    Treatments Cleansed    Cleansing Normal Saline Irrigation    Periwound Protectant Benzoin;Drape    Dressing Options Wound Vac    Dressing Cleansing/Solutions Normal Saline    Dressing Changed Changed    Dressing Status Intact    Dressing Change Frequency Monday, Wednesday, Friday    NEXT Dressing Change  10/11/19    NEXT Weekly Photo (Inpatient Only) 10/14/19    Odor Mild     Exposed Structures None     Tissue Type and Percentage 50% pink, 50% yellow      Vascular:  Wounds not r/t vascular problem    Lab Values:    WBC:       Lab Results   Component Value Date/Time    WBC 8.9 10/06/2019 03:40 AM    WBC 7.2 05/19/2010 11:24 AM    RBC 4.34 (L) 10/06/2019 03:40 AM    RBC 4.65 05/19/2010 11:24 AM      AIC:      Lab Results   Component Value Date/Time    HBA1C 5.7 06/13/2018 12:30 PM          Culture:   Already Completed     INTERVENTIONS BY WOUND TEAM: soaked drsg, removed. 3 pieces of grey foam and one piece of adaptic. Cleaned wounds with NS, dried. Applied benzoin, paste rings and drape to tammi-wound.  Grey waffle cleanse choice into each wound, then covered with one piece of thin grey foam with bridge connected so that all one piece to medial ankle wound. 3 pieces of grey foam used. Sealed and Veraflo resumed @ 125 mmHg for 2 H, 40 ml for 10 min dwell.    L medial ankle & lateral left leg-Dressing Options: Wound Vac    Interdisciplinary consultation:   With Nursing;With Patient     EVALUATION:    Factors affecting wound healing: age, cellulitis, edema  Goals:  Slow steady decrease in wound area and depth weekly      NURSING PLAN OF CARE ORDERS (X):    Dressing changes: See Dressing Care orders:   x  Skin care: See Skin Care orders:   Rectal tube care: See Rectal Tube Care orders:   Other orders:    WOUND TEAM PLAN OF CARE (X):   NPWT change 3 x week:  x      Dressing changes by wound team:       Follow up as needed:       Other (explain):    Anticipated discharge plans (X):  SNF:   x        Home Care:           Outpatient Wound Center:            Self Care:            Other:

## 2019-11-05 LAB — VANCOMYCIN TROUGH SERPL-MCNC: 4 UG/ML (ref 10–20)

## 2019-11-12 ENCOUNTER — OFFICE VISIT (OUTPATIENT)
Dept: URGENT CARE | Facility: MEDICAL CENTER | Age: 84
End: 2019-11-12
Payer: COMMERCIAL

## 2019-11-12 VITALS
BODY MASS INDEX: 26.37 KG/M2 | TEMPERATURE: 98.1 F | DIASTOLIC BLOOD PRESSURE: 68 MMHG | HEART RATE: 75 BPM | OXYGEN SATURATION: 98 % | RESPIRATION RATE: 16 BRPM | SYSTOLIC BLOOD PRESSURE: 120 MMHG | HEIGHT: 68 IN | WEIGHT: 174 LBS

## 2019-11-12 DIAGNOSIS — S81.802D WOUND OF LEFT LOWER EXTREMITY, SUBSEQUENT ENCOUNTER: ICD-10-CM

## 2019-11-12 DIAGNOSIS — I49.9 CARDIAC ARRHYTHMIA, UNSPECIFIED CARDIAC ARRHYTHMIA TYPE: ICD-10-CM

## 2019-11-12 PROCEDURE — 99214 OFFICE O/P EST MOD 30 MIN: CPT | Performed by: NURSE PRACTITIONER

## 2019-11-12 PROCEDURE — 93000 ELECTROCARDIOGRAM COMPLETE: CPT | Performed by: NURSE PRACTITIONER

## 2019-11-12 RX ORDER — LATANOPROST 50 UG/ML
SOLUTION/ DROPS OPHTHALMIC
Refills: 0 | COMMUNITY
Start: 2019-11-04 | End: 2020-05-18

## 2019-11-12 RX ORDER — OXYCODONE HYDROCHLORIDE 5 MG/1
TABLET ORAL
Refills: 0 | COMMUNITY
Start: 2019-11-04 | End: 2019-12-04

## 2019-11-13 NOTE — PROGRESS NOTES
Chief Complaint   Patient presents with   • Tachycardia     advised by wound care nurse to be seen in  for fast beating heartrate        HISTORY OF PRESENT ILLNESS: Patient is a 89 y.o. male who presents to urgent care today with requests of an EKG. He has a home health nurse visit him who noticed a tachycardic heart rate and encouraged him to come to  for evaluation. He is asymptomatic. He denies any fever, malaise, fatigue, chest pain, shortness of breath, diaphoresis, nausea, weakness, syncope, leg swelling. He is seen by a home health nurse for recent discharge from rehab for a complicated left leg laceration with infection. He has a wound vac in place. He was admitted to hospital on 9/20/19 for the mechanical fall which resulted in the laceration. At that time, he was found to be in a flutter with runs of SVT. He admits to known history of arrhythmia but does not see a cardiologist for such. In reviewing the inpatient chart, it appears there was thought of placing patient on metoprolol for such but was not discharged on this. He does not currently have a PCP and/or cardiologist.       Patient Active Problem List    Diagnosis Date Noted   • Arrhythmia 09/20/2019     Priority: High   • Chronic bilateral low back pain 05/18/2015     Priority: High   • (HCC) SVT (supraventricular tachycardia) 05/21/2015     Priority: Medium   • (HCC) Seizure disorder 05/21/2015     Priority: Low   • Left leg cellulitis 09/23/2019   • Displaced fracture of medial malleolus of left tibia, initial encounter for closed fracture 09/23/2019   • Leg laceration 09/20/2019   • Bilateral carotid bruits 08/06/2019   • Nonrheumatic aortic valve insufficiency 05/14/2019   • H/O radical prostatectomy 02/27/2019   • Hypogonadism in male 11/22/2016   • H/O prostate cancer 11/22/2016   • Glaucoma 11/22/2016   • Dyslipidemia 07/21/2011       Allergies:Pcn [penicillins]    Current Outpatient Medications Ordered in Epic   Medication Sig Dispense  Refill   • latanoprost (XALATAN) 0.005 % Solution INSTILL 1 DROP INTO RIGHT EYE AT BEDTIME FOR GLAUCOMA  0   • oxyCODONE immediate-release (ROXICODONE) 5 MG Tab TAKE 1 TABLET BY MOUTH EVERY 8 HOURS AS NEEDED FOR MODERATE TO SEVERE FOR PAIN  0   • acetaminophen (TYLENOL) 325 MG Tab Take 2 Tabs by mouth every 6 hours as needed (Mild Pain; (Pain scale 1-3); Temp greater than 100.5 F). 30 Tab 0   • senna-docusate (PERICOLACE OR SENOKOT S) 8.6-50 MG Tab Take 2 Tabs by mouth 2 Times a Day. 30 Tab 0   • atorvastatin (LIPITOR) 20 MG Tab TAKE 1 TABLET BY MOUTH ONCE DAILY 90 Tab 2   • levETIRAcetam (KEPPRA) 500 MG Tab TAKE 1 TABLET BY MOUTH TWICE DAILY 180 Tab 1   • B Complex Vitamins (B COMPLEX B-12 PO) Take  by mouth.     • pneumococcal 13-Harper Conj Vacc (PREVNAR 13) syringe 0.5 mL by Intramuscular route Once PRN for up to 1 dose. 0.5 Each 0   • Omega-3 Fatty Acids (FISH OIL) 1000 MG Cap capsule Take 1,000 mg by mouth 3 times a day, with meals.     • multivitamin (THERAGRAN) Tab Take 1 Tab by mouth every day.     • Cholecalciferol (VITAMIN D3) 2000 UNIT CAPS Take  by mouth.     • CALCIUM CITRATE PO Take 500 mg by mouth every day.     • MAGNESIUM CITRATE PO Take 250 mg by mouth every day.     • bimatoprost (LUMIGAN) 0.01 % SOLN Place 1 Drop in right eye every bedtime.     • niacin (SLO-NIACIN) 500 MG tablet Take 1 Tab by mouth every day. (Patient not taking: Reported on 5/9/2019) 90 Each 3   • VITAMIN C CR PO Take  by mouth. OTC VIT C        No current Epic-ordered facility-administered medications on file.        Past Medical History:   Diagnosis Date   • Allergy, unspecified not elsewhere classified    • Arthritis     osteo finger joints lower back   • Cancer (HCC) 1991    Prostate   • Gall stones    • Glaucoma     right eye   • Hyperlipidemia    • Pain     low back   • Seizure (HCC) 06/13/2014    once  on Keppra   • Unspecified cataract     oneal IOL surgery   • Unspecified hemorrhagic conditions     GI Bleed when taking  "Celebrex       Social History     Tobacco Use   • Smoking status: Former Smoker     Packs/day: 0.00     Last attempt to quit: 1964     Years since quittin.9   • Smokeless tobacco: Never Used   Substance Use Topics   • Alcohol use: Yes     Alcohol/week: 4.2 oz     Types: 7 Glasses of wine per week     Comment: one drink per week or less   • Drug use: No       Family Status   Relation Name Status   • Mo          STROKE   • Fa          PROSTATE CANCER   • Sis  Alive   • Sis  Alive   • Neg Hx  (Not Specified)     Family History   Problem Relation Age of Onset   • Heart Disease Mother         STROKE   • Stroke Mother    • Cancer Father    • Arthritis Sister    • Diabetes Neg Hx    • Hypertension Neg Hx    • Hyperlipidemia Neg Hx        ROS:  Review of Systems   Constitutional: Negative for fever, chills, weight loss, malaise, and fatigue.   HENT: Negative for ear pain, nosebleeds, congestion, sore throat and neck pain.    Eyes: Negative for vision changes.   Neuro: Negative for headache, sensory changes, weakness, seizure, LOC.   Cardiovascular: Negative for chest pain, palpitations, orthopnea and leg swelling.   Respiratory: Negative for cough, sputum production, shortness of breath and wheezing.   Gastrointestinal: Negative for abdominal pain, nausea, vomiting or diarrhea.   Genitourinary: Negative for dysuria, urgency and frequency.  Musculoskeletal: Positive for wound to LLE, swelling present, denies worsening or concerns. Negative for falls, neck pain, back pain, joint pain, myalgias.   Skin: Positive for wound LLE. Negative for rash, diaphoresis.     Exam:  /68   Pulse 75   Temp 36.7 °C (98.1 °F) (Temporal)   Resp 16   Ht 1.727 m (5' 8\")   Wt 78.9 kg (174 lb)   SpO2 98%   General: well-nourished, well-developed male in NAD  Head: normocephalic, atraumatic  Eyes: PERRLA, no conjunctival injection, acuity grossly intact, lids normal.  Ears: normal shape and symmetry, no " tenderness, no discharge. External canals are without any significant edema or erythema. Tympanic membranes are without any inflammation, no effusion. Gross auditory acuity is intact.  Nose: symmetrical without tenderness, no discharge.  Mouth/Throat: reasonable hygiene, no erythema, exudates or tonsillar enlargement.  Neck: no masses, range of motion within normal limits, no tracheal deviation. No obvious thyroid enlargement.   Lymph: no cervical adenopathy. No supraclavicular adenopathy.   Neuro: alert and oriented. Cranial nerves 1-12 grossly intact. No sensory deficit.   Cardiovascular: irregular rate and rhythm. No edema.  Pulmonary: no distress. Chest is symmetrical with respiration, no wheezes, crackles, or rhonchi.   Musculoskeletal: no clubbing, appropriate muscle tone, gait is slow but stable with walking boot to left foot.  Skin: warm, no clubbing, no cyanosis, no rashes.   Psych: appropriate mood, affect, judgement.         Assessment/Plan:  1. Cardiac arrhythmia, unspecified cardiac arrhythmia type  EKG    REFERRAL TO CARDIOLOGY    REFERRAL TO FOLLOW-UP WITH PRIMARY CARE   2. Wound of left lower extremity, subsequent encounter  REFERRAL TO FOLLOW-UP WITH PRIMARY CARE       12-lead study EKG interpretation, interpreted by myself.  The rhythm is aflutter with a rate of 116 with RBBB and PVCs. Interpretation: compared to 9/20/19 EKG which also showed atrial flutter, RBBB, and PVCs        The patient is a very pleasant, independent 89 year old who was sent here for evaluation of heart rate. Upon room arrival and exam, his pulse is irregular in the 70-90s. EKG obtained and shows some questionable a-flutter, RBB, and PVCs. His ECG on 9/20/19 is similar. He is completely asymptomatic today and do not see need for ER evaluation at this time. I have reviewed his EKG with him and have given him STRICT ER precautions, he is in agreement. Instructed to go to nearest emergency department for any change in condition,  cardiac symptoms, further concerns. In the meantime, he is given an urgent referral both to cardiology and PCP for follow up as he may require further medication management long term. He is in agreement. Supportive care, differential diagnoses, and indications for immediate follow-up discussed with patient. Patient states understanding of the plan of care and discharge instructions. Previous hospital visit encounter reviewed and considered in medical decision making today.             Please note that this dictation was created using voice recognition software. I have made every reasonable attempt to correct obvious errors, but I expect that there are errors of grammar and possibly content that I did not discover before finalizing the note.      OLEG Chambers.

## 2019-11-25 DIAGNOSIS — E29.1 HYPOGONADISM IN MALE: ICD-10-CM

## 2019-11-25 RX ORDER — TESTOSTERONE GEL, 1% 10 MG/G
50 GEL TRANSDERMAL DAILY
COMMUNITY
End: 2019-11-25 | Stop reason: SDUPTHER

## 2019-11-25 RX ORDER — TESTOSTERONE GEL, 1% 10 MG/G
50 GEL TRANSDERMAL DAILY
Qty: 300 MG | Refills: 3 | Status: SHIPPED | OUTPATIENT
Start: 2019-11-25 | End: 2019-12-25

## 2019-11-25 NOTE — TELEPHONE ENCOUNTER
1. Caller Name: Homa Alonso       Call Back Number: [phone]      Patient approves a detailed voicemail message: yes    I called Mr Alonso to let him know we were unavailable today. He has yet to pick a new provider.  He wants his testosterone refilled.  I asked Mate[facility] and [employer] to please block the 4:40pm slot .

## 2019-11-26 RX ORDER — TESTOSTERONE GEL, 1% 10 MG/G
50 GEL TRANSDERMAL DAILY
OUTPATIENT
Start: 2019-11-26

## 2019-12-04 ENCOUNTER — OFFICE VISIT (OUTPATIENT)
Dept: MEDICAL GROUP | Facility: MEDICAL CENTER | Age: 84
End: 2019-12-04
Payer: COMMERCIAL

## 2019-12-04 VITALS
WEIGHT: 167.11 LBS | SYSTOLIC BLOOD PRESSURE: 118 MMHG | HEART RATE: 72 BPM | HEIGHT: 68 IN | OXYGEN SATURATION: 91 % | DIASTOLIC BLOOD PRESSURE: 62 MMHG | BODY MASS INDEX: 25.33 KG/M2 | TEMPERATURE: 97.8 F

## 2019-12-04 DIAGNOSIS — E78.5 DYSLIPIDEMIA: ICD-10-CM

## 2019-12-04 DIAGNOSIS — E29.1 HYPOGONADISM IN MALE: ICD-10-CM

## 2019-12-04 DIAGNOSIS — S81.812A LACERATION OF LEFT LOWER EXTREMITY, INITIAL ENCOUNTER: ICD-10-CM

## 2019-12-04 DIAGNOSIS — G40.909 SEIZURE DISORDER (HCC): ICD-10-CM

## 2019-12-04 DIAGNOSIS — L03.116 LEFT LEG CELLULITIS: ICD-10-CM

## 2019-12-04 PROCEDURE — 99205 OFFICE O/P NEW HI 60 MIN: CPT | Performed by: INTERNAL MEDICINE

## 2019-12-04 RX ORDER — DOCUSATE SODIUM 100 MG/1
100 CAPSULE, LIQUID FILLED ORAL 2 TIMES DAILY
COMMUNITY
End: 2022-06-22

## 2019-12-04 RX ORDER — CHLORAL HYDRATE 500 MG
1000 CAPSULE ORAL
COMMUNITY
End: 2020-05-18

## 2019-12-04 RX ORDER — ASPIRIN 81 MG/1
81 TABLET, CHEWABLE ORAL DAILY
COMMUNITY
End: 2021-03-17

## 2019-12-04 NOTE — ASSESSMENT & PLAN NOTE
Previous problem that is currently resolved.  He will have surgical evaluation by his cardiologist in 2 days and then will proceed with skin graft by his plastic surgeon.  In the meantime he receives wound care 3 times weekly at home and has a wound VAC in place.  Pain is well controlled and no current signs of infection.

## 2019-12-04 NOTE — ASSESSMENT & PLAN NOTE
Chronic and stable problem.  Okay to continue atorvastatin 20 mg daily.  Continue with annual liver function and cholesterol panels, next due in May 2020.

## 2019-12-04 NOTE — ASSESSMENT & PLAN NOTE
Chronic and stable problem.  Previously evaluated by neurology and EEG suggestive of epileptiform discharges, he was placed on Keppra with no further seizures. Continue Keppra 500 mg twice daily moving forward.

## 2019-12-04 NOTE — ASSESSMENT & PLAN NOTE
Chronic ongoing problem.  He will have surgical evaluation by his cardiologist in 2 days and then will proceed with skin graft by his plastic surgeon.  In the meantime he receives wound care 3 times weekly at home and has a wound VAC in place.  Pain is well controlled and no current signs of infection.
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
## 2019-12-04 NOTE — ASSESSMENT & PLAN NOTE
Chronic and stable problem.  Symptoms well controlled on current testosterone replacement.  Continue to refill as appropriate.

## 2019-12-05 ENCOUNTER — OFFICE VISIT (OUTPATIENT)
Dept: MEDICAL GROUP | Facility: MEDICAL CENTER | Age: 84
End: 2019-12-05
Payer: COMMERCIAL

## 2019-12-05 VITALS
BODY MASS INDEX: 25.63 KG/M2 | TEMPERATURE: 98.6 F | OXYGEN SATURATION: 97 % | DIASTOLIC BLOOD PRESSURE: 60 MMHG | HEIGHT: 68 IN | SYSTOLIC BLOOD PRESSURE: 90 MMHG | HEART RATE: 83 BPM | WEIGHT: 169.09 LBS

## 2019-12-05 DIAGNOSIS — R31.0 GROSS HEMATURIA: ICD-10-CM

## 2019-12-05 DIAGNOSIS — R39.15 URINARY URGENCY: ICD-10-CM

## 2019-12-05 DIAGNOSIS — Z96.0 STATUS POST IMPLANTATION OF ARTIFICIAL URINARY SPHINCTER: ICD-10-CM

## 2019-12-05 DIAGNOSIS — R35.0 URINARY FREQUENCY: ICD-10-CM

## 2019-12-05 PROCEDURE — 99214 OFFICE O/P EST MOD 30 MIN: CPT | Performed by: INTERNAL MEDICINE

## 2019-12-05 RX ORDER — CIPROFLOXACIN 500 MG/1
500 TABLET, FILM COATED ORAL EVERY 12 HOURS
Qty: 14 TAB | Refills: 0 | Status: SHIPPED | OUTPATIENT
Start: 2019-12-05 | End: 2019-12-12

## 2019-12-05 NOTE — PROGRESS NOTES
"  Chief Complaint   Patient presents with   • Blood in Urine     HPI:    Very nice 89 year old retired  who I saw yesterday to establish care, who developed sudden onset of hematuria, frequency, and urgency last night around bedtime (10pm). No prior occurrence. No other systemic symptoms.    Symptom onset: Yesterday evening, December 4th.  Current symptoms: Urgency, frequent voids.  Mild discomfort.  Gross hematuria.    Since onset symptoms are: Unchanged and ongoing  Treatments tried: None  Associated symptoms: Negative for fever, flank pain, nausea and vomiting, penile pain, pelvis pain.  History is negative for frequent UTI. Negative for trauma to the groin/pelvis. Negative for prior kidney stones. He does not have a prostate so no prostatitis. Positive for presence of artificial urinary sphincter with mild urinary leakage at the end of the day normally.     ROS:  Denies fever, chills, vomiting or abdominal pain.     OBJECTIVE:  BP (!) 90/60   Pulse 83   Temp 37 °C (98.6 °F) (Temporal)   Ht 1.727 m (5' 8\")   Wt 76.7 kg (169 lb 1.5 oz)   SpO2 97%   Gen: Alert, NAD.  Chest: Lungs clear to auscultation, CV RRR.  Abdomen: Soft, tender in suprapubic region. No CVAT. Normal bowel sounds.     POC urinalysis could not be completed due to gross hematuria.     ASSESSMENT/PLAN:     1. Abnormal urine dipstick in office. Could not be analyzed due to gross hematuria. Start antibiotics with ciprofloxacin 500 mg twice daily. Hold baby aspirin.  2. Provided education to drink plenty of fluids.   3. Return to clinic if symptoms not improving within 3-4 days or in case of vomiting, fever, increasing pain.  4. Referral to Urology because of prior placement of artificial urinary sphincter in 2010.  5. Will need follow up urinalysis to demonstrate resolution of hematuria.  "

## 2019-12-12 ENCOUNTER — TELEPHONE (OUTPATIENT)
Dept: MEDICAL GROUP | Facility: MEDICAL CENTER | Age: 84
End: 2019-12-12

## 2019-12-12 NOTE — TELEPHONE ENCOUNTER
1. Caller Name: Farrah @ [facility]                                           Call Back Number: [phone] (home)         Patient approves a detailed voicemail message: N\A    Farrah called to report Burnha fell at home yesterday. He is OK  no injuries

## 2019-12-16 ENCOUNTER — TELEPHONE (OUTPATIENT)
Dept: MEDICAL GROUP | Facility: MEDICAL CENTER | Age: 84
End: 2019-12-16

## 2019-12-16 NOTE — TELEPHONE ENCOUNTER
Can you call Homa and see how he is doing? He came in for bloody urine a few weeks ago and I had ordered a urology follow up. He also had a fall at home. Want to make sure he is feeling okay. Thanks! KT   Attending Only

## 2019-12-16 NOTE — TELEPHONE ENCOUNTER
1. Caller Name: Homa Alonso            Call Back Number: [phone] (home)         Patient approves a detailed voicemail message: yes    Spoke with Homa  regarding referral to [facility] [place]  He is to call them and make an appointment.

## 2020-01-09 ENCOUNTER — TELEPHONE (OUTPATIENT)
Dept: MEDICAL GROUP | Facility: MEDICAL CENTER | Age: 85
End: 2020-01-09

## 2020-01-09 DIAGNOSIS — L03.116 LEFT LEG CELLULITIS: ICD-10-CM

## 2020-01-09 DIAGNOSIS — S81.812A LACERATION OF LEFT LOWER EXTREMITY, INITIAL ENCOUNTER: ICD-10-CM

## 2020-01-14 ENCOUNTER — TELEPHONE (OUTPATIENT)
Dept: MEDICAL GROUP | Facility: MEDICAL CENTER | Age: 85
End: 2020-01-14

## 2020-01-14 NOTE — TELEPHONE ENCOUNTER
1. Caller Name: Homa Alonso      Call Back Number:[phone] (home)         Patient approves a detailed voicemail message: yes    Called Homa Yan came and changed bandage  Pumping air to bandage   Farrah comes every two or three days Suzy will be in on Thursday [facility]     Gave [facility] number [phone]

## 2020-01-20 ENCOUNTER — TELEPHONE (OUTPATIENT)
Dept: MEDICAL GROUP | Facility: MEDICAL CENTER | Age: 85
End: 2020-01-20

## 2020-01-20 NOTE — TELEPHONE ENCOUNTER
1. Caller Name: Homa Alonso     Call Back Number: [phone] (home)         Patient approves a detailed voicemail message: yes    Spoke with Homa    Was told to take wound vac off last week. Still has it bandage.    Small blood in urine  was on anitbiotics  all good  had a second  urine  other DR ordered CT for tomorrow   Farrah was supposed to be there around 2:30pm   hasn't shown up yet.

## 2020-02-04 ENCOUNTER — TELEPHONE (OUTPATIENT)
Dept: MEDICAL GROUP | Facility: MEDICAL CENTER | Age: 85
End: 2020-02-04

## 2020-02-04 DIAGNOSIS — L03.116 LEFT LEG CELLULITIS: ICD-10-CM

## 2020-02-04 DIAGNOSIS — S81.812A LACERATION OF LEFT LOWER EXTREMITY, INITIAL ENCOUNTER: ICD-10-CM

## 2020-02-04 NOTE — TELEPHONE ENCOUNTER
1. Caller Name: Farrah CALLES  [facility]                        Call Back Number: [phone]      How would the patient prefer to be contacted with a response: Phone call OK to leave a detailed message    I called Farrah back at [facility] appartently refused to go to [facility]  He wants to go to [facility]..Please do a new referral to Wound Care  or PT for Lamar

## 2020-02-18 ENCOUNTER — TELEPHONE (OUTPATIENT)
Dept: MEDICAL GROUP | Facility: MEDICAL CENTER | Age: 85
End: 2020-02-18

## 2020-02-18 NOTE — TELEPHONE ENCOUNTER
1. Caller Name: Farrah CALLES with [facility]                         Call Back Number: [phone]      How would the patient prefer to be contacted with a response: Phone call OK to leave a detailed message    Farrah CALLES with [facility] called to say Homa had a fall over the weekend... He has a scab on his right hand.

## 2020-05-18 ENCOUNTER — OFFICE VISIT (OUTPATIENT)
Dept: MEDICAL GROUP | Facility: MEDICAL CENTER | Age: 85
End: 2020-05-18
Payer: COMMERCIAL

## 2020-05-18 VITALS
WEIGHT: 166 LBS | HEIGHT: 68 IN | TEMPERATURE: 97.8 F | DIASTOLIC BLOOD PRESSURE: 66 MMHG | OXYGEN SATURATION: 92 % | BODY MASS INDEX: 25.16 KG/M2 | HEART RATE: 47 BPM | SYSTOLIC BLOOD PRESSURE: 114 MMHG

## 2020-05-18 DIAGNOSIS — R73.01 IMPAIRED FASTING GLUCOSE: ICD-10-CM

## 2020-05-18 DIAGNOSIS — Z90.79 H/O RADICAL PROSTATECTOMY: ICD-10-CM

## 2020-05-18 DIAGNOSIS — Z00.00 ENCOUNTER FOR MEDICARE ANNUAL WELLNESS EXAM: Primary | ICD-10-CM

## 2020-05-18 DIAGNOSIS — E29.1 HYPOGONADISM IN MALE: ICD-10-CM

## 2020-05-18 DIAGNOSIS — E78.5 DYSLIPIDEMIA: ICD-10-CM

## 2020-05-18 DIAGNOSIS — Z51.81 ENCOUNTER FOR THERAPEUTIC DRUG MONITORING: ICD-10-CM

## 2020-05-18 DIAGNOSIS — D64.9 NORMOCYTIC ANEMIA: ICD-10-CM

## 2020-05-18 DIAGNOSIS — G40.909 SEIZURE DISORDER (HCC): ICD-10-CM

## 2020-05-18 PROBLEM — N18.30 CKD (CHRONIC KIDNEY DISEASE) STAGE 3, GFR 30-59 ML/MIN: Status: ACTIVE | Noted: 2020-05-18

## 2020-05-18 PROCEDURE — G0439 PPPS, SUBSEQ VISIT: HCPCS | Performed by: INTERNAL MEDICINE

## 2020-05-18 RX ORDER — APIXABAN 2.5 MG/1
2.5 TABLET, FILM COATED ORAL
COMMUNITY
Start: 2020-04-21 | End: 2021-12-21

## 2020-05-18 RX ORDER — LATANOPROST 50 UG/ML
1 SOLUTION/ DROPS OPHTHALMIC DAILY
COMMUNITY
End: 2021-03-17

## 2020-05-18 RX ORDER — TESTOSTERONE GEL, 1% 10 MG/G
GEL TRANSDERMAL
COMMUNITY
End: 2020-06-25

## 2020-05-18 ASSESSMENT — ENCOUNTER SYMPTOMS: GENERAL WELL-BEING: EXCELLENT

## 2020-05-18 ASSESSMENT — PATIENT HEALTH QUESTIONNAIRE - PHQ9: CLINICAL INTERPRETATION OF PHQ2 SCORE: 0

## 2020-05-18 ASSESSMENT — ACTIVITIES OF DAILY LIVING (ADL): BATHING_REQUIRES_ASSISTANCE: 0

## 2020-05-18 ASSESSMENT — FIBROSIS 4 INDEX: FIB4 SCORE: 1.59

## 2020-05-18 NOTE — PATIENT INSTRUCTIONS
Check on the cost of the Shingles vaccine, it's a 2 shot series, what is the price per shot. Do you have to have a doctor visit when it is administered or can it be a medical assistant visit. Price can vary from full coverage up to $271 per shot.

## 2020-05-18 NOTE — PROGRESS NOTES
Chief Complaint   Patient presents with   • Annual Wellness Visit       HPI:  Homa is a 89 y.o. here for Medicare Annual Wellness Visit    Problem   (HCC) Seizure disorder    Noted in June 2014.  Witnessed by his wife when he had full body convulsions.  He presented to the ER where he was postictal.  He followed up with neurology who performed an EEG and thought that there was epileptiform activity.  He was started on Keppra which he has maintained since that time.  He did try to go off of it once but notes he had recurrence of seizure activity and has gone back on it.  He is able to drive at this time because he has been so well controlled.  No subsequent seizures. He previously saw Dr. Valadez, currently not following with a neurologist.     Normocytic Anemia       Ref. Range 10/6/2019 03:40   Hemoglobin Latest Ref Range: 14.0 - 18.0 g/dL 12.1 (L)   Hematocrit Latest Ref Range: 42.0 - 52.0 % 39.2 (L)   MCV Latest Ref Range: 81.4 - 97.8 fL 90.3     He had developed normocytic anemia since he had a wound on the left lower extremity requiring wound vac treatment with dressing changes and lab work frequently. No follow up on blood counts since October 2019. He is also on Eliquis.      Encounter for Therapeutic Drug Monitoring    He is on Keppra chronically due to prior history of seizure disorder. Last check 1 year ago showed him to be mildly insufficient with his dosing. Previously worked with Dr. Valadez but he has not seen a neurologist in recent time.     Impaired Fasting Glucose       Ref. Range 9/28/2019 04:16 9/29/2019 02:47 10/6/2019 03:40   Glucose Latest Ref Range: 65 - 99 mg/dL 126 (H) 128 (H) 107 (H)     On review of his lab work he has several elevated fasting blood sugars. No known DM in the past.      H/O Radical Prostatectomy    He reports prior history of total prostatectomy after localized prostate cancer was identified. He unfortunately developed symptoms of hypogonadism due to low  testosterone in the form of profound fatigue and erectile dysfunction. He has significant improvement of his symptoms on Androgel, which he has been using since 2009.     Hypogonadism in Male       Ref. Range 5/1/2019 10:50   Free Testosterone Latest Ref Range: 6.6 - 18.1 pg/mL 7.2   Testosterone,Total Latest Ref Range: 264 - 916 ng/dL 297       He reports radical prostatectomy 1991 following discovery of localized prostate cancer.  Unfortunately, he had complications of both erectile dysfunction as well as urinary incontinence.  He had an artificial sphincter placed around 2009 which has continued to work well.  He notes profound fatigue following his prostatectomy was found to have low testosterone levels and has been using AndroGel since about 2009 with great success.     Dyslipidemia       Ref. Range 5/1/2019 10:50   Cholesterol,Tot Latest Ref Range: 100 - 199 mg/dL 132   Triglycerides Latest Ref Range: 0 - 149 mg/dL 139   HDL Latest Ref Range: >39 mg/dL 51   LDL Latest Ref Range: 0 - 99 mg/dL 53       He reports chronic use of atorvastatin 20 mg daily for history of elevated cholesterol panel.  He denies any myalgias or GI upset on this medication.  He is tolerating without difficulty.  He denies history of heart attack or stroke in the past.         Patient Active Problem List    Diagnosis Date Noted   • Arrhythmia 09/20/2019     Priority: High   • Chronic bilateral low back pain 05/18/2015     Priority: High   • (HCC) SVT (supraventricular tachycardia) 05/21/2015     Priority: Medium   • (HCC) Seizure disorder 05/21/2015     Priority: Low   • Normocytic anemia 05/18/2020   • Encounter for therapeutic drug monitoring 05/18/2020   • Impaired fasting glucose 05/18/2020   • Status post implantation of artificial urinary sphincter 12/05/2019   • Gross hematuria 12/05/2019   • Left leg cellulitis 09/23/2019   • Displaced fracture of medial malleolus of left tibia, initial encounter for closed fracture 09/23/2019    • Leg laceration 09/20/2019   • Bilateral carotid bruits 08/06/2019   • Nonrheumatic aortic valve insufficiency 05/14/2019   • H/O radical prostatectomy 02/27/2019   • Hypogonadism in male 11/22/2016   • H/O prostate cancer 11/22/2016   • Glaucoma 11/22/2016   • Dyslipidemia 07/21/2011       Current Outpatient Medications   Medication Sig Dispense Refill   • levETIRAcetam (KEPPRA) 500 MG Tab Take 1 tablet by mouth twice daily 180 Tab 3   • aspirin (ASA) 81 MG Chew Tab chewable tablet Take 81 mg by mouth every day.     • Multiple Vitamins-Minerals (ICAPS AREDS 2) Chew Tab Take  by mouth.     • docusate sodium (COLACE) 100 MG Cap Take 100 mg by mouth 2 times a day.     • acetaminophen (TYLENOL) 325 MG Tab Take 2 Tabs by mouth every 6 hours as needed (Mild Pain; (Pain scale 1-3); Temp greater than 100.5 F). 30 Tab 0   • senna-docusate (PERICOLACE OR SENOKOT S) 8.6-50 MG Tab Take 2 Tabs by mouth 2 Times a Day. 30 Tab 0   • atorvastatin (LIPITOR) 20 MG Tab TAKE 1 TABLET BY MOUTH ONCE DAILY 90 Tab 2   • B Complex Vitamins (B COMPLEX B-12 PO) Take  by mouth.     • Omega-3 Fatty Acids (FISH OIL) 1000 MG Cap capsule Take 1,000 mg by mouth 3 times a day, with meals.     • multivitamin (THERAGRAN) Tab Take 1 Tab by mouth every day.     • Cholecalciferol (VITAMIN D3) 2000 UNIT CAPS Take  by mouth.     • CALCIUM CITRATE PO Take 500 mg by mouth every day.     • MAGNESIUM CITRATE PO Take 250 mg by mouth every day.     • bimatoprost (LUMIGAN) 0.01 % SOLN Place 1 Drop in right eye every bedtime.     • VITAMIN C CR PO Take  by mouth. OTC VIT C      • latanoprost (XALATAN) 0.005 % Solution Apply 1 Drop to affected area(s) every day.     • testosterone (TESTIM/ANDROGEL) 50 MG/5GM (1%) Gel gel testosterone 1 % (50 mg/5 gram) transdermal gel packet   Apply 1 packet every day by transdermal route.     • metoprolol (LOPRESSOR) 25 MG Tab Take 25 mg by mouth.     • ELIQUIS 2.5 MG Tab Take 2.5 mg by mouth.       No current  facility-administered medications for this visit.         Patient is taking medications as noted in medication list.  Current supplements as per medication list.     Allergies: Pcn [penicillins]    Current social contact/activities: Patient reports spending a lot of time on the computer     Is patient current with immunizations? No, due for SHINGRIX (Shingles). Patient is interested in receiving SHINGRIX (Shingles) today.    He  reports that he quit smoking about 56 years ago. He smoked 0.00 packs per day. He has never used smokeless tobacco. He reports current alcohol use of about 4.2 oz of alcohol per week. He reports that he does not use drugs.  Counseling given: Not Answered  Comment: continued abstinence        DPA/Advanced directive: Patient has Advanced Directive, but it is not on file. Instructed to bring in a copy to scan into their chart.    ROS:    Gait: Uses no assistive device   Ostomy: No  Other tubes: No   Amputations: No   Chronic oxygen use No   Last eye exam Patient reports October 2019   Wears hearing aids: No   : Reports urinary leakage during the last 6 months that has not interfered at all with their daily activities or sleep.    Depression Screening    Little interest or pleasure in doing things?  0 - not at all  Feeling down, depressed, or hopeless? 0 - not at all  Patient Health Questionnaire Score: 0    If depressive symptoms identified deferred to follow up visit unless specifically addressed in assessment and plan.    Interpretation of PHQ-9 Total Score   Score Severity   1-4 No Depression   5-9 Mild Depression   10-14 Moderate Depression   15-19 Moderately Severe Depression   20-27 Severe Depression    Screening for Cognitive Impairment    Three Minute Recall (village, kitchen, baby)  2/3    Wander clock face with all 12 numbers and set the hands to show 10 past 10.  No 3/5  If cognitive concerns identified, deferred for follow up unless specifically addressed in assessment and  plan.    Fall Risk Assessment    Has the patient had two or more falls in the last year or any fall with injury in the last year?  No  If fall risk identified, deferred for follow up unless specifically addressed in assessment and plan.    Safety Assessment    Throw rugs on floor.  Yes  Handrails on all stairs.  Yes  Good lighting in all hallways.  Yes  Difficulty hearing.  No  Patient counseled about all safety risks that were identified.    Functional Assessment ADLs    Are there any barriers preventing you from cooking for yourself or meeting nutritional needs?  No.    Are there any barriers preventing you from driving safely or obtaining transportation?  No.    Are there any barriers preventing you from using a telephone or calling for help?  No.    Are there any barriers preventing you from shopping?  No.    Are there any barriers preventing you from taking care of your own finances?  No.    Are there any barriers preventing you from managing your medications?  No.    Are there any barriers preventing you from showering, bathing or dressing yourself?  No.    Are you currently engaging in any exercise or physical activity?  Yes.  Patient reports walking daily  What is your perception of your health?  Excellent.    Health Maintenance Summary                IMM ZOSTER VACCINES Overdue 4/9/1977     Annual Wellness Visit Overdue 5/14/2020      Done 5/14/2019 SUBSEQUENT ANNUAL WELLNESS VISIT-INCLUDES PPPS ()     Patient has more history with this topic...    COLONOSCOPY Next Due 11/22/2026      Done 11/22/2016     IMM DTaP/Tdap/Td Vaccine Next Due 9/11/2029      Done 9/11/2019 Imm Admin: Tdap Vaccine          Patient Care Team:  Sruthi Kincaid D.O. as PCP - General (Internal Medicine)  Ankush Valadez M.D. as Consulting Physician (Neurology)  Brisa Nowak M.D. as Consulting Physician (Ophthalmology)  Colin Garza M.D. as Consulting Physician (Cardiology)  Fabio Castro D.D.S. as  "Consulting Physician (Dentistry)    Social History     Tobacco Use   • Smoking status: Former Smoker     Packs/day: 0.00     Last attempt to quit: 1964     Years since quittin.4   • Smokeless tobacco: Never Used   • Tobacco comment: continued abstinence   Substance Use Topics   • Alcohol use: Yes     Alcohol/week: 4.2 oz     Types: 7 Glasses of wine per week     Comment: one drink per week or less   • Drug use: No     Family History   Problem Relation Age of Onset   • Heart Disease Mother         STROKE   • Stroke Mother    • Cancer Father    • Arthritis Sister    • Diabetes Neg Hx    • Hypertension Neg Hx    • Hyperlipidemia Neg Hx      He  has a past medical history of Allergy, unspecified not elsewhere classified, Arthritis, Cancer (HCC) (), Gall stones, Glaucoma, Hyperlipidemia, Pain, Seizure (HCC) (2014), Unspecified cataract, and Unspecified hemorrhagic conditions. He also has no past medical history of Encounter for long-term (current) use of other medications.   Past Surgical History:   Procedure Laterality Date   • LUMBAR FUSION O-ARM Bilateral 2015    Procedure: LUMBAR FUSION O-ARM [83.10B] L3-5;  Surgeon: Francesco Joe M.D.;  Location: [facility];  Service:    • LUMBAR DECOMPRESSION  2015    Procedure: LUMBAR DECOMPRESSION;  Surgeon: Francesco Joe M.D.;  Location: [facility];  Service:    • CHRISTIE BY LAPAROSCOPY  12/3/2013    Performed by Braden Garner M.D. at [facility]   • APPENDECTOMY     • ARTHROPLASTY     • EYE SURGERY     • KNEE REPLACEMENT, TOTAL Bilateral    • OTHER      artificial urinary spincter   • OTHER ORTHOPEDIC SURGERY     • PROSTATECTOMY, RADICAL RETRO             Exam:     /66   Pulse (!) 47   Temp 36.6 °C (97.8 °F) (Temporal)   Ht 1.727 m (5' 8\")   Wt 75.3 kg (166 lb)   SpO2 92%  Body mass index is 25.24 kg/m².    Hearing fair.    Dentition fair  Alert, oriented in no acute distress.  Eye contact is " good, speech goal directed, affect calm      Assessment and Plan. The following treatment and monitoring plan is recommended:    1. Encounter for Medicare annual wellness exam  latanoprost (XALATAN) 0.005 % Solution    testosterone (TESTIM/ANDROGEL) 50 MG/5GM (1%) Gel gel    metoprolol (LOPRESSOR) 25 MG Tab    ELIQUIS 2.5 MG Tab    CBC WITH DIFFERENTIAL    FERRITIN    IRON/TOTAL IRON BIND    Comp Metabolic Panel    Lipid Profile    LEVETIRACETAM (KEPPRA), S    HEMOGLOBIN A1C    TESTOSTERONE F&T MALE ADULT   2. Normocytic anemia  CBC WITH DIFFERENTIAL    FERRITIN    IRON/TOTAL IRON BIND   3. (HCC) Seizure disorder     4. Encounter for therapeutic drug monitoring  LEVETIRACETAM (KEPPRA), S   5. Dyslipidemia  Comp Metabolic Panel    Lipid Profile   6. Impaired fasting glucose  HEMOGLOBIN A1C   7. Hypogonadism in male  TESTOSTERONE F&T MALE ADULT   8. H/O radical prostatectomy  TESTOSTERONE F&T MALE ADULT     Dyslipidemia  Chronic and stable problem. Recheck lipid panel and continue atorvastatin 20 mg daily.    Hypogonadism in male  Chronic and stable problem. Energy levels significantly improved while on Androgel. His testosterone values are still borderline low but he is feeling well. Will update his hormone levels to ensure he has not dropped into an insufficient range. He voiced understanding.     H/O radical prostatectomy  Previous problem, stable on Androgel replacement. Will check testosterone levels to ensure appropriate dosing.    Normocytic anemia  Previous problem that requires follow up. Check iron/TIBC, ferritin, and CBC to see if his counts have recovered now that he is off the wound vac.    Encounter for therapeutic drug monitoring  Previous problem that requires follow up. Check Keppra level to ensure safe dosage and appropriate levels.    Impaired fasting glucose  New problem that requires additional evaluation. Incidental finding on lab work. Will check A1c to ensure no preDM or overt DM.    (HCC)  Seizure disorder  Chronic and stable problem. Will obtain Keppra level to ensure appropriate dosing.      Services suggested: No services needed at this time He completed home health care in April 2020.  Health Care Screening recommendations as per orders if indicated.  Referrals offered: PT/OT/Nutrition counseling/Behavioral Health/Smoking cessation as per orders if indicated.    Discussion today about general wellness and lifestyle habits:    · Prevent falls and reduce trip hazards; Cautioned about securing or removing rugs.  · Have a working fire alarm and carbon monoxide detector;   · Engage in regular physical activity and social activities       Follow-up: Return in about 1 year (around 5/18/2021).

## 2020-05-18 NOTE — ASSESSMENT & PLAN NOTE
Previous problem, stable on Androgel replacement. Will check testosterone levels to ensure appropriate dosing.

## 2020-05-18 NOTE — ASSESSMENT & PLAN NOTE
Chronic and stable problem. Energy levels significantly improved while on Androgel. His testosterone values are still borderline low but he is feeling well. Will update his hormone levels to ensure he has not dropped into an insufficient range. He voiced understanding.

## 2020-05-18 NOTE — ASSESSMENT & PLAN NOTE
Previous problem that requires follow up. Check iron/TIBC, ferritin, and CBC to see if his counts have recovered now that he is off the wound vac.

## 2020-05-18 NOTE — ASSESSMENT & PLAN NOTE
New problem that requires additional evaluation. Incidental finding on lab work. Will check A1c to ensure no preDM or overt DM.

## 2020-05-18 NOTE — ASSESSMENT & PLAN NOTE
Previous problem that requires follow up. Check Keppra level to ensure safe dosage and appropriate levels.

## 2020-06-26 DIAGNOSIS — Z00.00 ENCOUNTER FOR MEDICARE ANNUAL WELLNESS EXAM: ICD-10-CM

## 2020-08-26 ENCOUNTER — HOSPITAL ENCOUNTER (OUTPATIENT)
Dept: LAB | Facility: MEDICAL CENTER | Age: 85
End: 2020-08-26
Attending: INTERNAL MEDICINE
Payer: COMMERCIAL

## 2020-08-26 DIAGNOSIS — D64.9 NORMOCYTIC ANEMIA: ICD-10-CM

## 2020-08-26 DIAGNOSIS — Z90.79 H/O RADICAL PROSTATECTOMY: ICD-10-CM

## 2020-08-26 DIAGNOSIS — Z00.00 ENCOUNTER FOR MEDICARE ANNUAL WELLNESS EXAM: ICD-10-CM

## 2020-08-26 DIAGNOSIS — E29.1 HYPOGONADISM IN MALE: ICD-10-CM

## 2020-08-26 DIAGNOSIS — E78.5 DYSLIPIDEMIA: ICD-10-CM

## 2020-08-26 DIAGNOSIS — R73.01 IMPAIRED FASTING GLUCOSE: ICD-10-CM

## 2020-08-29 LAB
ALBUMIN SERPL-MCNC: 4.3 G/DL (ref 3.5–4.6)
ALBUMIN/GLOB SERPL: 1.3 {RATIO} (ref 1.2–2.2)
ALP SERPL-CCNC: 82 IU/L (ref 39–117)
ALT SERPL-CCNC: 25 IU/L (ref 0–44)
AST SERPL-CCNC: 32 IU/L (ref 0–40)
BASOPHILS # BLD AUTO: 0 X10E3/UL (ref 0–0.2)
BASOPHILS NFR BLD AUTO: 0 %
BILIRUB SERPL-MCNC: 1 MG/DL (ref 0–1.2)
BUN SERPL-MCNC: 23 MG/DL (ref 10–36)
BUN/CREAT SERPL: 21 (ref 10–24)
CALCIUM SERPL-MCNC: 9.3 MG/DL (ref 8.6–10.2)
CHLORIDE SERPL-SCNC: 102 MMOL/L (ref 96–106)
CHOLEST SERPL-MCNC: 154 MG/DL (ref 100–199)
CO2 SERPL-SCNC: 22 MMOL/L (ref 20–29)
CREAT SERPL-MCNC: 1.1 MG/DL (ref 0.76–1.27)
EOSINOPHIL # BLD AUTO: 0.1 X10E3/UL (ref 0–0.4)
EOSINOPHIL NFR BLD AUTO: 1 %
ERYTHROCYTE [DISTWIDTH] IN BLOOD BY AUTOMATED COUNT: 14.9 % (ref 11.6–15.4)
FERRITIN SERPL-MCNC: 72 NG/ML (ref 30–400)
GLOBULIN SER CALC-MCNC: 3.3 G/DL (ref 1.5–4.5)
GLUCOSE SERPL-MCNC: 98 MG/DL (ref 65–99)
HBA1C MFR BLD: 5.9 % (ref 4.8–5.6)
HCT VFR BLD AUTO: 49.9 % (ref 37.5–51)
HDLC SERPL-MCNC: 52 MG/DL
HGB BLD-MCNC: 16.3 G/DL (ref 13–17.7)
IMM GRANULOCYTES # BLD AUTO: 0 X10E3/UL (ref 0–0.1)
IMM GRANULOCYTES NFR BLD AUTO: 0 %
IMMATURE CELLS  115398: NORMAL
IRON SATN MFR SERPL: 50 % (ref 15–55)
IRON SERPL-MCNC: 161 UG/DL (ref 38–169)
LABORATORY COMMENT REPORT: ABNORMAL
LDLC SERPL CALC-MCNC: 66 MG/DL (ref 0–99)
LEVETIRACETAM SERPL-MCNC: 5.5 UG/ML (ref 10–40)
LYMPHOCYTES # BLD AUTO: 2.2 X10E3/UL (ref 0.7–3.1)
LYMPHOCYTES NFR BLD AUTO: 30 %
MCH RBC QN AUTO: 29.5 PG (ref 26.6–33)
MCHC RBC AUTO-ENTMCNC: 32.7 G/DL (ref 31.5–35.7)
MCV RBC AUTO: 90 FL (ref 79–97)
MONOCYTES # BLD AUTO: 0.8 X10E3/UL (ref 0.1–0.9)
MONOCYTES NFR BLD AUTO: 11 %
MORPHOLOGY BLD-IMP: NORMAL
NEUTROPHILS # BLD AUTO: 4.3 X10E3/UL (ref 1.4–7)
NEUTROPHILS NFR BLD AUTO: 58 %
NRBC BLD AUTO-RTO: NORMAL %
PLATELET # BLD AUTO: 175 X10E3/UL (ref 150–450)
POTASSIUM SERPL-SCNC: 4.4 MMOL/L (ref 3.5–5.2)
PROT SERPL-MCNC: 7.6 G/DL (ref 6–8.5)
RBC # BLD AUTO: 5.52 X10E6/UL (ref 4.14–5.8)
SODIUM SERPL-SCNC: 139 MMOL/L (ref 134–144)
TESTOST FREE SERPL-MCNC: 1.4 PG/ML (ref 6.6–18.1)
TESTOST SERPL-MCNC: 18 NG/DL (ref 264–916)
TIBC SERPL-MCNC: 321 UG/DL (ref 250–450)
TRIGL SERPL-MCNC: 178 MG/DL (ref 0–149)
UIBC SERPL-MCNC: 160 UG/DL (ref 111–343)
VLDLC SERPL CALC-MCNC: 36 MG/DL (ref 5–40)
WBC # BLD AUTO: 7.5 X10E3/UL (ref 3.4–10.8)

## 2020-08-31 NOTE — RESULT ENCOUNTER NOTE
Dillon Luther,    I have your lab results.    Blood counts have improved, no more anemia and remaining values are stable.  Metabolic panel shows mild decrease in kidney function with remaining electrolytes and liver levels in the normal range.  Cholesterol panel shows slight increase in triglycerides; total, good, and bad cholesterol values are stable.  Iron levels are normal.  Testosterone and Keppra levels are low.    Let me know if you have follow up questions or concerns for me.    Thanks,  Dr. Kincaid

## 2020-09-24 RX ORDER — BIMATOPROST 0.1 MG/ML
1 SOLUTION/ DROPS OPHTHALMIC
Qty: 30 ML | Refills: 2 | Status: SHIPPED | OUTPATIENT
Start: 2020-09-24 | End: 2022-03-07

## 2020-09-25 ENCOUNTER — HOSPITAL ENCOUNTER (EMERGENCY)
Facility: MEDICAL CENTER | Age: 85
End: 2020-09-25
Attending: EMERGENCY MEDICINE
Payer: COMMERCIAL

## 2020-09-25 VITALS
RESPIRATION RATE: 16 BRPM | HEART RATE: 44 BPM | DIASTOLIC BLOOD PRESSURE: 79 MMHG | SYSTOLIC BLOOD PRESSURE: 125 MMHG | TEMPERATURE: 97.4 F | OXYGEN SATURATION: 98 % | BODY MASS INDEX: 25.24 KG/M2 | HEIGHT: 68 IN
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
## 2020-09-25 DIAGNOSIS — S81.812A LACERATION OF LEFT LOWER EXTREMITY, INITIAL ENCOUNTER: ICD-10-CM

## 2020-09-25 PROCEDURE — 303747 HCHG EXTRA SUTURE

## 2020-09-25 PROCEDURE — 99284 EMERGENCY DEPT VISIT MOD MDM: CPT

## 2020-09-25 PROCEDURE — 304999 HCHG REPAIR-SIMPLE/INTERMED LEVEL 1

## 2020-09-25 PROCEDURE — 700101 HCHG RX REV CODE 250: Performed by: EMERGENCY MEDICINE

## 2020-09-25 PROCEDURE — 304217 HCHG IRRIGATION SYSTEM

## 2020-09-25 RX ORDER — LIDOCAINE HYDROCHLORIDE AND EPINEPHRINE 10; 10 MG/ML; UG/ML
4 INJECTION, SOLUTION INFILTRATION; PERINEURAL ONCE
Status: COMPLETED | OUTPATIENT
Start: 2020-09-25 | End: 2020-09-25

## 2020-09-25 RX ADMIN — LIDOCAINE HYDROCHLORIDE AND EPINEPHRINE 4 ML: 10; 10 INJECTION, SOLUTION INFILTRATION; PERINEURAL at 17:45
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
## 2020-09-26 NOTE — ED NOTES
Dc instructions discussed with patient at bedside. All questions answered at this time. VSS. No IV in place at this time. Pt to lobby without incident. uber/cab to drive patient home.

## 2020-10-26 ENCOUNTER — TELEPHONE (OUTPATIENT)
Dept: MEDICAL GROUP | Facility: MEDICAL CENTER | Age: 85
End: 2020-10-26

## 2020-10-26 NOTE — TELEPHONE ENCOUNTER
1. Caller Name: Homa Alonso                          Call Back Number: [phone] (home)         How would the patient prefer to be contacted with a response: Phone call OK to leave a detailed message    Left a message for Homa to call me back.  He never got a hold of us about the sutures which should have come out 20 days ago.  Asked him to please call back.  I tried the other phone number and it rang 1 time and then nothing

## 2020-10-27 ENCOUNTER — NON-PROVIDER VISIT (OUTPATIENT)
Dept: MEDICAL GROUP | Facility: MEDICAL CENTER | Age: 85
End: 2020-10-27
Payer: COMMERCIAL

## 2020-10-27 NOTE — NON-PROVIDER
Homa Alonso is a 89 y.o. male here for a non-provider visit for suture removal  6 stitches removed a month after insertion.  Patient misunderstood directions to have removed 7-10 days after.      If abnormal was an in office provider notified today (if so, indicate provider)? Yes  Routed to PCP? Yes

## 2020-12-09 ENCOUNTER — OFFICE VISIT (OUTPATIENT)
Dept: MEDICAL GROUP | Facility: MEDICAL CENTER | Age: 85
End: 2020-12-09
Payer: COMMERCIAL

## 2020-12-09 VITALS
WEIGHT: 170.86 LBS | HEART RATE: 58 BPM | BODY MASS INDEX: 25.89 KG/M2 | OXYGEN SATURATION: 100 % | SYSTOLIC BLOOD PRESSURE: 122 MMHG | HEIGHT: 68 IN | TEMPERATURE: 97.8 F | DIASTOLIC BLOOD PRESSURE: 72 MMHG | RESPIRATION RATE: 16 BRPM

## 2020-12-09 DIAGNOSIS — B35.1 ONYCHOMYCOSIS: ICD-10-CM

## 2020-12-09 PROCEDURE — 99213 OFFICE O/P EST LOW 20 MIN: CPT | Performed by: INTERNAL MEDICINE

## 2020-12-09 RX ORDER — CLINDAMYCIN HYDROCHLORIDE 300 MG/1
600 CAPSULE ORAL ONCE
Status: ON HOLD | COMMUNITY
Start: 2020-11-17 | End: 2022-09-07

## 2020-12-09 RX ORDER — TESTOSTERONE GEL, 1% 10 MG/G
GEL TRANSDERMAL
COMMUNITY
Start: 2020-11-25 | End: 2020-12-30

## 2020-12-09 ASSESSMENT — FIBROSIS 4 INDEX: FIB4 SCORE: 3.4

## 2020-12-10 NOTE — PROGRESS NOTES
Subjective:   Chief Complaint/History of Present Illness:  Homa Alonso is a 89 y.o. male established patient who presents today to discuss medical problems as listed below. He is unaccompanied for today's visit.    Problem   Onychomycosis    He reports increasing difficulty with toenail care.  He is 89 years old and has history of onychomycosis of all of his toenails and now notes that the nails are becoming more sharp and catching on his socks.  He has some associated lower extremity edema with venous stasis dermatitis as well as just physical debility with being able to reach his feet.  He has not seen a podiatrist yet for this problem.  His wife was previously helping him but she is also older and they would appreciate referral to podiatry to assist with foot and nail care moving forward.  He has history of prediabetes as well as cellulitis and injuries to the lower extremities requiring wound VAC placement in the past.          Additional History:   Allergies:    Pcn [penicillins]     Current Medications:     Current Outpatient Medications   Medication Sig Dispense Refill   • bimatoprost (LUMIGAN) 0.01 % Solution Place 1 Drop in right eye every bedtime. 30 mL 2   • atorvastatin (LIPITOR) 20 MG Tab Take 1 tablet by mouth once daily 90 Tab 3   • metoprolol (LOPRESSOR) 25 MG Tab Take 1 Tab by mouth 2 times a day. 180 Tab 3   • latanoprost (XALATAN) 0.005 % Solution Apply 1 Drop to affected area(s) every day.     • ELIQUIS 2.5 MG Tab Take 2.5 mg by mouth.     • levETIRAcetam (KEPPRA) 500 MG Tab Take 1 tablet by mouth twice daily 180 Tab 3   • Multiple Vitamins-Minerals (ICAPS AREDS 2) Chew Tab Take  by mouth.     • acetaminophen (TYLENOL) 325 MG Tab Take 2 Tabs by mouth every 6 hours as needed (Mild Pain; (Pain scale 1-3); Temp greater than 100.5 F). 30 Tab 0   • B Complex Vitamins (B COMPLEX B-12 PO) Take  by mouth.     • Omega-3 Fatty Acids (FISH OIL) 1000 MG Cap capsule Take 1,000 mg by mouth 3 times a day,  "with meals.     • multivitamin (THERAGRAN) Tab Take 1 Tab by mouth every day.     • Cholecalciferol (VITAMIN D3) 2000 UNIT CAPS Take  by mouth.     • CALCIUM CITRATE PO Take 500 mg by mouth every day.     • MAGNESIUM CITRATE PO Take 250 mg by mouth every day.     • VITAMIN C CR PO Take  by mouth. OTC VIT C      • testosterone (TESTIM/ANDROGEL) 50 MG/5GM (1%) Gel gel APPLY 50MG TOPICALLY AS DIRECTED ONCE DAILY FOR 30 DAYS     • clindamycin (CLEOCIN) 300 MG Cap TAKE 2 CAPS BY MOUTH 1 HOUR PRIOR TO DENTAL APPOINTMENT     • aspirin (ASA) 81 MG Chew Tab chewable tablet Take 81 mg by mouth every day.     • docusate sodium (COLACE) 100 MG Cap Take 100 mg by mouth 2 times a day.       No current facility-administered medications for this visit.         Social History:     Social History     Tobacco Use   • Smoking status: Former Smoker     Packs/day: 0.00     Quit date: 1964     Years since quittin.9   • Smokeless tobacco: Never Used   • Tobacco comment: continued abstinence   Substance Use Topics   • Alcohol use: Yes     Alcohol/week: 4.2 oz     Types: 7 Glasses of wine per week     Comment: one drink per week or less   • Drug use: No       ROS: As above in the HPI      Objective:   Physical Exam:    Vitals: /72 (BP Location: Left arm, Patient Position: Sitting, BP Cuff Size: Adult)   Pulse (!) 58   Temp 36.6 °C (97.8 °F) (Temporal)   Resp 16   Ht 1.727 m (5' 8\")   Wt 77.5 kg (170 lb 13.7 oz)   SpO2 100%    BMI: Body mass index is 25.98 kg/m².   General/Constitutional: Vitals as above, Well nourished, well developed male in no acute distress, appears younger than stated age   Head/Eyes: Head is grossly normal & atraumatic, bilateral conjunctivae clear and not injected, bilateral EOMI   ENT: Bilateral external ears grossly normal in appearance, Hearing grossly intact, External nares normal in appearance and without discharge/bleeding   Respiratory: No respiratory distress, no cough   Cardiovascular: 1+ " bilateral lower extremity edema with venous stasis   MSK: Gait grossly normal & not antalgic   Integumentary: No apparent rashes. Venous stasis dermatitis bilaterally.    Psych: Judgment grossly appropriate, no apparent depression/anxiety    Health Maintenance:     - Completed      Assessment and Plan:     Problem List Items Addressed This Visit     Onychomycosis     New problem by my assessment, longstanding issue per the patient.  Will refer him to podiatry to establish care to assist with foot and nail care moving forward.  He has diffuse onychomycosis of all toenails and clippers would likely not be sufficient moving forward.         Relevant Orders    REFERRAL TO PODIATRY             RTC: 3 months.    PLEASE NOTE: This dictation was created using voice recognition software. I have made every reasonable attempt to correct obvious errors, but I expect that there are errors of grammar and possibly content that I did not discover before finalizing the note.

## 2020-12-10 NOTE — ASSESSMENT & PLAN NOTE
New problem by my assessment, longstanding issue per the patient.  Will refer him to podiatry to establish care to assist with foot and nail care moving forward.  He has diffuse onychomycosis of all toenails and clippers would likely not be sufficient moving forward.

## 2020-12-30 NOTE — TELEPHONE ENCOUNTER
Received request via: Pharmacy    Was the patient seen in the last year in this department? Yes    Does the patient have an active prescription (recently filled or refills available) for medication(s) requested? No     Last OV 12/9/20.

## 2021-01-04 DIAGNOSIS — E29.1 HYPOGONADISM IN MALE: ICD-10-CM

## 2021-01-04 RX ORDER — TESTOSTERONE GEL, 1% 10 MG/G
GEL TRANSDERMAL
Qty: 150 G | Refills: 2 | OUTPATIENT
Start: 2021-01-04 | End: 2021-02-02

## 2021-01-11 DIAGNOSIS — Z23 NEED FOR VACCINATION: ICD-10-CM

## 2021-03-16 ENCOUNTER — APPOINTMENT (OUTPATIENT)
Dept: MEDICAL GROUP | Facility: PHYSICIAN GROUP | Age: 86
End: 2021-03-16
Payer: COMMERCIAL

## 2021-03-17 ENCOUNTER — OFFICE VISIT (OUTPATIENT)
Dept: MEDICAL GROUP | Facility: PHYSICIAN GROUP | Age: 86
End: 2021-03-17
Payer: COMMERCIAL

## 2021-03-17 VITALS
DIASTOLIC BLOOD PRESSURE: 70 MMHG | SYSTOLIC BLOOD PRESSURE: 132 MMHG | HEART RATE: 52 BPM | BODY MASS INDEX: 26.22 KG/M2 | WEIGHT: 173 LBS | HEIGHT: 68 IN | TEMPERATURE: 97.6 F | OXYGEN SATURATION: 95 %

## 2021-03-17 DIAGNOSIS — E29.1 HYPOGONADISM IN MALE: ICD-10-CM

## 2021-03-17 DIAGNOSIS — E78.5 DYSLIPIDEMIA: ICD-10-CM

## 2021-03-17 DIAGNOSIS — G40.909 SEIZURE DISORDER (HCC): ICD-10-CM

## 2021-03-17 DIAGNOSIS — R73.01 IMPAIRED FASTING GLUCOSE: ICD-10-CM

## 2021-03-17 PROCEDURE — 99214 OFFICE O/P EST MOD 30 MIN: CPT | Performed by: INTERNAL MEDICINE

## 2021-03-17 RX ORDER — LEVETIRACETAM 500 MG/1
500 TABLET ORAL 2 TIMES DAILY
Qty: 180 TABLET | Refills: 3 | Status: SHIPPED | OUTPATIENT
Start: 2021-03-17 | End: 2022-05-16

## 2021-03-17 ASSESSMENT — FIBROSIS 4 INDEX: FIB4 SCORE: 3.4

## 2021-03-17 ASSESSMENT — PATIENT HEALTH QUESTIONNAIRE - PHQ9: CLINICAL INTERPRETATION OF PHQ2 SCORE: 0

## 2021-03-17 NOTE — LETTER
[facility]  Sruthi Kincaid D.O.  [address]  [place] [address]  Fax: [phone]   Authorization for Release/Disclosure of   Protected Health Information   Name: HOMA WALLER : 1927 SSN: [id number]   Address: [address] [place] [address]  [place] [address] Phone:    [phone] (home)    I authorize the entity listed below to release/disclose the PHI below to:   [facility]/Sruthi Kincaid D.O. and Sruthi Kincaid D.O.   Provider or Entity Name:  [facility] Mateo Bravo (Cardiology)   Address   City, State, [facility]   Phone:      Fax:     Reason for request: continuity of care   Information to be released:    [  ] LAST COLONOSCOPY,  including any PATH REPORT and follow-up  [  ] LAST FIT/COLOGUARD RESULT [  ] LAST DEXA  [  ] LAST MAMMOGRAM  [  ] LAST PAP  [  ] LAST LABS [  ] RETINA EXAM REPORT  [  ] IMMUNIZATION RECORDS  [ x ] Release all info      [ x ] Check here and initial the line next to each item to release ALL health information INCLUDING  _____ Care and treatment for drug and / or alcohol abuse  _____ HIV testing, infection status, or AIDS  _____ Genetic Testing    DATES OF SERVICE OR TIME PERIOD TO BE DISCLOSED: __-___________  I understand and acknowledge that:  * This Authorization may be revoked at any time by you in writing, except if your health information has already been used or disclosed.  * Your health information that will be used or disclosed as a result of you signing this authorization could be re-disclosed by the recipient. If this occurs, your re-disclosed health information may no longer be protected by State or Federal laws.  * You may refuse to sign this Authorization. Your refusal will not affect your ability to obtain treatment.  * This Authorization becomes effective upon signing and will  on (date) __________.      If no date is indicated, this Authorization will  one (1) year from the signature date.    Name: Homa  [place]    Signature:   Date:     3/17/2021       PLEASE FAX REQUESTED RECORDS BACK TO: [phone]

## 2021-03-18 NOTE — ASSESSMENT & PLAN NOTE
Previous problem, requires follow up. Check A1c to assess for diabetes due to elevated fasting blood sugar.

## 2021-03-18 NOTE — PROGRESS NOTES
Subjective:   Chief Complaint/History of Present Illness:  Homa Alonso is a 89 y.o. male established patient who presents today to discuss medical problems as listed below. Homa is unaccompanied for today's visit.    Problem   (HCC) Seizure disorder    Noted in June 2014.  Witnessed by his wife when he had full body convulsions.  He presented to the ER where he was postictal.  He followed up with neurology who performed an EEG and thought that there was epileptiform activity.  He was started on Keppra which he has maintained since that time.  He did try to go off of it once but notes he had recurrence of seizure activity and has gone back on it.  He is able to drive at this time because he has been so well controlled.  No subsequent seizures. He previously saw Dr. Valadez, currently not following with a neurologist.    Current regimen: Keppra 500 mg twice daily     Impaired Fasting Glucose       Ref. Range 9/28/2019 04:16 9/29/2019 02:47 10/6/2019 03:40   Glucose Latest Ref Range: 65 - 99 mg/dL 126 (H) 128 (H) 107 (H)     On review of his lab work he has several elevated fasting blood sugars. No known DM in the past.      Hypogonadism in Male       Ref. Range 5/1/2019 10:50   Free Testosterone Latest Ref Range: 6.6 - 18.1 pg/mL 7.2   Testosterone,Total Latest Ref Range: 264 - 916 ng/dL 297       He reports radical prostatectomy 1991 following discovery of localized prostate cancer.  Unfortunately, he had complications of both erectile dysfunction as well as urinary incontinence.  He had an artificial sphincter placed around 2009 which has continued to work well.  He notes profound fatigue following his prostatectomy was found to have low testosterone levels and has been using AndroGel since about 2009 with great success.     Dyslipidemia       Ref. Range 5/1/2019 10:50   Cholesterol,Tot Latest Ref Range: 100 - 199 mg/dL 132   Triglycerides Latest Ref Range: 0 - 149 mg/dL 139   HDL Latest Ref Range: >39 mg/dL  51   LDL Latest Ref Range: 0 - 99 mg/dL 53       He reports chronic use of atorvastatin 20 mg daily for history of elevated cholesterol panel.  He denies any myalgias or GI upset on this medication.  He is tolerating without difficulty.  He denies history of heart attack or stroke in the past.          Current Medications:  Current Outpatient Medications Ordered in Epic   Medication Sig Dispense Refill   • levETIRAcetam (KEPPRA) 500 MG Tab Take 1 tablet by mouth 2 times a day. 180 tablet 3   • testosterone (TESTIM/ANDROGEL) 50 MG/5GM (1%) Gel gel APPLY  50MG TOPICALLY AS DIRECTED ONCE DAILY FOR 30 DAYS 150 g 2   • clindamycin (CLEOCIN) 300 MG Cap TAKE 2 CAPS BY MOUTH 1 HOUR PRIOR TO DENTAL APPOINTMENT     • bimatoprost (LUMIGAN) 0.01 % Solution Place 1 Drop in right eye every bedtime. 30 mL 2   • atorvastatin (LIPITOR) 20 MG Tab Take 1 tablet by mouth once daily 90 Tab 3   • metoprolol (LOPRESSOR) 25 MG Tab Take 1 Tab by mouth 2 times a day. (Patient taking differently: Take 25 mg by mouth 2 times a day. 1 tab three times a day  Dr. Mateo Bravo  [facility]) 180 Tab 3   • ELIQUIS 2.5 MG Tab Take 2.5 mg by mouth.     • Multiple Vitamins-Minerals (ICAPS AREDS 2) Chew Tab Take  by mouth.     • docusate sodium (COLACE) 100 MG Cap Take 100 mg by mouth 2 times a day.     • acetaminophen (TYLENOL) 325 MG Tab Take 2 Tabs by mouth every 6 hours as needed (Mild Pain; (Pain scale 1-3); Temp greater than 100.5 F). 30 Tab 0   • B Complex Vitamins (B COMPLEX B-12 PO) Take  by mouth.     • Omega-3 Fatty Acids (FISH OIL) 1000 MG Cap capsule Take 1,000 mg by mouth 3 times a day, with meals.     • multivitamin (THERAGRAN) Tab Take 1 Tab by mouth every day.     • Cholecalciferol (VITAMIN D3) 2000 UNIT CAPS Take  by mouth.     • CALCIUM CITRATE PO Take 500 mg by mouth every day.     • MAGNESIUM CITRATE PO Take 250 mg by mouth every day.     • VITAMIN C CR PO Take  by mouth. OTC VIT C        No current Epic-ordered facility-administered  "medications on file.          Objective:   Physical Exam:    Vitals: /70 (BP Location: Left arm, Patient Position: Sitting, BP Cuff Size: Adult)   Pulse (!) 52   Temp 36.4 °C (97.6 °F) (Temporal)   Ht 1.727 m (5' 8\")   Wt 78.5 kg (173 lb)   SpO2 95%    BMI: Body mass index is 26.3 kg/m².  Physical Exam   Constitutional: He is well-developed, well-nourished, and in no distress.   Appears younger than stated age   HENT:   No cerumen in external ear canals; hearing grossly normal.   Eyes:   Glasses in place   Cardiovascular: Normal rate and regular rhythm.   Pulmonary/Chest: Effort normal and breath sounds normal. No respiratory distress.   Musculoskeletal:      Comments: Trace edema bilaterally in LE's with mild venous stasis dermatitis.   Neurological: He is alert.   Psychiatric: Mood, memory, affect and judgment normal.             Assessment and Plan:   Homa is a 89 y.o. male with the following:  Problem List Items Addressed This Visit     Dyslipidemia     Chronic and stable problem. Continue atorvastatin 20 mg daily. Update lipid panel and liver enzymes to ensure ongoing stability.         Relevant Orders    Comp Metabolic Panel    Lipid Profile    Hypogonadism in male     Chronic and stable problem. He continues to find great benefit from use of testosterone since his radical prostatectomy. He will continue to use this medicine as prescribed.         Relevant Orders    Comp Metabolic Panel    Testosterone, Free & Total, Adult Male (w/SHBG)    Impaired fasting glucose     Previous problem, requires follow up. Check A1c to assess for diabetes due to elevated fasting blood sugar.         Relevant Orders    HEMOGLOBIN A1C    (HCC) Seizure disorder     Chronic and stable problem. No recurrence of seizure activity. Continue Keppra 500 mg twice daily, prior neurologist recommended indefinite treatment.          Relevant Medications    levETIRAcetam (KEPPRA) 500 MG Tab    Other Relevant Orders    CBC WITH " DIFFERENTIAL           RTC: Return in about 6 months (around 9/17/2021).    I spent a total of 35 minutes with record review, exam, communication with the patient, communication with other providers, and documentation of this encounter.    PLEASE NOTE: This dictation was created using voice recognition software. I have made every reasonable attempt to correct obvious errors, but I expect that there are errors of grammar and possibly content that I did not discover before finalizing the note.      Sruthi Kincaid, DO  Geriatric and Internal Medicine  [facility]

## 2021-03-18 NOTE — ASSESSMENT & PLAN NOTE
Chronic and stable problem. He continues to find great benefit from use of testosterone since his radical prostatectomy. He will continue to use this medicine as prescribed.

## 2021-03-18 NOTE — ASSESSMENT & PLAN NOTE
Chronic and stable problem. No recurrence of seizure activity. Continue Keppra 500 mg twice daily, prior neurologist recommended indefinite treatment.

## 2021-03-18 NOTE — ASSESSMENT & PLAN NOTE
Chronic and stable problem. Continue atorvastatin 20 mg daily. Update lipid panel and liver enzymes to ensure ongoing stability.

## 2021-04-23 ENCOUNTER — TELEPHONE (OUTPATIENT)
Dept: MEDICAL GROUP | Facility: PHYSICIAN GROUP | Age: 86
End: 2021-04-23

## 2021-05-25 DIAGNOSIS — E29.1 HYPOGONADISM IN MALE: ICD-10-CM

## 2021-05-26 RX ORDER — TESTOSTERONE GEL, 1% 10 MG/G
50 GEL TRANSDERMAL DAILY
Qty: 5 G | Refills: 0 | Status: SHIPPED | OUTPATIENT
Start: 2021-05-26 | End: 2021-06-15

## 2021-06-15 DIAGNOSIS — E29.1 HYPOGONADISM IN MALE: ICD-10-CM

## 2021-06-18 ENCOUNTER — TELEPHONE (OUTPATIENT)
Dept: MEDICAL GROUP | Facility: PHYSICIAN GROUP | Age: 86
End: 2021-06-18

## 2021-06-18 RX ORDER — TESTOSTERONE GEL, 1% 10 MG/G
GEL TRANSDERMAL
Qty: 5 G | Refills: 0 | Status: SHIPPED | OUTPATIENT
Start: 2021-06-18 | End: 2021-10-12

## 2021-06-18 NOTE — TELEPHONE ENCOUNTER
Phone Number Called: [phone]  [facility] for [facility]    Call outcome: Spoke to patient regarding message below.    Message: Claudia from [facility] left a message to ask for the day supply for pt's testostrone. Relayed back from Dr. Kincaid that the pt has a 30 day supply with 2 refills.

## 2021-06-28 ENCOUNTER — OFFICE VISIT (OUTPATIENT)
Dept: MEDICAL GROUP | Facility: PHYSICIAN GROUP | Age: 86
End: 2021-06-28
Payer: COMMERCIAL

## 2021-06-28 VITALS
HEART RATE: 50 BPM | BODY MASS INDEX: 26.52 KG/M2 | SYSTOLIC BLOOD PRESSURE: 116 MMHG | RESPIRATION RATE: 14 BRPM | TEMPERATURE: 98 F | OXYGEN SATURATION: 95 % | DIASTOLIC BLOOD PRESSURE: 62 MMHG | WEIGHT: 175 LBS | HEIGHT: 68 IN

## 2021-06-28 DIAGNOSIS — L98.9 SKIN LESION OF RIGHT ARM: ICD-10-CM

## 2021-06-28 PROCEDURE — 99213 OFFICE O/P EST LOW 20 MIN: CPT | Performed by: INTERNAL MEDICINE

## 2021-06-28 ASSESSMENT — FIBROSIS 4 INDEX: FIB4 SCORE: 3.44

## 2021-06-29 NOTE — PROGRESS NOTES
Subjective:   Chief Complaint/History of Present Illness:  Homa Alonso is a 89 y.o. male established patient who presents today to discuss medical problems as listed below. Homa is unaccompanied for today's visit.    Problem   Skin Lesion of Right Arm    He reports lesion on the right forearm that has been growing in size and increasingly more tender.  He thinks that showed up somewhere around March or April 2021.  It is located just proximal to the olecranon and is on the radial side of the forearm.  There is white ulceration on the top of the lesion.  It is very tender with palpation.  No streaking erythema, no tenderness outside of the lesion.  It is measuring today at 1.7 cm x 1.7 cm x 0.5 cm.  He recalls prior lesion on the left ear that required surgical excision around 2011 otherwise does not recall any specific skin cancer.  He has a history of prostate cancer.          Current Medications:  Current Outpatient Medications Ordered in Epic   Medication Sig Dispense Refill   • testosterone (TESTIM/ANDROGEL) 50 MG/5GM (1%) Gel gel APPLY 50 MG  TOPICALLY ONCE DAILY AS DIRECTED. 5 g 0   • levETIRAcetam (KEPPRA) 500 MG Tab Take 1 tablet by mouth 2 times a day. 180 tablet 3   • clindamycin (CLEOCIN) 300 MG Cap TAKE 2 CAPS BY MOUTH 1 HOUR PRIOR TO DENTAL APPOINTMENT     • bimatoprost (LUMIGAN) 0.01 % Solution Place 1 Drop in right eye every bedtime. 30 mL 2   • atorvastatin (LIPITOR) 20 MG Tab Take 1 tablet by mouth once daily 90 Tab 3   • metoprolol (LOPRESSOR) 25 MG Tab Take 1 Tab by mouth 2 times a day. (Patient taking differently: Take 25 mg by mouth 2 times a day. 1 tab three times a day  Dr. Mateo Bravo  [facility]) 180 Tab 3   • ELIQUIS 2.5 MG Tab Take 2.5 mg by mouth.     • Multiple Vitamins-Minerals (ICAPS AREDS 2) Chew Tab Take  by mouth.     • docusate sodium (COLACE) 100 MG Cap Take 100 mg by mouth 2 times a day.     • acetaminophen (TYLENOL) 325 MG Tab Take 2 Tabs by mouth every 6 hours as needed  "(Mild Pain; (Pain scale 1-3); Temp greater than 100.5 F). 30 Tab 0   • B Complex Vitamins (B COMPLEX B-12 PO) Take  by mouth.     • Omega-3 Fatty Acids (FISH OIL) 1000 MG Cap capsule Take 1,000 mg by mouth 3 times a day, with meals.     • multivitamin (THERAGRAN) Tab Take 1 Tab by mouth every day.     • Cholecalciferol (VITAMIN D3) 2000 UNIT CAPS Take  by mouth.     • CALCIUM CITRATE PO Take 500 mg by mouth every day.     • MAGNESIUM CITRATE PO Take 250 mg by mouth every day.     • VITAMIN C CR PO Take  by mouth. OTC VIT C        No current Epic-ordered facility-administered medications on file.          Objective:   Physical Exam:    Vitals: /62 (BP Location: Left arm, Patient Position: Sitting, BP Cuff Size: Adult)   Pulse (!) 50   Temp 36.7 °C (98 °F) (Temporal)   Resp 14   Ht 1.727 m (5' 8\")   Wt 79.4 kg (175 lb)   SpO2 95%    BMI: Body mass index is 26.61 kg/m².  Physical Exam  Constitutional:       Comments: Appears younger than stated age.   Skin:     Findings: Lesion present. No rash.      Comments: 1.7 cm x 1.7 cm x 0.5 cm ulcerated raised lesion on right forearm just distal to olecranon on radial side. Tender to palpation. Tough/ropey density.    Psychiatric:         Mood and Affect: Mood normal.         Behavior: Behavior normal.         Thought Content: Thought content normal.         Judgment: Judgment normal.           Assessment and Plan:   Homa is a 89 y.o. male with the following:  Problem List Items Addressed This Visit     Skin lesion of right arm     New problem that requires additional evaluation.  Will refer him to dermatology as I am concerned that this could be an ulcerated squamous cell carcinoma on the arm.  It has been slow growing however progressive more recently and becoming tender.  No erythema, warmth, or fluctuance to suggest abscess at this time.  He is agreeable with the plan and will start with dermatology to evaluate for excision.         Relevant Orders    " REFERRAL TO DERMATOLOGY           RTC: Return if symptoms worsen or fail to improve.    I spent a total of 25 minutes with record review, exam, communication with the patient, communication with other providers, and documentation of this encounter.    PLEASE NOTE: This dictation was created using voice recognition software. I have made every reasonable attempt to correct obvious errors, but I expect that there are errors of grammar and possibly content that I did not discover before finalizing the note.      Sruthi Kincaid, DO  Geriatric and Internal Medicine  [facility]

## 2021-06-29 NOTE — ASSESSMENT & PLAN NOTE
New problem that requires additional evaluation.  Will refer him to dermatology as I am concerned that this could be an ulcerated squamous cell carcinoma on the arm.  It has been slow growing however progressive more recently and becoming tender.  No erythema, warmth, or fluctuance to suggest abscess at this time.  He is agreeable with the plan and will start with dermatology to evaluate for excision.

## 2021-07-02 ENCOUNTER — TELEPHONE (OUTPATIENT)
Dept: MEDICAL GROUP | Facility: PHYSICIAN GROUP | Age: 86
End: 2021-07-02

## 2021-07-02 NOTE — TELEPHONE ENCOUNTER
Spoke to referrals and they did a wonderful job accelerating the process for this patient. They will have approval by end of today and will schedule him with dermatology ASAP close to his home.

## 2021-07-02 NOTE — TELEPHONE ENCOUNTER
"Patient called with concerns that referral status reflected \"closed\" in MyChart.Confirmed closed status. PCP here today re-issued the referral and tagged it as \"urgent\" after we were unable to speak to anyone in referral department. Called patient back to give new status and advised that I will call referrals again at the beginning of next week to accelerate the order.  "

## 2021-07-06 ENCOUNTER — TELEPHONE (OUTPATIENT)
Dept: MEDICAL GROUP | Facility: PHYSICIAN GROUP | Age: 86
End: 2021-07-06

## 2021-07-06 DIAGNOSIS — L98.9 SKIN LESION OF RIGHT ARM: Primary | ICD-10-CM

## 2021-07-06 NOTE — TELEPHONE ENCOUNTER
Phone Number Called: [phone] (home) [phone] (work)  [facility]      Call outcome: Spoke to patient regarding message below.    Message: Pt stated he is having a hard time scheduling his dermatology appointment. He has left a few messages but no one has followed up to schedule. Gave pt the address and will attempt to schedule an appointment for him.    Phone Number Called: [phone] [facility] [place] [facility] [place]    Call outcome: Spoke to patient regarding message below.    Message: [facility] stated they have not received a referral and would need to see it before scheduling the appointment. Will send over new referral and notify pt.    Phone Number Called: [phone] (home) [phone] (work)  [facility]      Call outcome: Spoke to patient regarding message below.    Message: Notified pt. Let pt know if he does not hear back from them by tomorrow to let us know and we will see where we can go from there.

## 2021-07-07 NOTE — TELEPHONE ENCOUNTER
VOICEMAIL  1. Caller Name: Homa Alonso  Call Back Number: [phone] (home) [phone] (work)      2. Message: Pt left voicemail stating concern that his arm is getting worse and the earliest the dermatologist can get him in is September. If possible, needs a sooner appointment with a [facility] provider.    3. Patient approves office to leave a detailed voicemail/MyChart message: N\A

## 2021-07-08 DIAGNOSIS — Z00.00 ENCOUNTER FOR MEDICARE ANNUAL WELLNESS EXAM: ICD-10-CM

## 2021-07-09 NOTE — TELEPHONE ENCOUNTER
Pt called reporting he was told by his Cardiologist, Dr. Bravo to take three 25mg tablets/ day. Advised Dr. Kincaid of Rx change.

## 2021-07-15 ENCOUNTER — APPOINTMENT (RX ONLY)
Dept: URBAN - METROPOLITAN AREA CLINIC 36 | Facility: CLINIC | Age: 86
Setting detail: DERMATOLOGY
End: 2021-07-15

## 2021-07-15 DIAGNOSIS — B35.1 TINEA UNGUIUM: ICD-10-CM

## 2021-07-15 DIAGNOSIS — L57.0 ACTINIC KERATOSIS: ICD-10-CM

## 2021-07-15 DIAGNOSIS — L57.8 OTHER SKIN CHANGES DUE TO CHRONIC EXPOSURE TO NONIONIZING RADIATION: ICD-10-CM

## 2021-07-15 PROBLEM — D48.5 NEOPLASM OF UNCERTAIN BEHAVIOR OF SKIN: Status: ACTIVE | Noted: 2021-07-15

## 2021-07-15 PROCEDURE — ? COUNSELING

## 2021-07-15 PROCEDURE — 17000 DESTRUCT PREMALG LESION: CPT | Mod: 59

## 2021-07-15 PROCEDURE — ? SUNSCREEN RECOMMENDATIONS

## 2021-07-15 PROCEDURE — 99203 OFFICE O/P NEW LOW 30 MIN: CPT | Mod: 25

## 2021-07-15 PROCEDURE — 17003 DESTRUCT PREMALG LES 2-14: CPT

## 2021-07-15 PROCEDURE — ? BIOPSY BY SHAVE METHOD

## 2021-07-15 PROCEDURE — ? LIQUID NITROGEN

## 2021-07-15 PROCEDURE — 11102 TANGNTL BX SKIN SINGLE LES: CPT

## 2021-07-15 ASSESSMENT — LOCATION DETAILED DESCRIPTION DERM
LOCATION DETAILED: RIGHT 2ND TOENAIL
LOCATION DETAILED: LEFT DISTAL DORSAL FOREARM
LOCATION DETAILED: RIGHT 3RD TOENAIL
LOCATION DETAILED: LEFT 4TH TOENAIL
LOCATION DETAILED: RIGHT DISTAL DORSAL FOREARM
LOCATION DETAILED: NASAL INFRATIP
LOCATION DETAILED: LEFT RADIAL DORSAL HAND
LOCATION DETAILED: RIGHT ELBOW
LOCATION DETAILED: RIGHT DISTAL PLANTAR GREAT TOE
LOCATION DETAILED: LEFT 2ND TOENAIL
LOCATION DETAILED: RIGHT DORSAL WRIST
LOCATION DETAILED: LEFT PROXIMAL DORSAL FOREARM
LOCATION DETAILED: RIGHT PROXIMAL DORSAL FOREARM
LOCATION DETAILED: LEFT GREAT TOENAIL
LOCATION DETAILED: LEFT LATERAL 3RD TOE

## 2021-07-15 ASSESSMENT — LOCATION SIMPLE DESCRIPTION DERM
LOCATION SIMPLE: RIGHT ELBOW
LOCATION SIMPLE: RIGHT GREAT TOE
LOCATION SIMPLE: RIGHT FOREARM
LOCATION SIMPLE: LEFT HAND
LOCATION SIMPLE: LEFT 3RD TOE
LOCATION SIMPLE: LEFT 2ND TOE
LOCATION SIMPLE: NOSE
LOCATION SIMPLE: LEFT GREAT TOE
LOCATION SIMPLE: RIGHT 3RD TOE
LOCATION SIMPLE: LEFT 4TH TOE
LOCATION SIMPLE: RIGHT WRIST
LOCATION SIMPLE: LEFT FOREARM
LOCATION SIMPLE: RIGHT 2ND TOE

## 2021-07-15 ASSESSMENT — LOCATION ZONE DERM
LOCATION ZONE: HAND
LOCATION ZONE: TOE
LOCATION ZONE: ARM
LOCATION ZONE: NOSE
LOCATION ZONE: TOENAIL

## 2021-07-15 NOTE — PROCEDURE: LIQUID NITROGEN
Render Post-Care Instructions In Note?: no
Post-Care Instructions: I reviewed with the patient in detail post-care instructions. Patient is to wear sunprotection, and avoid picking at any of the treated lesions. Pt may apply Vaseline to crusted or scabbing areas.
Duration Of Freeze Thaw-Cycle (Seconds): 0
Consent: The patient's consent was obtained including but not limited to risks of crusting, scabbing, blistering, scarring, darker or lighter pigmentary change, recurrence, incomplete removal and infection.
Show Applicator Variable?: Yes
Detail Level: Detailed

## 2021-07-15 NOTE — PROCEDURE: BIOPSY BY SHAVE METHOD
Detail Level: Detailed
Depth Of Biopsy: dermis
Was A Bandage Applied: Yes
Size Of Lesion In Cm: 0
Biopsy Type: H and E
Biopsy Method: Dermablade
Anesthesia Type: 1% lidocaine with epinephrine
Anesthesia Volume In Cc: 0.5
Hemostasis: Drysol
Wound Care: Petrolatum
Dressing: bandage
Destruction After The Procedure: No
Type Of Destruction Used: Curettage
Curettage Text: The wound bed was treated with curettage after the biopsy was performed.
Cryotherapy Text: The wound bed was treated with cryotherapy after the biopsy was performed.
Electrodesiccation Text: The wound bed was treated with electrodesiccation after the biopsy was performed.
Electrodesiccation And Curettage Text: The wound bed was treated with electrodesiccation and curettage after the biopsy was performed.
Silver Nitrate Text: The wound bed was treated with silver nitrate after the biopsy was performed.
Lab: [id number]
Consent: Written consent was obtained and risks were reviewed including but not limited to scarring, infection, bleeding, scabbing, incomplete removal, nerve damage and allergy to anesthesia.
Post-Care Instructions: I reviewed with the patient in detail post-care instructions. Patient is to keep the biopsy site dry overnight, and then apply bacitracin twice daily until healed. Patient may apply hydrogen peroxide soaks to remove any crusting.
Notification Instructions: Patient will be notified of biopsy results. However, patient instructed to call the office if not contacted within 2 weeks.
Billing Type: Third-Party Bill
Information: Selecting Yes will display possible errors in your note based on the variables you have selected. This validation is only offered as a suggestion for you. PLEASE NOTE THAT THE VALIDATION TEXT WILL BE REMOVED WHEN YOU FINALIZE YOUR NOTE. IF YOU WANT TO FAX A PRELIMINARY NOTE YOU WILL NEED TO TOGGLE THIS TO 'NO' IF YOU DO NOT WANT IT IN YOUR FAXED NOTE.

## 2021-07-28 ENCOUNTER — TELEPHONE (OUTPATIENT)
Dept: MEDICAL GROUP | Facility: PHYSICIAN GROUP | Age: 86
End: 2021-07-28

## 2021-07-28 NOTE — TELEPHONE ENCOUNTER
Called pt to inquire whether he was able to obtain a Dermatology appt. No answer, LVM asking pt to return call and let me know if he has a Derm appt.

## 2021-08-12 ENCOUNTER — APPOINTMENT (RX ONLY)
Dept: URBAN - METROPOLITAN AREA CLINIC 22 | Facility: CLINIC | Age: 86
Setting detail: DERMATOLOGY
End: 2021-08-12

## 2021-08-12 PROBLEM — C44.622 SQUAMOUS CELL CARCINOMA OF SKIN OF RIGHT UPPER LIMB, INCLUDING SHOULDER: Status: ACTIVE | Noted: 2021-08-12

## 2021-08-12 PROCEDURE — 12032 INTMD RPR S/A/T/EXT 2.6-7.5: CPT

## 2021-08-12 PROCEDURE — ? EXCISION

## 2021-08-12 PROCEDURE — 11603 EXC TR-EXT MAL+MARG 2.1-3 CM: CPT

## 2021-08-12 NOTE — PROCEDURE: EXCISION
Referring Physician (Optional): Hu Crawford MD
Biopsy Photograph Reviewed: Yes
Previous Accession (Optional): [id number]
Date Of Previous Biopsy (Optional): 07/15/2021
Size Of Lesion In Cm: 1.5
X Size Of Lesion In Cm (Optional): 1.6
Size Of Margin In Cm: 0.5
Anesthesia Volume In Cc: 0
Was An Eye Clamp Used?: No
Eye Clamp Note Details: An eye clamp was used during the procedure.
Excision Method: Fusiform
Repair Type: Intermediate
Suturegard Retention Suture: 2-0 Nylon
Retention Suture Bite Size: 3 mm
Length To Time In Minutes Device Was In Place: 10
Number Of Hemigard Strips Per Side: 1
Intermediate / Complex Repair - Final Wound Length In Cm: 6
Undermining Type: Entire Wound
Debridement Text: The wound edges were debrided prior to proceeding with the closure to facilitate wound healing.
Helical Rim Text: The closure involved the helical rim.
Vermilion Border Text: The closure involved the vermilion border.
Nostril Rim Text: The closure involved the nostril rim.
Retention Suture Text: Retention sutures were placed to support the closure and prevent dehiscence.
Suture Removal: 17 days
Lab: [id number]
Lab Facility: 
Graft Donor Site Bandage (Optional-Leave Blank If You Don't Want In Note): Steri-strips and a pressure bandage were applied to the donor site.
Epidermal Closure Graft Donor Site (Optional): simple interrupted
Billing Type: Third-Party Bill
Excision Depth: adipose tissue
Scalpel Size: 15 blade
Anesthesia Type: 1% lidocaine with epinephrine
Hemostasis: Electrocautery
Estimated Blood Loss (Cc): minimal
Detail Level: Detailed
Deep Sutures: 3-0 Prolene
Epidermal Sutures: 3-0 Polysorb
Wound Care: Petrolatum
Dressing: dry sterile dressing
Suturegard Intro: Intraoperative tissue expansion was performed, utilizing the SUTUREGARD device, in order to reduce wound tension.
Suturegard Body: The suture ends were repeatedly re-tightened and re-clamped to achieve the desired tissue expansion.
Hemigard Intro: Due to skin fragility and wound tension, it was decided to use HEMIGARD adhesive retention suture devices to permit a linear closure. The skin was cleaned and dried for a 6cm distance away from the wound. Excessive hair, if present, was removed to allow for adhesion.
Hemigard Postcare Instructions: The HEMIGARD strips are to remain completely dry for at least 5-7 days.
Positioning (Leave Blank If You Do Not Want): The patient was placed in a comfortable position exposing the surgical site.
Pre-Excision Curettage Text (Leave Blank If You Do Not Want): Prior to drawing the surgical margin the visible lesion was removed with electrodesiccation and curettage to clearly define the lesion size.
Complex Repair Preamble Text (Leave Blank If You Do Not Want): Extensive wide undermining was performed.
Intermediate Repair Preamble Text (Leave Blank If You Do Not Want): Undermining was performed with blunt dissection.
Curvilinear Excision Additional Text (Leave Blank If You Do Not Want): The margin was drawn around the clinically apparent lesion.  A curvilinear shape was then drawn on the skin incorporating the lesion and margins.  Incisions were then made along these lines to the appropriate tissue plane and the lesion was extirpated.
Fusiform Excision Additional Text (Leave Blank If You Do Not Want): The margin was drawn around the clinically apparent lesion.  A fusiform shape was then drawn on the skin incorporating the lesion and margins.  Incisions were then made along these lines to the appropriate tissue plane and the lesion was extirpated.
Elliptical Excision Additional Text (Leave Blank If You Do Not Want): The margin was drawn around the clinically apparent lesion.  An elliptical shape was then drawn on the skin incorporating the lesion and margins.  Incisions were then made along these lines to the appropriate tissue plane and the lesion was extirpated.
Saucerization Excision Additional Text (Leave Blank If You Do Not Want): The margin was drawn around the clinically apparent lesion.  Incisions were then made along these lines, in a tangential fashion, to the appropriate tissue plane and the lesion was extirpated.
Slit Excision Additional Text (Leave Blank If You Do Not Want): A linear line was drawn on the skin overlying the lesion. An incision was made slowly until the lesion was visualized.  Once visualized, the lesion was removed with blunt dissection.
Excisional Biopsy Additional Text (Leave Blank If You Do Not Want): The margin was drawn around the clinically apparent lesion. An elliptical shape was then drawn on the skin incorporating the lesion and margins.  Incisions were then made along these lines to the appropriate tissue plane and the lesion was extirpated.
Perilesional Excision Additional Text (Leave Blank If You Do Not Want): The margin was drawn around the clinically apparent lesion. Incisions were then made along these lines to the appropriate tissue plane and the lesion was extirpated.
Repair Performed By Another Provider Text (Leave Blank If You Do Not Want): After the tissue was excised the defect was repaired by another provider.
No Repair - Repaired With Adjacent Surgical Defect Text (Leave Blank If You Do Not Want): After the excision the defect was repaired concurrently with another surgical defect which was in close approximation.
Advancement Flap (Single) Text: The defect edges were debeveled with a #15 scalpel blade.  Given the location of the defect and the proximity to free margins a single advancement flap was deemed most appropriate.  Using a sterile surgical marker, an appropriate advancement flap was drawn incorporating the defect and placing the expected incisions within the relaxed skin tension lines where possible.    The area thus outlined was incised deep to adipose tissue with a #15 scalpel blade.  The skin margins were undermined to an appropriate distance in all directions utilizing iris scissors.
Advancement Flap (Double) Text: The defect edges were debeveled with a #15 scalpel blade.  Given the location of the defect and the proximity to free margins a double advancement flap was deemed most appropriate.  Using a sterile surgical marker, the appropriate advancement flaps were drawn incorporating the defect and placing the expected incisions within the relaxed skin tension lines where possible.    The area thus outlined was incised deep to adipose tissue with a #15 scalpel blade.  The skin margins were undermined to an appropriate distance in all directions utilizing iris scissors.
Burow's Advancement Flap Text: The defect edges were debeveled with a #15 scalpel blade.  Given the location of the defect and the proximity to free margins a Burow's advancement flap was deemed most appropriate.  Using a sterile surgical marker, the appropriate advancement flap was drawn incorporating the defect and placing the expected incisions within the relaxed skin tension lines where possible.    The area thus outlined was incised deep to adipose tissue with a #15 scalpel blade.  The skin margins were undermined to an appropriate distance in all directions utilizing iris scissors.
Chonodrocutaneous Helical Advancement Flap Text: The defect edges were debeveled with a #15 scalpel blade.  Given the location of the defect and the proximity to free margins a chondrocutaneous helical advancement flap was deemed most appropriate.  Using a sterile surgical marker, the appropriate advancement flap was drawn incorporating the defect and placing the expected incisions within the relaxed skin tension lines where possible.    The area thus outlined was incised deep to adipose tissue with a #15 scalpel blade.  The skin margins were undermined to an appropriate distance in all directions utilizing iris scissors.
Crescentic Advancement Flap Text: The defect edges were debeveled with a #15 scalpel blade.  Given the location of the defect and the proximity to free margins a crescentic advancement flap was deemed most appropriate.  Using a sterile surgical marker, the appropriate advancement flap was drawn incorporating the defect and placing the expected incisions within the relaxed skin tension lines where possible.    The area thus outlined was incised deep to adipose tissue with a #15 scalpel blade.  The skin margins were undermined to an appropriate distance in all directions utilizing iris scissors.
A-T Advancement Flap Text: The defect edges were debeveled with a #15 scalpel blade.  Given the location of the defect, shape of the defect and the proximity to free margins an A-T advancement flap was deemed most appropriate.  Using a sterile surgical marker, an appropriate advancement flap was drawn incorporating the defect and placing the expected incisions within the relaxed skin tension lines where possible.    The area thus outlined was incised deep to adipose tissue with a #15 scalpel blade.  The skin margins were undermined to an appropriate distance in all directions utilizing iris scissors.
O-T Advancement Flap Text: The defect edges were debeveled with a #15 scalpel blade.  Given the location of the defect, shape of the defect and the proximity to free margins an O-T advancement flap was deemed most appropriate.  Using a sterile surgical marker, an appropriate advancement flap was drawn incorporating the defect and placing the expected incisions within the relaxed skin tension lines where possible.    The area thus outlined was incised deep to adipose tissue with a #15 scalpel blade.  The skin margins were undermined to an appropriate distance in all directions utilizing iris scissors.
O-L Flap Text: The defect edges were debeveled with a #15 scalpel blade.  Given the location of the defect, shape of the defect and the proximity to free margins an O-L flap was deemed most appropriate.  Using a sterile surgical marker, an appropriate advancement flap was drawn incorporating the defect and placing the expected incisions within the relaxed skin tension lines where possible.    The area thus outlined was incised deep to adipose tissue with a #15 scalpel blade.  The skin margins were undermined to an appropriate distance in all directions utilizing iris scissors.
O-Z Flap Text: The defect edges were debeveled with a #15 scalpel blade.  Given the location of the defect, shape of the defect and the proximity to free margins an O-Z flap was deemed most appropriate.  Using a sterile surgical marker, an appropriate transposition flap was drawn incorporating the defect and placing the expected incisions within the relaxed skin tension lines where possible. The area thus outlined was incised deep to adipose tissue with a #15 scalpel blade.  The skin margins were undermined to an appropriate distance in all directions utilizing iris scissors.
Double O-Z Flap Text: The defect edges were debeveled with a #15 scalpel blade.  Given the location of the defect, shape of the defect and the proximity to free margins a Double O-Z flap was deemed most appropriate.  Using a sterile surgical marker, an appropriate transposition flap was drawn incorporating the defect and placing the expected incisions within the relaxed skin tension lines where possible. The area thus outlined was incised deep to adipose tissue with a #15 scalpel blade.  The skin margins were undermined to an appropriate distance in all directions utilizing iris scissors.
V-Y Flap Text: The defect edges were debeveled with a #15 scalpel blade.  Given the location of the defect, shape of the defect and the proximity to free margins a V-Y flap was deemed most appropriate.  Using a sterile surgical marker, an appropriate advancement flap was drawn incorporating the defect and placing the expected incisions within the relaxed skin tension lines where possible.    The area thus outlined was incised deep to adipose tissue with a #15 scalpel blade.  The skin margins were undermined to an appropriate distance in all directions utilizing iris scissors.
Advancement-Rotation Flap Text: The defect edges were debeveled with a #15 scalpel blade.  Given the location of the defect, shape of the defect and the proximity to free margins an advancement-rotation flap was deemed most appropriate.  Using a sterile surgical marker, an appropriate flap was drawn incorporating the defect and placing the expected incisions within the relaxed skin tension lines where possible. The area thus outlined was incised deep to adipose tissue with a #15 scalpel blade.  The skin margins were undermined to an appropriate distance in all directions utilizing iris scissors.
Mercedes Flap Text: The defect edges were debeveled with a #15 scalpel blade.  Given the location of the defect, shape of the defect and the proximity to free margins a Mercedes flap was deemed most appropriate.  Using a sterile surgical marker, an appropriate advancement flap was drawn incorporating the defect and placing the expected incisions within the relaxed skin tension lines where possible. The area thus outlined was incised deep to adipose tissue with a #15 scalpel blade.  The skin margins were undermined to an appropriate distance in all directions utilizing iris scissors.
Modified Advancement Flap Text: The defect edges were debeveled with a #15 scalpel blade.  Given the location of the defect, shape of the defect and the proximity to free margins a modified advancement flap was deemed most appropriate.  Using a sterile surgical marker, an appropriate advancement flap was drawn incorporating the defect and placing the expected incisions within the relaxed skin tension lines where possible.    The area thus outlined was incised deep to adipose tissue with a #15 scalpel blade.  The skin margins were undermined to an appropriate distance in all directions utilizing iris scissors.
Mucosal Advancement Flap Text: Given the location of the defect, shape of the defect and the proximity to free margins a mucosal advancement flap was deemed most appropriate. Incisions were made with a 15 blade scalpel in the appropriate fashion along the cutaneous vermilion border and the mucosal lip. The remaining actinically damaged mucosal tissue was excised.  The mucosal advancement flap was then elevated to the gingival sulcus with care taken to preserve the neurovascular structures and advanced into the primary defect. Care was taken to ensure that precise realignment of the vermilion border was achieved.
Peng Advancement Flap Text: The defect edges were debeveled with a #15 scalpel blade.  Given the location of the defect, shape of the defect and the proximity to free margins a Peng advancement flap was deemed most appropriate.  Using a sterile surgical marker, an appropriate advancement flap was drawn incorporating the defect and placing the expected incisions within the relaxed skin tension lines where possible. The area thus outlined was incised deep to adipose tissue with a #15 scalpel blade.  The skin margins were undermined to an appropriate distance in all directions utilizing iris scissors.
Hatchet Flap Text: The defect edges were debeveled with a #15 scalpel blade.  Given the location of the defect, shape of the defect and the proximity to free margins a hatchet flap was deemed most appropriate.  Using a sterile surgical marker, an appropriate hatchet flap was drawn incorporating the defect and placing the expected incisions within the relaxed skin tension lines where possible.    The area thus outlined was incised deep to adipose tissue with a #15 scalpel blade.  The skin margins were undermined to an appropriate distance in all directions utilizing iris scissors.
Rotation Flap Text: The defect edges were debeveled with a #15 scalpel blade.  Given the location of the defect, shape of the defect and the proximity to free margins a rotation flap was deemed most appropriate.  Using a sterile surgical marker, an appropriate rotation flap was drawn incorporating the defect and placing the expected incisions within the relaxed skin tension lines where possible.    The area thus outlined was incised deep to adipose tissue with a #15 scalpel blade.  The skin margins were undermined to an appropriate distance in all directions utilizing iris scissors.
Spiral Flap Text: The defect edges were debeveled with a #15 scalpel blade.  Given the location of the defect, shape of the defect and the proximity to free margins a spiral flap was deemed most appropriate.  Using a sterile surgical marker, an appropriate rotation flap was drawn incorporating the defect and placing the expected incisions within the relaxed skin tension lines where possible. The area thus outlined was incised deep to adipose tissue with a #15 scalpel blade.  The skin margins were undermined to an appropriate distance in all directions utilizing iris scissors.
Staged Advancement Flap Text: The defect edges were debeveled with a #15 scalpel blade.  Given the location of the defect, shape of the defect and the proximity to free margins a staged advancement flap was deemed most appropriate.  Using a sterile surgical marker, an appropriate advancement flap was drawn incorporating the defect and placing the expected incisions within the relaxed skin tension lines where possible. The area thus outlined was incised deep to adipose tissue with a #15 scalpel blade.  The skin margins were undermined to an appropriate distance in all directions utilizing iris scissors.
Star Wedge Flap Text: The defect edges were debeveled with a #15 scalpel blade.  Given the location of the defect, shape of the defect and the proximity to free margins a star wedge flap was deemed most appropriate.  Using a sterile surgical marker, an appropriate rotation flap was drawn incorporating the defect and placing the expected incisions within the relaxed skin tension lines where possible. The area thus outlined was incised deep to adipose tissue with a #15 scalpel blade.  The skin margins were undermined to an appropriate distance in all directions utilizing iris scissors.
Transposition Flap Text: The defect edges were debeveled with a #15 scalpel blade.  Given the location of the defect and the proximity to free margins a transposition flap was deemed most appropriate.  Using a sterile surgical marker, an appropriate transposition flap was drawn incorporating the defect.    The area thus outlined was incised deep to adipose tissue with a #15 scalpel blade.  The skin margins were undermined to an appropriate distance in all directions utilizing iris scissors.
Muscle Hinge Flap Text: The defect edges were debeveled with a #15 scalpel blade.  Given the size, depth and location of the defect and the proximity to free margins a muscle hinge flap was deemed most appropriate.  Using a sterile surgical marker, an appropriate hinge flap was drawn incorporating the defect. The area thus outlined was incised with a #15 scalpel blade.  The skin margins were undermined to an appropriate distance in all directions utilizing iris scissors.
Nasal Turnover Hinge Flap Text: The defect edges were debeveled with a #15 scalpel blade.  Given the size, depth, location of the defect and the defect being full thickness a nasal turnover hinge flap was deemed most appropriate.  Using a sterile surgical marker, an appropriate hinge flap was drawn incorporating the defect. The area thus outlined was incised with a #15 scalpel blade. The flap was designed to recreate the nasal mucosal lining and the alar rim. The skin margins were undermined to an appropriate distance in all directions utilizing iris scissors.
Nasalis-Muscle-Based Myocutaneous Island Pedicle Flap Text: Using a #15 blade, an incision was made around the donor flap to the level of the nasalis muscle. Wide lateral undermining was then performed in both the subcutaneous plane above the nasalis muscle, and in a submuscular plane just above periosteum. This allowed the formation of a free nasalis muscle axial pedicle (based on the angular artery) which was still attached to the actual cutaneous flap, increasing its mobility and vascular viability. Hemostasis was obtained with pinpoint electrocoagulation. The flap was mobilized into position and the pivotal anchor points positioned and stabilized with buried interrupted sutures. Subcutaneous and dermal tissues were closed in a multilayered fashion with sutures. Tissue redundancies were excised, and the epidermal edges were apposed without significant tension and sutured with sutures.
Orbicularis Oris Muscle Flap Text: The defect edges were debeveled with a #15 scalpel blade.  Given that the defect affected the competency of the oral sphincter an orbicularis oris muscle flap was deemed most appropriate to restore this competency and normal muscle function.  Using a sterile surgical marker, an appropriate flap was drawn incorporating the defect. The area thus outlined was incised with a #15 scalpel blade.
Melolabial Transposition Flap Text: The defect edges were debeveled with a #15 scalpel blade.  Given the location of the defect and the proximity to free margins a melolabial flap was deemed most appropriate.  Using a sterile surgical marker, an appropriate melolabial transposition flap was drawn incorporating the defect.    The area thus outlined was incised deep to adipose tissue with a #15 scalpel blade.  The skin margins were undermined to an appropriate distance in all directions utilizing iris scissors.
Rhombic Flap Text: The defect edges were debeveled with a #15 scalpel blade.  Given the location of the defect and the proximity to free margins a rhombic flap was deemed most appropriate.  Using a sterile surgical marker, an appropriate rhombic flap was drawn incorporating the defect.    The area thus outlined was incised deep to adipose tissue with a #15 scalpel blade.  The skin margins were undermined to an appropriate distance in all directions utilizing iris scissors.
Rhomboid Transposition Flap Text: The defect edges were debeveled with a #15 scalpel blade.  Given the location of the defect and the proximity to free margins a rhomboid transposition flap was deemed most appropriate.  Using a sterile surgical marker, an appropriate rhomboid flap was drawn incorporating the defect.    The area thus outlined was incised deep to adipose tissue with a #15 scalpel blade.  The skin margins were undermined to an appropriate distance in all directions utilizing iris scissors.
Bi-Rhombic Flap Text: The defect edges were debeveled with a #15 scalpel blade.  Given the location of the defect and the proximity to free margins a bi-rhombic flap was deemed most appropriate.  Using a sterile surgical marker, an appropriate rhombic flap was drawn incorporating the defect. The area thus outlined was incised deep to adipose tissue with a #15 scalpel blade.  The skin margins were undermined to an appropriate distance in all directions utilizing iris scissors.
Helical Rim Advancement Flap Text: The defect edges were debeveled with a #15 blade scalpel.  Given the location of the defect and the proximity to free margins (helical rim) a double helical rim advancement flap was deemed most appropriate.  Using a sterile surgical marker, the appropriate advancement flaps were drawn incorporating the defect and placing the expected incisions between the helical rim and antihelix where possible.  The area thus outlined was incised through and through with a #15 scalpel blade.  With a skin hook and iris scissors, the flaps were gently and sharply undermined and freed up.
Bilateral Helical Rim Advancement Flap Text: The defect edges were debeveled with a #15 blade scalpel.  Given the location of the defect and the proximity to free margins (helical rim) a bilateral helical rim advancement flap was deemed most appropriate.  Using a sterile surgical marker, the appropriate advancement flaps were drawn incorporating the defect and placing the expected incisions between the helical rim and antihelix where possible.  The area thus outlined was incised through and through with a #15 scalpel blade.  With a skin hook and iris scissors, the flaps were gently and sharply undermined and freed up.
Ear Star Wedge Flap Text: The defect edges were debeveled with a #15 blade scalpel.  Given the location of the defect and the proximity to free margins (helical rim) an ear star wedge flap was deemed most appropriate.  Using a sterile surgical marker, the appropriate flap was drawn incorporating the defect and placing the expected incisions between the helical rim and antihelix where possible.  The area thus outlined was incised through and through with a #15 scalpel blade.
Banner Transposition Flap Text: The defect edges were debeveled with a #15 scalpel blade.  Given the location of the defect and the proximity to free margins a Banner transposition flap was deemed most appropriate.  Using a sterile surgical marker, an appropriate flap drawn around the defect. The area thus outlined was incised deep to adipose tissue with a #15 scalpel blade.  The skin margins were undermined to an appropriate distance in all directions utilizing iris scissors.
Bilobed Flap Text: The defect edges were debeveled with a #15 scalpel blade.  Given the location of the defect and the proximity to free margins a bilobe flap was deemed most appropriate.  Using a sterile surgical marker, an appropriate bilobe flap drawn around the defect.    The area thus outlined was incised deep to adipose tissue with a #15 scalpel blade.  The skin margins were undermined to an appropriate distance in all directions utilizing iris scissors.
Bilobed Transposition Flap Text: The defect edges were debeveled with a #15 scalpel blade.  Given the location of the defect and the proximity to free margins a bilobed transposition flap was deemed most appropriate.  Using a sterile surgical marker, an appropriate bilobe flap drawn around the defect.    The area thus outlined was incised deep to adipose tissue with a #15 scalpel blade.  The skin margins were undermined to an appropriate distance in all directions utilizing iris scissors.
Trilobed Flap Text: The defect edges were debeveled with a #15 scalpel blade.  Given the location of the defect and the proximity to free margins a trilobed flap was deemed most appropriate.  Using a sterile surgical marker, an appropriate trilobed flap drawn around the defect.    The area thus outlined was incised deep to adipose tissue with a #15 scalpel blade.  The skin margins were undermined to an appropriate distance in all directions utilizing iris scissors.
Dorsal Nasal Flap Text: The defect edges were debeveled with a #15 scalpel blade.  Given the location of the defect and the proximity to free margins a dorsal nasal flap was deemed most appropriate.  Using a sterile surgical marker, an appropriate dorsal nasal flap was drawn around the defect.    The area thus outlined was incised deep to adipose tissue with a #15 scalpel blade.  The skin margins were undermined to an appropriate distance in all directions utilizing iris scissors.
Island Pedicle Flap Text: The defect edges were debeveled with a #15 scalpel blade.  Given the location of the defect, shape of the defect and the proximity to free margins an island pedicle advancement flap was deemed most appropriate.  Using a sterile surgical marker, an appropriate advancement flap was drawn incorporating the defect, outlining the appropriate donor tissue and placing the expected incisions within the relaxed skin tension lines where possible.    The area thus outlined was incised deep to adipose tissue with a #15 scalpel blade.  The skin margins were undermined to an appropriate distance in all directions around the primary defect and laterally outward around the island pedicle utilizing iris scissors.  There was minimal undermining beneath the pedicle flap.
Island Pedicle Flap With Canthal Suspension Text: The defect edges were debeveled with a #15 scalpel blade.  Given the location of the defect, shape of the defect and the proximity to free margins an island pedicle advancement flap was deemed most appropriate.  Using a sterile surgical marker, an appropriate advancement flap was drawn incorporating the defect, outlining the appropriate donor tissue and placing the expected incisions within the relaxed skin tension lines where possible. The area thus outlined was incised deep to adipose tissue with a #15 scalpel blade.  The skin margins were undermined to an appropriate distance in all directions around the primary defect and laterally outward around the island pedicle utilizing iris scissors.  There was minimal undermining beneath the pedicle flap. A suspension suture was placed in the canthal tendon to prevent tension and prevent ectropion.
Alar Island Pedicle Flap Text: The defect edges were debeveled with a #15 scalpel blade.  Given the location of the defect, shape of the defect and the proximity to the alar rim an island pedicle advancement flap was deemed most appropriate.  Using a sterile surgical marker, an appropriate advancement flap was drawn incorporating the defect, outlining the appropriate donor tissue and placing the expected incisions within the nasal ala running parallel to the alar rim. The area thus outlined was incised with a #15 scalpel blade.  The skin margins were undermined minimally to an appropriate distance in all directions around the primary defect and laterally outward around the island pedicle utilizing iris scissors.  There was minimal undermining beneath the pedicle flap.
Double Island Pedicle Flap Text: The defect edges were debeveled with a #15 scalpel blade.  Given the location of the defect, shape of the defect and the proximity to free margins a double island pedicle advancement flap was deemed most appropriate.  Using a sterile surgical marker, an appropriate advancement flap was drawn incorporating the defect, outlining the appropriate donor tissue and placing the expected incisions within the relaxed skin tension lines where possible.    The area thus outlined was incised deep to adipose tissue with a #15 scalpel blade.  The skin margins were undermined to an appropriate distance in all directions around the primary defect and laterally outward around the island pedicle utilizing iris scissors.  There was minimal undermining beneath the pedicle flap.
Island Pedicle Flap-Requiring Vessel Identification Text: The defect edges were debeveled with a #15 scalpel blade.  Given the location of the defect, shape of the defect and the proximity to free margins an island pedicle advancement flap was deemed most appropriate.  Using a sterile surgical marker, an appropriate advancement flap was drawn, based on the axial vessel mentioned above, incorporating the defect, outlining the appropriate donor tissue and placing the expected incisions within the relaxed skin tension lines where possible.    The area thus outlined was incised deep to adipose tissue with a #15 scalpel blade.  The skin margins were undermined to an appropriate distance in all directions around the primary defect and laterally outward around the island pedicle utilizing iris scissors.  There was minimal undermining beneath the pedicle flap.
Keystone Flap Text: The defect edges were debeveled with a #15 scalpel blade.  Given the location of the defect, shape of the defect a keystone flap was deemed most appropriate.  Using a sterile surgical marker, an appropriate keystone flap was drawn incorporating the defect, outlining the appropriate donor tissue and placing the expected incisions within the relaxed skin tension lines where possible. The area thus outlined was incised deep to adipose tissue with a #15 scalpel blade.  The skin margins were undermined to an appropriate distance in all directions around the primary defect and laterally outward around the flap utilizing iris scissors.
O-T Plasty Text: The defect edges were debeveled with a #15 scalpel blade.  Given the location of the defect, shape of the defect and the proximity to free margins an O-T plasty was deemed most appropriate.  Using a sterile surgical marker, an appropriate O-T plasty was drawn incorporating the defect and placing the expected incisions within the relaxed skin tension lines where possible.    The area thus outlined was incised deep to adipose tissue with a #15 scalpel blade.  The skin margins were undermined to an appropriate distance in all directions utilizing iris scissors.
O-Z Plasty Text: The defect edges were debeveled with a #15 scalpel blade.  Given the location of the defect, shape of the defect and the proximity to free margins an O-Z plasty (double transposition flap) was deemed most appropriate.  Using a sterile surgical marker, the appropriate transposition flaps were drawn incorporating the defect and placing the expected incisions within the relaxed skin tension lines where possible.    The area thus outlined was incised deep to adipose tissue with a #15 scalpel blade.  The skin margins were undermined to an appropriate distance in all directions utilizing iris scissors.  Hemostasis was achieved with electrocautery.  The flaps were then transposed into place, one clockwise and the other counterclockwise, and anchored with interrupted buried subcutaneous sutures.
Double O-Z Plasty Text: The defect edges were debeveled with a #15 scalpel blade.  Given the location of the defect, shape of the defect and the proximity to free margins a Double O-Z plasty (double transposition flap) was deemed most appropriate.  Using a sterile surgical marker, the appropriate transposition flaps were drawn incorporating the defect and placing the expected incisions within the relaxed skin tension lines where possible. The area thus outlined was incised deep to adipose tissue with a #15 scalpel blade.  The skin margins were undermined to an appropriate distance in all directions utilizing iris scissors.  Hemostasis was achieved with electrocautery.  The flaps were then transposed into place, one clockwise and the other counterclockwise, and anchored with interrupted buried subcutaneous sutures.
V-Y Plasty Text: The defect edges were debeveled with a #15 scalpel blade.  Given the location of the defect, shape of the defect and the proximity to free margins an V-Y advancement flap was deemed most appropriate.  Using a sterile surgical marker, an appropriate advancement flap was drawn incorporating the defect and placing the expected incisions within the relaxed skin tension lines where possible.    The area thus outlined was incised deep to adipose tissue with a #15 scalpel blade.  The skin margins were undermined to an appropriate distance in all directions utilizing iris scissors.
H Plasty Text: Given the location of the defect, shape of the defect and the proximity to free margins a H-plasty was deemed most appropriate for repair.  Using a sterile surgical marker, the appropriate advancement arms of the H-plasty were drawn incorporating the defect and placing the expected incisions within the relaxed skin tension lines where possible. The area thus outlined was incised deep to adipose tissue with a #15 scalpel blade. The skin margins were undermined to an appropriate distance in all directions utilizing iris scissors.  The opposing advancement arms were then advanced into place in opposite direction and anchored with interrupted buried subcutaneous sutures.
W Plasty Text: The lesion was extirpated to the level of the fat with a #15 scalpel blade.  Given the location of the defect, shape of the defect and the proximity to free margins a W-plasty was deemed most appropriate for repair.  Using a sterile surgical marker, the appropriate transposition arms of the W-plasty were drawn incorporating the defect and placing the expected incisions within the relaxed skin tension lines where possible.    The area thus outlined was incised deep to adipose tissue with a #15 scalpel blade.  The skin margins were undermined to an appropriate distance in all directions utilizing iris scissors.  The opposing transposition arms were then transposed into place in opposite direction and anchored with interrupted buried subcutaneous sutures.
Z Plasty Text: The lesion was extirpated to the level of the fat with a #15 scalpel blade.  Given the location of the defect, shape of the defect and the proximity to free margins a Z-plasty was deemed most appropriate for repair.  Using a sterile surgical marker, the appropriate transposition arms of the Z-plasty were drawn incorporating the defect and placing the expected incisions within the relaxed skin tension lines where possible.    The area thus outlined was incised deep to adipose tissue with a #15 scalpel blade.  The skin margins were undermined to an appropriate distance in all directions utilizing iris scissors.  The opposing transposition arms were then transposed into place in opposite direction and anchored with interrupted buried subcutaneous sutures.
Zygomaticofacial Flap Text: Given the location of the defect, shape of the defect and the proximity to free margins a zygomaticofacial flap was deemed most appropriate for repair.  Using a sterile surgical marker, the appropriate flap was drawn incorporating the defect and placing the expected incisions within the relaxed skin tension lines where possible. The area thus outlined was incised deep to adipose tissue with a #15 scalpel blade with preservation of a vascular pedicle.  The skin margins were undermined to an appropriate distance in all directions utilizing iris scissors.  The flap was then placed into the defect and anchored with interrupted buried subcutaneous sutures.
Cheek Interpolation Flap Text: A decision was made to reconstruct the defect utilizing an interpolation axial flap and a staged reconstruction.  A telfa template was made of the defect.  This telfa template was then used to outline the Cheek Interpolation flap.  The donor area for the pedicle flap was then injected with anesthesia.  The flap was excised through the skin and subcutaneous tissue down to the layer of the underlying musculature.  The interpolation flap was carefully excised within this deep plane to maintain its blood supply.  The edges of the donor site were undermined.   The donor site was closed in a primary fashion.  The pedicle was then rotated into position and sutured.  Once the tube was sutured into place, adequate blood supply was confirmed with blanching and refill.  The pedicle was then wrapped with xeroform gauze and dressed appropriately with a telfa and gauze bandage to ensure continued blood supply and protect the attached pedicle.
Cheek-To-Nose Interpolation Flap Text: A decision was made to reconstruct the defect utilizing an interpolation axial flap and a staged reconstruction.  A telfa template was made of the defect.  This telfa template was then used to outline the Cheek-To-Nose Interpolation flap.  The donor area for the pedicle flap was then injected with anesthesia.  The flap was excised through the skin and subcutaneous tissue down to the layer of the underlying musculature.  The interpolation flap was carefully excised within this deep plane to maintain its blood supply.  The edges of the donor site were undermined.   The donor site was closed in a primary fashion.  The pedicle was then rotated into position and sutured.  Once the tube was sutured into place, adequate blood supply was confirmed with blanching and refill.  The pedicle was then wrapped with xeroform gauze and dressed appropriately with a telfa and gauze bandage to ensure continued blood supply and protect the attached pedicle.
Interpolation Flap Text: A decision was made to reconstruct the defect utilizing an interpolation axial flap and a staged reconstruction.  A telfa template was made of the defect.  This telfa template was then used to outline the interpolation flap.  The donor area for the pedicle flap was then injected with anesthesia.  The flap was excised through the skin and subcutaneous tissue down to the layer of the underlying musculature.  The interpolation flap was carefully excised within this deep plane to maintain its blood supply.  The edges of the donor site were undermined.   The donor site was closed in a primary fashion.  The pedicle was then rotated into position and sutured.  Once the tube was sutured into place, adequate blood supply was confirmed with blanching and refill.  The pedicle was then wrapped with xeroform gauze and dressed appropriately with a telfa and gauze bandage to ensure continued blood supply and protect the attached pedicle.
Melolabial Interpolation Flap Text: A decision was made to reconstruct the defect utilizing an interpolation axial flap and a staged reconstruction.  A telfa template was made of the defect.  This telfa template was then used to outline the melolabial interpolation flap.  The donor area for the pedicle flap was then injected with anesthesia.  The flap was excised through the skin and subcutaneous tissue down to the layer of the underlying musculature.  The pedicle flap was carefully excised within this deep plane to maintain its blood supply.  The edges of the donor site were undermined.   The donor site was closed in a primary fashion.  The pedicle was then rotated into position and sutured.  Once the tube was sutured into place, adequate blood supply was confirmed with blanching and refill.  The pedicle was then wrapped with xeroform gauze and dressed appropriately with a telfa and gauze bandage to ensure continued blood supply and protect the attached pedicle.
Mastoid Interpolation Flap Text: A decision was made to reconstruct the defect utilizing an interpolation axial flap and a staged reconstruction.  A telfa template was made of the defect.  This telfa template was then used to outline the mastoid interpolation flap.  The donor area for the pedicle flap was then injected with anesthesia.  The flap was excised through the skin and subcutaneous tissue down to the layer of the underlying musculature.  The pedicle flap was carefully excised within this deep plane to maintain its blood supply.  The edges of the donor site were undermined.   The donor site was closed in a primary fashion.  The pedicle was then rotated into position and sutured.  Once the tube was sutured into place, adequate blood supply was confirmed with blanching and refill.  The pedicle was then wrapped with xeroform gauze and dressed appropriately with a telfa and gauze bandage to ensure continued blood supply and protect the attached pedicle.
Posterior Auricular Interpolation Flap Text: A decision was made to reconstruct the defect utilizing an interpolation axial flap and a staged reconstruction.  A telfa template was made of the defect.  This telfa template was then used to outline the posterior auricular interpolation flap.  The donor area for the pedicle flap was then injected with anesthesia.  The flap was excised through the skin and subcutaneous tissue down to the layer of the underlying musculature.  The pedicle flap was carefully excised within this deep plane to maintain its blood supply.  The edges of the donor site were undermined.   The donor site was closed in a primary fashion.  The pedicle was then rotated into position and sutured.  Once the tube was sutured into place, adequate blood supply was confirmed with blanching and refill.  The pedicle was then wrapped with xeroform gauze and dressed appropriately with a telfa and gauze bandage to ensure continued blood supply and protect the attached pedicle.
Paramedian Forehead Flap Text: A decision was made to reconstruct the defect utilizing an interpolation axial flap and a staged reconstruction.  A telfa template was made of the defect.  This telfa template was then used to outline the paramedian forehead pedicle flap.  The donor area for the pedicle flap was then injected with anesthesia.  The flap was excised through the skin and subcutaneous tissue down to the layer of the underlying musculature.  The pedicle flap was carefully excised within this deep plane to maintain its blood supply.  The edges of the donor site were undermined.   The donor site was closed in a primary fashion.  The pedicle was then rotated into position and sutured.  Once the tube was sutured into place, adequate blood supply was confirmed with blanching and refill.  The pedicle was then wrapped with xeroform gauze and dressed appropriately with a telfa and gauze bandage to ensure continued blood supply and protect the attached pedicle.
Lip Wedge Excision Repair Text: Given the location of the defect and the proximity to free margins a full thickness wedge repair was deemed most appropriate.  Using a sterile surgical marker, the appropriate repair was drawn incorporating the defect and placing the expected incisions perpendicular to the vermilion border.  The vermilion border was also meticulously outlined to ensure appropriate reapproximation during the repair.  The area thus outlined was incised through and through with a #15 scalpel blade.  The muscularis and dermis were reaproximated with deep sutures following hemostasis. Care was taken to realign the vermilion border before proceeding with the superficial closure.  Once the vermilion was realigned the superfical and mucosal closure was finished.
Ftsg Text: The defect edges were debeveled with a #15 scalpel blade.  Given the location of the defect, shape of the defect and the proximity to free margins a full thickness skin graft was deemed most appropriate.  Using a sterile surgical marker, the primary defect shape was transferred to the donor site. The area thus outlined was incised deep to adipose tissue with a #15 scalpel blade.  The harvested graft was then trimmed of adipose tissue until only dermis and epidermis was left.  The skin margins of the secondary defect were undermined to an appropriate distance in all directions utilizing iris scissors.  The secondary defect was closed with interrupted buried subcutaneous sutures.  The skin edges were then re-apposed with running  sutures.  The skin graft was then placed in the primary defect and oriented appropriately.
Split-Thickness Skin Graft Text: The defect edges were debeveled with a #15 scalpel blade.  Given the location of the defect, shape of the defect and the proximity to free margins a split thickness skin graft was deemed most appropriate.  Using a sterile surgical marker, the primary defect shape was transferred to the donor site. The split thickness graft was then harvested.  The skin graft was then placed in the primary defect and oriented appropriately.
Burow's Graft Text: The defect edges were debeveled with a #15 scalpel blade.  Given the location of the defect, shape of the defect, the proximity to free margins and the presence of a standing cone deformity a Burow's skin graft was deemed most appropriate. The standing cone was removed and this tissue was then trimmed to the shape of the primary defect. The adipose tissue was also removed until only dermis and epidermis were left.  The skin margins of the secondary defect were undermined to an appropriate distance in all directions utilizing iris scissors.  The secondary defect was closed with interrupted buried subcutaneous sutures.  The skin edges were then re-apposed with running  sutures.  The skin graft was then placed in the primary defect and oriented appropriately.
Cartilage Graft Text: The defect edges were debeveled with a #15 scalpel blade.  Given the location of the defect, shape of the defect, the fact the defect involved a full thickness cartilage defect a cartilage graft was deemed most appropriate.  An appropriate donor site was identified, cleansed, and anesthetized. The cartilage graft was then harvested and transferred to the recipient site, oriented appropriately and then sutured into place.  The secondary defect was then repaired using a primary closure.
Composite Graft Text: The defect edges were debeveled with a #15 scalpel blade.  Given the location of the defect, shape of the defect, the proximity to free margins and the fact the defect was full thickness a composite graft was deemed most appropriate.  The defect was outline and then transferred to the donor site.  A full thickness graft was then excised from the donor site. The graft was then placed in the primary defect, oriented appropriately and then sutured into place.  The secondary defect was then repaired using a primary closure.
Epidermal Autograft Text: The defect edges were debeveled with a #15 scalpel blade.  Given the location of the defect, shape of the defect and the proximity to free margins an epidermal autograft was deemed most appropriate.  Using a sterile surgical marker, the primary defect shape was transferred to the donor site. The epidermal graft was then harvested.  The skin graft was then placed in the primary defect and oriented appropriately.
Dermal Autograft Text: The defect edges were debeveled with a #15 scalpel blade.  Given the location of the defect, shape of the defect and the proximity to free margins a dermal autograft was deemed most appropriate.  Using a sterile surgical marker, the primary defect shape was transferred to the donor site. The area thus outlined was incised deep to adipose tissue with a #15 scalpel blade.  The harvested graft was then trimmed of adipose and epidermal tissue until only dermis was left.  The skin graft was then placed in the primary defect and oriented appropriately.
Skin Substitute Text: The defect edges were debeveled with a #15 scalpel blade.  Given the location of the defect, shape of the defect and the proximity to free margins a skin substitute graft was deemed most appropriate.  The graft material was trimmed to fit the size of the defect. The graft was then placed in the primary defect and oriented appropriately.
Tissue Cultured Epidermal Autograft Text: The defect edges were debeveled with a #15 scalpel blade.  Given the location of the defect, shape of the defect and the proximity to free margins a tissue cultured epidermal autograft was deemed most appropriate.  The graft was then trimmed to fit the size of the defect.  The graft was then placed in the primary defect and oriented appropriately.
Xenograft Text: The defect edges were debeveled with a #15 scalpel blade.  Given the location of the defect, shape of the defect and the proximity to free margins a xenograft was deemed most appropriate.  The graft was then trimmed to fit the size of the defect.  The graft was then placed in the primary defect and oriented appropriately.
Purse String (Intermediate) Text: Given the location of the defect and the characteristics of the surrounding skin a purse string intermediate closure was deemed most appropriate.  Undermining was performed circumfirentially around the surgical defect.  A purse string suture was then placed and tightened.
Purse String (Simple) Text: Given the location of the defect and the characteristics of the surrounding skin a purse string simple closure was deemed most appropriate.  Undermining was performed circumferentially around the surgical defect.  A purse string suture was then placed and tightened.
Partial Purse String (Intermediate) Text: Given the location of the defect and the characteristics of the surrounding skin an intermediate purse string closure was deemed most appropriate.  Undermining was performed circumferentially around the surgical defect.  A purse string suture was then placed and tightened. Wound tension of the circular defect prevented complete closure of the wound.
Partial Purse String (Simple) Text: Given the location of the defect and the characteristics of the surrounding skin a simple purse string closure was deemed most appropriate.  Undermining was performed circumferentially around the surgical defect.  A purse string suture was then placed and tightened. Wound tension of the circular defect prevented complete closure of the wound.
Complex Repair And Single Advancement Flap Text: The defect edges were debeveled with a #15 scalpel blade.  The primary defect was closed partially with a complex linear closure.  Given the location of the remaining defect, shape of the defect and the proximity to free margins a single advancement flap was deemed most appropriate for complete closure of the defect.  Using a sterile surgical marker, an appropriate advancement flap was drawn incorporating the defect and placing the expected incisions within the relaxed skin tension lines where possible.    The area thus outlined was incised deep to adipose tissue with a #15 scalpel blade.  The skin margins were undermined to an appropriate distance in all directions utilizing iris scissors.
Complex Repair And Double Advancement Flap Text: The defect edges were debeveled with a #15 scalpel blade.  The primary defect was closed partially with a complex linear closure.  Given the location of the remaining defect, shape of the defect and the proximity to free margins a double advancement flap was deemed most appropriate for complete closure of the defect.  Using a sterile surgical marker, an appropriate advancement flap was drawn incorporating the defect and placing the expected incisions within the relaxed skin tension lines where possible.    The area thus outlined was incised deep to adipose tissue with a #15 scalpel blade.  The skin margins were undermined to an appropriate distance in all directions utilizing iris scissors.
Complex Repair And Modified Advancement Flap Text: The defect edges were debeveled with a #15 scalpel blade.  The primary defect was closed partially with a complex linear closure.  Given the location of the remaining defect, shape of the defect and the proximity to free margins a modified advancement flap was deemed most appropriate for complete closure of the defect.  Using a sterile surgical marker, an appropriate advancement flap was drawn incorporating the defect and placing the expected incisions within the relaxed skin tension lines where possible.    The area thus outlined was incised deep to adipose tissue with a #15 scalpel blade.  The skin margins were undermined to an appropriate distance in all directions utilizing iris scissors.
Complex Repair And A-T Advancement Flap Text: The defect edges were debeveled with a #15 scalpel blade.  The primary defect was closed partially with a complex linear closure.  Given the location of the remaining defect, shape of the defect and the proximity to free margins an A-T advancement flap was deemed most appropriate for complete closure of the defect.  Using a sterile surgical marker, an appropriate advancement flap was drawn incorporating the defect and placing the expected incisions within the relaxed skin tension lines where possible.    The area thus outlined was incised deep to adipose tissue with a #15 scalpel blade.  The skin margins were undermined to an appropriate distance in all directions utilizing iris scissors.
Complex Repair And O-T Advancement Flap Text: The defect edges were debeveled with a #15 scalpel blade.  The primary defect was closed partially with a complex linear closure.  Given the location of the remaining defect, shape of the defect and the proximity to free margins an O-T advancement flap was deemed most appropriate for complete closure of the defect.  Using a sterile surgical marker, an appropriate advancement flap was drawn incorporating the defect and placing the expected incisions within the relaxed skin tension lines where possible.    The area thus outlined was incised deep to adipose tissue with a #15 scalpel blade.  The skin margins were undermined to an appropriate distance in all directions utilizing iris scissors.
Complex Repair And O-L Flap Text: The defect edges were debeveled with a #15 scalpel blade.  The primary defect was closed partially with a complex linear closure.  Given the location of the remaining defect, shape of the defect and the proximity to free margins an O-L flap was deemed most appropriate for complete closure of the defect.  Using a sterile surgical marker, an appropriate flap was drawn incorporating the defect and placing the expected incisions within the relaxed skin tension lines where possible.    The area thus outlined was incised deep to adipose tissue with a #15 scalpel blade.  The skin margins were undermined to an appropriate distance in all directions utilizing iris scissors.
Complex Repair And Bilobe Flap Text: The defect edges were debeveled with a #15 scalpel blade.  The primary defect was closed partially with a complex linear closure.  Given the location of the remaining defect, shape of the defect and the proximity to free margins a bilobe flap was deemed most appropriate for complete closure of the defect.  Using a sterile surgical marker, an appropriate advancement flap was drawn incorporating the defect and placing the expected incisions within the relaxed skin tension lines where possible.    The area thus outlined was incised deep to adipose tissue with a #15 scalpel blade.  The skin margins were undermined to an appropriate distance in all directions utilizing iris scissors.
Complex Repair And Melolabial Flap Text: The defect edges were debeveled with a #15 scalpel blade.  The primary defect was closed partially with a complex linear closure.  Given the location of the remaining defect, shape of the defect and the proximity to free margins a melolabial flap was deemed most appropriate for complete closure of the defect.  Using a sterile surgical marker, an appropriate advancement flap was drawn incorporating the defect and placing the expected incisions within the relaxed skin tension lines where possible.    The area thus outlined was incised deep to adipose tissue with a #15 scalpel blade.  The skin margins were undermined to an appropriate distance in all directions utilizing iris scissors.
Complex Repair And Rotation Flap Text: The defect edges were debeveled with a #15 scalpel blade.  The primary defect was closed partially with a complex linear closure.  Given the location of the remaining defect, shape of the defect and the proximity to free margins a rotation flap was deemed most appropriate for complete closure of the defect.  Using a sterile surgical marker, an appropriate advancement flap was drawn incorporating the defect and placing the expected incisions within the relaxed skin tension lines where possible.    The area thus outlined was incised deep to adipose tissue with a #15 scalpel blade.  The skin margins were undermined to an appropriate distance in all directions utilizing iris scissors.
Complex Repair And Rhombic Flap Text: The defect edges were debeveled with a #15 scalpel blade.  The primary defect was closed partially with a complex linear closure.  Given the location of the remaining defect, shape of the defect and the proximity to free margins a rhombic flap was deemed most appropriate for complete closure of the defect.  Using a sterile surgical marker, an appropriate advancement flap was drawn incorporating the defect and placing the expected incisions within the relaxed skin tension lines where possible.    The area thus outlined was incised deep to adipose tissue with a #15 scalpel blade.  The skin margins were undermined to an appropriate distance in all directions utilizing iris scissors.
Complex Repair And Transposition Flap Text: The defect edges were debeveled with a #15 scalpel blade.  The primary defect was closed partially with a complex linear closure.  Given the location of the remaining defect, shape of the defect and the proximity to free margins a transposition flap was deemed most appropriate for complete closure of the defect.  Using a sterile surgical marker, an appropriate advancement flap was drawn incorporating the defect and placing the expected incisions within the relaxed skin tension lines where possible.    The area thus outlined was incised deep to adipose tissue with a #15 scalpel blade.  The skin margins were undermined to an appropriate distance in all directions utilizing iris scissors.
Complex Repair And V-Y Plasty Text: The defect edges were debeveled with a #15 scalpel blade.  The primary defect was closed partially with a complex linear closure.  Given the location of the remaining defect, shape of the defect and the proximity to free margins a V-Y plasty was deemed most appropriate for complete closure of the defect.  Using a sterile surgical marker, an appropriate advancement flap was drawn incorporating the defect and placing the expected incisions within the relaxed skin tension lines where possible.    The area thus outlined was incised deep to adipose tissue with a #15 scalpel blade.  The skin margins were undermined to an appropriate distance in all directions utilizing iris scissors.
Complex Repair And M Plasty Text: The defect edges were debeveled with a #15 scalpel blade.  The primary defect was closed partially with a complex linear closure.  Given the location of the remaining defect, shape of the defect and the proximity to free margins an M plasty was deemed most appropriate for complete closure of the defect.  Using a sterile surgical marker, an appropriate advancement flap was drawn incorporating the defect and placing the expected incisions within the relaxed skin tension lines where possible.    The area thus outlined was incised deep to adipose tissue with a #15 scalpel blade.  The skin margins were undermined to an appropriate distance in all directions utilizing iris scissors.
Complex Repair And Double M Plasty Text: The defect edges were debeveled with a #15 scalpel blade.  The primary defect was closed partially with a complex linear closure.  Given the location of the remaining defect, shape of the defect and the proximity to free margins a double M plasty was deemed most appropriate for complete closure of the defect.  Using a sterile surgical marker, an appropriate advancement flap was drawn incorporating the defect and placing the expected incisions within the relaxed skin tension lines where possible.    The area thus outlined was incised deep to adipose tissue with a #15 scalpel blade.  The skin margins were undermined to an appropriate distance in all directions utilizing iris scissors.
Complex Repair And W Plasty Text: The defect edges were debeveled with a #15 scalpel blade.  The primary defect was closed partially with a complex linear closure.  Given the location of the remaining defect, shape of the defect and the proximity to free margins a W plasty was deemed most appropriate for complete closure of the defect.  Using a sterile surgical marker, an appropriate advancement flap was drawn incorporating the defect and placing the expected incisions within the relaxed skin tension lines where possible.    The area thus outlined was incised deep to adipose tissue with a #15 scalpel blade.  The skin margins were undermined to an appropriate distance in all directions utilizing iris scissors.
Complex Repair And Z Plasty Text: The defect edges were debeveled with a #15 scalpel blade.  The primary defect was closed partially with a complex linear closure.  Given the location of the remaining defect, shape of the defect and the proximity to free margins a Z plasty was deemed most appropriate for complete closure of the defect.  Using a sterile surgical marker, an appropriate advancement flap was drawn incorporating the defect and placing the expected incisions within the relaxed skin tension lines where possible.    The area thus outlined was incised deep to adipose tissue with a #15 scalpel blade.  The skin margins were undermined to an appropriate distance in all directions utilizing iris scissors.
Complex Repair And Dorsal Nasal Flap Text: The defect edges were debeveled with a #15 scalpel blade.  The primary defect was closed partially with a complex linear closure.  Given the location of the remaining defect, shape of the defect and the proximity to free margins a dorsal nasal flap was deemed most appropriate for complete closure of the defect.  Using a sterile surgical marker, an appropriate flap was drawn incorporating the defect and placing the expected incisions within the relaxed skin tension lines where possible.    The area thus outlined was incised deep to adipose tissue with a #15 scalpel blade.  The skin margins were undermined to an appropriate distance in all directions utilizing iris scissors.
Complex Repair And Ftsg Text: The defect edges were debeveled with a #15 scalpel blade.  The primary defect was closed partially with a complex linear closure.  Given the location of the defect, shape of the defect and the proximity to free margins a full thickness skin graft was deemed most appropriate to repair the remaining defect.  The graft was trimmed to fit the size of the remaining defect.  The graft was then placed in the primary defect, oriented appropriately, and sutured into place.
Complex Repair And Burow's Graft Text: The defect edges were debeveled with a #15 scalpel blade.  The primary defect was closed partially with a complex linear closure.  Given the location of the defect, shape of the defect, the proximity to free margins and the presence of a standing cone deformity a Burow's graft was deemed most appropriate to repair the remaining defect.  The graft was trimmed to fit the size of the remaining defect.  The graft was then placed in the primary defect, oriented appropriately, and sutured into place.
Complex Repair And Split-Thickness Skin Graft Text: The defect edges were debeveled with a #15 scalpel blade.  The primary defect was closed partially with a complex linear closure.  Given the location of the defect, shape of the defect and the proximity to free margins a split thickness skin graft was deemed most appropriate to repair the remaining defect.  The graft was trimmed to fit the size of the remaining defect.  The graft was then placed in the primary defect, oriented appropriately, and sutured into place.
Complex Repair And Epidermal Autograft Text: The defect edges were debeveled with a #15 scalpel blade.  The primary defect was closed partially with a complex linear closure.  Given the location of the defect, shape of the defect and the proximity to free margins an epidermal autograft was deemed most appropriate to repair the remaining defect.  The graft was trimmed to fit the size of the remaining defect.  The graft was then placed in the primary defect, oriented appropriately, and sutured into place.
Complex Repair And Dermal Autograft Text: The defect edges were debeveled with a #15 scalpel blade.  The primary defect was closed partially with a complex linear closure.  Given the location of the defect, shape of the defect and the proximity to free margins an dermal autograft was deemed most appropriate to repair the remaining defect.  The graft was trimmed to fit the size of the remaining defect.  The graft was then placed in the primary defect, oriented appropriately, and sutured into place.
Complex Repair And Tissue Cultured Epidermal Autograft Text: The defect edges were debeveled with a #15 scalpel blade.  The primary defect was closed partially with a complex linear closure.  Given the location of the defect, shape of the defect and the proximity to free margins an tissue cultured epidermal autograft was deemed most appropriate to repair the remaining defect.  The graft was trimmed to fit the size of the remaining defect.  The graft was then placed in the primary defect, oriented appropriately, and sutured into place.
Complex Repair And Xenograft Text: The defect edges were debeveled with a #15 scalpel blade.  The primary defect was closed partially with a complex linear closure.  Given the location of the defect, shape of the defect and the proximity to free margins a xenograft was deemed most appropriate to repair the remaining defect.  The graft was trimmed to fit the size of the remaining defect.  The graft was then placed in the primary defect, oriented appropriately, and sutured into place.
Complex Repair And Skin Substitute Graft Text: The defect edges were debeveled with a #15 scalpel blade.  The primary defect was closed partially with a complex linear closure.  Given the location of the remaining defect, shape of the defect and the proximity to free margins a skin substitute graft was deemed most appropriate to repair the remaining defect.  The graft was trimmed to fit the size of the remaining defect.  The graft was then placed in the primary defect, oriented appropriately, and sutured into place.
Path Notes (To The Dermatopathologist): Please check margins.
Consent was obtained from the patient. The risks and benefits to therapy were discussed in detail. Specifically, the risks of infection, scarring, bleeding, prolonged wound healing, incomplete removal, allergy to anesthesia, nerve injury and recurrence were addressed. Prior to the procedure, the treatment site was clearly identified and confirmed by the patient. All components of Universal Protocol/PAUSE Rule completed.
Post-Care Instructions: I reviewed with the patient in detail post-care instructions. Patient is not to engage in any heavy lifting, exercise, or swimming for the next 14 days. Should the patient develop any fevers, chills, bleeding, severe pain patient will contact the office immediately.
Home Suture Removal Text: Patient was provided a home suture removal kit and will remove their sutures at home.  If they have any questions or difficulties they will call the office.
Where Do You Want The Question To Include Opioid Counseling Located?: Case Summary Tab
Information: Selecting Yes will display possible errors in your note based on the variables you have selected. This validation is only offered as a suggestion for you. PLEASE NOTE THAT THE VALIDATION TEXT WILL BE REMOVED WHEN YOU FINALIZE YOUR NOTE. IF YOU WANT TO FAX A PRELIMINARY NOTE YOU WILL NEED TO TOGGLE THIS TO 'NO' IF YOU DO NOT WANT IT IN YOUR FAXED NOTE.

## 2021-08-12 NOTE — PROCEDURE: MIPS QUALITY
Detail Level: Detailed
Quality 111:Pneumonia Vaccination Status For Older Adults: Pneumococcal Vaccination Previously Received
Quality 265: Biopsy Follow-Up: Biopsy results reviewed, communicated, tracked, and documented
Quality 130: Documentation Of Current Medications In The Medical Record: Current Medications Documented
Quality 431: Preventive Care And Screening: Unhealthy Alcohol Use - Screening: Patient screened for unhealthy alcohol use using a single question and scores less than 2 times per year
Quality 226: Preventive Care And Screening: Tobacco Use: Screening And Cessation Intervention: Patient screened for tobacco use and is an ex/non-smoker

## 2021-08-23 ENCOUNTER — APPOINTMENT (RX ONLY)
Dept: URBAN - METROPOLITAN AREA CLINIC 36 | Facility: CLINIC | Age: 86
Setting detail: DERMATOLOGY
End: 2021-08-23

## 2021-08-23 DIAGNOSIS — Z48.817 ENCOUNTER FOR SURGICAL AFTERCARE FOLLOWING SURGERY ON THE SKIN AND SUBCUTANEOUS TISSUE: ICD-10-CM

## 2021-08-23 PROCEDURE — ? SUTURE REMOVAL (GLOBAL PERIOD)

## 2021-08-23 PROCEDURE — ? PHOTO-DOCUMENTATION

## 2021-08-23 PROCEDURE — 99024 POSTOP FOLLOW-UP VISIT: CPT

## 2021-08-23 ASSESSMENT — LOCATION ZONE DERM: LOCATION ZONE: ARM

## 2021-08-23 ASSESSMENT — LOCATION SIMPLE DESCRIPTION DERM: LOCATION SIMPLE: RIGHT FOREARM

## 2021-08-23 ASSESSMENT — LOCATION DETAILED DESCRIPTION DERM: LOCATION DETAILED: RIGHT PROXIMAL RADIAL DORSAL FOREARM

## 2021-08-23 NOTE — PROCEDURE: SUTURE REMOVAL (GLOBAL PERIOD)
Detail Level: Simple
Add 49305 Cpt? (Important Note: In 2017 The Use Of 66936 Is Being Tracked By Cms To Determine Future Global Period Reimbursement For Global Periods): yes

## 2021-08-31 ENCOUNTER — APPOINTMENT (RX ONLY)
Dept: URBAN - METROPOLITAN AREA CLINIC 4 | Facility: CLINIC | Age: 86
Setting detail: DERMATOLOGY
End: 2021-08-31

## 2021-08-31 DIAGNOSIS — Z48.817 ENCOUNTER FOR SURGICAL AFTERCARE FOLLOWING SURGERY ON THE SKIN AND SUBCUTANEOUS TISSUE: ICD-10-CM

## 2021-08-31 PROCEDURE — ? POST-OP WOUND CHECK

## 2021-08-31 PROCEDURE — ? PHOTO-DOCUMENTATION

## 2021-08-31 ASSESSMENT — LOCATION SIMPLE DESCRIPTION DERM: LOCATION SIMPLE: RIGHT FOREARM

## 2021-08-31 ASSESSMENT — LOCATION ZONE DERM: LOCATION ZONE: ARM

## 2021-08-31 ASSESSMENT — LOCATION DETAILED DESCRIPTION DERM: LOCATION DETAILED: RIGHT PROXIMAL RADIAL DORSAL FOREARM

## 2021-08-31 NOTE — PROCEDURE: POST-OP WOUND CHECK
Detail Level: Detailed
Add 79445 Cpt? (Important Note: In 2017 The Use Of 36574 Is Being Tracked By Cms To Determine Future Global Period Reimbursement For Global Periods): no
Wound Evaluated By: Daisha Bañuelos, RN

## 2021-09-14 ENCOUNTER — TELEPHONE (OUTPATIENT)
Dept: MEDICAL GROUP | Facility: PHYSICIAN GROUP | Age: 86
End: 2021-09-14

## 2021-09-14 NOTE — TELEPHONE ENCOUNTER
Received PA for pt's Testosterone from [employer]. Completed requested information and PA was submitted- awaiting response.

## 2021-09-21 ENCOUNTER — APPOINTMENT (OUTPATIENT)
Dept: MEDICAL GROUP | Facility: PHYSICIAN GROUP | Age: 86
End: 2021-09-21
Payer: COMMERCIAL

## 2021-10-11 DIAGNOSIS — E29.1 HYPOGONADISM IN MALE: ICD-10-CM

## 2021-10-12 ENCOUNTER — APPOINTMENT (OUTPATIENT)
Dept: MEDICAL GROUP | Facility: PHYSICIAN GROUP | Age: 86
End: 2021-10-12
Payer: COMMERCIAL

## 2021-10-12 ENCOUNTER — TELEPHONE (OUTPATIENT)
Dept: MEDICAL GROUP | Facility: PHYSICIAN GROUP | Age: 86
End: 2021-10-12

## 2021-10-12 RX ORDER — TESTOSTERONE GEL, 1% 10 MG/G
50 GEL TRANSDERMAL DAILY
Qty: 5 G | Refills: 0 | Status: SHIPPED | OUTPATIENT
Start: 2021-10-12 | End: 2021-10-21 | Stop reason: SDUPTHER

## 2021-10-12 NOTE — TELEPHONE ENCOUNTER
Phone Number Called: [phone] (home) [phone] (work)      Call outcome: Left detailed message for patient. Informed to call back with any additional questions.    Message: LVM for pt to reschedule his appt with PCP due to him missing appt on 10/12/21. Informed pt to call back to discuss appt date. Also informed pt that Testosterone Rx had been refilled and sent to [facility].

## 2021-10-21 ENCOUNTER — TELEPHONE (OUTPATIENT)
Dept: MEDICAL GROUP | Facility: PHYSICIAN GROUP | Age: 86
End: 2021-10-21

## 2021-10-21 ENCOUNTER — OFFICE VISIT (OUTPATIENT)
Dept: MEDICAL GROUP | Facility: PHYSICIAN GROUP | Age: 86
End: 2021-10-21
Payer: COMMERCIAL

## 2021-10-21 VITALS
DIASTOLIC BLOOD PRESSURE: 62 MMHG | SYSTOLIC BLOOD PRESSURE: 110 MMHG | OXYGEN SATURATION: 97 % | BODY MASS INDEX: 26.64 KG/M2 | RESPIRATION RATE: 12 BRPM | HEIGHT: 68 IN | HEART RATE: 54 BPM | WEIGHT: 175.8 LBS | TEMPERATURE: 97 F

## 2021-10-21 DIAGNOSIS — E29.1 HYPOGONADISM IN MALE: ICD-10-CM

## 2021-10-21 DIAGNOSIS — S81.802A LEG WOUND, LEFT, INITIAL ENCOUNTER: ICD-10-CM

## 2021-10-21 DIAGNOSIS — E78.5 DYSLIPIDEMIA: ICD-10-CM

## 2021-10-21 DIAGNOSIS — R73.03 PREDIABETES: ICD-10-CM

## 2021-10-21 DIAGNOSIS — D69.6 THROMBOCYTOPENIA (HCC): ICD-10-CM

## 2021-10-21 PROBLEM — D64.9 NORMOCYTIC ANEMIA: Status: RESOLVED | Noted: 2020-05-18 | Resolved: 2021-10-21

## 2021-10-21 PROCEDURE — 99214 OFFICE O/P EST MOD 30 MIN: CPT | Performed by: INTERNAL MEDICINE

## 2021-10-21 RX ORDER — TESTOSTERONE GEL, 1% 10 MG/G
50 GEL TRANSDERMAL DAILY
Qty: 5 G | Refills: 0 | Status: SHIPPED | OUTPATIENT
Start: 2021-10-21 | End: 2022-02-09

## 2021-10-21 ASSESSMENT — FIBROSIS 4 INDEX: FIB4 SCORE: 3.44

## 2021-10-21 NOTE — TELEPHONE ENCOUNTER
Received incoming email from [employer] regarding pt's Testosterone. Completed requirements. Awaiting response.     Homa Alonso Key: [id number] - PA Case ID: [id number] - Rx #: [id number]

## 2021-10-21 NOTE — PROGRESS NOTES
Subjective:   Chief Complaint/History of Present Illness:  Homa Alonso is a 89 y.o. male established patient who presents today to discuss medical problems as listed below. Homa is unaccompanied for today's visit.    Problem   Thrombocytopenia (Hcc)    10/2021: platelet count 137    He has history of thrombocytopenia and multiple wounds on extremities.  He is taking Eliquis for stroke prophylaxis.     Leg Wound, Left, Initial Encounter    On the left lower leg on the lateral aspect he has a 1.4 x 2 cm wound.  It is superficial depth with granulation and epithelialization surrounding.  There is no streaking erythema.  He has venous insufficiency of that leg with slight pitting edema pretibial.  He had a previous deeper wound in the same area that required wound VAC treatment after he had an accident with his car door causing a significant skin tear.  He noticed this wound yesterday in the shower and wanted to have it looked at in clinic.  RN Marjorie CONKLIN came to help evaluate and recommended Xeroform and recheck in 1 week with her.     Prediabetes    10/2021: A1c 6.0    On review of his lab work he has several elevated fasting blood sugars. No known DM in the past.      Hypogonadism in Male       Ref. Range 5/1/2019 10:50   Free Testosterone Latest Ref Range: 6.6 - 18.1 pg/mL 7.2   Testosterone,Total Latest Ref Range: 264 - 916 ng/dL 297       He reports radical prostatectomy 1991 following discovery of localized prostate cancer.  Unfortunately, he had complications of both erectile dysfunction as well as urinary incontinence.  He had an artificial sphincter placed around 2009 which has continued to work well.  He notes profound fatigue following his prostatectomy was found to have low testosterone levels and has been using AndroGel since about 2009 with great success.     Dyslipidemia    10/2021:  , triglycerides 229, HDL 48, LDL 74    He reports chronic use of atorvastatin for history of elevated  cholesterol panel.  He denies any myalgias or GI upset on this medication.  He is tolerating without difficulty.  He denies history of heart attack or stroke in the past.    Current regimen: Atorvastatin 20 mg daily       Current Medications:  Current Outpatient Medications Ordered in Epic   Medication Sig Dispense Refill   • testosterone (TESTIM/ANDROGEL) 50 MG/5GM (1%) Gel gel Place 50 mg on the skin every day for 90 days. 5 g 0   • atorvastatin (LIPITOR) 20 MG Tab Take 1 tablet by mouth once daily 90 tablet 3   • metoprolol tartrate (LOPRESSOR) 25 MG Tab Take 2 tabs in the AM and 1 tab in the  tablet 3   • levETIRAcetam (KEPPRA) 500 MG Tab Take 1 tablet by mouth 2 times a day. 180 tablet 3   • bimatoprost (LUMIGAN) 0.01 % Solution Place 1 Drop in right eye every bedtime. 30 mL 2   • ELIQUIS 2.5 MG Tab Take 2.5 mg by mouth.     • Multiple Vitamins-Minerals (ICAPS AREDS 2) Chew Tab Take  by mouth.     • docusate sodium (COLACE) 100 MG Cap Take 100 mg by mouth 2 times a day.     • acetaminophen (TYLENOL) 325 MG Tab Take 2 Tabs by mouth every 6 hours as needed (Mild Pain; (Pain scale 1-3); Temp greater than 100.5 F). 30 Tab 0   • B Complex Vitamins (B COMPLEX B-12 PO) Take  by mouth.     • Omega-3 Fatty Acids (FISH OIL) 1000 MG Cap capsule Take 1,000 mg by mouth 3 times a day, with meals.     • multivitamin (THERAGRAN) Tab Take 1 Tab by mouth every day.     • Cholecalciferol (VITAMIN D3) 2000 UNIT CAPS Take  by mouth.     • CALCIUM CITRATE PO Take 500 mg by mouth every day.     • MAGNESIUM CITRATE PO Take 250 mg by mouth every day.     • VITAMIN C CR PO Take  by mouth. OTC VIT C      • clindamycin (CLEOCIN) 300 MG Cap TAKE 2 CAPS BY MOUTH 1 HOUR PRIOR TO DENTAL APPOINTMENT       No current Epic-ordered facility-administered medications on file.          Objective:   Physical Exam:    Vitals: /62 (BP Location: Left arm, Patient Position: Sitting, BP Cuff Size: Adult)   Pulse (!) 54   Temp 36.1 °C (97 °F)  "(Temporal)   Resp 12   Ht 1.727 m (5' 8\")   Wt 79.7 kg (175 lb 12.8 oz)   SpO2 97%    BMI: Body mass index is 26.73 kg/m².  Physical Exam  Constitutional:       Appearance: Normal appearance.      Comments: Frail phenotype, alert, answering questions responsively    Eyes:      Conjunctiva/sclera: Conjunctivae normal.   Pulmonary:      Effort: Pulmonary effort is normal. No respiratory distress.   Musculoskeletal:      Right lower leg: Edema present.      Left lower leg: Edema present.   Skin:     Comments: Venous stasis dermatitis. Left > right pretibial edema. 1.3 x 2 cm superficial wound on left lower leg on lateral aspect.   Psychiatric:         Mood and Affect: Mood normal.         Behavior: Behavior normal.         Thought Content: Thought content normal.         Judgment: Judgment normal.          Assessment and Plan:   Homa is a 89 y.o. male with the following:  Problem List Items Addressed This Visit     Dyslipidemia     Chronic and ongoing problem.  Appropriate continue atorvastatin 20 mg daily.  Although he is 89 he continues to function independently and is not having side effects so I think ongoing benefit outweighs risk for this medicine.         Hypogonadism in male     Chronic and ongoing problem.  Continues to feel great benefit with the use of testosterone replacement.  He understands increased risk of clotting, heart attack, and stroke.  No side effects is noted with the medicine.  We will send in 3-month supply and arrange for follow-up in 90 days.    Obtained and reviewed patient utilization report from [facility] pharmacy database on 10/21/2021 1:33 PM  prior to writing prescription for controlled substance II, III or IV per [place] bill . Based on assessment of the report, the prescription is medically necessary.     Patient understands this prescription is a controlled substance which is potentially habit-forming and its use is regulated by the SERA. We also discussed the new " "\"black box\" warning regarding the lethal combination of opioids and benzodiazepines. Refills are subject to terms of a controlled substance agreement and patient has an updated one on file. Most recent UDS is appropriate. Any refill requires an office visit. Narcotics have may adverse effects and the risks of addiction, accidental overdose and death were emphasized. Provided prescriptions for the next three months.         Relevant Medications    testosterone (TESTIM/ANDROGEL) 50 MG/5GM (1%) Gel gel    Prediabetes     Chronic and ongoing problem, likely due to gradual insulin resistance.  He reports prediabetes for a long time.  No indication to start pharmacotherapy at this time.  Continue with periodic evaluation.         Thrombocytopenia (HCC)     Chronic and ongoing problem.  Platelets are reduced but not at a dangerous level.  He continues on Eliquis for stroke prophylaxis.  Continue periodic observation.  No bleeding complications at this time but he does have history of hematuria.         Leg wound, left, initial encounter     New problem, mild severity.  He has longstanding history of wounds with difficult healing.  He has past history of vein stripping with edema and venous insufficiency in the left lower extremity.  This wound does not appear infected right now but due to advanced age and difficulty for him to visualize the wound location he would be at high risk of complications.  Recommend Xeroform application and and follow-up in 1 week with our RN to ensure it is healing as expected.  He was appreciative of the evaluation.                RTC: No follow-ups on file.    I spent a total of 31 minutes with record review, exam, communication with the patient, communication with other providers, and documentation of this encounter.    PLEASE NOTE: This dictation was created using voice recognition software. I have made every reasonable attempt to correct obvious errors, but I expect that there are errors of " grammar and possibly content that I did not discover before finalizing the note.      Sruthi Kincaid, DO  Geriatric and Internal Medicine  [facility]

## 2021-10-21 NOTE — ASSESSMENT & PLAN NOTE
Chronic and ongoing problem, likely due to gradual insulin resistance.  He reports prediabetes for a long time.  No indication to start pharmacotherapy at this time.  Continue with periodic evaluation.

## 2021-10-21 NOTE — ASSESSMENT & PLAN NOTE
Chronic and ongoing problem.  Appropriate continue atorvastatin 20 mg daily.  Although he is 89 he continues to function independently and is not having side effects so I think ongoing benefit outweighs risk for this medicine.

## 2021-10-21 NOTE — ASSESSMENT & PLAN NOTE
Chronic and ongoing problem.  Platelets are reduced but not at a dangerous level.  He continues on Eliquis for stroke prophylaxis.  Continue periodic observation.  No bleeding complications at this time but he does have history of hematuria.

## 2021-10-21 NOTE — ASSESSMENT & PLAN NOTE
New problem, mild severity.  He has longstanding history of wounds with difficult healing.  He has past history of vein stripping with edema and venous insufficiency in the left lower extremity.  This wound does not appear infected right now but due to advanced age and difficulty for him to visualize the wound location he would be at high risk of complications.  Recommend Xeroform application and and follow-up in 1 week with our RN to ensure it is healing as expected.  He was appreciative of the evaluation.

## 2021-10-21 NOTE — ASSESSMENT & PLAN NOTE
"Chronic and ongoing problem.  Continues to feel great benefit with the use of testosterone replacement.  He understands increased risk of clotting, heart attack, and stroke.  No side effects is noted with the medicine.  We will send in 3-month supply and arrange for follow-up in 90 days.    Obtained and reviewed patient utilization report from [facility] pharmacy database on 10/21/2021 1:33 PM  prior to writing prescription for controlled substance II, III or IV per [place] bill . Based on assessment of the report, the prescription is medically necessary.     Patient understands this prescription is a controlled substance which is potentially habit-forming and its use is regulated by the SERA. We also discussed the new \"black box\" warning regarding the lethal combination of opioids and benzodiazepines. Refills are subject to terms of a controlled substance agreement and patient has an updated one on file. Most recent UDS is appropriate. Any refill requires an office visit. Narcotics have may adverse effects and the risks of addiction, accidental overdose and death were emphasized. Provided prescriptions for the next three months.  "

## 2021-10-28 ENCOUNTER — NON-PROVIDER VISIT (OUTPATIENT)
Dept: MEDICAL GROUP | Facility: PHYSICIAN GROUP | Age: 86
End: 2021-10-28
Payer: COMMERCIAL

## 2021-10-28 PROCEDURE — 99999 PR NO CHARGE: CPT | Performed by: INTERNAL MEDICINE

## 2021-10-28 NOTE — PROGRESS NOTES
Patient wound on L lateral LE is clean, dry and healing well. No redness, heat, swelling, rash or drainage. Patient will continue to pat dry after shower, bandage at night prior to bedtime, and monitor for signs of infection. Informed PCP.

## 2021-12-16 ENCOUNTER — NON-PROVIDER VISIT (OUTPATIENT)
Dept: MEDICAL GROUP | Facility: PHYSICIAN GROUP | Age: 86
End: 2021-12-16

## 2021-12-16 ENCOUNTER — TELEPHONE (OUTPATIENT)
Dept: MEDICAL GROUP | Facility: PHYSICIAN GROUP | Age: 86
End: 2021-12-16

## 2021-12-16 NOTE — TELEPHONE ENCOUNTER
Pt called and LVM concerned over possible DVT.     Called pt back. He reports a spot on his left LE that is tender, swollen. Pt on Eliquis. Willing to come into clinic for wound check today- scheduled.

## 2021-12-17 NOTE — PROGRESS NOTES
Pt arrived for reported wound check. Pt has bruising to the right, medial-dorsal side of calf. Area is 5cm in length and 6 cm in width. Used a sharpie to duane erythema outline. Center is purple in color. Pt reports it is very tender to touch. Small amount of swelling to area. No open area. Does not inhibit walking or sleep. Denies fever/chills.   Pt given info on how bruising occurs on blood thinning medication- pt is taking Eliquis 2.5mg bid.   Pt will RTC on 12/20/21 at 10am for a re-check of area. Pt given a couple of cold packs to use at home for discomfort. Pt has office extension if he has concerns post visit.

## 2021-12-20 ENCOUNTER — NON-PROVIDER VISIT (OUTPATIENT)
Dept: MEDICAL GROUP | Facility: PHYSICIAN GROUP | Age: 86
End: 2021-12-20
Payer: COMMERCIAL

## 2021-12-20 ENCOUNTER — HOSPITAL ENCOUNTER (OUTPATIENT)
Dept: RADIOLOGY | Facility: MEDICAL CENTER | Age: 86
End: 2021-12-20
Attending: INTERNAL MEDICINE
Payer: COMMERCIAL

## 2021-12-20 DIAGNOSIS — M79.89 RIGHT LEG SWELLING: ICD-10-CM

## 2021-12-20 PROCEDURE — 93971 EXTREMITY STUDY: CPT | Mod: RT

## 2021-12-20 NOTE — PROGRESS NOTES
Pt arrived for wound check. Pt was seen last week for possible hematoma to right LE. Area was marked with sharpie last week to encircle red area of wound.     Today the red area has spread distally, outside of the duane placed at last week's appt. Swelling remains, very tender to touch, a little warmer than surrounding skin. Pt complains the pain is worse, making it difficult to walk. Pt denies fever.     Had Dr. Hanna view area and she recommends US. Ordered and scheduled US for today at [address]. Pt aware he should arrive 15 minutes early for appt at 2:15. Pt given map of location- but verbalized he knows where imaging is located.

## 2021-12-21 ENCOUNTER — NON-PROVIDER VISIT (OUTPATIENT)
Dept: MEDICAL GROUP | Facility: PHYSICIAN GROUP | Age: 86
End: 2021-12-21
Payer: COMMERCIAL

## 2021-12-21 ENCOUNTER — TELEPHONE (OUTPATIENT)
Dept: MEDICAL GROUP | Facility: PHYSICIAN GROUP | Age: 86
End: 2021-12-21

## 2021-12-21 ENCOUNTER — HOSPITAL ENCOUNTER (OUTPATIENT)
Facility: MEDICAL CENTER | Age: 86
End: 2021-12-21
Attending: INTERNAL MEDICINE
Payer: COMMERCIAL

## 2021-12-21 DIAGNOSIS — M79.661 PAIN OF RIGHT LOWER LEG: ICD-10-CM

## 2021-12-21 DIAGNOSIS — S80.11XD HEMATOMA OF RIGHT LOWER EXTREMITY, SUBSEQUENT ENCOUNTER: ICD-10-CM

## 2021-12-21 PROBLEM — S80.11XA HEMATOMA OF RIGHT LOWER EXTREMITY: Status: ACTIVE | Noted: 2021-10-21

## 2021-12-21 LAB
FORWARD REASON: SPWHY: NORMAL
FORWARDED TO LAB: SPWHR: NORMAL
SPECIMEN SENT: SPWT1: NORMAL

## 2021-12-21 PROCEDURE — 36415 COLL VENOUS BLD VENIPUNCTURE: CPT | Performed by: INTERNAL MEDICINE

## 2021-12-21 PROCEDURE — 99999 PR NO CHARGE: CPT | Performed by: INTERNAL MEDICINE

## 2021-12-21 NOTE — ASSESSMENT & PLAN NOTE
New and decompensated problem.  Fortunately his ultrasound was negative for DVT.  However, due to ongoing pain, swelling, and fluid collection seen on ultrasound we will proceed with MRI of the right lower extremity.  I will order this with contrast to evaluate for hematoma versus infected fluid collection.  Discussed with Dr. Tamayo of radiology.  Please see RN note for additional details regarding examination as this was not a formal clinic visit.

## 2021-12-21 NOTE — TELEPHONE ENCOUNTER
1. Caller Name: Homa Alonso                          Call Back Number: [phone] (home) [phone] (work)        How would the patient prefer to be contacted with a response: Phone call OK to leave a detailed message    Homa would like to speak with Dr. Kincaid or the nurse about results  Says his leg hurts more today

## 2021-12-21 NOTE — PROGRESS NOTES
Problem   Pain of Right Lower Leg  Hematoma of Right Lower Extremity    Patient reports 1 week of progressive pain in the right lower extremity.  Located on the posterior medial aspect of his calf towards the bottom of his gastrocnemius.  There is overlying skin color change with dark discoloration.  He is acutely tender over the purple area.  He has difficulty weightbearing and feels more off balance.  He had ultrasound of the right lower extremity on 12/20/2021 which was negative for DVT but did demonstrate a fluid collection concerning for evolving hematoma versus infected fluid versus other.  Due to significant decline on his ambulation he agrees that additional imaging investigation would be warranted.  Called and discussed with Dr. Tamayo of radiology and he recommends proceeding with MRI of the tib/fib to include up to the knee and down to the ankle.         Problem List Items Addressed This Visit     Pain of right lower leg  Hematoma of right lower extremity     New and decompensated problem.  Fortunately his ultrasound was negative for DVT.  However, due to ongoing pain, swelling, and fluid collection seen on ultrasound we will proceed with MRI of the right lower extremity.  I will order this with contrast to evaluate for hematoma versus infected fluid collection.  Discussed with Dr. Tamayo of radiology.  Please see RN note for additional details regarding examination as this was not a formal clinic visit.         Relevant Orders    [id number] & W/O RIGHT     Sruthi Kincaid DO  Geriatric and Internal Medicine  [facility]

## 2021-12-21 NOTE — ASSESSMENT & PLAN NOTE
New and decompensated problem.  Fortunately his ultrasound was negative for DVT.  However, due to ongoing pain, swelling, and fluid collection seen on ultrasound we will proceed with MRI of the right lower extremity.  I will order this with contrast to evaluate for hematoma versus infected fluid collection.  Discussed with Dr. Tamayo of radiology.

## 2021-12-22 NOTE — PROGRESS NOTES
Patient arrived for blood draw.    Verified patient's name/date-of-birth and reason for visit before procedure was started. Patient's right antecubital cleansed. Venipuncture attempts X1. Blood draw completed on patient's right AC. Applied pressure afterwards and placed Coban on site of venipuncture with directions for patient to remove within the next hour. Patient tolerated procedure well, no adverse reactions. Patient ambulated safely upon leaving clinic.   Completed labels were placed on blood samples in draw room with patient present. Appropriate blood samples were centrifuged. Blood samples were then placed in locked lab box for  pick-up.     Escorted pt to Panel Coordinators office, pt met w/Rin and she will assist pt with scheduling MRI.

## 2021-12-23 ENCOUNTER — TELEPHONE (OUTPATIENT)
Dept: MEDICAL GROUP | Facility: PHYSICIAN GROUP | Age: 86
End: 2021-12-23

## 2021-12-24 NOTE — TELEPHONE ENCOUNTER
Called pt in response to MRI results (scanned into [employer] [facility]). Advised pt that area on right leg is a bruise and will take time to heal. Pt verbalized understanding. No questions.

## 2022-02-08 DIAGNOSIS — E29.1 HYPOGONADISM IN MALE: ICD-10-CM

## 2022-02-09 RX ORDER — TESTOSTERONE GEL, 1% 10 MG/G
GEL TRANSDERMAL
Qty: 450 G | Refills: 0 | Status: SHIPPED | OUTPATIENT
Start: 2022-02-09 | End: 2022-02-09

## 2022-02-09 NOTE — TELEPHONE ENCOUNTER
Received request via: Pharmacy    Was the patient seen in the last year in this department? Yes 10/21/21    Does the patient have an active prescription (recently filled or refills available) for medication(s) requested? No

## 2022-02-16 ENCOUNTER — TELEPHONE (OUTPATIENT)
Dept: MEDICAL GROUP | Facility: PHYSICIAN GROUP | Age: 87
End: 2022-02-16
Payer: COMMERCIAL

## 2022-02-16 NOTE — TELEPHONE ENCOUNTER
----- Message from Elysia Reynoso sent at 2/16/2022  2:25 PM PST -----  PCP Sanjiv     Patient left VM at PAR desk regarding a refill on (TESTIM/ANDROGEL) 50 MG/5GM (1%   Please advise.

## 2022-02-16 NOTE — TELEPHONE ENCOUNTER
Spoke with patient and told him that prescription has been sent to [facility]. Advised that he call them to confirm availability prior to going there.

## 2022-02-17 NOTE — TELEPHONE ENCOUNTER
1. Caller Name: Homa Alonso                          Call Back Number: [phone] (home) [phone] (work)        How would the patient prefer to be contacted with a response: Phone call OK to leave a detailed message    Called pharmacy and they wanted to know how many days  ()  And end date   Gel packets    Called Homa and told him Pharmacy should be calling him tomorrow tht the testosterone gel is ready to ppick up

## 2022-03-07 RX ORDER — BIMATOPROST 0.1 MG/ML
SOLUTION/ DROPS OPHTHALMIC
Qty: 6 ML | Refills: 0 | Status: SHIPPED | OUTPATIENT
Start: 2022-03-07 | End: 2022-08-01

## 2022-04-28 ENCOUNTER — TELEPHONE (OUTPATIENT)
Dept: MEDICAL GROUP | Facility: PHYSICIAN GROUP | Age: 87
End: 2022-04-28

## 2022-04-28 ENCOUNTER — OFFICE VISIT (OUTPATIENT)
Dept: MEDICAL GROUP | Facility: PHYSICIAN GROUP | Age: 87
End: 2022-04-28
Payer: COMMERCIAL

## 2022-04-28 VITALS
DIASTOLIC BLOOD PRESSURE: 52 MMHG | HEIGHT: 67 IN | BODY MASS INDEX: 26.06 KG/M2 | OXYGEN SATURATION: 98 % | RESPIRATION RATE: 12 BRPM | SYSTOLIC BLOOD PRESSURE: 100 MMHG | WEIGHT: 166 LBS | HEART RATE: 41 BPM | TEMPERATURE: 97.1 F

## 2022-04-28 DIAGNOSIS — Z02.89 ENCOUNTER FOR COMPLETION OF FORM WITH PATIENT: ICD-10-CM

## 2022-04-28 DIAGNOSIS — N18.31 STAGE 3A CHRONIC KIDNEY DISEASE: ICD-10-CM

## 2022-04-28 DIAGNOSIS — R73.03 PREDIABETES: ICD-10-CM

## 2022-04-28 DIAGNOSIS — I48.20 CHRONIC ATRIAL FIBRILLATION (HCC): ICD-10-CM

## 2022-04-28 DIAGNOSIS — G40.909 SEIZURE DISORDER (HCC): ICD-10-CM

## 2022-04-28 DIAGNOSIS — E29.1 HYPOGONADISM IN MALE: ICD-10-CM

## 2022-04-28 DIAGNOSIS — E78.5 DYSLIPIDEMIA: ICD-10-CM

## 2022-04-28 PROBLEM — S82.52XA DISPLACED FRACTURE OF MEDIAL MALLEOLUS OF LEFT TIBIA, INITIAL ENCOUNTER FOR CLOSED FRACTURE: Status: RESOLVED | Noted: 2019-09-23 | Resolved: 2022-04-28

## 2022-04-28 PROBLEM — Z51.81 ENCOUNTER FOR THERAPEUTIC DRUG MONITORING: Status: RESOLVED | Noted: 2020-05-18 | Resolved: 2022-04-28

## 2022-04-28 PROBLEM — S80.11XA HEMATOMA OF RIGHT LOWER EXTREMITY: Status: RESOLVED | Noted: 2021-10-21 | Resolved: 2022-04-28

## 2022-04-28 PROBLEM — L03.116 LEFT LEG CELLULITIS: Status: RESOLVED | Noted: 2019-09-23 | Resolved: 2022-04-28

## 2022-04-28 PROBLEM — S81.819A LEG LACERATION: Status: RESOLVED | Noted: 2019-09-20 | Resolved: 2022-04-28

## 2022-04-28 PROBLEM — L98.9 SKIN LESION OF RIGHT ARM: Status: RESOLVED | Noted: 2021-06-28 | Resolved: 2022-04-28

## 2022-04-28 PROBLEM — I49.9 ARRHYTHMIA: Status: RESOLVED | Noted: 2019-09-20 | Resolved: 2022-04-28

## 2022-04-28 PROCEDURE — 99214 OFFICE O/P EST MOD 30 MIN: CPT | Performed by: INTERNAL MEDICINE

## 2022-04-28 ASSESSMENT — PATIENT HEALTH QUESTIONNAIRE - PHQ9: CLINICAL INTERPRETATION OF PHQ2 SCORE: 0

## 2022-04-28 ASSESSMENT — FIBROSIS 4 INDEX: FIB4 SCORE: 3.47

## 2022-04-28 NOTE — PROGRESS NOTES
Subjective:   Chief Complaint/History of Present Illness:  Homa Alonso is a 89 y.o. male established patient who presents today to discuss medical problems as listed below. Homa is unaccompanied for today's visit.    Problem   Stage 3a Chronic Kidney Disease (Hcc)       Ref. Range 8/26/2020 09:33   Bun Latest Ref Range: 10 - 36 mg/dL 23   Creatinine Latest Ref Range: 0.76 - 1.27 mg/dL 1.10   GFR If Non  Latest Ref Range: >59 mL/min/1.73 58 (L)     He has CKD stage 3 on most recent lab work. No nephrotoxic agents on board, he has good oral hydration. Blood pressure and heart rate slightly overtreated, mild prediabetes on last assessment. History of total prostatectomy for prostate cancer with artificial sphincter in place, no recent UTI or hematuria episodes.     Encounter for completion of form with patient- DMV form    Patient presents with medical DMV form. He had eye exam 2-3 months ago and will have his eye doctor complete that portion.    Medically speaking his chronic conditions are doing well. Functionally he is better than most of his peers. He has a seizure disorder for the past 8 years that is controlled with Keppra, no recent breakthrough episodes.    His atrial fibrillation/heart rate I think is slightly overtreated since his cardiologist increased his metoprolol dose with heart rate 40-60 during our encounter today.    Otherwise vital signs are stable, he is alert, no car accidents or tickets.     Hypogonadism in Male       Ref. Range 8/26/2020 09:33   Free Testosterone Latest Ref Range: 6.6 - 18.1 pg/mL 1.4 (L)   Testosterone,Total Latest Ref Range: 264 - 916 ng/dL 18 (L)     He reports radical prostatectomy 1991 following discovery of localized prostate cancer.  Unfortunately, he had complications of both erectile dysfunction as well as urinary incontinence.  He had an artificial sphincter placed around 2009 which has continued to work well.  He notes profound fatigue following  his prostatectomy was found to have low testosterone levels and has been using AndroGel since about 2009 with great success.     Chronic Atrial Fibrillation (Hcc)    He has atrial fibrillation since at least 2019. He has followed with [facility] cardiology. In Spring 2021 his ventricular rate had increased and his metoprolol was increased per the note to 100 mg in AM and 50 mg in PM. He needs to confirm exactly what dose he is taking, has not seen cardiology for about 6 months. He notes his ventricular rate has consistently been running the 40-50 range the past few months and he has some associated fatigue/weakness. No issues with his Eliquis 2.5 mg twice daily.     ([facility]) Seizure disorder    Noted in June 2014.  Witnessed by his wife when he had full body convulsions.  He presented to the ER where he was postictal.  He followed up with neurology who performed an EEG and thought that there was epileptiform activity.  He was started on Keppra which he has maintained since that time.  He did try to go off of it once but notes he had recurrence of seizure activity and has gone back on it.  He is able to drive at this time because he has been so well controlled.  No subsequent seizures. He previously saw Dr. Valadez, currently not following with a neurologist.    Current regimen: Keppra 500 mg twice daily     Dyslipidemia       Ref. Range 8/26/2020 09:33   Cholesterol,Tot Latest Ref Range: 100 - 199 mg/dL 154   Triglycerides Latest Ref Range: 0 - 149 mg/dL 178 (H)   HDL Latest Ref Range: >39 mg/dL 52   LDL Latest Ref Range: 0 - 99 mg/dL 66       He reports chronic use of atorvastatin for history of elevated cholesterol panel.  He denies any myalgias or GI upset on this medication.  He is tolerating without difficulty.  He denies history of heart attack or stroke in the past.    Current regimen: Atorvastatin 20 mg daily          Current Medications:  Current Outpatient Medications Ordered in Epic   Medication Sig  "Dispense Refill   • metoprolol tartrate (LOPRESSOR) 25 MG Tab Take 0.5 Tablets by mouth 2 times a day. 50 Tablet 3   • LUMIGAN 0.01 % Solution INSTILL 1 DROP INTO RIGHT EYE EVERY DAY AT BEDTIME 6 mL 0   • testosterone (TESTIM/ANDROGEL) 50 MG/5GM (1%) Gel gel APPLY 50 MG TOPICALLY  ONCE DAILY 450 g 0   • apixaban (ELIQUIS) 2.5mg Tab Take 1 Tablet by mouth 2 times a day.     • atorvastatin (LIPITOR) 20 MG Tab Take 1 tablet by mouth once daily 90 tablet 3   • levETIRAcetam (KEPPRA) 500 MG Tab Take 1 tablet by mouth 2 times a day. 180 tablet 3   • clindamycin (CLEOCIN) 300 MG Cap TAKE 2 CAPS BY MOUTH 1 HOUR PRIOR TO DENTAL APPOINTMENT     • Multiple Vitamins-Minerals (ICAPS AREDS 2) Chew Tab Take  by mouth.     • docusate sodium (COLACE) 100 MG Cap Take 100 mg by mouth 2 times a day.     • acetaminophen (TYLENOL) 325 MG Tab Take 2 Tabs by mouth every 6 hours as needed (Mild Pain; (Pain scale 1-3); Temp greater than 100.5 F). 30 Tab 0   • B Complex Vitamins (B COMPLEX B-12 PO) Take  by mouth.     • Omega-3 Fatty Acids (FISH OIL) 1000 MG Cap capsule Take 1,000 mg by mouth 3 times a day, with meals.     • multivitamin (THERAGRAN) Tab Take 1 Tab by mouth every day.     • Cholecalciferol (VITAMIN D3) 2000 UNIT CAPS Take  by mouth.     • CALCIUM CITRATE PO Take 500 mg by mouth every day.     • MAGNESIUM CITRATE PO Take 250 mg by mouth every day.     • VITAMIN C CR PO Take  by mouth. OTC VIT C       No current Epic-ordered facility-administered medications on file.          Objective:   Physical Exam:    Vitals: /52 (BP Location: Left arm, Patient Position: Sitting, BP Cuff Size: Adult)   Pulse (!) 41   Temp 36.2 °C (97.1 °F) (Temporal)   Resp 12   Ht 1.689 m (5' 6.5\")   Wt 75.3 kg (166 lb)   SpO2 98%    BMI: Body mass index is 26.39 kg/m².  Physical Exam  Constitutional:       General: He is not in acute distress.     Appearance: Normal appearance. He is not ill-appearing.   HENT:      Ears:      Comments: Mildly " hard of hearing  Eyes:      General: No scleral icterus.     Conjunctiva/sclera: Conjunctivae normal.      Comments: Glasses in place   Cardiovascular:      Rate and Rhythm: Bradycardia present. Rhythm irregular.      Pulses: Normal pulses.   Pulmonary:      Effort: Pulmonary effort is normal. No respiratory distress.      Breath sounds: Normal breath sounds. No wheezing or rhonchi.   Skin:     General: Skin is warm and dry.   Neurological:      Comments: Ambulates with a walking stick   Psychiatric:         Mood and Affect: Mood normal.         Behavior: Behavior normal.         Thought Content: Thought content normal.         Judgment: Judgment normal.          Assessment and Plan:   Homa is a 89 y.o. male with the following:  Problem List Items Addressed This Visit     Dyslipidemia     Chronic and ongoing problem, recheck lipids and continue statin.         Relevant Orders    CBC WITH DIFFERENTIAL    Comp Metabolic Panel    Lipid Profile    TSH    FREE THYROXINE    VITAMIN D,25 HYDROXY    VITAMIN B12    Chronic atrial fibrillation (HCC)     Chronic and ongoing problem, will reduce metoprolol to 12.5 mg BID and recommend he contact his cardiologist for follow up at [place], I will also forward them my note. Ventricular rate consistently in the 40-50 range on his home meter as well. Would rather he run closer to 70-90. He will inform me of current dose, buy a pill cutter, and switch to the reduced dose of metoprolol.         Relevant Medications    metoprolol tartrate (LOPRESSOR) 25 MG Tab    (HCC) Seizure disorder     Chronic and stable problem, continue Keppra 500 mg twice daily. Check keppra level. No seizure activity in many years.         Relevant Orders    KEPPRA    Hypogonadism in male     Chronic and ongoing problem, will update testosterone levels. He feels better when he uses the Androgel but is sometimes inconsistent.         Relevant Orders    Testosterone, Free & Total, Adult Male (w/SHBG)     Prediabetes    Relevant Orders    HEMOGLOBIN A1C    Stage 3a chronic kidney disease (HCC)     New problem, mild severity, will recheck GFR with SPEP and PTH to ensure stability, will lower metoprolol to allow higher HR and BP. Avoid nephrotoxic agents as able.         Relevant Orders    PTH INTACT (PTH ONLY)    SPEP W/REFLEX TO MILADYS, A, G, M    Encounter for completion of form with patient- DMV form     New problem, he will have eye doctor complete that portion of the form. We will reduce his metoprolol to allow HR to run higher, otherwise he is functionally stable and fine to continue driving as long as he feels well.                RTC: Return in about 8 weeks (around 6/23/2022).    I spent a total of 34 minutes with record review, exam, communication with the patient, communication with other providers, and documentation of this encounter.    PLEASE NOTE: This dictation was created using voice recognition software. I have made every reasonable attempt to correct obvious errors, but I expect that there are errors of grammar and possibly content that I did not discover before finalizing the note.      Sruthi Kincaid, DO  Geriatric and Internal Medicine  [facility]

## 2022-04-28 NOTE — ASSESSMENT & PLAN NOTE
New problem, mild severity, will recheck GFR with SPEP and PTH to ensure stability, will lower metoprolol to allow higher HR and BP. Avoid nephrotoxic agents as able.

## 2022-04-28 NOTE — TELEPHONE ENCOUNTER
1. Caller Name: Homa Alonso                          Call Back Number: [phone] (home) [phone] (work)        How would the patient prefer to be contacted with a response: Phone call OK to leave a detailed message    Homa called and said he takes Metoprolol 25mg  2 in am and 1 in pm  total 75mg

## 2022-04-28 NOTE — ASSESSMENT & PLAN NOTE
New problem, he will have eye doctor complete that portion of the form. We will reduce his metoprolol to allow HR to run higher, otherwise he is functionally stable and fine to continue driving as long as he feels well.

## 2022-04-28 NOTE — ASSESSMENT & PLAN NOTE
Chronic and ongoing problem, will update testosterone levels. He feels better when he uses the Androgel but is sometimes inconsistent.

## 2022-04-28 NOTE — ASSESSMENT & PLAN NOTE
Chronic and stable problem, continue Keppra 500 mg twice daily. Check keppra level. No seizure activity in many years.

## 2022-04-28 NOTE — ASSESSMENT & PLAN NOTE
Chronic and ongoing problem, will reduce metoprolol to 12.5 mg BID and recommend he contact his cardiologist for follow up at [place], I will also forward them my note. Ventricular rate consistently in the 40-50 range on his home meter as well. Would rather he run closer to 70-90. He will inform me of current dose, buy a pill cutter, and switch to the reduced dose of metoprolol.

## 2022-06-22 ENCOUNTER — OFFICE VISIT (OUTPATIENT)
Dept: MEDICAL GROUP | Facility: PHYSICIAN GROUP | Age: 87
End: 2022-06-22
Payer: COMMERCIAL

## 2022-06-22 VITALS
RESPIRATION RATE: 16 BRPM | TEMPERATURE: 98.3 F | SYSTOLIC BLOOD PRESSURE: 92 MMHG | OXYGEN SATURATION: 97 % | BODY MASS INDEX: 25.58 KG/M2 | HEART RATE: 53 BPM | DIASTOLIC BLOOD PRESSURE: 62 MMHG | WEIGHT: 163 LBS | HEIGHT: 67 IN

## 2022-06-22 DIAGNOSIS — E29.1 HYPOGONADISM IN MALE: ICD-10-CM

## 2022-06-22 DIAGNOSIS — R29.898 SHOULDER WEAKNESS: ICD-10-CM

## 2022-06-22 DIAGNOSIS — I48.20 CHRONIC ATRIAL FIBRILLATION (HCC): ICD-10-CM

## 2022-06-22 PROCEDURE — 99214 OFFICE O/P EST MOD 30 MIN: CPT | Performed by: INTERNAL MEDICINE

## 2022-06-22 RX ORDER — TESTOSTERONE GEL, 1% 10 MG/G
50 GEL TRANSDERMAL DAILY
Qty: 5 G | Refills: 0 | Status: SHIPPED | OUTPATIENT
Start: 2022-06-22 | End: 2022-10-24

## 2022-06-22 ASSESSMENT — FIBROSIS 4 INDEX: FIB4 SCORE: 3.47

## 2022-06-23 NOTE — ASSESSMENT & PLAN NOTE
Chronic and ongoing problem, feels best when using Androgel, longstanding history of hypogonadism. Will send in refill. [facility] reviewed and 3 month supply will be sent.    Obtained and reviewed patient utilization report from [facility] pharmacy database on 6/22/2022 5:59 PM  prior to writing prescription for controlled substance II, III or IV per [place] bill . Based on assessment of the report, the prescription is medically necessary.

## 2022-06-23 NOTE — ASSESSMENT & PLAN NOTE
Chronic and ongoing problem.  He will call [facility]ology to make a follow-up appointment.  We dialed back on his metoprolol in April 2022 and he notes his heart rate has improved from the 40s to the 50s to 60s.  No dizziness, lightheadedness, or presyncope.

## 2022-06-23 NOTE — ASSESSMENT & PLAN NOTE
New problem, suspect rotator cuff pathology, fortunately he is not having any pain.  He would like to monitor for a couple days with Tylenol and working on range of motion and if it is not getting better he will let me know and we can get him to an orthopedist and proceed with additional imaging.

## 2022-06-23 NOTE — PROGRESS NOTES
Subjective:   Chief Complaint/History of Present Illness:  Homa Alonso is a 89 y.o. male established patient who presents today to discuss medical problems as listed below. Homa is unaccompanied for today's visit.    Problem   Shoulder Weakness    Reports he woke up this morning with weakness in the right shoulder.  No preceding injury that he can recall.  Absolutely no pain.  He states that when he tries to lift the arm to use it he can only get up to a 90 degree level whether he is going through forward flexion or abduction.  No limitation with internal rotation.  When he brings his left arm over to help manually lift the right arm he can easily get up above 90 degrees.  He denies any known history of frozen shoulder.  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     Hypogonadism in Male       Ref. Range 8/26/2020 09:33   Free Testosterone Latest Ref Range: 6.6 - 18.1 pg/mL 1.4 (L)   Testosterone,Total Latest Ref Range: 264 - 916 ng/dL 18 (L)     He reports radical prostatectomy 1991 following discovery of localized prostate cancer.  Unfortunately, he had complications of both erectile dysfunction as well as urinary incontinence.  He had an artificial sphincter placed around 2009 which has continued to work well.  He notes profound fatigue following his prostatectomy was found to have low testosterone levels and has been using AndroGel since about 2009 with great success.     Chronic Atrial Fibrillation (Hcc)    He has atrial fibrillation since at least 2019. He has followed with [facility] cardiology. In Spring 2021 his ventricular rate had increased and his metoprolol was increased per the note to 100 mg in AM and 50 mg in PM. He needs to confirm exactly what dose he is taking, has not seen cardiology for about 6 months. He notes his ventricular rate has consistently been running the 40-50 range the past few  "months and he has some associated fatigue/weakness. No issues with his Eliquis 2.5 mg twice daily.    Current regimen: metoprolol tartrate 12.5 mg twice daily          Current Medications:  Current Outpatient Medications Ordered in Epic   Medication Sig Dispense Refill   • testosterone (TESTIM/ANDROGEL) 50 MG/5GM (1%) Gel gel Place 50 mg on the skin every day for 100 days. 5 g 0   • levETIRAcetam (KEPPRA) 500 MG Tab Take 1 tablet by mouth twice daily 180 Tablet 3   • metoprolol tartrate (LOPRESSOR) 25 MG Tab Take 0.5 Tablets by mouth 2 times a day. 50 Tablet 3   • LUMIGAN 0.01 % Solution INSTILL 1 DROP INTO RIGHT EYE EVERY DAY AT BEDTIME 6 mL 0   • apixaban (ELIQUIS) 2.5mg Tab Take 1 Tablet by mouth 2 times a day.     • atorvastatin (LIPITOR) 20 MG Tab Take 1 tablet by mouth once daily 90 tablet 3   • clindamycin (CLEOCIN) 300 MG Cap TAKE 2 CAPS BY MOUTH 1 HOUR PRIOR TO DENTAL APPOINTMENT     • Multiple Vitamins-Minerals (ICAPS AREDS 2) Chew Tab Take  by mouth.     • acetaminophen (TYLENOL) 325 MG Tab Take 2 Tabs by mouth every 6 hours as needed (Mild Pain; (Pain scale 1-3); Temp greater than 100.5 F). 30 Tab 0   • B Complex Vitamins (B COMPLEX B-12 PO) Take  by mouth.     • Omega-3 Fatty Acids (FISH OIL) 1000 MG Cap capsule Take 1,000 mg by mouth 3 times a day, with meals.     • multivitamin (THERAGRAN) Tab Take 1 Tab by mouth every day.     • Cholecalciferol (VITAMIN D3) 2000 UNIT CAPS Take  by mouth.     • CALCIUM CITRATE PO Take 500 mg by mouth every day.     • MAGNESIUM CITRATE PO Take 250 mg by mouth every day.     • VITAMIN C CR PO Take  by mouth. OTC VIT C       No current Epic-ordered facility-administered medications on file.          Objective:   Physical Exam:    Vitals: BP (!) 92/62 (BP Location: Left arm, Patient Position: Sitting, BP Cuff Size: Adult)   Pulse (!) 53   Temp 36.8 °C (98.3 °F) (Temporal)   Resp 16   Ht 1.689 m (5' 6.5\")   Wt 73.9 kg (163 lb)   SpO2 97%    BMI: Body mass index is " 25.91 kg/m².  Physical Exam  Constitutional:       General: He is not in acute distress.     Appearance: Normal appearance. He is not ill-appearing.      Comments: Appears younger than stated age   HENT:      Ears:      Comments: Hearing aids in place  Eyes:      General: No scleral icterus.     Conjunctiva/sclera: Conjunctivae normal.   Cardiovascular:      Rate and Rhythm: Regular rhythm. Bradycardia present.      Pulses: Normal pulses.   Pulmonary:      Effort: Pulmonary effort is normal. No respiratory distress.      Breath sounds: Normal breath sounds. No wheezing or rhonchi.   Musculoskeletal:      Comments: Left > right edema, pretibial, 1+. Weakness with right shoulder flexion and abduction with active ROM, shoulder can be moved passively in all directions without pain.   Skin:     Findings: No rash.      Comments: Healing lesion on left lower outer leg   Neurological:      Gait: Gait abnormal.      Comments: Ambulates with a cane   Psychiatric:         Mood and Affect: Mood normal.         Behavior: Behavior normal.         Thought Content: Thought content normal.         Judgment: Judgment normal.          Assessment and Plan:   Homa is a 89 y.o. male with the following:  Problem List Items Addressed This Visit     Chronic atrial fibrillation (HCC)     Chronic and ongoing problem.  He will call [facility] cardiology to make a follow-up appointment.  We dialed back on his metoprolol in April 2022 and he notes his heart rate has improved from the 40s to the 50s to 60s.  No dizziness, lightheadedness, or presyncope.           Hypogonadism in male     Chronic and ongoing problem, feels best when using Androgel, longstanding history of hypogonadism. Will send in refill. [facility] reviewed and 3 month supply will be sent.    Obtained and reviewed patient utilization report from [facility] [place] pharmacy database on 6/22/2022 5:59 PM  prior to writing prescription for controlled substance II, III or IV per [place]  oks[place] . Based on assessment of the report, the prescription is medically necessary.              Relevant Medications    testosterone (TESTIM/ANDROGEL) 50 MG/5GM (1%) Gel gel    Shoulder weakness     New problem, suspect rotator cuff pathology, fortunately he is not having any pain.  He would like to monitor for a couple days with Tylenol and working on range of motion and if it is not getting better he will let me know and we can get him to an orthopedist and proceed with additional imaging.                  RTC: Return in about 4 months (around 10/22/2022).    I spent a total of 32 minutes with record review, exam, communication with the patient, communication with other providers, and documentation of this encounter.    PLEASE NOTE: This dictation was created using voice recognition software. I have made every reasonable attempt to correct obvious errors, but I expect that there are errors of grammar and possibly content that I did not discover before finalizing the note.      Sruthi Kincaid, DO  Geriatric and Internal Medicine  [facility]

## 2022-08-01 RX ORDER — BIMATOPROST 0.1 MG/ML
SOLUTION/ DROPS OPHTHALMIC
Qty: 7.5 ML | Refills: 0 | Status: ON HOLD | OUTPATIENT
Start: 2022-08-01 | End: 2023-02-06

## 2022-08-11 NOTE — PROGRESS NOTES
Subjective:   Homa Alonso is a 89 y.o. male who presents todayIn follow-up for heart murmur. Echocardiogram in 2011 demonstrated aortic valve sclerosis and mild aortic regurgitation.  He continues to be physically very active.  Some residual numbness and left leg after successful lumbar surgery.  He is on Keppra     He mentions that he has occasional pauses in his heart rate at rest but no irregularity when he exercises. No symptoms to suggest symptoms of heart disease at this time  Echocardiogram of 2011 reviewed    Past Medical History:   Diagnosis Date   • Allergy, unspecified not elsewhere classified    • Arthritis     osteo finger joints lower back   • Cancer (CMS-HCC) 1991    Prostate   • Gall stones    • Glaucoma     right eye   • Hyperlipidemia    • Pain     low back   • Seizure (CMS-HCC) 06/13/2014    once  on Keppra   • Unspecified cataract     oneal IOL surgery   • Unspecified hemorrhagic conditions     GI Bleed when taking Celebrex     Past Surgical History:   Procedure Laterality Date   • LUMBAR FUSION O-ARM Bilateral 5/18/2015    Procedure: LUMBAR FUSION O-ARM [83.10B] L3-5;  Surgeon: Francesco Joe M.D.;  Location: SURGERY [facility];  Service:    • LUMBAR DECOMPRESSION  5/18/2015    Procedure: LUMBAR DECOMPRESSION;  Surgeon: Francesco Joe M.D.;  Location: [facility];  Service:    • CHRISTIE BY LAPAROSCOPY  12/3/2013    Performed by Braden Garner M.D. at [facility]   • APPENDECTOMY     • ATHROPLASTY     • EYE SURGERY     • OTHER      artificial urinary spincter   • OTHER ORTHOPEDIC SURGERY     • PROSTATECTOMY, RADIAL       Family History   Problem Relation Age of Onset   • Heart Disease Mother      STROKE   • Stroke Mother    • Cancer Father    • Arthritis Sister      History   Smoking Status   • Former Smoker   • Quit date: 1/1/1964   Smokeless Tobacco   • Never Used     Allergies   Allergen Reactions   • Pcn [Penicillins]      Outpatient Encounter Prescriptions  has upcoming appointment, sent in medication refill.   "as of 11/1/2017   Medication Sig Dispense Refill   • Omega-3 Fatty Acids (FISH OIL) 1000 MG Cap capsule Take 1,000 mg by mouth 3 times a day, with meals.     • multivitamin (THERAGRAN) Tab Take 1 Tab by mouth every day.     • testosterone (ANDROGEL) 50 MG/5GM (1%) Gel gel Apply 1 Packet to skin as directed every day. 90 Each 0   • Cholecalciferol (VITAMIN D3) 2000 UNIT CAPS Take  by mouth.     • CALCIUM CITRATE PO Take 500 mg by mouth every day.     • levetiracetam (KEPPRA) 500 MG TABS Take 500 mg by mouth 2 times a day.     • MAGNESIUM CITRATE PO Take 250 mg by mouth every day.     • atorvastatin (LIPITOR) 20 MG TABS Take 20 mg by mouth every evening.     • bimatoprost (LUMIGAN) 0.01 % SOLN Place 1 Drop in right eye every bedtime.     • niacin (SLO-NIACIN) 500 MG tablet Take 1 Tab by mouth every day. 90 Each 3   • VITAMIN C CR PO Take  by mouth. OTC VIT C        No facility-administered encounter medications on file as of 11/1/2017.      Review of Systems   Constitutional: Negative for chills and fever.   HENT: Negative for sore throat.    Eyes: Negative for blurred vision.   Respiratory: Negative for cough and shortness of breath.    Cardiovascular: Positive for palpitations. Negative for chest pain, claudication, leg swelling and PND.   Gastrointestinal: Negative for abdominal pain and nausea.   Musculoskeletal: Positive for back pain and joint pain. Negative for falls.   Skin: Negative for rash.   Neurological: Positive for sensory change. Negative for dizziness, focal weakness, loss of consciousness, weakness and headaches.   Endo/Heme/Allergies: Does not bruise/bleed easily.        Objective:   /72   Pulse 66   Ht 1.778 m (5' 10\")   Wt 81.2 kg (179 lb)   SpO2 95%   BMI 25.68 kg/m²     Physical Exam   Constitutional: He is oriented to person, place, and time. He appears well-developed and well-nourished.   HENT:   Head: Normocephalic and atraumatic.   Eyes: Conjunctivae and EOM are normal. No scleral " icterus.   Neck: Neck supple. No JVD present. No thyromegaly present.   Cardiovascular: Normal rate and regular rhythm.  Frequent extrasystoles are present. Exam reveals no gallop and no friction rub.    Murmur heard.   Systolic murmur is present with a grade of 2/6   Premature beat every 3rd beat on exam     Pulmonary/Chest: Effort normal and breath sounds normal. No respiratory distress. He has no wheezes. He has no rales. He exhibits no tenderness.   Abdominal: Soft. Bowel sounds are normal. He exhibits no distension and no mass. There is no tenderness.   Musculoskeletal: He exhibits edema.   Bilateral pitting edema of ankles, varicose veins right leg. Venous stripping in the remote past and left leg   Neurological: He is alert and oriented to person, place, and time. Coordination normal.   Skin: Skin is warm and dry. No rash noted. No pallor.   Psychiatric: He has a normal mood and affect. His behavior is normal. Judgment and thought content normal.       Assessment:     1. Aortic valve sclerosis     2. Palpitations         Medical Decision Making:  Today's Assessment / Status / Plan:   Other murmurs consistent with aortic valve sclerosis, well documented 6 years ago on an echo  There is no need for repeat echo at this time based on the quality of the heart murmur auscultated today and the lack of symptoms  The premature beats which is felt clinically without any major symptoms and I do not think this needs to be pursued  We'll have him return in 9 months for auscultation of heart murmur.  I recommended stopping niacin based on his lack of efficacy

## 2022-08-23 DIAGNOSIS — E78.5 DYSLIPIDEMIA: ICD-10-CM

## 2022-08-24 RX ORDER — ATORVASTATIN CALCIUM 20 MG/1
TABLET, FILM COATED ORAL
Qty: 100 TABLET | Refills: 0 | Status: SHIPPED | OUTPATIENT
Start: 2022-08-24 | End: 2022-12-12

## 2022-09-05 ENCOUNTER — HOSPITAL ENCOUNTER (OUTPATIENT)
Facility: MEDICAL CENTER | Age: 87
End: 2022-09-07
Attending: STUDENT IN AN ORGANIZED HEALTH CARE EDUCATION/TRAINING PROGRAM | Admitting: HOSPITALIST
Payer: COMMERCIAL

## 2022-09-05 DIAGNOSIS — I48.20 CHRONIC ATRIAL FIBRILLATION (HCC): ICD-10-CM

## 2022-09-05 DIAGNOSIS — K92.2 LOWER GI BLEED: ICD-10-CM

## 2022-09-05 PROCEDURE — 86900 BLOOD TYPING SEROLOGIC ABO: CPT

## 2022-09-05 PROCEDURE — 86901 BLOOD TYPING SEROLOGIC RH(D): CPT

## 2022-09-05 PROCEDURE — 36415 COLL VENOUS BLD VENIPUNCTURE: CPT

## 2022-09-05 PROCEDURE — 85730 THROMBOPLASTIN TIME PARTIAL: CPT

## 2022-09-05 PROCEDURE — 85025 COMPLETE CBC W/AUTO DIFF WBC: CPT

## 2022-09-05 PROCEDURE — 80053 COMPREHEN METABOLIC PANEL: CPT

## 2022-09-05 PROCEDURE — 99285 EMERGENCY DEPT VISIT HI MDM: CPT

## 2022-09-05 PROCEDURE — 85610 PROTHROMBIN TIME: CPT

## 2022-09-05 PROCEDURE — 86850 RBC ANTIBODY SCREEN: CPT

## 2022-09-05 RX ORDER — SODIUM CHLORIDE 9 MG/ML
1000 INJECTION, SOLUTION INTRAVENOUS ONCE
Status: COMPLETED | OUTPATIENT
Start: 2022-09-06 | End: 2022-09-06

## 2022-09-05 ASSESSMENT — ENCOUNTER SYMPTOMS
COUGH: 0
SHORTNESS OF BREATH: 0
SORE THROAT: 0
ABDOMINAL PAIN: 0
NECK PAIN: 0
DOUBLE VISION: 0
NAUSEA: 0
FEVER: 0
LOSS OF CONSCIOUSNESS: 0
VOMITING: 0
FALLS: 0
HEADACHES: 0
CHILLS: 0
BLOOD IN STOOL: 1
BLURRED VISION: 0

## 2022-09-06 PROBLEM — K92.2 LOWER GI BLEED: Status: ACTIVE | Noted: 2022-09-06

## 2022-09-06 LAB
ABO + RH BLD: NORMAL
ABO GROUP BLD: NORMAL
ALBUMIN SERPL BCP-MCNC: 4.6 G/DL (ref 3.2–4.9)
ALBUMIN/GLOB SERPL: 1.2 G/DL
ALP SERPL-CCNC: 80 U/L (ref 30–99)
ALT SERPL-CCNC: 17 U/L (ref 2–50)
ANION GAP SERPL CALC-SCNC: 10 MMOL/L (ref 7–16)
APTT PPP: 32.2 SEC (ref 24.7–36)
AST SERPL-CCNC: 32 U/L (ref 12–45)
BASOPHILS # BLD AUTO: 0.5 % (ref 0–1.8)
BASOPHILS # BLD: 0.04 K/UL (ref 0–0.12)
BILIRUB SERPL-MCNC: 0.6 MG/DL (ref 0.1–1.5)
BLD GP AB SCN SERPL QL: NORMAL
BUN SERPL-MCNC: 23 MG/DL (ref 8–22)
CALCIUM SERPL-MCNC: 9.5 MG/DL (ref 8.4–10.2)
CHLORIDE SERPL-SCNC: 103 MMOL/L (ref 96–112)
CO2 SERPL-SCNC: 25 MMOL/L (ref 20–33)
CREAT SERPL-MCNC: 1.13 MG/DL (ref 0.5–1.4)
EOSINOPHIL # BLD AUTO: 0.11 K/UL (ref 0–0.51)
EOSINOPHIL NFR BLD: 1.3 % (ref 0–6.9)
ERYTHROCYTE [DISTWIDTH] IN BLOOD BY AUTOMATED COUNT: 49.6 FL (ref 35.9–50)
GFR SERPLBLD CREATININE-BSD FMLA CKD-EPI: 60 ML/MIN/1.73 M 2
GLOBULIN SER CALC-MCNC: 3.7 G/DL (ref 1.9–3.5)
GLUCOSE SERPL-MCNC: 119 MG/DL (ref 65–99)
HCT VFR BLD AUTO: 33.7 % (ref 42–52)
HCT VFR BLD AUTO: 34.6 % (ref 42–52)
HCT VFR BLD AUTO: 40.9 % (ref 42–52)
HCT VFR BLD AUTO: 51.7 % (ref 42–52)
HGB BLD-MCNC: 10.7 G/DL (ref 14–18)
HGB BLD-MCNC: 11.3 G/DL (ref 14–18)
HGB BLD-MCNC: 13.2 G/DL (ref 14–18)
HGB BLD-MCNC: 16.8 G/DL (ref 14–18)
IMM GRANULOCYTES # BLD AUTO: 0.09 K/UL (ref 0–0.11)
IMM GRANULOCYTES NFR BLD AUTO: 1 % (ref 0–0.9)
INR PPP: 1.11 (ref 0.87–1.13)
LYMPHOCYTES # BLD AUTO: 3.34 K/UL (ref 1–4.8)
LYMPHOCYTES NFR BLD: 38.2 % (ref 22–41)
MCH RBC QN AUTO: 31.3 PG (ref 27–33)
MCHC RBC AUTO-ENTMCNC: 32.5 G/DL (ref 33.7–35.3)
MCV RBC AUTO: 96.3 FL (ref 81.4–97.8)
MONOCYTES # BLD AUTO: 1.02 K/UL (ref 0–0.85)
MONOCYTES NFR BLD AUTO: 11.7 % (ref 0–13.4)
NEUTROPHILS # BLD AUTO: 4.15 K/UL (ref 1.82–7.42)
NEUTROPHILS NFR BLD: 47.3 % (ref 44–72)
NRBC # BLD AUTO: 0 K/UL
NRBC BLD-RTO: 0 /100 WBC
PLATELET # BLD AUTO: 188 K/UL (ref 164–446)
PMV BLD AUTO: 10.4 FL (ref 9–12.9)
POTASSIUM SERPL-SCNC: 4.5 MMOL/L (ref 3.6–5.5)
PROT SERPL-MCNC: 8.3 G/DL (ref 6–8.2)
PROTHROMBIN TIME: 13.9 SEC (ref 12–14.6)
RBC # BLD AUTO: 5.37 M/UL (ref 4.7–6.1)
RH BLD: NORMAL
SODIUM SERPL-SCNC: 138 MMOL/L (ref 135–145)
WBC # BLD AUTO: 8.8 K/UL (ref 4.8–10.8)

## 2022-09-06 PROCEDURE — 85018 HEMOGLOBIN: CPT | Mod: 91

## 2022-09-06 PROCEDURE — G0378 HOSPITAL OBSERVATION PER HR: HCPCS

## 2022-09-06 PROCEDURE — 700105 HCHG RX REV CODE 258: Performed by: STUDENT IN AN ORGANIZED HEALTH CARE EDUCATION/TRAINING PROGRAM

## 2022-09-06 PROCEDURE — 700101 HCHG RX REV CODE 250: Performed by: INTERNAL MEDICINE

## 2022-09-06 PROCEDURE — A9270 NON-COVERED ITEM OR SERVICE: HCPCS | Performed by: STUDENT IN AN ORGANIZED HEALTH CARE EDUCATION/TRAINING PROGRAM

## 2022-09-06 PROCEDURE — 94760 N-INVAS EAR/PLS OXIMETRY 1: CPT

## 2022-09-06 PROCEDURE — 700102 HCHG RX REV CODE 250 W/ 637 OVERRIDE(OP): Performed by: STUDENT IN AN ORGANIZED HEALTH CARE EDUCATION/TRAINING PROGRAM

## 2022-09-06 PROCEDURE — 85014 HEMATOCRIT: CPT | Mod: 91

## 2022-09-06 PROCEDURE — 700102 HCHG RX REV CODE 250 W/ 637 OVERRIDE(OP): Performed by: HOSPITALIST

## 2022-09-06 PROCEDURE — A9270 NON-COVERED ITEM OR SERVICE: HCPCS | Performed by: HOSPITALIST

## 2022-09-06 PROCEDURE — 99226 PR SUBSEQUENT OBSERVATION CARE,LEVEL III: CPT | Performed by: HOSPITALIST

## 2022-09-06 PROCEDURE — 36415 COLL VENOUS BLD VENIPUNCTURE: CPT

## 2022-09-06 RX ORDER — LEVETIRACETAM 500 MG/1
500 TABLET ORAL 2 TIMES DAILY
Status: DISCONTINUED | OUTPATIENT
Start: 2022-09-07 | End: 2022-09-07 | Stop reason: HOSPADM

## 2022-09-06 RX ORDER — BISACODYL 10 MG
10 SUPPOSITORY, RECTAL RECTAL
Status: DISCONTINUED | OUTPATIENT
Start: 2022-09-06 | End: 2022-09-06

## 2022-09-06 RX ORDER — ACETAMINOPHEN 325 MG/1
650 TABLET ORAL EVERY 6 HOURS PRN
Status: DISCONTINUED | OUTPATIENT
Start: 2022-09-06 | End: 2022-09-07 | Stop reason: HOSPADM

## 2022-09-06 RX ORDER — LATANOPROST 50 UG/ML
1 SOLUTION/ DROPS OPHTHALMIC EVERY EVENING
Status: DISCONTINUED | OUTPATIENT
Start: 2022-09-06 | End: 2022-09-07 | Stop reason: HOSPADM

## 2022-09-06 RX ORDER — LEVETIRACETAM 500 MG/1
500 TABLET ORAL 2 TIMES DAILY
Status: DISCONTINUED | OUTPATIENT
Start: 2022-09-06 | End: 2022-09-06

## 2022-09-06 RX ORDER — ATORVASTATIN CALCIUM 20 MG/1
20 TABLET, FILM COATED ORAL DAILY
Status: DISCONTINUED | OUTPATIENT
Start: 2022-09-06 | End: 2022-09-07 | Stop reason: HOSPADM

## 2022-09-06 RX ORDER — ONDANSETRON 4 MG/1
4 TABLET, ORALLY DISINTEGRATING ORAL EVERY 4 HOURS PRN
Status: DISCONTINUED | OUTPATIENT
Start: 2022-09-06 | End: 2022-09-07 | Stop reason: HOSPADM

## 2022-09-06 RX ORDER — LEVETIRACETAM 500 MG/5ML
500 INJECTION, SOLUTION, CONCENTRATE INTRAVENOUS ONCE
Status: DISCONTINUED | OUTPATIENT
Start: 2022-09-07 | End: 2022-09-07 | Stop reason: HOSPADM

## 2022-09-06 RX ORDER — POLYETHYLENE GLYCOL 3350 17 G/17G
1 POWDER, FOR SOLUTION ORAL
Status: DISCONTINUED | OUTPATIENT
Start: 2022-09-06 | End: 2022-09-06

## 2022-09-06 RX ORDER — ONDANSETRON 2 MG/ML
4 INJECTION INTRAMUSCULAR; INTRAVENOUS EVERY 4 HOURS PRN
Status: DISCONTINUED | OUTPATIENT
Start: 2022-09-06 | End: 2022-09-07 | Stop reason: HOSPADM

## 2022-09-06 RX ORDER — AMOXICILLIN 250 MG
2 CAPSULE ORAL 2 TIMES DAILY
Status: DISCONTINUED | OUTPATIENT
Start: 2022-09-06 | End: 2022-09-06

## 2022-09-06 RX ADMIN — ATORVASTATIN CALCIUM 20 MG: 10 TABLET, FILM COATED ORAL at 05:08

## 2022-09-06 RX ADMIN — LATANOPROST 1 DROP: 50 SOLUTION OPHTHALMIC at 20:05

## 2022-09-06 RX ADMIN — LEVETIRACETAM 500 MG: 500 TABLET, FILM COATED ORAL at 05:08

## 2022-09-06 RX ADMIN — POLYETHYLENE GLYCOL 3350, SODIUM SULFATE ANHYDROUS, SODIUM BICARBONATE, SODIUM CHLORIDE, POTASSIUM CHLORIDE 4 L: 236; 22.74; 6.74; 5.86; 2.97 POWDER, FOR SOLUTION ORAL at 13:05

## 2022-09-06 RX ADMIN — LEVETIRACETAM 500 MG: 500 TABLET, FILM COATED ORAL at 17:18

## 2022-09-06 RX ADMIN — SENNOSIDES AND DOCUSATE SODIUM 2 TABLET: 50; 8.6 TABLET ORAL at 05:09

## 2022-09-06 RX ADMIN — METOPROLOL TARTRATE 12.5 MG: 25 TABLET, FILM COATED ORAL at 17:18

## 2022-09-06 RX ADMIN — SODIUM CHLORIDE 1000 ML: 9 INJECTION, SOLUTION INTRAVENOUS at 00:15

## 2022-09-06 ASSESSMENT — ENCOUNTER SYMPTOMS
CHILLS: 0
COUGH: 0
PALPITATIONS: 0
HEADACHES: 0
MYALGIAS: 0
INSOMNIA: 0
BRUISES/BLEEDS EASILY: 0
BLOOD IN STOOL: 1
NAUSEA: 0
SHORTNESS OF BREATH: 0
ABDOMINAL PAIN: 0
DIZZINESS: 0
NECK PAIN: 0
DOUBLE VISION: 0
VOMITING: 0
DEPRESSION: 0
FEVER: 0
BLURRED VISION: 0
SORE THROAT: 0
WEAKNESS: 0

## 2022-09-06 ASSESSMENT — LIFESTYLE VARIABLES
ALCOHOL_USE: YES
ON A TYPICAL DAY WHEN YOU DRINK ALCOHOL HOW MANY DRINKS DO YOU HAVE: 1
HOW MANY TIMES IN THE PAST YEAR HAVE YOU HAD 5 OR MORE DRINKS IN A DAY: 0
TOTAL SCORE: 0
TOTAL SCORE: 0
HAVE PEOPLE ANNOYED YOU BY CRITICIZING YOUR DRINKING: NO
EVER FELT BAD OR GUILTY ABOUT YOUR DRINKING: NO
HAVE YOU EVER FELT YOU SHOULD CUT DOWN ON YOUR DRINKING: NO
CONSUMPTION TOTAL: NEGATIVE
TOTAL SCORE: 0
EVER HAD A DRINK FIRST THING IN THE MORNING TO STEADY YOUR NERVES TO GET RID OF A HANGOVER: NO
AVERAGE NUMBER OF DAYS PER WEEK YOU HAVE A DRINK CONTAINING ALCOHOL: 7

## 2022-09-06 ASSESSMENT — PAIN DESCRIPTION - PAIN TYPE
TYPE: ACUTE PAIN
TYPE: ACUTE PAIN

## 2022-09-06 ASSESSMENT — COGNITIVE AND FUNCTIONAL STATUS - GENERAL
WALKING IN HOSPITAL ROOM: A LITTLE
TOILETING: A LITTLE
CLIMB 3 TO 5 STEPS WITH RAILING: A LITTLE
SUGGESTED CMS G CODE MODIFIER MOBILITY: CJ
SUGGESTED CMS G CODE MODIFIER DAILY ACTIVITY: CI
MOBILITY SCORE: 22
DAILY ACTIVITIY SCORE: 23

## 2022-09-06 ASSESSMENT — PATIENT HEALTH QUESTIONNAIRE - PHQ9
SUM OF ALL RESPONSES TO PHQ9 QUESTIONS 1 AND 2: 0
1. LITTLE INTEREST OR PLEASURE IN DOING THINGS: NOT AT ALL
2. FEELING DOWN, DEPRESSED, IRRITABLE, OR HOPELESS: NOT AT ALL
2. FEELING DOWN, DEPRESSED, IRRITABLE, OR HOPELESS: NOT AT ALL
SUM OF ALL RESPONSES TO PHQ9 QUESTIONS 1 AND 2: 0
1. LITTLE INTEREST OR PLEASURE IN DOING THINGS: NOT AT ALL

## 2022-09-06 NOTE — PROGRESS NOTES
4 Eyes Skin Assessment Completed by MARLI Narvaez and MARLI Evangelista.    Head WDL  Ears WDL  Nose WDL  Mouth WDL  Neck WDL  Breast/Chest WDL  Shoulder Blades WDL  Spine WDL  (R) Arm/Elbow/Hand WDL  (L) Arm/Elbow/Hand WDL  Abdomen Scar  Groin WDL  Scrotum/Coccyx/Buttocks WDL  (R) Leg Scab, Bruising, and Edema  (L) Leg Scab, Bruising, and Edema  (R) Heel/Foot/Toe WDL  (L) Heel/Foot/Toe WDL          Devices In Places Blood Pressure Cuff      Interventions In Place N/A    Possible Skin Injury No    Pictures Uploaded Into Epic N/A  Wound Consult Placed N/A  RN Wound Prevention Protocol Ordered No

## 2022-09-06 NOTE — ED TRIAGE NOTES
Pt comes in by himself  c/o bloody stools that started earlier today   has had several episodes  no Hx of such  no pain or discomfort  is on blood thinner

## 2022-09-06 NOTE — H&P
Hospital Medicine History & Physical Note    Date of Service  9/6/2022    Primary Care Physician  Sruthi Kincaid D.O.    Consultants  ERP discussed with Dr. Howell - [facility]    Code Status  Full Code    Chief Complaint  Chief Complaint   Patient presents with    Rectal Bleeding     Started today today  has had multiple bloody BM       History of Presenting Illness  Homa Alonso is a 89 y.o. male, with h/o HTN, HLD, Afib on Eliquis  who presented to the ER on 9/5/2022 with complains of multiple bloody stool and rectal bleeding - started tonight and bleeding bright red blood every 30 min or so since this evening. No prior GI bleed. No abdominal pain or rectal pain.     I discussed the plan of care with patient.    Review of Systems  Review of Systems   Constitutional:  Negative for fever.   HENT:  Negative for congestion and sore throat.    Eyes:  Negative for blurred vision and double vision.   Respiratory:  Negative for cough and shortness of breath.    Cardiovascular:  Negative for chest pain and palpitations.   Gastrointestinal:  Positive for blood in stool. Negative for nausea and vomiting.   Genitourinary:  Negative for dysuria and urgency.   Musculoskeletal:  Negative for myalgias and neck pain.   Skin:  Negative for itching and rash.   Neurological:  Negative for dizziness, weakness and headaches.   Endo/Heme/Allergies:  Does not bruise/bleed easily.   Psychiatric/Behavioral:  Negative for depression. The patient does not have insomnia.      Past Medical History   has a past medical history of Allergy, unspecified not elsewhere classified, Arrhythmia (9/20/2019), Arthritis, Cancer ([facility]) (1991), Displaced fracture of medial malleolus of left tibia, initial encounter for closed fracture (9/23/2019), Encounter for therapeutic drug monitoring (5/18/2020), Gall stones, Glaucoma, Hematoma of right lower extremity (10/21/2021), Hyperlipidemia, Left leg cellulitis (9/23/2019), Leg laceration (9/20/2019),  Normocytic anemia (5/18/2020), Pain, Seizure (HCC) (06/13/2014), Skin lesion of right arm (6/28/2021), Unspecified cataract, and Unspecified hemorrhagic conditions.    Surgical History   has a past surgical history that includes other orthopedic surgery; manny by laparoscopy (12/3/2013); appendectomy; eye surgery; arthroplasty; other; lumbar fusion o-arm (Bilateral, 5/18/2015); lumbar decompression (5/18/2015); knee replacement, total (Bilateral); and prostatectomy, radical retro.     Family History  family history includes Arthritis in his sister; Cancer in his father; Heart Disease in his mother; Stroke in his mother.   Family history reviewed with patient. There is no family history that is pertinent to the chief complaint.     Social History   reports that he quit smoking about 58 years ago. He has never used smokeless tobacco. He reports current alcohol use of about 4.2 oz per week. He reports that he does not use drugs.    Allergies  Allergies   Allergen Reactions    Pcn [Penicillins]        Medications  Prior to Admission Medications   Prescriptions Last Dose Informant Patient Reported? Taking?   B Complex Vitamins (B COMPLEX B-12 PO)   Yes No   Sig: Take  by mouth.   CALCIUM CITRATE PO   Yes No   Sig: Take 500 mg by mouth every day.   Cholecalciferol (VITAMIN D3) 2000 UNIT CAPS   Yes No   Sig: Take  by mouth.   LUMIGAN 0.01 % Solution   No No   Sig: INSTILL 1 DROP INTO RIGHT EYE EVERY DAY AT BEDTIME   MAGNESIUM CITRATE PO  Patient Yes No   Sig: Take 250 mg by mouth every day.   Multiple Vitamins-Minerals (ICAPS AREDS 2) Chew Tab   Yes No   Sig: Take  by mouth.   Omega-3 Fatty Acids (FISH OIL) 1000 MG Cap capsule   Yes No   Sig: Take 1,000 mg by mouth 3 times a day, with meals.   VITAMIN C CR PO  Patient Yes No   Sig: Take  by mouth. OTC VIT C   acetaminophen (TYLENOL) 325 MG Tab   No No   Sig: Take 2 Tabs by mouth every 6 hours as needed (Mild Pain; (Pain scale 1-3); Temp greater than 100.5 F).   apixaban  (ELIQUIS) 2.5mg Tab   Yes No   Sig: Take 1 Tablet by mouth 2 times a day.   atorvastatin (LIPITOR) 20 MG Tab   No No   Sig: Take 1 tablet by mouth once daily   clindamycin (CLEOCIN) 300 MG Cap   Yes No   Sig: TAKE 2 CAPS BY MOUTH 1 HOUR PRIOR TO DENTAL APPOINTMENT   levETIRAcetam (KEPPRA) 500 MG Tab   No No   Sig: Take 1 tablet by mouth twice daily   metoprolol tartrate (LOPRESSOR) 25 MG Tab   No No   Sig: Take 0.5 Tablets by mouth 2 times a day.   multivitamin (THERAGRAN) Tab   Yes No   Sig: Take 1 Tab by mouth every day.   testosterone (TESTIM/ANDROGEL) 50 MG/5GM (1%) Gel gel   No No   Sig: Place 50 mg on the skin every day for 100 days.      Facility-Administered Medications: None       Physical Exam  Temp:  [36.8 °C (98.3 °F)] 36.8 °C (98.3 °F)  Pulse:  [49-79] 65  Resp:  [13-19] 13  BP: (118-159)/(57-92) 143/57  SpO2:  [93 %-97 %] 94 %  Blood Pressure : (!) 143/57   Temperature: 36.8 °C (98.3 °F)   Pulse: 65   Respiration: 13   Pulse Oximetry: 94 %       Physical Exam    Laboratory:  Recent Labs     09/05/22  2350   WBC 8.8   RBC 5.37   HEMOGLOBIN 16.8   HEMATOCRIT 51.7   MCV 96.3   MCH 31.3   MCHC 32.5*   RDW 49.6   PLATELETCT 188   MPV 10.4     Recent Labs     09/05/22  2350   SODIUM 138   POTASSIUM 4.5   CHLORIDE 103   CO2 25   GLUCOSE 119*   BUN 23*   CREATININE 1.13   CALCIUM 9.5     Recent Labs     09/05/22  2350   ALTSGPT 17   ASTSGOT 32   ALKPHOSPHAT 80   TBILIRUBIN 0.6   GLUCOSE 119*     Recent Labs     09/05/22  2350   APTT 32.2   INR 1.11     No results for input(s): NTPROBNP in the last 72 hours.      No results for input(s): TROPONINT in the last 72 hours.    Imaging:  No orders to display           Assessment/Plan:  Justification for Admission Status  I anticipate this patient is appropriate for observation status at this time because lower GI bleed with stable vitals signs and initial Hgb 16      * Lower GI bleed  Assessment & Plan  -Observation status to medical floor.  -Patient takes Eliquis  for atrial fibrillation.  Hold Eliquis now.  -Lower GI bleed with stable vital signs and normal hemoglobin 16.8.  -Serial H&H every 6 hours.  -We will need nonemergency GI consult in the morning.    Chronic atrial fibrillation (HCC)- (present on admission)  Assessment & Plan  -Takes metoprolol and is anticoagulated with Eliquis that I will hold given GI bleeding.  He follows with [facility].    Dyslipidemia- (present on admission)  Assessment & Plan  -Continue atorvastatin.    Stage 3a chronic kidney disease (HCC)- (present on admission)  Assessment & Plan  -On admission, creatinine is 1.13.  Monitor chemistry panel in the morning.  Avoid nephrotoxic medication.    (HCC) Seizure disorder- (present on admission)  Assessment & Plan  -Continue Keppra.      VTE prophylaxis: SCDs/TEDs
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
## 2022-09-06 NOTE — PROGRESS NOTES
[facility] Medicine Daily Progress Note    Date of Service  9/6/2022    Chief Complaint  Homa Alonso is a 89 y.o. male admitted 9/5/2022 with rectal bleeding    Hospital Course  Homa Alonso is a 89 y.o. male, with h/o HTN, HLD, Afib on Eliquis  who presented to the ER on 9/5/2022 with complains of multiple bloody stool and rectal bleeding - started tonight and bleeding bright red blood every 30 min or so since this evening. No prior GI bleed. No abdominal pain or rectal pain.     Interval Problem Update  Patient continues to endorse rectal bleeding, which she reports is more red this morning.  He was seen by GI this morning they are planning on a colonoscopy tomorrow.  He will be on a clear liquid diet and n.p.o. at midnight.  Prep ordered.    I have discussed this patient's plan of care and discharge plan at IDT rounds today with Case Management, Nursing, Nursing leadership, and other members of the IDT team.    Consultants/Specialty  GI    Code Status  Full Code    Disposition  Patient is not medically cleared for discharge.   Anticipate discharge to to home with close outpatient follow-up.  I have placed the appropriate orders for post-discharge needs.    Review of Systems  Review of Systems   Constitutional:  Negative for chills and fever.   Respiratory:  Negative for shortness of breath.    Cardiovascular:  Negative for chest pain.   Gastrointestinal:  Positive for blood in stool. Negative for abdominal pain and vomiting.   All other systems reviewed and are negative.     Physical Exam  Temp:  [36.3 °C (97.4 °F)-36.9 °C (98.4 °F)] 36.9 °C (98.4 °F)  Pulse:  [49-79] 68  Resp:  [12-20] 20  BP: (118-168)/(55-92) 118/55  SpO2:  [93 %-97 %] 95 %    Physical Exam  Vitals and nursing note reviewed. Exam conducted with a chaperone present.   Constitutional:       General: He is not in acute distress.     Appearance: He is not ill-appearing.   HENT:      Head: Normocephalic and atraumatic.   Eyes:      General: No  scleral icterus.        Right eye: No discharge.         Left eye: No discharge.   Cardiovascular:      Rate and Rhythm: Normal rate and regular rhythm.      Heart sounds: Murmur heard.   Pulmonary:      Effort: No respiratory distress.      Breath sounds: Normal breath sounds. No wheezing.   Abdominal:      General: Abdomen is flat. Bowel sounds are normal. There is no distension.      Tenderness: There is no abdominal tenderness.   Musculoskeletal:         General: No tenderness.      Left lower leg: No edema.   Skin:     General: Skin is warm and dry.      Coloration: Skin is not jaundiced.   Neurological:      Mental Status: He is alert.       Fluids  No intake or output data in the 24 hours ending 09/06/22 1027    Laboratory  Recent Labs     09/05/22  2350 09/06/22  0537 09/06/22  1019   WBC 8.8  --   --    RBC 5.37  --   --    HEMOGLOBIN 16.8 13.2* 11.3*   HEMATOCRIT 51.7 40.9* 34.6*   MCV 96.3  --   --    MCH 31.3  --   --    MCHC 32.5*  --   --    RDW 49.6  --   --    PLATELETCT 188  --   --    MPV 10.4  --   --      Recent Labs     09/05/22  2350   SODIUM 138   POTASSIUM 4.5   CHLORIDE 103   CO2 25   GLUCOSE 119*   BUN 23*   CREATININE 1.13   CALCIUM 9.5     Recent Labs     09/05/22  2350   APTT 32.2   INR 1.11               Imaging  No orders to display        Assessment/Plan  * Lower GI bleed  Assessment & Plan  -Observation status to medical floor.  -Patient takes Eliquis for atrial fibrillation.  Hold Eliquis now.  -Lower GI bleed with stable vital signs and normal hemoglobin 16.8.  -Serial H&H every 6 hours.  GI consulted- plan is for colonoscopy tomorrow.  N.p.o. at midnight, clear liquid diet until then    Stage 3a chronic kidney disease (HCC)- (present on admission)  Assessment & Plan  -On admission, creatinine is 1.13.  Monitor chemistry panel in the morning.  Avoid nephrotoxic medication.  Patient appears at his baseline.    (HCC) Seizure disorder- (present on admission)  Assessment &  Plan  -Continue Keppra.    Chronic atrial fibrillation (HCC)- (present on admission)  Assessment & Plan   He follows with [facility].  Continue rate control with metoprolol  Eliquis held due to GI bleed, patient most likely would not restart Eliquis on discharge.    Dyslipidemia- (present on admission)  Assessment & Plan  -Continue atorvastatin.         VTE prophylaxis: SCDs/TEDs    I have performed a physical exam and reviewed and updated ROS and Plan today (9/6/2022). In review of yesterday's note (9/5/2022), there are no changes except as documented above.

## 2022-09-06 NOTE — ED PROVIDER NOTES
ED Provider Note    Chief Complaint:   Rectal bleeding    HPI:  Homa Alonso is a very pleasant 89-year-old male with past medical history of hypertension, hyperlipidemia, diabetes who presents with bloody stools that started 2 hours prior to arrival.  Patient denies history of rectal bleeding.  Patient denies history of hemorrhoids.  Patient denies recent colonoscopy.  Patient denies fevers, chills, bodies, sweats, lightheadedness, chest pain, shortness of breath, diaphoresis.  Patient denies abdominal pain, nausea or vomiting.  Patient reports that he has been constipated recently.  Patient denies history of diverticulosis.  Patient is on Eliquis, last dose this evening.    Review of Systems:  Review of Systems   Constitutional:  Negative for chills and fever.   HENT:  Negative for congestion and sore throat.    Eyes:  Negative for blurred vision and double vision.   Respiratory:  Negative for cough and shortness of breath.    Cardiovascular:  Negative for chest pain and leg swelling.   Gastrointestinal:  Positive for blood in stool. Negative for abdominal pain, nausea and vomiting.   Genitourinary:  Negative for dysuria and hematuria.   Musculoskeletal:  Negative for falls and neck pain.   Skin:  Negative for itching and rash.   Neurological:  Negative for loss of consciousness and headaches.     Family History:  Family History   Problem Relation Age of Onset    Heart Disease Mother         STROKE    Stroke Mother     Cancer Father     Arthritis Sister     Diabetes Neg Hx     Hypertension Neg Hx     Hyperlipidemia Neg Hx        Past Medical History:   has a past medical history of Allergy, unspecified not elsewhere classified, Arrhythmia (9/20/2019), Arthritis, Cancer ([facility]) (1991), Displaced fracture of medial malleolus of left tibia, initial encounter for closed fracture (9/23/2019), Encounter for therapeutic drug monitoring (5/18/2020), Gall stones, Glaucoma, Hematoma of right lower extremity (10/21/2021),  "Hyperlipidemia, Left leg cellulitis (2019), Leg laceration (2019), Normocytic anemia (2020), Pain, Seizure (HCC) (2014), Skin lesion of right arm (2021), Unspecified cataract, and Unspecified hemorrhagic conditions.    Social History:  Social History     Tobacco Use    Smoking status: Former     Packs/day: 0.00     Types: Cigarettes     Quit date: 1964     Years since quittin.7    Smokeless tobacco: Never    Tobacco comments:     continued abstinence   Vaping Use    Vaping Use: Never used   Substance and Sexual Activity    Alcohol use: Yes     Alcohol/week: 4.2 oz     Types: 7 Glasses of wine per week     Comment: one drink per week or less    Drug use: No    Sexual activity: Not Currently     Partners: Female     Comment:        Surgical History:   has a past surgical history that includes other orthopedic surgery; manny by laparoscopy (12/3/2013); appendectomy; eye surgery; arthroplasty; other; lumbar fusion o-arm (Bilateral, 2015); lumbar decompression (2015); knee replacement, total (Bilateral); and prostatectomy, radical retro.    Allergies:  Allergies   Allergen Reactions    Pcn [Penicillins]        Physical Exam:  Vital Signs: /62   Pulse 66   Temp 36.8 °C (98.3 °F) (Temporal)   Resp 19   Ht 1.689 m (5' 6.5\")   Wt 73.9 kg (162 lb 14.7 oz)   SpO2 96%   BMI 25.90 kg/m²   Physical Exam  Vitals and nursing note reviewed.   Constitutional:       Comments: Patient is lying in bed supine, pleasant, conversant, speaking in complete sentences   HENT:      Head: Normocephalic and atraumatic.   Eyes:      Extraocular Movements: Extraocular movements intact.      Conjunctiva/sclera: Conjunctivae normal.      Pupils: Pupils are equal, round, and reactive to light.   Cardiovascular:      Pulses: Normal pulses.      Comments: HR 79  Pulmonary:      Effort: Pulmonary effort is normal. No respiratory distress.   Abdominal:      Comments: Abdomen is soft, " non-tender, non-distended, non-rigid, no rebound, guarding, masses, no McBurney's point tenderness, no peritoneal signs, negative Rovsing sign, negative Duenas sign.  No CVA tenderness to palpation. Benign abdomen.   Genitourinary:     Comments: Gross bleeding about the rectum  Musculoskeletal:         General: No swelling. Normal range of motion.      Cervical back: Normal range of motion. No rigidity.   Skin:     General: Skin is warm and dry.      Capillary Refill: Capillary refill takes less than 2 seconds.   Neurological:      Mental Status: He is alert.       Medical records reviewed for continuity of care.     Results for orders placed or performed during the hospital encounter of 09/05/22   CBC WITH DIFFERENTIAL   Result Value Ref Range    WBC 8.8 4.8 - 10.8 K/uL    RBC 5.37 4.70 - 6.10 M/uL    Hemoglobin 16.8 14.0 - 18.0 g/dL    Hematocrit 51.7 42.0 - 52.0 %    MCV 96.3 81.4 - 97.8 fL    MCH 31.3 27.0 - 33.0 pg    MCHC 32.5 (L) 33.7 - 35.3 g/dL    RDW 49.6 35.9 - 50.0 fL    Platelet Count 188 164 - 446 K/uL    MPV 10.4 9.0 - 12.9 fL    Neutrophils-Polys 47.30 44.00 - 72.00 %    Lymphocytes 38.20 22.00 - 41.00 %    Monocytes 11.70 0.00 - 13.40 %    Eosinophils 1.30 0.00 - 6.90 %    Basophils 0.50 0.00 - 1.80 %    Immature Granulocytes 1.00 (H) 0.00 - 0.90 %    Nucleated RBC 0.00 /100 WBC    Neutrophils (Absolute) 4.15 1.82 - 7.42 K/uL    Lymphs (Absolute) 3.34 1.00 - 4.80 K/uL    Monos (Absolute) 1.02 (H) 0.00 - 0.85 K/uL    Eos (Absolute) 0.11 0.00 - 0.51 K/uL    Baso (Absolute) 0.04 0.00 - 0.12 K/uL    Immature Granulocytes (abs) 0.09 0.00 - 0.11 K/uL    NRBC (Absolute) 0.00 K/uL   Comp Metabolic Panel   Result Value Ref Range    Sodium 138 135 - 145 mmol/L    Potassium 4.5 3.6 - 5.5 mmol/L    Chloride 103 96 - 112 mmol/L    Co2 25 20 - 33 mmol/L    Anion Gap 10.0 7.0 - 16.0    Glucose 119 (H) 65 - 99 mg/dL    Bun 23 (H) 8 - 22 mg/dL    Creatinine 1.13 0.50 - 1.40 mg/dL    Calcium 9.5 8.4 - 10.2 mg/dL     AST(SGOT) 32 12 - 45 U/L    ALT(SGPT) 17 2 - 50 U/L    Alkaline Phosphatase 80 30 - 99 U/L    Total Bilirubin 0.6 0.1 - 1.5 mg/dL    Albumin 4.6 3.2 - 4.9 g/dL    Total Protein 8.3 (H) 6.0 - 8.2 g/dL    Globulin 3.7 (H) 1.9 - 3.5 g/dL    A-G Ratio 1.2 g/dL   Prothrombin Time   Result Value Ref Range    PT 13.9 12.0 - 14.6 sec    INR 1.11 0.87 - 1.13   APTT   Result Value Ref Range    APTT 32.2 24.7 - 36.0 sec   COD - Adult (Type and Screen)   Result Value Ref Range    ABO Grouping Only A     Rh Grouping Only NEG    ABO Rh Confirm   Result Value Ref Range    ABO Rh Confirm A NEG    ESTIMATED GFR   Result Value Ref Range    GFR (CKD-EPI) 60 >60 mL/min/1.73 m 2       Radiology:  No orders to display        MDM:  CBC to evaluate for acute anemia.  Patient thankfully is hemodynamically stable at this time.  IV fluids started.  Coags ordered to evaluate for coagulopathy.  Patient does however have a known coagulopathy given Eliquis prescription.  CMP to evaluate for liver failure, acute kidney injury, acute electrolyte abnormality.  Patient denies chest pain, lightheadedness, diaphoresis, shortness of breath, acute hemorrhage is inconsistent with patient presentation at this time.  Disposition pending work-up, GI consultation and admission.    Electronically signed by: Ajay Jimenez M.D., 9/5/2022, 11:51 PM    I spoke with Dr Mesa of gastroenterology regarding the patient's presentation and his copious bleeding per rectum.  He recommended admission to hospital medicine service and would consult on the patient in the morning.  Since the patient is hemodynamically stable at this time he does not believe that emergent intervention is required.  Disposition pending consultation to hospital medicine.    Electronically signed by: Ajay Jimenez M.D., 9/6/2022 1:08 AM    Dr. Dockery of hospital medicine has come to the bedside to evaluate the patient and has graciously excepted the patient to her service for  admission.  Disposition to the hospital medicine service.    This dictation has been created using voice recognition software. I am continuously working with the software to minimize the number of voice recognition errors and I have made every attempt to manually correct the errors within my dictation. However errors  related to this voice recognition software may still exist and should be interpreted within the appropriate context.     Electronically signed by: Ajay Jimenez M.D., 9/6/2022 1:58 AM      Disposition:  Hospital medicine floor    Final Impression:  1. Lower GI bleed        Electronically signed by: Ajay Jimenez M.D., 9/6/2022 1:58 AM

## 2022-09-06 NOTE — CARE PLAN
The patient is Watcher - Medium risk of patient condition declining or worsening    Shift Goals  Clinical Goals: Hemoglobin above 7  Patient Goals: rest and comfort    Progress made toward(s) clinical / shift goals:    Problem: Knowledge Deficit - Standard  Goal: Patient and family/care givers will demonstrate understanding of plan of care, disease process/condition, diagnostic tests and medications  Outcome: Progressing     Problem: Fall Risk  Goal: Patient will remain free from falls  Outcome: Progressing       Patient is not progressing towards the following goals:

## 2022-09-06 NOTE — ASSESSMENT & PLAN NOTE
He follows with [facility].  Continue rate control with metoprolol  Eliquis held due to GI bleed, patient most likely would not restart Eliquis on discharge.

## 2022-09-06 NOTE — ASSESSMENT & PLAN NOTE
-On admission, creatinine is 1.13.  Monitor chemistry panel in the morning.  Avoid nephrotoxic medication.  Patient appears at his baseline.

## 2022-09-06 NOTE — PROGRESS NOTES
Pt arrived from ED ambulatory from [facility] to bed. Report received from RN Boyd. No complaints of pain. Bowel sounds in all 4 quadrants. Mild bleeding in brief on arrival; last hemoglobin WNR. Pt verbalizes needs well and demonstrates use of call bell. Call bell in reach    Chart check completed    0525 Pt GI bleeding a full diapers amount x2. Dr. Dockery is made aware and has ordered Hemoglobin to be drawn. Lab is aware     0528 Lab arrived      0630 Hemoglobin 13.2

## 2022-09-06 NOTE — ASSESSMENT & PLAN NOTE
-Observation status to medical floor.  -Patient takes Eliquis for atrial fibrillation.  Hold Eliquis now.  -Lower GI bleed with stable vital signs and normal hemoglobin 16.8.  -Serial H&H every 6 hours.  GI consulted- plan is for colonoscopy tomorrow.  N.p.o. at midnight, clear liquid diet until then

## 2022-09-06 NOTE — ED NOTES
Patient changed and blood was present. Blood in stool close to the equivalent of a cereal bowl. Updated ERP.

## 2022-09-06 NOTE — ED NOTES
Patient changed with blood present again. Dr. Dockery at bedside shown brief post change to help visualize blood loss.

## 2022-09-06 NOTE — PROGRESS NOTES
Med rec updated and complete, per pt   Allergies reviewed, per pt   Pt reports that he has been taking his METOPROLOL LOPRESSOR 25MG 1 tablet twice a day, not a half tablet twice a day

## 2022-09-06 NOTE — DISCHARGE PLANNING
Case Management Discharge Planning    Admission Date: 9/5/2022  GMLOS:    ALOS: 0    6-Clicks ADL Score: 23  6-Clicks Mobility Score: 22      Anticipated Discharge Dispo: Discharge Disposition: Discharged to home/self care (01)    DME Needed: No    Action(s) Taken:     RNCM attended IDT rounds and reviewed chart. No CM needs noted at this time.    Escalations Completed: None    Medically Clear: No    Next Steps:  f/u with medical team to discuss DC needs and barriers    Barriers to Discharge: Medical clearance

## 2022-09-06 NOTE — ED NOTES
Patient pivoted to bed dizzy upon ambulation. Change from walking into the ED steadily upon entry. Dr. Dockery notified of change in condition.

## 2022-09-07 ENCOUNTER — ANESTHESIA EVENT (OUTPATIENT)
Dept: SURGERY | Facility: MEDICAL CENTER | Age: 87
End: 2022-09-07
Payer: COMMERCIAL

## 2022-09-07 ENCOUNTER — ANESTHESIA (OUTPATIENT)
Dept: SURGERY | Facility: MEDICAL CENTER | Age: 87
End: 2022-09-07
Payer: COMMERCIAL

## 2022-09-07 VITALS
TEMPERATURE: 97.7 F | SYSTOLIC BLOOD PRESSURE: 105 MMHG | RESPIRATION RATE: 16 BRPM | BODY MASS INDEX: 25.57 KG/M2 | OXYGEN SATURATION: 99 % | WEIGHT: 162.92 LBS | DIASTOLIC BLOOD PRESSURE: 42 MMHG | HEIGHT: 67 IN | HEART RATE: 70 BPM

## 2022-09-07 LAB
ANION GAP SERPL CALC-SCNC: 15 MMOL/L (ref 7–16)
BUN SERPL-MCNC: 18 MG/DL (ref 8–22)
CALCIUM SERPL-MCNC: 8.2 MG/DL (ref 8.4–10.2)
CHLORIDE SERPL-SCNC: 107 MMOL/L (ref 96–112)
CO2 SERPL-SCNC: 18 MMOL/L (ref 20–33)
CREAT SERPL-MCNC: 0.95 MG/DL (ref 0.5–1.4)
EKG IMPRESSION: NORMAL
ERYTHROCYTE [DISTWIDTH] IN BLOOD BY AUTOMATED COUNT: 48.1 FL (ref 35.9–50)
GFR SERPLBLD CREATININE-BSD FMLA CKD-EPI: 74 ML/MIN/1.73 M 2
GLUCOSE SERPL-MCNC: 100 MG/DL (ref 65–99)
HCT VFR BLD AUTO: 31.9 % (ref 42–52)
HGB BLD-MCNC: 10.2 G/DL (ref 14–18)
MCH RBC QN AUTO: 31 PG (ref 27–33)
MCHC RBC AUTO-ENTMCNC: 32 G/DL (ref 33.7–35.3)
MCV RBC AUTO: 97 FL (ref 81.4–97.8)
PATHOLOGY CONSULT NOTE: NORMAL
PLATELET # BLD AUTO: 141 K/UL (ref 164–446)
PMV BLD AUTO: 10.8 FL (ref 9–12.9)
POTASSIUM SERPL-SCNC: 4 MMOL/L (ref 3.6–5.5)
RBC # BLD AUTO: 3.29 M/UL (ref 4.7–6.1)
SODIUM SERPL-SCNC: 140 MMOL/L (ref 135–145)
WBC # BLD AUTO: 11.1 K/UL (ref 4.8–10.8)

## 2022-09-07 PROCEDURE — 160009 HCHG ANES TIME/MIN: Performed by: INTERNAL MEDICINE

## 2022-09-07 PROCEDURE — 93005 ELECTROCARDIOGRAM TRACING: CPT | Mod: XE | Performed by: INTERNAL MEDICINE

## 2022-09-07 PROCEDURE — 80048 BASIC METABOLIC PNL TOTAL CA: CPT

## 2022-09-07 PROCEDURE — 160002 HCHG RECOVERY MINUTES (STAT): Performed by: INTERNAL MEDICINE

## 2022-09-07 PROCEDURE — 99217 PR OBSERVATION CARE DISCHARGE: CPT | Performed by: STUDENT IN AN ORGANIZED HEALTH CARE EDUCATION/TRAINING PROGRAM

## 2022-09-07 PROCEDURE — 88305 TISSUE EXAM BY PATHOLOGIST: CPT

## 2022-09-07 PROCEDURE — 99100 ANES PT EXTEME AGE<1 YR&>70: CPT | Performed by: INTERNAL MEDICINE

## 2022-09-07 PROCEDURE — 160208 HCHG ENDO MINUTES - EA ADDL 1 MIN LEVEL 4: Performed by: INTERNAL MEDICINE

## 2022-09-07 PROCEDURE — 36415 COLL VENOUS BLD VENIPUNCTURE: CPT

## 2022-09-07 PROCEDURE — 700101 HCHG RX REV CODE 250: Performed by: INTERNAL MEDICINE

## 2022-09-07 PROCEDURE — 160048 HCHG OR STATISTICAL LEVEL 1-5: Performed by: INTERNAL MEDICINE

## 2022-09-07 PROCEDURE — 85027 COMPLETE CBC AUTOMATED: CPT

## 2022-09-07 PROCEDURE — 160203 HCHG ENDO MINUTES - 1ST 30 MINS LEVEL 4: Performed by: INTERNAL MEDICINE

## 2022-09-07 PROCEDURE — 700111 HCHG RX REV CODE 636 W/ 250 OVERRIDE (IP): Performed by: INTERNAL MEDICINE

## 2022-09-07 PROCEDURE — 160036 HCHG PACU - EA ADDL 30 MINS PHASE I: Performed by: INTERNAL MEDICINE

## 2022-09-07 PROCEDURE — G0378 HOSPITAL OBSERVATION PER HR: HCPCS

## 2022-09-07 PROCEDURE — 700105 HCHG RX REV CODE 258: Performed by: INTERNAL MEDICINE

## 2022-09-07 PROCEDURE — 110371 HCHG SHELL REV 272: Performed by: INTERNAL MEDICINE

## 2022-09-07 PROCEDURE — 93010 ELECTROCARDIOGRAM REPORT: CPT | Performed by: INTERNAL MEDICINE

## 2022-09-07 PROCEDURE — 00812 ANES LWR INTST SCR COLSC: CPT | Performed by: INTERNAL MEDICINE

## 2022-09-07 PROCEDURE — 160035 HCHG PACU - 1ST 60 MINS PHASE I: Performed by: INTERNAL MEDICINE

## 2022-09-07 RX ORDER — ONDANSETRON 2 MG/ML
4 INJECTION INTRAMUSCULAR; INTRAVENOUS
Status: DISCONTINUED | OUTPATIENT
Start: 2022-09-07 | End: 2022-09-07 | Stop reason: HOSPADM

## 2022-09-07 RX ORDER — FERROUS SULFATE 325(65) MG
325 TABLET ORAL DAILY
Qty: 30 TABLET | Refills: 1 | Status: SHIPPED | OUTPATIENT
Start: 2022-09-07 | End: 2023-07-03

## 2022-09-07 RX ORDER — LABETALOL HYDROCHLORIDE 5 MG/ML
5 INJECTION, SOLUTION INTRAVENOUS
Status: DISCONTINUED | OUTPATIENT
Start: 2022-09-07 | End: 2022-09-07 | Stop reason: HOSPADM

## 2022-09-07 RX ORDER — MEPERIDINE HYDROCHLORIDE 25 MG/ML
12.5 INJECTION INTRAMUSCULAR; INTRAVENOUS; SUBCUTANEOUS
Status: DISCONTINUED | OUTPATIENT
Start: 2022-09-07 | End: 2022-09-07 | Stop reason: HOSPADM

## 2022-09-07 RX ORDER — HYDROMORPHONE HYDROCHLORIDE 1 MG/ML
0.2 INJECTION, SOLUTION INTRAMUSCULAR; INTRAVENOUS; SUBCUTANEOUS
Status: DISCONTINUED | OUTPATIENT
Start: 2022-09-07 | End: 2022-09-07 | Stop reason: HOSPADM

## 2022-09-07 RX ORDER — ALBUTEROL SULFATE 2.5 MG/3ML
2.5 SOLUTION RESPIRATORY (INHALATION)
Status: DISCONTINUED | OUTPATIENT
Start: 2022-09-07 | End: 2022-09-07 | Stop reason: HOSPADM

## 2022-09-07 RX ORDER — OXYCODONE HCL 5 MG/5 ML
5 SOLUTION, ORAL ORAL
Status: DISCONTINUED | OUTPATIENT
Start: 2022-09-07 | End: 2022-09-07 | Stop reason: HOSPADM

## 2022-09-07 RX ORDER — DOCUSATE SODIUM 100 MG/1
100 CAPSULE, LIQUID FILLED ORAL 2 TIMES DAILY PRN
Qty: 60 CAPSULE | Refills: 2 | Status: SHIPPED | OUTPATIENT
Start: 2022-09-07

## 2022-09-07 RX ORDER — SODIUM CHLORIDE, SODIUM LACTATE, POTASSIUM CHLORIDE, CALCIUM CHLORIDE 600; 310; 30; 20 MG/100ML; MG/100ML; MG/100ML; MG/100ML
INJECTION, SOLUTION INTRAVENOUS CONTINUOUS
Status: ACTIVE | OUTPATIENT
Start: 2022-09-07 | End: 2022-09-07

## 2022-09-07 RX ORDER — HYDRALAZINE HYDROCHLORIDE 20 MG/ML
5 INJECTION INTRAMUSCULAR; INTRAVENOUS
Status: DISCONTINUED | OUTPATIENT
Start: 2022-09-07 | End: 2022-09-07 | Stop reason: HOSPADM

## 2022-09-07 RX ORDER — HALOPERIDOL 5 MG/ML
1 INJECTION INTRAMUSCULAR
Status: DISCONTINUED | OUTPATIENT
Start: 2022-09-07 | End: 2022-09-07 | Stop reason: HOSPADM

## 2022-09-07 RX ORDER — OXYCODONE HCL 5 MG/5 ML
10 SOLUTION, ORAL ORAL
Status: DISCONTINUED | OUTPATIENT
Start: 2022-09-07 | End: 2022-09-07 | Stop reason: HOSPADM

## 2022-09-07 RX ORDER — LIDOCAINE HYDROCHLORIDE 20 MG/ML
INJECTION, SOLUTION EPIDURAL; INFILTRATION; INTRACAUDAL; PERINEURAL PRN
Status: DISCONTINUED | OUTPATIENT
Start: 2022-09-07 | End: 2022-09-07 | Stop reason: SURG

## 2022-09-07 RX ORDER — HYDROMORPHONE HYDROCHLORIDE 1 MG/ML
0.4 INJECTION, SOLUTION INTRAMUSCULAR; INTRAVENOUS; SUBCUTANEOUS
Status: DISCONTINUED | OUTPATIENT
Start: 2022-09-07 | End: 2022-09-07 | Stop reason: HOSPADM

## 2022-09-07 RX ORDER — HYDROMORPHONE HYDROCHLORIDE 1 MG/ML
0.1 INJECTION, SOLUTION INTRAMUSCULAR; INTRAVENOUS; SUBCUTANEOUS
Status: DISCONTINUED | OUTPATIENT
Start: 2022-09-07 | End: 2022-09-07 | Stop reason: HOSPADM

## 2022-09-07 RX ORDER — DIPHENHYDRAMINE HYDROCHLORIDE 50 MG/ML
12.5 INJECTION INTRAMUSCULAR; INTRAVENOUS
Status: DISCONTINUED | OUTPATIENT
Start: 2022-09-07 | End: 2022-09-07 | Stop reason: HOSPADM

## 2022-09-07 RX ADMIN — SODIUM CHLORIDE, POTASSIUM CHLORIDE, SODIUM LACTATE AND CALCIUM CHLORIDE: 600; 310; 30; 20 INJECTION, SOLUTION INTRAVENOUS at 08:44

## 2022-09-07 RX ADMIN — PROPOFOL 30 MG: 10 INJECTION, EMULSION INTRAVENOUS at 08:48

## 2022-09-07 RX ADMIN — LIDOCAINE HYDROCHLORIDE 70 MG: 20 INJECTION, SOLUTION EPIDURAL; INFILTRATION; INTRACAUDAL at 08:48

## 2022-09-07 RX ADMIN — PROPOFOL 20 MG: 10 INJECTION, EMULSION INTRAVENOUS at 08:50

## 2022-09-07 NOTE — PROGRESS NOTES
1130- Pt returned to room from PACU via RN. Pt noted to abnormal heart rhythm in pacu while monitored.  Strips sent to Dr. Payton via voalte.   No new orders received

## 2022-09-07 NOTE — DISCHARGE INSTRUCTIONS
Colonoscopy, Adult, Care After  This sheet gives you information about how to care for yourself after your procedure. Your doctor may also give you more specific instructions. If you have problems or questions, call your doctor.  What can I expect after the procedure?  After the procedure, it is common to have:  A small amount of blood in your poop for 24 hours.  Some gas.  Mild cramping or bloating in your belly.  Follow these instructions at home:  General instructions  For the first 24 hours after the procedure:  Do not drive or use machinery.  Do not sign important documents.  Do not drink alcohol.  Do your daily activities more slowly than normal.  Eat foods that are soft and easy to digest.  Take over-the-counter or prescription medicines only as told by your doctor.  To help cramping and bloating:    Try walking around.  Put heat on your belly (abdomen) as told by your doctor. Use a heat source that your doctor recommends, such as a moist heat pack or a heating pad.  Put a towel between your skin and the heat source.  Leave the heat on for 20-30 minutes.  Remove the heat if your skin turns bright red. This is especially important if you cannot feel pain, heat, or cold. You can get burned.  Eating and drinking    Drink enough fluid to keep your pee (urine) clear or pale yellow.  Return to your normal diet as told by your doctor. Avoid heavy or fried foods that are hard to digest.  Avoid drinking alcohol for as long as told by your doctor.  Contact a doctor if:  You have blood in your poop (stool) 2-3 days after the procedure.  Get help right away if:  You have more than a small amount of blood in your poop.  You see large clumps of tissue (blood clots) in your poop.  Your belly is swollen.  You feel sick to your stomach (nauseous).  You throw up (vomit).  You have a fever.  You have belly pain that gets worse, and medicine does not help your pain.  Summary  After the procedure, it is common to have a small  amount of blood in your poop. You may also have mild cramping and bloating in your belly.  For the first 24 hours after the procedure, do not drive or use machinery, do not sign important documents, and do not drink alcohol.  Get help right away if you have a lot of blood in your poop, feel sick to your stomach, have a fever, or have more belly pain.  This information is not intended to replace advice given to you by your health care provider. Make sure you discuss any questions you have with your health care provider.  Document Released: 01/20/2012 Document Revised: 10/18/2018 Document Reviewed: 09/11/2017  Elsevier Patient Education © 2020 [employer]

## 2022-09-07 NOTE — CARE PLAN
The patient is Watcher - Medium risk of patient condition declining or worsening    Shift Goals  Clinical Goals: HGB >8 this shift  Patient Goals: rest    Progress made toward(s) clinical / shift goals:  no falls this shift.     Patient is not progressing towards the following goals:    Hgb continues to drop.

## 2022-09-07 NOTE — ANESTHESIA PREPROCEDURE EVALUATION
Case: [id number] Date/Time: 09/07/22 0815    Procedure: COLONOSCOPY    Anesthesia type: MAC    Location:  ENDOSCOPIC ULTRASOUND ROOM / SURGERY [facility]    Surgeons: Topher Mesa M.D.          Relevant Problems   NEURO   (positive) (HCC) Seizure disorder   (positive) H/O prostate cancer      CARDIAC   (positive) Chronic atrial fibrillation (HCC)   (positive) Nonrheumatic aortic valve insufficiency         (positive) Stage 3a chronic kidney disease (HCC)      Other   (positive) Lower GI bleed       Physical Exam    Airway   Mallampati: II  TM distance: >3 FB  Neck ROM: full       Cardiovascular - normal exam  Rhythm: regular  Rate: normal  (-) murmur     Dental              Pulmonary - normal exam  Breath sounds clear to auscultation     Abdominal    Neurological - normal exam                 Anesthesia Plan    ASA 2       Plan - MAC               Induction: intravenous    Postoperative Plan: Postoperative administration of opioids is intended.    Pertinent diagnostic labs and testing reviewed    Informed Consent:    Anesthetic plan and risks discussed with patient.    Use of blood products discussed with: patient whom consented to blood products.

## 2022-09-07 NOTE — OR NURSING
0919 - Pt arrived from [facility], report received from RN/anesthesia, B/P low, anesthesia at bedside, fluids running    0925 - Pt sleeping comfortably, VSS, B/P improved, anesthesia at bedside    0945 - Pt resting comfortably, arousable, denies pain/nausea, VSS    1000 - Pt arousable, denies nausea/pain, VSS, Attempted to contact spouse Bernardo (aka avtar) via phone for update x2       1015 - Pt awake, meets criteria for transfer to room,  Report called to Dorota RN    1030 - Pt resting comfortably, VSS, no complaints of pain/nausea, occasional pauses noted, HR 30's/40's, Dr Fan notified, EKG ordered    1045 - EKG done, Dr Fan informed, Pt ok to transfer to the room    1105 - Pt transferred to room via [facility] with belongings.

## 2022-09-07 NOTE — CARE PLAN
The patient is Watcher - Medium risk of patient condition declining or worsening    Shift Goals  Clinical Goals: monitor hemoglobin levels >7  Patient Goals: complete prep and rest    Progress made toward(s) clinical / shift goals:    Problem: Knowledge Deficit - Standard  Goal: Patient and family/care givers will demonstrate understanding of plan of care, disease process/condition, diagnostic tests and medications  Outcome: Progressing     Problem: Fall Risk  Goal: Patient will remain free from falls  Outcome: Progressing       Patient is not progressing towards the following goals:

## 2022-09-07 NOTE — OR SURGEON
Post OP Note    PreOp Diagnosis: Hematochezia      PostOp Diagnosis:    1/small 3 mm sessile polyp biopsy removed with cold cautery in the descending colon.   2/diverticulosis within the sigmoid and ascending colon   3/8 mm pedunculated polyp removed in the sigmoid using hot cautery, resolution clip deployed onto the stalk.   4/otherwise normal colonoscopy to the cecum, prep was good      Procedure(s):  COLONOSCOPY - Wound Class: None    Surgeon(s):  Topher Mesa M.D.    Anesthesiologist/Type of Anesthesia:  Anesthesiologist: Ammon Fan M.D./MAC    Surgical Staff:  Circulator: Cody Marmolejo R.N.  Endoscopy Technician: Alycia Tan; Jacqueline Willis; Margie Zepeda    Specimens removed if any:  ID Type Source Tests Collected by Time Destination   A : polyp bx Tissue Colon - Descending PATHOLOGY SPECIMEN Topher Mesa M.D. 9/7/2022  9:05 AM    B : polyp bx Tissue Colon - Sigmoid PATHOLOGY SPECIMEN Topher Mesa M.D. 9/7/2022  9:12 AM        Estimated Blood Loss: none    Findings:   Prior to the procedure the patient was informed the risks and benefits of the procedure and freely signed the consent form.  He was monitored on standard monitoring blood pressure oxygen heart monitor no appreciable abnormalities were noted during the procedure.  Once adequate sedation was achieved he was placed in left lateral decubitus position.  Digital rectal exam was nontender no internal or external hemorrhoids were appreciated.  Using the standard pediatric variable stiffness colonoscope was introduced under manual insertion passed the cecum identified by appendiceal orifice ileocecal valve and cecal cap.  Preparation was good.  There is no residual blood no active bleeding no remnant of any blood appreciated within the colon, mucosa was normal.  In the descending colon a small 3 mm sessile polyp was biopsied and removed and retrieved using cold cautery.  In the sigmoid colon there is an 8 mm  pedunculated polyp that was snared and removed using hot cautery and retrieved.  A resolution clip was deployed on the remnant stalk.  There is diverticulosis appreciated in the sigmoid and ascending colon.    Again no signs of residual blood or old blood active bleeding of any sort was appreciated throughout the procedure.  Rectum appeared normal retroflexion was performed the rectum to evaluate the rectal anal verge and no overt mucosal abnormalities were appreciated.  Colonoscope was straightened excess air was removed patient tolerated the procedure well.    Complications: None    Recommendations  Await pathology findings  Continue to monitor H&H and keep greater than 7/21  Iron supplementation with a stool softener to help replenish iron stores  Follow-up in the outpatient setting at [facility]  Advance diet as tolerated  If there are any further questions or concerns please feel free to give us call at [phone].        9/7/2022 9:21 AM Topher Mesa M.D.

## 2022-09-07 NOTE — PROGRESS NOTES
Pt supine in bed when received. No complaints of pain at this time. Pt is alert and verbalizes needs well, no signs of distress. Pt educated on the need to drink colonoscopy prep before midnight and his strict NPO status for his colonoscopy tomorrow    Chart check completed

## 2022-09-07 NOTE — PROGRESS NOTES
Discharge to home. Pt signed a copy of the discharge papers and confirms all questions have been answered. Second copy of d/c papers in chart.  Prescriptions sent to pts pharmacy. IV discontinued. Pt states all person belongings are in possession.

## 2022-09-07 NOTE — ANESTHESIA POSTPROCEDURE EVALUATION
Patient: Homa Alonso    Procedure Summary     Date: 09/07/22 Room / Location:  ENDOSCOPIC ULTRASOUND ROOM / SURGERY [facility]    Anesthesia Start: 0844 Anesthesia Stop: 0923    Procedure: COLONOSCOPY Diagnosis:       Colon polyp      (Colon polyp [[id number]])    Surgeons: Topher Mesa M.D. Responsible Provider: Ammon Fan M.D.    Anesthesia Type: MAC ASA Status: 2          Final Anesthesia Type: MAC  Last vitals  BP   Blood Pressure : 111/65    Temp   36 °C (96.8 °F)    Pulse   64   Resp   17    SpO2   96 %      Anesthesia Post Evaluation    Patient location during evaluation: PACU  Patient participation: complete - patient participated  Level of consciousness: awake and alert    Airway patency: patent  Anesthetic complications: no  Cardiovascular status: hemodynamically stable  Respiratory status: acceptable  Hydration status: euvolemic    PONV: none          No notable events documented.     Nurse Pain Score: 0 (NPRS)

## 2022-09-07 NOTE — DISCHARGE SUMMARY
Discharge Summary    CHIEF COMPLAINT ON ADMISSION  Chief Complaint   Patient presents with    Rectal Bleeding     Started today today  has had multiple bloody BM       Reason for Admission  rectal bleeding      Admission Date  9/5/2022    CODE STATUS  Full Code    HPI & HOSPITAL COURSE  Homa Alonso is a 89 y.o. male, with h/o HTN, HLD, Afib on Eliquis  who presented to the ER on 9/5/2022 with complains of multiple bloody stool and rectal bleeding - started tonight and bleeding bright red blood every 30 min or so since this evening. No prior GI bleed. No abdominal pain or rectal pain.     GI bleeding had improved colonoscopy was performed on 9/7.  Colonoscopy demonstrated a 3 mm sessile polyp, diverticulosis, 8 mm pedunculated polyp.  Biopsies were obtained results will be followed up with gastroenterology as an outpatient.  Patient's hemoglobin has been stable.  He had episodes of bradycardia and seems to be persistently around 50s to 60s with his heart rate.  We will hold the patient's metoprolol which has been used for rate control have him reevaluated by [facility] cardiology as an outpatient.  Also due to the GI bleed would not discharge the patient on anticoagulation since the benefits do not outweigh the risk.    Therefore, he is discharged in good and stable condition to home with close outpatient follow-up.    The patient recovered much more quickly than anticipated on admission.    Discharge Date  9/7/22    FOLLOW UP ITEMS POST DISCHARGE  Lower GI bleed-Eliquis discontinued we will follow-up with [facility] cardiology for reevaluation of when to restart anticoagulation.  Bradycardia likely secondary to metoprolol patient will follow-up with [facility] cardiology for evaluation if he should restart rate control with metoprolol.    DISCHARGE DIAGNOSES  Principal Problem:    Lower GI bleed POA: Unknown  Active Problems:    Dyslipidemia POA: Yes      Overview:        Ref. Range 8/26/2020 09:33        Cholesterol,Tot Latest Ref Range: 100 - 199 mg/dL 154       Triglycerides Latest Ref Range: 0 - 149 mg/dL 178 (H)       HDL Latest Ref Range: >39 mg/dL 52       LDL Latest Ref Range: 0 - 99 mg/dL 66                   He reports chronic use of atorvastatin for history of elevated cholesterol       panel.  He denies any myalgias or GI upset on this medication.  He is       tolerating without difficulty.  He denies history of heart attack or       stroke in the past.            Current regimen: Atorvastatin 20 mg daily    Chronic atrial fibrillation (HCC) POA: Yes      Overview: He has atrial fibrillation since at least 2019. He has followed with [facility]ology. In Spring 2021 his ventricular rate had increased and       his metoprolol was increased per the note to 100 mg in AM and 50 mg in PM.       He needs to confirm exactly what dose he is taking, has not seen       cardiology for about 6 months. He notes his ventricular rate has       consistently been running the 40-50 range the past few months and he has       some associated fatigue/weakness. No issues with his Eliquis 2.5 mg twice       daily.            Current regimen: metoprolol tartrate 12.5 mg twice daily    (HCC) Seizure disorder POA: Yes      Overview: Noted in June 2014.  Witnessed by his wife when he had full body       convulsions.  He presented to the ER where he was postictal.  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  He was started on Keppra which he has maintained       since that time.  He did try to go off of it once but notes he had       recurrence of seizure activity and has gone back on it.  He is able to       drive at this time because he has been so well controlled.  No subsequent       seizures. He previously saw Dr. Valadez, currently not following with a       neurologist.            Current regimen: Keppra 500 mg twice daily    Stage 3a chronic  kidney disease (HCC) POA: Yes      Overview:        Ref. Range 8/26/2020 09:33       Bun Latest Ref Range: 10 - 36 mg/dL 23       Creatinine Latest Ref Range: 0.76 - 1.27 mg/dL 1.10       GFR If Non  Latest Ref Range: >59 mL/min/1.73 58 (L)             He has CKD stage 3 on most recent lab work. No nephrotoxic agents on       board, he has good oral hydration. Blood pressure and heart rate slightly       overtreated, mild prediabetes on last assessment. History of total       prostatectomy for prostate cancer with artificial sphincter in place, no       recent UTI or hematuria episodes.  Resolved Problems:    * No resolved hospital problems. *      FOLLOW UP  Future Appointments   Date Time Provider Department Center   10/12/2022  4:20 PM Sruthi Kincaid D.O. [facility] None     Sruthi Kincaid D.O.  [address] [facility]  [address]  [place] [id number] [phone]    Follow up in 1 week(s)      Topher Mesa M.D.  [address]  [place] [address]  [phone]    Schedule an appointment as soon as possible for a visit        MEDICATIONS ON DISCHARGE     Medication List        START taking these medications        Instructions   docusate sodium 100 MG Caps  Commonly known as: COLACE   Take 1 Capsule by mouth 2 times a day as needed for Constipation.  Dose: 100 mg     ferrous sulfate 325 (65 Fe) MG tablet   Take 1 Tablet by mouth every day.  Dose: 325 mg            CHANGE how you take these medications        Instructions   atorvastatin 20 MG Tabs  What changed: when to take this  Commonly known as: LIPITOR   Take 1 tablet by mouth once daily     levETIRAcetam 500 MG Tabs  What changed: when to take this  Commonly known as: KEPPRA   Take 1 tablet by mouth twice daily     Lumigan 0.01 % Soln  What changed: See the new instructions.  Generic drug: bimatoprost   INSTILL 1 DROP INTO RIGHT EYE EVERY DAY AT BEDTIME            CONTINUE taking these medications        Instructions   B COMPLEX B-12 PO    Take 1 Tablet by mouth every day.  Dose: 1 Tablet     CALCIUM CITRATE PO   Take 1 Tablet by mouth 2 times a day.  Dose: 1 Tablet     fish oil 1000 MG Caps capsule   Take 1,000 mg by mouth every day.  Dose: 1,000 mg     ICaps Areds 2 Chew   Chew 1 Capsule 2 times a day.  Dose: 1 Capsule     MAGNESIUM CITRATE PO   Take 1 Capsule by mouth every day.  Dose: 1 Capsule     multivitamin Tabs   Take 1 Tab by mouth every day.  Dose: 1 Tablet     testosterone 50 MG/5GM (1%) Gel gel  Commonly known as: TESTIM/ANDROGEL   Doctor's comments: Clinic visit: 6/22/2022, please dispense 3 month supply  Place 50 mg on the skin every day for 100 days.  Dose: 50 mg     VITAMIN C PO   Take 1 Tablet by mouth every day.  Dose: 1 Tablet     VITAMIN D3 PO   Take 1 Capsule by mouth every day.  Dose: 1 Capsule            STOP taking these medications      apixaban 2.5mg Tabs  Commonly known as: ELIQUIS     clindamycin 300 MG Caps  Commonly known as: CLEOCIN     metoprolol tartrate 25 MG Tabs  Commonly known as: LOPRESSOR              Allergies  Allergies   Allergen Reactions    Pcn [Penicillins] Rash     Pt reports that he gets a rash all over his body       DIET  Orders Placed This Encounter   Procedures    Diet Order Diet: Low Fiber(GI Soft)     Standing Status:   Standing     Number of Occurrences:   1     Order Specific Question:   Diet:     Answer:   Low Fiber(GI Soft) [2]       ACTIVITY  As tolerated.  Weight bearing as tolerated    CONSULTATIONS  Gastroenterology- Dr Mesa    PROCEDURES  Colonoscopy-9/7/2022    LABORATORY  Lab Results   Component Value Date    SODIUM 140 09/07/2022    POTASSIUM 4.0 09/07/2022    CHLORIDE 107 09/07/2022    CO2 18 (L) 09/07/2022    GLUCOSE 100 (H) 09/07/2022    BUN 18 09/07/2022    CREATININE 0.95 09/07/2022    CREATININE 0.89 04/23/2010    GLOMRATE >59 04/23/2010        Lab Results   Component Value Date    WBC 11.1 (H) 09/07/2022    WBC 7.2 05/19/2010    HEMOGLOBIN 10.2 (L) 09/07/2022    HEMATOCRIT  31.9 (L) 09/07/2022    PLATELETCT 141 (L) 09/07/2022        Total time of the discharge process exceeds 34 minutes.

## 2022-09-07 NOTE — ANESTHESIA TIME REPORT
Anesthesia Start and Stop Event Times     Date Time Event    9/7/2022 0815 Ready for Procedure     0844 Anesthesia Start     0923 Anesthesia Stop        Responsible Staff  09/07/22    Name Role Begin End    Ammon Fan M.D. Anesth 0844 [id number]        Overtime Reason:  no overtime (within assigned shift)    Comments:

## 2022-10-12 ENCOUNTER — OFFICE VISIT (OUTPATIENT)
Dept: MEDICAL GROUP | Facility: PHYSICIAN GROUP | Age: 87
End: 2022-10-12
Payer: COMMERCIAL

## 2022-10-12 VITALS
RESPIRATION RATE: 12 BRPM | WEIGHT: 165 LBS | BODY MASS INDEX: 25.9 KG/M2 | HEIGHT: 67 IN | OXYGEN SATURATION: 97 % | HEART RATE: 61 BPM | DIASTOLIC BLOOD PRESSURE: 54 MMHG | SYSTOLIC BLOOD PRESSURE: 120 MMHG | TEMPERATURE: 97.2 F

## 2022-10-12 DIAGNOSIS — I48.20 CHRONIC ATRIAL FIBRILLATION (HCC): ICD-10-CM

## 2022-10-12 DIAGNOSIS — D50.0 IRON DEFICIENCY ANEMIA DUE TO CHRONIC BLOOD LOSS: ICD-10-CM

## 2022-10-12 DIAGNOSIS — N18.31 STAGE 3A CHRONIC KIDNEY DISEASE: ICD-10-CM

## 2022-10-12 DIAGNOSIS — E78.5 DYSLIPIDEMIA: ICD-10-CM

## 2022-10-12 DIAGNOSIS — D50.9 IRON DEFICIENCY ANEMIA, UNSPECIFIED IRON DEFICIENCY ANEMIA TYPE: ICD-10-CM

## 2022-10-12 PROCEDURE — 99214 OFFICE O/P EST MOD 30 MIN: CPT | Performed by: INTERNAL MEDICINE

## 2022-10-12 RX ORDER — LEVETIRACETAM 250 MG/1
250 TABLET ORAL
COMMUNITY
End: 2022-10-12

## 2022-10-12 ASSESSMENT — FIBROSIS 4 INDEX: FIB4 SCORE: 5.23

## 2022-10-12 NOTE — ASSESSMENT & PLAN NOTE
Recent problem, secondary to rectal bleeding, requires follow-up.  Recheck iron levels and blood counts approximately 90 days after bleeding event to make sure bone marrow has replenished hemoglobin.  He has added back his Eliquis at a reduced dose as he does not want to suffer a stroke and we will continue to monitor closely for rectal bleeding moving forward.

## 2022-10-12 NOTE — PROGRESS NOTES
Subjective:   Chief Complaint/History of Present Illness:  Homa Alonso is a 89 y.o. male established patient who presents today to discuss medical problems as listed below. Homa is unaccompanied for today's visit.    Problem   Iron Deficiency Anemia Due to Chronic Blood Loss     Latest Reference Range & Units 9/5/22 23:50 9/6/22 05:37 9/6/22 10:19 9/6/22 18:48 9/7/22 04:39   Hemoglobin 14.0 - 18.0 g/dL 16.8 13.2 (L) 11.3 (L) 10.7 (L) 10.2 (L)   Hematocrit 42.0 - 52.0 % 51.7 40.9 (L) 34.6 (L) 33.7 (L) 31.9 (L)     He presented to the emergency department with rectal bleeding in early September 2022.  Between IV fluid dilutional effects and rectal bleeding hemoglobin drifted down almost 7 points.  No transfusion was required.  He had polyps removed on colonoscopy.  Eliquis was recommended to stop, however patient continued to at a reduced dose.     Stage 3a Chronic Kidney Disease (Hcc)    He has CKD stage 2-3 on blood work in 2022 2022. No nephrotoxic agents on board, he has good oral hydration. Blood pressure and heart rate slightly overtreated, mild prediabetes on last assessment. History of total prostatectomy for prostate cancer with artificial sphincter in place, no recent UTI or hematuria episodes.     Chronic Atrial Fibrillation (Hcc)    He has atrial fibrillation since at least 2019. He has followed with [facility] cardiology. In Spring 2021 his ventricular rate had increased and his metoprolol was increased per the note to 100 mg in AM and 50 mg in PM. He needs to confirm exactly what dose he is taking, has not seen cardiology for about 6 months. He notes his ventricular rate has consistently been running the 40-50 range the past few months and he has some associated fatigue/weakness. He reduced his Eliquis 1.25 mg twice daily after rectal bleeding episode in Sept 2022.    Current regimen: metoprolol tartrate 12.5 mg twice daily     Dyslipidemia       Ref. Range 8/26/2020 09:33   Cholesterol,Tot Latest  Ref Range: 100 - 199 mg/dL 154   Triglycerides Latest Ref Range: 0 - 149 mg/dL 178 (H)   HDL Latest Ref Range: >39 mg/dL 52   LDL Latest Ref Range: 0 - 99 mg/dL 66       He reports chronic use of atorvastatin for history of elevated cholesterol panel.  He denies any myalgias or GI upset on this medication.  He is tolerating without difficulty.  He denies history of heart attack or stroke in the past.    Current regimen: Atorvastatin 20 mg daily          Current Medications:  Current Outpatient Medications Ordered in Epic   Medication Sig Dispense Refill    apixaban (ELIQUIS) 2.5mg Tab Take 1.25 mg by mouth 2 times a day.      metoprolol tartrate (LOPRESSOR) 25 MG Tab Take 12.5 mg by mouth 2 times a day.      ferrous sulfate 325 (65 Fe) MG tablet Take 1 Tablet by mouth every day. 30 Tablet 1    docusate sodium (COLACE) 100 MG Cap Take 1 Capsule by mouth 2 times a day as needed for Constipation. 60 Capsule 2    Ascorbic Acid (VITAMIN C PO) Take 1 Tablet by mouth every day.      atorvastatin (LIPITOR) 20 MG Tab Take 1 tablet by mouth once daily (Patient taking differently: Take 20 mg by mouth every evening.) 100 Tablet 0    LUMIGAN 0.01 % Solution INSTILL 1 DROP INTO RIGHT EYE EVERY DAY AT BEDTIME (Patient taking differently: Administer 1 Drop into the right eye every evening.) 7.5 mL 0    levETIRAcetam (KEPPRA) 500 MG Tab Take 1 tablet by mouth twice daily (Patient taking differently: Take 500 mg by mouth 2 times a day.) 180 Tablet 3    Multiple Vitamins-Minerals (ICAPS AREDS 2) Chew Tab Chew 1 Capsule 2 times a day.      B Complex Vitamins (B COMPLEX B-12 PO) Take 1 Tablet by mouth every day.      Omega-3 Fatty Acids (FISH OIL) 1000 MG Cap capsule Take 1,000 mg by mouth every day.      multivitamin (THERAGRAN) Tab Take 1 Tab by mouth every day.      Cholecalciferol (VITAMIN D3 PO) Take 1 Capsule by mouth every day.      CALCIUM CITRATE PO Take 1 Tablet by mouth 2 times a day.      MAGNESIUM CITRATE PO Take 1 Capsule  "by mouth every day.       No current [facility]-ordered facility-administered medications on file.          Objective:   Physical Exam:    Vitals: /54 (BP Location: Left arm, Patient Position: Sitting, BP Cuff Size: Adult)   Pulse 61   Temp 36.2 °C (97.2 °F) (Temporal)   Resp 12   Ht 1.689 m (5' 6.5\")   Wt 74.8 kg (165 lb)   SpO2 97%    BMI: Body mass index is 26.23 kg/m².  Physical Exam  Constitutional:       Comments: Appears younger than stated age   HENT:      Right Ear: There is no impacted cerumen.      Left Ear: There is no impacted cerumen.   Eyes:      General: No scleral icterus.     Conjunctiva/sclera: Conjunctivae normal.   Cardiovascular:      Rate and Rhythm: Bradycardia present. Rhythm irregular.      Pulses: Normal pulses.      Heart sounds: No murmur heard.  Pulmonary:      Effort: Pulmonary effort is normal. No respiratory distress.      Breath sounds: No wheezing or rhonchi.   Abdominal:      General: Bowel sounds are normal.      Palpations: Abdomen is soft.   Musculoskeletal:      Right lower leg: Edema present.      Left lower leg: Edema present.      Comments: 1+ pretibial b/l   Skin:     Findings: Bruising present. No rash.      Comments: Venous stasis dermatitis on b/l LE and well healed wound on left lower lateral leg. Bruising on left forearm.   Neurological:      Gait: Gait abnormal.   Psychiatric:         Mood and Affect: Mood normal.         Behavior: Behavior normal.         Thought Content: Thought content normal.         Judgment: Judgment normal.             Assessment and Plan:   Homa is a 89 y.o. male with the following:  Problem List Items Addressed This Visit       Dyslipidemia     Chronic and stable problem.  Update lipids to ensure stability.  Continue atorvastatin 20 mg daily.         Relevant Orders    Lipid Profile    Chronic atrial fibrillation (HCC)     Chronic and ongoing problem.  Continue metoprolol titrate 12.5 mg twice daily, blood pressure and heart rate " are running better.  No episodes of RVR.         Relevant Medications    apixaban (ELIQUIS) 2.5mg Tab    metoprolol tartrate (LOPRESSOR) 25 MG Tab    Stage 3a chronic kidney disease (HCC)     Chronic and improved problem, most recent GFR better while hospitalized which may be related to IV hydration.  Recheck again in 2 months to ensure stability.  Avoid nephrotoxic agents.  Blood pressure and heart rate are running better since his metoprolol was reduced.         Relevant Orders    Comp Metabolic Panel    Iron deficiency anemia due to chronic blood loss     Recent problem, secondary to rectal bleeding, requires follow-up.  Recheck iron levels and blood counts approximately 90 days after bleeding event to make sure bone marrow has replenished hemoglobin.  He has added back his Eliquis at a reduced dose as he does not want to suffer a stroke and we will continue to monitor closely for rectal bleeding moving forward.             RTC: Return in about 3 months (around 1/12/2023).    I spent a total of 34 minutes with record review, exam, communication with the patient, communication with other providers, and documentation of this encounter.    PLEASE NOTE: This dictation was created using voice recognition software. I have made every reasonable attempt to correct obvious errors, but I expect that there are errors of grammar and possibly content that I did not discover before finalizing the note.      Sruthi Kincaid, DO  Geriatric and Internal Medicine  [facility]

## 2022-10-12 NOTE — ASSESSMENT & PLAN NOTE
Chronic and stable problem.  Update lipids to ensure stability.  Continue atorvastatin 20 mg daily.

## 2022-10-12 NOTE — ASSESSMENT & PLAN NOTE
Chronic and ongoing problem.  Continue metoprolol titrate 12.5 mg twice daily, blood pressure and heart rate are running better.  No episodes of RVR.

## 2022-10-12 NOTE — ASSESSMENT & PLAN NOTE
Chronic and improved problem, most recent GFR better while hospitalized which may be related to IV hydration.  Recheck again in 2 months to ensure stability.  Avoid nephrotoxic agents.  Blood pressure and heart rate are running better since his metoprolol was reduced.

## 2022-11-01 ENCOUNTER — TELEPHONE (OUTPATIENT)
Dept: MEDICAL GROUP | Facility: PHYSICIAN GROUP | Age: 87
End: 2022-11-01
Payer: COMMERCIAL

## 2022-11-01 NOTE — TELEPHONE ENCOUNTER
1. Caller Name: Homa Alonso                          Call Back Number: [phone] (home) [phone] (work)        How would the patient prefer to be contacted with a response: Phone call OK to leave a detailed message    Called to leave vm that prior auth for testosterone will be signed tomorrow by Dr. Kincaid and go out to Prior Auth tomorrow.

## 2022-11-03 ENCOUNTER — TELEPHONE (OUTPATIENT)
Dept: MEDICAL GROUP | Facility: PHYSICIAN GROUP | Age: 87
End: 2022-11-03
Payer: COMMERCIAL

## 2022-11-03 NOTE — TELEPHONE ENCOUNTER
1. Caller Name: Homa Alonso                          Call Back Number: Homa Alonso        How would the patient prefer to be contacted with a response: Phone call OK to leave a detailed message    A prior auth is needed for his testosterone  we sent in a new RX on 10/24/22  Pharmacy contacted patient and said they sent us a prior auth form but we have not received it.  He is Medicare BC/BS paul ABRAHAM called [phone]  and it was not available at this time.   Trying to find a form on line but none for [place].  Will keep working on this.

## 2022-11-07 ENCOUNTER — TELEPHONE (OUTPATIENT)
Dept: MEDICAL GROUP | Facility: PHYSICIAN GROUP | Age: 87
End: 2022-11-07
Payer: COMMERCIAL

## 2022-11-07 NOTE — TELEPHONE ENCOUNTER
1. Caller Name: Homa Alonso                          Call Back Number: [phone] (home) [phone] (work)        How would the patient prefer to be contacted with a response: Phone call OK to leave a detailed message    Called to let [facility] know approved PA # [id number] from 8/8/22 to 11/7/23   Called and spoke to pharmacist as well at [phone] [facility]         PA phone # [phone]

## 2022-11-10 ENCOUNTER — PATIENT MESSAGE (OUTPATIENT)
Dept: HEALTH INFORMATION MANAGEMENT | Facility: OTHER | Age: 87
End: 2022-11-10

## 2023-01-05 ENCOUNTER — TELEPHONE (OUTPATIENT)
Dept: MEDICAL GROUP | Facility: PHYSICIAN GROUP | Age: 88
End: 2023-01-05
Payer: COMMERCIAL

## 2023-01-05 DIAGNOSIS — M54.50 ACUTE BILATERAL LOW BACK PAIN WITHOUT SCIATICA: ICD-10-CM

## 2023-01-05 NOTE — TELEPHONE ENCOUNTER
1. Caller Name: Homa Alonso                          Call Back Number: [phone] (home)         How would the patient prefer to be contacted with a response: Phone call OK to leave a detailed message    Homa called to say he has been on testosterone gel for over 20 years and wonders why now does it need a Prior Authorization.Also wanted to let us know he had a fall and felt weak a couple of days ago.  He is wondering if he needs another EEG or referral to neurologist   
Called pt, no answer. LVM, encouraged pt to call back and LVM on MA's phone with update on condition.   
I do not think he needs a referral to neurology, we can check into the testosterone issue though. Maybe call and see how he is doing?  
.

## 2023-01-06 NOTE — TELEPHONE ENCOUNTER
1. Caller Name: Homa Alonso                          Call Back Number: [phone] (home) [phone] (work)        How would the patient prefer to be contacted with a response: Phone call OK to leave a detailed message    Returned vm   Dr. Kincaid will put in imaging orders and referral to pain/back/neurosurgery doc    Hmoa was given the number to call to schedule for imaging

## 2023-01-06 NOTE — PROGRESS NOTES
New problem, low back pain, history of back surgery, update imaging and get him referred to [facility] neurosurgery

## 2023-01-07 ENCOUNTER — HOSPITAL ENCOUNTER (OUTPATIENT)
Dept: RADIOLOGY | Facility: MEDICAL CENTER | Age: 88
End: 2023-01-07
Attending: INTERNAL MEDICINE
Payer: COMMERCIAL

## 2023-01-07 DIAGNOSIS — M54.50 ACUTE BILATERAL LOW BACK PAIN WITHOUT SCIATICA: ICD-10-CM

## 2023-01-07 PROCEDURE — 72100 X-RAY EXAM L-S SPINE 2/3 VWS: CPT

## 2023-01-07 PROCEDURE — 72148 MRI LUMBAR SPINE W/O DYE: CPT

## 2023-01-09 ENCOUNTER — TELEPHONE (OUTPATIENT)
Dept: MEDICAL GROUP | Facility: PHYSICIAN GROUP | Age: 88
End: 2023-01-09
Payer: COMMERCIAL

## 2023-01-09 DIAGNOSIS — S32.010A CLOSED COMPRESSION FRACTURE OF L1 LUMBAR VERTEBRA, INITIAL ENCOUNTER (HCC): ICD-10-CM

## 2023-01-09 NOTE — TELEPHONE ENCOUNTER
1. Caller Name: Homa Alonso                          Call Back Number: [phone] (home) [phone] (work)        How would the patient prefer to be contacted with a response: Phone call OK to leave a detailed message    Spoke with Homa  He would like a consult about possibly having Kypoplasty
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
## 2023-01-13 ENCOUNTER — HOSPITAL ENCOUNTER (OUTPATIENT)
Dept: RADIOLOGY | Facility: MEDICAL CENTER | Age: 88
End: 2023-01-13
Attending: INTERNAL MEDICINE
Payer: COMMERCIAL

## 2023-01-13 DIAGNOSIS — S32.010A CLOSED COMPRESSION FRACTURE OF L1 LUMBAR VERTEBRA, INITIAL ENCOUNTER (HCC): ICD-10-CM

## 2023-01-13 ASSESSMENT — ENCOUNTER SYMPTOMS
CHILLS: 0
FOCAL WEAKNESS: 0
WEIGHT LOSS: 0
CARDIOVASCULAR NEGATIVE: 1
SPEECH CHANGE: 0
CONSTITUTIONAL NEGATIVE: 1
RESPIRATORY NEGATIVE: 1
SENSORY CHANGE: 0
WEAKNESS: 0
DIAPHORESIS: 0
BACK PAIN: 1
FALLS: 1
FEVER: 0
GASTROINTESTINAL NEGATIVE: 1

## 2023-01-13 ASSESSMENT — LIFESTYLE VARIABLES: SUBSTANCE_ABUSE: 0

## 2023-01-18 DIAGNOSIS — S32.010A CLOSED COMPRESSION FRACTURE OF L1 LUMBAR VERTEBRA, INITIAL ENCOUNTER (HCC): ICD-10-CM

## 2023-01-18 NOTE — PROGRESS NOTES
"Request received from authorizations to call for P2P with [facility]. Call placed 1/18/23 at 1017:      Ref #[id number]    Pt shama for kyphoplasty of L1 VCF. Pt holding DOAC in anticipation of procedure. P2P requested and call initiated. The procedure is pending review from MD due to no 1) referral for osteoporosis education classes, and/ or 2) no active bone density test ordered. P2P shama for today 1330 PST.    P2P complete at 1335, spoke with MD Clemons, neurosurgery. Denial of planned kyphoplasty due to no dx of osteoporosis and therapy plan. No % compression noted on imaging reports, needs to be documented.    Informed [facility] auth/ scheduling, call placed to pt's wife to explain. Pt is currently in \"a lot of pain,\" not getting around well, had a difficult night last night. Informed wife that osteoporosis needs to be dx before resubmitting for kyphoplasty. If pt is unable to care for himself at home due to uncontrolled pain he could consider presentation to ED for evaluation. He should restart his Eliquis as well.     Will place DEXA order, pt will need to see his PCP for potential osteoporosis plan and we will resubmit for kyphoplasty authorization if he continues to have back pain from the VCF.  "

## 2023-01-24 ENCOUNTER — OFFICE VISIT (OUTPATIENT)
Dept: MEDICAL GROUP | Facility: PHYSICIAN GROUP | Age: 88
End: 2023-01-24
Payer: COMMERCIAL

## 2023-01-24 VITALS
DIASTOLIC BLOOD PRESSURE: 70 MMHG | TEMPERATURE: 97.2 F | BODY MASS INDEX: 24.43 KG/M2 | RESPIRATION RATE: 14 BRPM | OXYGEN SATURATION: 92 % | HEART RATE: 62 BPM | HEIGHT: 66 IN | WEIGHT: 152 LBS | SYSTOLIC BLOOD PRESSURE: 116 MMHG

## 2023-01-24 DIAGNOSIS — S32.010A CLOSED COMPRESSION FRACTURE OF BODY OF L1 VERTEBRA (HCC): ICD-10-CM

## 2023-01-24 DIAGNOSIS — I48.20 CHRONIC ATRIAL FIBRILLATION (HCC): ICD-10-CM

## 2023-01-24 PROCEDURE — 99214 OFFICE O/P EST MOD 30 MIN: CPT | Performed by: INTERNAL MEDICINE

## 2023-01-24 RX ORDER — LIDOCAINE 50 MG/G
1 PATCH TOPICAL EVERY 24 HOURS
Qty: 30 PATCH | Refills: 1 | Status: ON HOLD | OUTPATIENT
Start: 2023-01-24 | End: 2023-02-06

## 2023-01-24 ASSESSMENT — FIBROSIS 4 INDEX: FIB4 SCORE: 5.23

## 2023-01-24 ASSESSMENT — PATIENT HEALTH QUESTIONNAIRE - PHQ9: CLINICAL INTERPRETATION OF PHQ2 SCORE: 0

## 2023-01-25 NOTE — ASSESSMENT & PLAN NOTE
New decompensated problem.  Surprisingly his insurance would not cover the kyphoplasty for the L1 compression fracture.  They request a bone density be completed 89-year-old gentleman to confirm the osteoporosis that most certainly exists.  We will have my staff help arrange setting up the DEXA scan so we can get the kyphoplasty back on the schedule as he continues to have significant pain.  He is taking Aleve as needed, discussed interaction with Eliquis but he reports this is what helps to reduce pain.  He is using heating pad.  We will also send in topical lidocaine patch 5% to help with additional pain control.

## 2023-01-25 NOTE — PROGRESS NOTES
Subjective:   Chief Complaint/History of Present Illness:  Homa Alonso is a 89 y.o. male established patient who presents today to discuss medical problems as listed below. Homa is unaccompanied for today's visit.    Problem   Closed Compression Fracture of Body of L1 Vertebra (Hcc)    He developed acute back pain after a fall, he has history of back surgery.  Imaging demonstrated acute L1 compression fracture.  Prior operative site in the back was stable.  He has been using Aleve and heating pads.  He was referred to interventional radiology to consider kyphoplasty however his insurance did not approve as he did not have it completed bone density on file to confirm the osteoporosis.  He would be open to adding a Lidoderm patch for better pain control.     Chronic Atrial Fibrillation (Hcc)    He has atrial fibrillation since at least 2019. He has followed with [facility]. In Spring 2021 his ventricular rate had increased and his metoprolol was increased per the note to 100 mg in AM and 50 mg in PM. He needs to confirm exactly what dose he is taking, has not seen cardiology for about 6 months. He notes his ventricular rate has consistently been running the 40-50 range the past few months and he has some associated fatigue/weakness. He reduced his Eliquis 1.25 mg twice daily after rectal bleeding episode in Sept 2022.    Current regimen: metoprolol tartrate 12.5 mg twice daily          Current Medications:  Current Outpatient Medications Ordered in Epic   Medication Sig Dispense Refill    lidocaine (LIDODERM) 5 % Patch Place 1 Patch on the skin every 24 hours. 30 Patch 1    apixaban (ELIQUIS) 2.5mg Tab Take 1 Tablet by mouth 2 times a day. 60 Tablet 3    atorvastatin (LIPITOR) 20 MG Tab Take 1 Tablet by mouth every evening. 90 Tablet 3    metoprolol tartrate (LOPRESSOR) 25 MG Tab Take 12.5 mg by mouth 2 times a day.      ferrous sulfate 325 (65 Fe) MG tablet Take 1 Tablet by mouth every day. 30 Tablet  "1    docusate sodium (COLACE) 100 MG Cap Take 1 Capsule by mouth 2 times a day as needed for Constipation. 60 Capsule 2    Ascorbic Acid (VITAMIN C PO) Take 1 Tablet by mouth every day.      LUMIGAN 0.01 % Solution INSTILL 1 DROP INTO RIGHT EYE EVERY DAY AT BEDTIME (Patient taking differently: Administer 1 Drop into the right eye every evening.) 7.5 mL 0    levETIRAcetam (KEPPRA) 500 MG Tab Take 1 tablet by mouth twice daily (Patient taking differently: Take 500 mg by mouth 2 times a day.) 180 Tablet 3    Multiple Vitamins-Minerals (ICAPS AREDS 2) Chew Tab Chew 1 Capsule 2 times a day.      B Complex Vitamins (B COMPLEX B-12 PO) Take 1 Tablet by mouth every day.      Omega-3 Fatty Acids (FISH OIL) 1000 MG Cap capsule Take 1,000 mg by mouth every day.      multivitamin (THERAGRAN) Tab Take 1 Tab by mouth every day.      Cholecalciferol (VITAMIN D3 PO) Take 1 Capsule by mouth every day.      CALCIUM CITRATE PO Take 1 Tablet by mouth 2 times a day.      MAGNESIUM CITRATE PO Take 1 Capsule by mouth every day.       No current [facility]-ordered facility-administered medications on file.          Objective:   Physical Exam:    Vitals: /70   Pulse 62   Temp 36.2 °C (97.2 °F) (Temporal)   Resp 14   Ht 1.689 m (5' 6.5\")   Wt 68.9 kg (152 lb)   SpO2 92%    BMI: Body mass index is 24.17 kg/m².  Physical Exam  Constitutional:       General: He is not in acute distress.     Appearance: Normal appearance. He is not ill-appearing.   HENT:      Left Ear: There is no impacted cerumen.   Eyes:      General: No scleral icterus.     Conjunctiva/sclera: Conjunctivae normal.   Cardiovascular:      Rate and Rhythm: Regular rhythm. Bradycardia present.      Pulses: Normal pulses.      Heart sounds: No murmur heard.  Pulmonary:      Effort: Pulmonary effort is normal. No respiratory distress.      Breath sounds: No wheezing, rhonchi or rales.   Abdominal:      General: There is distension.      Palpations: Abdomen is soft.      " Tenderness: There is no abdominal tenderness.   Musculoskeletal:         General: Tenderness and signs of injury present.      Right lower leg: No edema.      Left lower leg: No edema.      Comments: Low back pain, slow to stand, ambulating with a cane, cautious gait   Skin:     General: Skin is warm and dry.      Findings: No rash.   Neurological:      Gait: Gait abnormal.   Psychiatric:         Mood and Affect: Mood normal.         Behavior: Behavior normal.         Thought Content: Thought content normal.         Judgment: Judgment normal.             Assessment and Plan:   Homa is a 89 y.o. male with the following:  Problem List Items Addressed This Visit       Chronic atrial fibrillation (HCC)     Chronic ongoing problem.  Rate well controlled on current regimen, continue metoprolol tartrate 12.5 mg twice daily.  Continue Eliquis 2.5 mg twice daily for stroke prophylaxis, will likely need to hold this before kyphoplasty.         Relevant Medications    apixaban (ELIQUIS) 2.5mg Tab    Closed compression fracture of body of L1 vertebra (HCC)     New decompensated problem.  Surprisingly his insurance would not cover the kyphoplasty for the L1 compression fracture.  They request a bone density be completed 89-year-old gentleman to confirm the osteoporosis that most certainly exists.  We will have my staff help arrange setting up the DEXA scan so we can get the kyphoplasty back on the schedule as he continues to have significant pain.  He is taking Aleve as needed, discussed interaction with Eliquis but he reports this is what helps to reduce pain.  He is using heating pad.  We will also send in topical lidocaine patch 5% to help with additional pain control.         Relevant Medications    lidocaine (LIDODERM) 5 % Patch         RTC: Return in about 3 months (around 4/24/2023).    I spent a total of 32 minutes with record review, exam, communication with the patient, communication with other providers, and  documentation of this encounter.    PLEASE NOTE: This dictation was created using voice recognition software. I have made every reasonable attempt to correct obvious errors, but I expect that there are errors of grammar and possibly content that I did not discover before finalizing the note.      Sruthi Kincaid, DO  Geriatric and Internal Medicine  [facility]

## 2023-01-25 NOTE — ASSESSMENT & PLAN NOTE
Chronic ongoing problem.  Rate well controlled on current regimen, continue metoprolol tartrate 12.5 mg twice daily.  Continue Eliquis 2.5 mg twice daily for stroke prophylaxis, will likely need to hold this before kyphoplasty.

## 2023-01-26 ENCOUNTER — TELEPHONE (OUTPATIENT)
Dept: MEDICAL GROUP | Facility: PHYSICIAN GROUP | Age: 88
End: 2023-01-26
Payer: COMMERCIAL

## 2023-01-26 DIAGNOSIS — S32.010A CLOSED COMPRESSION FRACTURE OF BODY OF L1 VERTEBRA (HCC): ICD-10-CM

## 2023-01-26 NOTE — TELEPHONE ENCOUNTER
Spoke with patient and informed him of the Bone Density Scan scheduled on 01/31. Patient is feeling okay. He is resting at home and moving slowly. He will call us if he needs anything and I gave him direct line.

## 2023-01-31 ENCOUNTER — HOSPITAL ENCOUNTER (OUTPATIENT)
Dept: RADIOLOGY | Facility: MEDICAL CENTER | Age: 88
End: 2023-01-31
Attending: INTERNAL MEDICINE
Payer: COMMERCIAL

## 2023-01-31 DIAGNOSIS — S32.010A CLOSED COMPRESSION FRACTURE OF BODY OF L1 VERTEBRA (HCC): ICD-10-CM

## 2023-01-31 PROCEDURE — 77080 DXA BONE DENSITY AXIAL: CPT

## 2023-02-01 DIAGNOSIS — M80.00XA AGE-RELATED OSTEOPOROSIS WITH CURRENT PATHOLOGICAL FRACTURE, INITIAL ENCOUNTER: ICD-10-CM

## 2023-02-01 DIAGNOSIS — S32.010A CLOSED COMPRESSION FRACTURE OF BODY OF L1 VERTEBRA (HCC): ICD-10-CM

## 2023-02-01 RX ORDER — ALENDRONATE SODIUM 70 MG/1
70 TABLET ORAL
Qty: 12 TABLET | Refills: 3 | Status: SHIPPED | OUTPATIENT
Start: 2023-02-01 | End: 2024-01-18

## 2023-02-01 NOTE — PROGRESS NOTES
Patient is aware of Dr. Kincaid's message. Pt states that he is already taking calcium and vitamin D. Patient requested that I sent him a message through [employer] because he was unable to hear the whole message due to phone static and would rather have a written message for his records. Message sent.

## 2023-02-01 NOTE — PROGRESS NOTES
Please let patient know I sent in a medicine for osteoporosis, he takes it once per week and follow the instructions on the bottle. I also let Sharda know at interventional radiology so she can resubmit authorization for the kyphoplasty procedure. Let me know if he has any questions. His bone density actually looked pretty good but we still treat because of the fracture in his back. We also recommend calcium 1200 mg daily and vitamin D 2,000 IU daily which are available over the counter.

## 2023-02-02 ENCOUNTER — PRE-ADMISSION TESTING (OUTPATIENT)
Dept: ADMISSIONS | Facility: MEDICAL CENTER | Age: 88
End: 2023-02-02
Attending: RADIOLOGY
Payer: COMMERCIAL

## 2023-02-06 ENCOUNTER — ANESTHESIA (OUTPATIENT)
Dept: RADIOLOGY | Facility: MEDICAL CENTER | Age: 88
End: 2023-02-06
Payer: COMMERCIAL

## 2023-02-06 ENCOUNTER — ANESTHESIA EVENT (OUTPATIENT)
Dept: RADIOLOGY | Facility: MEDICAL CENTER | Age: 88
End: 2023-02-06
Payer: COMMERCIAL

## 2023-02-06 ENCOUNTER — HOSPITAL ENCOUNTER (OUTPATIENT)
Facility: MEDICAL CENTER | Age: 88
End: 2023-02-06
Attending: RADIOLOGY | Admitting: RADIOLOGY
Payer: COMMERCIAL

## 2023-02-06 ENCOUNTER — APPOINTMENT (OUTPATIENT)
Dept: RADIOLOGY | Facility: MEDICAL CENTER | Age: 88
End: 2023-02-06
Attending: RADIOLOGY
Payer: COMMERCIAL

## 2023-02-06 VITALS
RESPIRATION RATE: 16 BRPM | HEART RATE: 58 BPM | OXYGEN SATURATION: 96 % | TEMPERATURE: 97.3 F | SYSTOLIC BLOOD PRESSURE: 136 MMHG | DIASTOLIC BLOOD PRESSURE: 67 MMHG | WEIGHT: 154.1 LBS | HEIGHT: 68 IN | BODY MASS INDEX: 23.36 KG/M2

## 2023-02-06 DIAGNOSIS — S32.010G CLOSED COMPRESSION FRACTURE OF L1 LUMBAR VERTEBRA, WITH DELAYED HEALING, SUBSEQUENT ENCOUNTER: ICD-10-CM

## 2023-02-06 LAB
ANION GAP SERPL CALC-SCNC: 11 MMOL/L (ref 7–16)
BUN SERPL-MCNC: 28 MG/DL (ref 8–22)
CALCIUM SERPL-MCNC: 9.3 MG/DL (ref 8.5–10.5)
CHLORIDE SERPL-SCNC: 107 MMOL/L (ref 96–112)
CO2 SERPL-SCNC: 23 MMOL/L (ref 20–33)
CREAT SERPL-MCNC: 1.16 MG/DL (ref 0.5–1.4)
EKG IMPRESSION: NORMAL
ERYTHROCYTE [DISTWIDTH] IN BLOOD BY AUTOMATED COUNT: 63.3 FL (ref 35.9–50)
GFR SERPLBLD CREATININE-BSD FMLA CKD-EPI: 58 ML/MIN/1.73 M 2
GLUCOSE SERPL-MCNC: 103 MG/DL (ref 65–99)
HCT VFR BLD AUTO: 46.8 % (ref 42–52)
HGB BLD-MCNC: 15 G/DL (ref 14–18)
INR PPP: 1.1 (ref 0.87–1.13)
MCH RBC QN AUTO: 27.9 PG (ref 27–33)
MCHC RBC AUTO-ENTMCNC: 32.1 G/DL (ref 33.7–35.3)
MCV RBC AUTO: 87.2 FL (ref 81.4–97.8)
PLATELET # BLD AUTO: 160 K/UL (ref 164–446)
PMV BLD AUTO: 10.3 FL (ref 9–12.9)
POTASSIUM SERPL-SCNC: 4.7 MMOL/L (ref 3.6–5.5)
PROTHROMBIN TIME: 14.1 SEC (ref 12–14.6)
RBC # BLD AUTO: 5.37 M/UL (ref 4.7–6.1)
SODIUM SERPL-SCNC: 141 MMOL/L (ref 135–145)
WBC # BLD AUTO: 7.7 K/UL (ref 4.8–10.8)

## 2023-02-06 PROCEDURE — 700111 HCHG RX REV CODE 636 W/ 250 OVERRIDE (IP): Performed by: ANESTHESIOLOGY

## 2023-02-06 PROCEDURE — 93005 ELECTROCARDIOGRAM TRACING: CPT | Performed by: RADIOLOGY

## 2023-02-06 PROCEDURE — 4410193 IR-KYPHOPLASTY,1 VERTEBRA,LUMBAR

## 2023-02-06 PROCEDURE — 160002 HCHG RECOVERY MINUTES (STAT)

## 2023-02-06 PROCEDURE — 160046 HCHG PACU - 1ST 60 MINS PHASE II

## 2023-02-06 PROCEDURE — 700101 HCHG RX REV CODE 250

## 2023-02-06 PROCEDURE — 80048 BASIC METABOLIC PNL TOTAL CA: CPT

## 2023-02-06 PROCEDURE — 160035 HCHG PACU - 1ST 60 MINS PHASE I

## 2023-02-06 PROCEDURE — 85027 COMPLETE CBC AUTOMATED: CPT

## 2023-02-06 PROCEDURE — 01942 ANES NEUROMD/NTRVRT LMBR/SAC: CPT | Performed by: ANESTHESIOLOGY

## 2023-02-06 PROCEDURE — 700105 HCHG RX REV CODE 258: Performed by: RADIOLOGY

## 2023-02-06 PROCEDURE — 93010 ELECTROCARDIOGRAM REPORT: CPT | Performed by: INTERNAL MEDICINE

## 2023-02-06 PROCEDURE — 4410588 IR-KYPHOPLASTY,1 VERTEBRA,LUMBAR

## 2023-02-06 PROCEDURE — 36415 COLL VENOUS BLD VENIPUNCTURE: CPT

## 2023-02-06 PROCEDURE — 85610 PROTHROMBIN TIME: CPT

## 2023-02-06 PROCEDURE — 700101 HCHG RX REV CODE 250: Performed by: ANESTHESIOLOGY

## 2023-02-06 PROCEDURE — 160036 HCHG PACU - EA ADDL 30 MINS PHASE I

## 2023-02-06 PROCEDURE — 99100 ANES PT EXTEME AGE<1 YR&>70: CPT | Performed by: ANESTHESIOLOGY

## 2023-02-06 RX ORDER — SODIUM CHLORIDE, SODIUM LACTATE, POTASSIUM CHLORIDE, CALCIUM CHLORIDE 600; 310; 30; 20 MG/100ML; MG/100ML; MG/100ML; MG/100ML
INJECTION, SOLUTION INTRAVENOUS CONTINUOUS
Status: DISCONTINUED | OUTPATIENT
Start: 2023-02-06 | End: 2023-02-06 | Stop reason: HOSPADM

## 2023-02-06 RX ORDER — LEVETIRACETAM 500 MG/1
500 TABLET ORAL 2 TIMES DAILY
COMMUNITY
End: 2023-04-05 | Stop reason: SDUPTHER

## 2023-02-06 RX ORDER — OXYCODONE HCL 5 MG/5 ML
5 SOLUTION, ORAL ORAL
Status: DISCONTINUED | OUTPATIENT
Start: 2023-02-06 | End: 2023-02-06 | Stop reason: HOSPADM

## 2023-02-06 RX ORDER — OXYCODONE HCL 5 MG/5 ML
10 SOLUTION, ORAL ORAL
Status: DISCONTINUED | OUTPATIENT
Start: 2023-02-06 | End: 2023-02-06 | Stop reason: HOSPADM

## 2023-02-06 RX ORDER — LIDOCAINE HYDROCHLORIDE 10 MG/ML
INJECTION, SOLUTION INFILTRATION; PERINEURAL
Status: COMPLETED
Start: 2023-02-06 | End: 2023-02-06

## 2023-02-06 RX ORDER — HYDROMORPHONE HYDROCHLORIDE 1 MG/ML
0.25 INJECTION, SOLUTION INTRAMUSCULAR; INTRAVENOUS; SUBCUTANEOUS
Status: DISCONTINUED | OUTPATIENT
Start: 2023-02-06 | End: 2023-02-06 | Stop reason: HOSPADM

## 2023-02-06 RX ORDER — OXYCODONE HYDROCHLORIDE 5 MG/1
2.5 TABLET ORAL
Status: DISCONTINUED | OUTPATIENT
Start: 2023-02-06 | End: 2023-02-06 | Stop reason: HOSPADM

## 2023-02-06 RX ORDER — LIDOCAINE HYDROCHLORIDE 40 MG/ML
SOLUTION TOPICAL
Status: COMPLETED
Start: 2023-02-06 | End: 2023-02-06

## 2023-02-06 RX ORDER — BIMATOPROST 0.1 MG/ML
1 SOLUTION/ DROPS OPHTHALMIC
COMMUNITY
End: 2023-07-03

## 2023-02-06 RX ORDER — ONDANSETRON 2 MG/ML
4 INJECTION INTRAMUSCULAR; INTRAVENOUS
Status: DISCONTINUED | OUTPATIENT
Start: 2023-02-06 | End: 2023-02-06 | Stop reason: HOSPADM

## 2023-02-06 RX ORDER — LIDOCAINE HYDROCHLORIDE 40 MG/ML
SOLUTION TOPICAL PRN
Status: DISCONTINUED | OUTPATIENT
Start: 2023-02-06 | End: 2023-02-06 | Stop reason: SURG

## 2023-02-06 RX ORDER — CEFAZOLIN SODIUM 1 G/3ML
INJECTION, POWDER, FOR SOLUTION INTRAMUSCULAR; INTRAVENOUS PRN
Status: DISCONTINUED | OUTPATIENT
Start: 2023-02-06 | End: 2023-02-06 | Stop reason: SURG

## 2023-02-06 RX ORDER — METOPROLOL TARTRATE 1 MG/ML
INJECTION, SOLUTION INTRAVENOUS PRN
Status: DISCONTINUED | OUTPATIENT
Start: 2023-02-06 | End: 2023-02-06 | Stop reason: SURG

## 2023-02-06 RX ORDER — OXYCODONE HYDROCHLORIDE 5 MG/1
5 TABLET ORAL
Status: DISCONTINUED | OUTPATIENT
Start: 2023-02-06 | End: 2023-02-06 | Stop reason: HOSPADM

## 2023-02-06 RX ADMIN — SODIUM CHLORIDE, POTASSIUM CHLORIDE, SODIUM LACTATE AND CALCIUM CHLORIDE: 600; 310; 30; 20 INJECTION, SOLUTION INTRAVENOUS at 09:26

## 2023-02-06 RX ADMIN — PROPOFOL 150 MG: 10 INJECTION, EMULSION INTRAVENOUS at 09:48

## 2023-02-06 RX ADMIN — METOPROLOL TARTRATE 1 MG: 5 INJECTION, SOLUTION INTRAVENOUS at 10:08

## 2023-02-06 RX ADMIN — LIDOCAINE HYDROCHLORIDE 0.5 ML: 10 INJECTION, SOLUTION INFILTRATION; PERINEURAL at 09:15

## 2023-02-06 RX ADMIN — CEFAZOLIN 2 G: 330 INJECTION, POWDER, FOR SOLUTION INTRAMUSCULAR; INTRAVENOUS at 09:54

## 2023-02-06 RX ADMIN — LIDOCAINE HYDROCHLORIDE 4 ML: 40 SOLUTION TOPICAL at 09:51

## 2023-02-06 RX ADMIN — Medication 80 MG: at 09:49

## 2023-02-06 RX ADMIN — EPHEDRINE SULFATE 20 MG: 50 INJECTION INTRAMUSCULAR; INTRAVENOUS; SUBCUTANEOUS at 09:56

## 2023-02-06 RX ADMIN — PROPOFOL 50 MG: 10 INJECTION, EMULSION INTRAVENOUS at 10:12

## 2023-02-06 RX ADMIN — Medication 0.5 ML: at 09:15

## 2023-02-06 ASSESSMENT — PAIN DESCRIPTION - PAIN TYPE
TYPE: SURGICAL PAIN
TYPE: ACUTE PAIN
TYPE: SURGICAL PAIN

## 2023-02-06 ASSESSMENT — PAIN SCALES - GENERAL: PAIN_LEVEL: 3

## 2023-02-06 ASSESSMENT — FIBROSIS 4 INDEX: FIB4 SCORE: 5.23

## 2023-02-06 NOTE — ANESTHESIA PREPROCEDURE EVALUATION
" Date/Time: 02/06/23 1000    Scheduled providers: Mike Velasquez M.D.; Lj Nichole M.D.    Procedure: IR-KYPHOPLASTY,1 VERTEBRA,LUMBAR    Diagnosis:       Closed compression fracture of L1 lumbar vertebra, with delayed healing, subsequent encounter [S32.010G]      Wedge compression fracture of first lumbar vertebra, subsequent encounter for fracture with delayed healing [S32.010G]    Indications:       Vertebrae Fracture      L1 kyphoplasty    Location: [facility] - INTERVENTIONAL - [facility]          Relevant Problems   NEURO   (positive) (HCC) Seizure disorder      CARDIAC   (positive) Chronic atrial fibrillation (HCC)   (positive) Nonrheumatic aortic valve insufficiency         (positive) Stage 3a chronic kidney disease (HCC)     /82   Pulse 83   Temp 36.2 °C (97.1 °F) (Temporal)   Resp 15   Ht 1.727 m (5' 8\")   Wt 69.9 kg (154 lb 1.6 oz)   SpO2 98%   BMI 23.43 kg/m²     Physical Exam    Airway   Mallampati: II  TM distance: >3 FB  Neck ROM: full       Cardiovascular - normal exam  Rhythm: regular  Rate: normal  (-) murmur     Dental - normal exam           Pulmonary - normal exam  Breath sounds clear to auscultation     Abdominal    Neurological - normal exam                 Anesthesia Plan    ASA 3       Plan - general       Airway plan will be ETT          Induction: intravenous    Postoperative Plan: Postoperative administration of opioids is intended.    Pertinent diagnostic labs and testing reviewed    Informed Consent:    Anesthetic plan and risks discussed with patient.    Use of blood products discussed with: patient whom consented to blood products.         "

## 2023-02-06 NOTE — ANESTHESIA TIME REPORT
Anesthesia Start and Stop Event Times     Date Time Event    2/6/2023 0939 Ready for Procedure     0945 Anesthesia Start     1042 Anesthesia Stop        Responsible Staff  02/06/23    Name Role Begin End    Deejay Catherine M.D. Anesth 0945 1042        Overtime Reason:  no overtime (within assigned shift)    Comments:

## 2023-02-06 NOTE — ANESTHESIA PROCEDURE NOTES
Airway    Date/Time: 2/6/2023 9:51 AM  Performed by: Deejay Catherine M.D.  Authorized by: Deejay Catherine M.D.     Location:  OR  Urgency:  Elective  Difficult Airway: No    Indications for Airway Management:  Anesthesia      Spontaneous Ventilation: absent    Sedation Level:  Deep  Preoxygenated: Yes    Patient Position:  Sniffing  Mask Difficulty Assessment:  0 - not attempted  Final Airway Type:  Endotracheal airway  Final Endotracheal Airway:  ETT  Cuffed: Yes    Technique Used for Successful ETT Placement:  Direct laryngoscopy    Insertion Site:  Oral  Blade Type:  Fan  Laryngoscope Blade/Videolaryngoscope Blade Size:  3  ETT Size (mm):  7.0  Measured from:  Teeth  ETT to Teeth (cm):  24  Placement Verified by: auscultation and capnometry    Cormack-Lehane Classification:  Grade I - full view of glottis  Number of Attempts at Approach:  1

## 2023-02-06 NOTE — OR SURGEON
Immediate Post- Operative Note        Findings: L1 Compression Fracture       Procedure(s): L1 Kyphoplasty       Estimated Blood Loss: Less than 5 ml        Complications: None            2/6/2023     10:34 AM     Mike Velasquez M.D.

## 2023-02-06 NOTE — OR NURSING
Patient transferred to Phase II. Vitals taken. IV removed, WNL. Site clean, dry, intact. DC education reviewed with patient  , questions encouraged and answered.

## 2023-02-06 NOTE — PROGRESS NOTES
Patient in pre-op, assessment completed, patient and family updated on plan of care, all questions answered, no further needs at this time, call light within reach.

## 2023-02-06 NOTE — OR NURSING
Patient A+Ox4. Denies pain or nausea.  VSS. Hx of A-Fib.  Controlled rate.  Plan for patient to discharge home when meets criteria.  Cont to umer

## 2023-02-06 NOTE — DISCHARGE INSTRUCTIONS
HOME CARE INSTRUCTIONS    ACTIVITY: Rest and take it easy for the first 24 hours.  A responsible adult is recommended to remain with you during that time.  It is normal to feel sleepy.  We encourage you to not do anything that requires balance, judgment or coordination.    FOR 24 HOURS DO NOT:  Drive, operate machinery or run household appliances.  Drink beer or alcoholic beverages.  Make important decisions or sign legal documents.    SPECIAL INSTRUCTIONS: Balloon Kyphoplasty, Care After  This sheet gives you information about how to care for yourself after your procedure. Your health care provider may also give you more specific instructions. If you have problems or questions, contact your health care provider.  What can I expect after the procedure?  After your procedure, it is common to have back pain.  Follow these instructions at home:  Medicines  Take over-the-counter and prescription medicines only as told by your health care provider.  Ask your health care provider if the medicine prescribed to you:  Requires you to avoid driving or using heavy machinery.  Can cause constipation. You may need to take steps to prevent or treat constipation, such as:  Drink enough fluid to keep your urine pale yellow.  Take over-the-counter or prescription medicines.  Eat foods that are high in fiber, such as beans, whole grains, and fresh fruits and vegetables.  Limit foods that are high in fat and processed sugars, such as fried or sweet foods.  Puncture site care    Follow instructions from your health care provider about how to take care of your puncture site. Make sure you:  Wash your hands with soap and water before and after you change your bandage (dressing). If soap and water are not available, use hand .  Change your dressing as told by your health care provider.  Leave skin glue or adhesive strips in place. These skin closures may need to be in place for 2 weeks or longer. If adhesive strip edges start to  loosen and curl up, you may trim the loose edges. Do not remove adhesive strips completely unless your health care provider tells you to do that.  Check your puncture site every day for signs of infection. Watch for:  Redness, swelling, or pain.  Fluid or blood.  Warmth.  Pus or a bad smell.  Keep your dressing dry until your health care provider says that it can be removed.  Managing pain, stiffness, and swelling    If directed, put ice on the painful area.  Put ice in a plastic bag.  Place a towel between your skin and the bag.  Leave the ice on for 20 minutes, 2-3 times a day.  Activity  Rest your back and avoid intense physical activity for as long as told by your health care provider.  Avoid bending, lifting, or twisting your back for as long as told by your health care provider.  Return to your normal activities as told by your health care provider. Ask your health care provider what activities are safe for you.  Do not lift anything that is heavier than 5 lb (2.2 kg). You may need to avoid heavy lifting for several weeks.  General instructions  Do not use any products that contain nicotine or tobacco, such as cigarettes, e-cigarettes, and chewing tobacco. These can delay bone healing. If you need help quitting, ask your health care provider.  Do not drive for 24 hours if you were given a sedative during your procedure.  Keep all follow-up visits as told by your health care provider. This is important.  Contact a health care provider if:  You have a fever or chills.  You have redness, swelling, or pain at the site of your puncture.  You have fluid, blood, or pus coming from the puncture site.  You have pain that gets worse or does not get better with medicine.  You develop numbness or weakness in any part of your body.  Get help right away if:  You have chest pain.  You have difficulty breathing.  You have weakness, numbness, or tingling in your legs.  You cannot control your bladder or bowel movements.  You  suddenly become weak or numb on one side of your body.  You become very confused.  You have trouble speaking or understanding, or both.  Summary  Follow instructions from your health care provider about how to take care of your puncture site.  Take over-the-counter and prescription medicines only as told by your health care provider.  Rest your back and avoid intense physical activity for as long as told by your health care provider.  Contact a health care provider if you have pain that gets worse or does not get better with medicine.  Keep all follow-up visits as told by your health care provider. This is important.  This information is not intended to replace advice given to you by your health care provider. Make sure you discuss any questions you have with your health care provider.  Document Released: 09/07/2016 Document Revised: 11/25/2019 Document Reviewed: 11/25/2019  [employer] Patient Education © 2020 [employer]      DIET: To avoid nausea, slowly advance diet as tolerated, avoiding spicy or greasy foods for the first day.  Add more substantial food to your diet according to your physician's instructions.  Babies can be fed formula or breast milk as soon as they are hungry.  INCREASE FLUIDS AND FIBER TO AVOID CONSTIPATION.      MEDICATIONS: Resume taking daily medication.  Take prescribed pain medication with food.  If no medication is prescribed, you may take non-aspirin pain medication if needed.  PAIN MEDICATION CAN BE VERY CONSTIPATING.  Take a stool softener or laxative such as senokot, pericolace, or milk of magnesia if needed.    A follow-up appointment should be arranged with your doctor in 1-2 weeks; call to schedule.    You should CALL YOUR PHYSICIAN if you develop:  Fever greater than 101 degrees F.  Pain not relieved by medication, or persistent nausea or vomiting.  Excessive bleeding (blood soaking through dressing) or unexpected drainage from the wound.  Extreme redness or swelling around the  incision site, drainage of pus or foul smelling drainage.  Inability to urinate or empty your bladder within 8 hours.  Problems with breathing or chest pain.    You should call 911 if you develop problems with breathing or chest pain.  If you are unable to contact your doctor or surgical center, you should go to the nearest emergency room or urgent care center.  Physician's telephone #: [phone] Dr. Velaqsuez    MILD FLU-LIKE SYMPTOMS ARE NORMAL.  YOU MAY EXPERIENCE GENERALIZED MUSCLE ACHES, THROAT IRRITATION, HEADACHE AND/OR SOME NAUSEA.    If any questions arise, call your doctor.  If your doctor is not available, please feel free to call the Surgical Center at [phone].  The Center is open Monday through Friday from 7AM to 7PM.      A registered nurse may call you a few days after your surgery to see how you are doing after your procedure.    You may also receive a survey in the mail within the next two weeks and we ask that you take a few moments to complete the survey and return it to us.  Our goal is to provide you with very good care and we value your comments.     Depression / Suicide Risk    As you are discharged from this [facility] facility, it is important to learn how to keep safe from harming yourself.    Recognize the warning signs:  Abrupt changes in personality, positive or negative- including increase in energy   Giving away possessions  Change in eating patterns- significant weight changes-  positive or negative  Change in sleeping patterns- unable to sleep or sleeping all the time   Unwillingness or inability to communicate  Depression  Unusual sadness, discouragement and loneliness  Talk of wanting to die  Neglect of personal appearance   Rebelliousness- reckless behavior  Withdrawal from people/activities they love  Confusion- inability to concentrate     If you or a loved one observes any of these behaviors or has concerns about self-harm, here's what you can do:  Talk about it-  your feelings and reasons for harming yourself  Remove any means that you might use to hurt yourself (examples: pills, rope, extension cords, firearm)  Get professional help from the community (Mental Health, Substance Abuse, psychological counseling)  Do not be alone:Call your Safe Contact- someone whom you trust who will be there for you.  Call your local CRISIS HOTLINE 301-9993 or 287-624-9568  Call your local Children's Mobile Crisis Response Team [place] (794) 343-0565 or www.????  Call the toll free National Suicide Prevention Hotlines   National Suicide Prevention Lifeline 166-490-MLVR (0142)  National Hope Line Network 800-SUICIDE (158-6358)    I acknowledge receipt and understanding of these Home Care instructions.

## 2023-02-06 NOTE — PROGRESS NOTES
IR Nursing Note    L1 Kyphoplasty  by MD Velasquez assisted by RT Montgomery, spinal access site.  MD and anesthesiologist consented patient, verified by this RN. Patient connected to monitor and placed in a prone position. Procedure site verified by MD using imaging guidance. Procedure done under general anesthesia, by MD Velasquez. Anesthesiologist assumes complete care of patient, RN to assist with case. For complete vital signs and meds given refer to anesthesiology flowsheets.      A gauze and Tegaderm dressing was placed over surgical site.       Report given to MARLI Khan.  Patient transported to [facility] via IR RN ans anestesiologist, monitored then transferred care to report RN.

## 2023-02-06 NOTE — OR NURSING
Patient A+Ox4. Dressing to back seems high.  Patient questioning site of surgery.  Dr Velasquez paged about patient concerns.  He came to bed side to reassess patient and answer all his questions.

## 2023-02-06 NOTE — ANESTHESIA POSTPROCEDURE EVALUATION
Patient: Homa Alonso    Procedure Summary     Date: 02/06/23 Room / Location: [facility] - INTERVENTIONAL  [facility]AL [facility]    Anesthesia Start: 0945 Anesthesia Stop: 1042    Procedure: IR-KYPHOPLASTY,1 VERTEBRA,LUMBAR Diagnosis:       Closed compression fracture of L1 lumbar vertebra, with delayed healing, subsequent encounter      Wedge compression fracture of first lumbar vertebra, subsequent encounter for fracture with delayed healing      (Vertebrae Fracture)      (L1 kyphoplasty)    Scheduled Providers: Mike Velasquez M.D.; Lj Nichole M.D. Responsible Provider: Deejay Catherine M.D.    Anesthesia Type: general ASA Status: 3          Final Anesthesia Type: general  Last vitals  BP   Blood Pressure : 136/67    Temp   36.3 °C (97.3 °F)    Pulse   (!) 58   Resp   16    SpO2   96 %      Anesthesia Post Evaluation    Patient location during evaluation: PACU  Patient participation: complete - patient participated  Level of consciousness: awake and alert  Pain score: 3    Airway patency: patent  Anesthetic complications: no  Cardiovascular status: hemodynamically stable  Respiratory status: acceptable  Hydration status: euvolemic    PONV: none          No notable events documented.     Nurse Pain Score: 3 (NPRS)

## 2023-02-07 NOTE — H&P
There is acute/subacute compression fracture at L1 mostly like secondary to the osteoporotic insufficiency

## 2023-02-22 NOTE — DOCUMENTATION QUERY
[facility]                                                                       Query Response Note      PATIENT:               LEONARDO WALLER  ACCT #:                  [id number]  MRN:                     [id number]  :                      [id number]  ADMIT DATE:       2023 8:21 AM  DISCH DATE:        2023 1:10 PM  RESPONDING  PROVIDER #:        [id number]           QUERY TEXT:    There is conflicting documentation in the medical record.  Acute vertebral compression fracture of L1 is documented on H&P dated 2023 cosigned by Dr. Velasquez.  Osteoporotic insufficiency compression fracture of L1 is documented on Kyphoplasty report on 2023 by Dr. Velasquez.     Based on treatment, clinical findings and risk factors, can the diagnosis be further clarified?    The patient's Clinical Indicators include:  Clinical indicators  L1 Compression Fracture    Treatment or Monitoring  Kyphoplasty  Medications List: Vitamin D3 & Calcium citrate    Risk Factors  Pain, exacerbation of fracture    [place] contact information  [email or web address]  Options provided:   -- Osteoporotic Insufficiency Compression Fracture of L1   -- Acute vertebral compression fracture of L1 due to fall   -- Other -- please specify with a new note or addendum   -- Unable to determine      Query created by: Roxie Herndon on 2/15/2023 11:28 AM    RESPONSE TEXT:    Osteoporotic Insufficiency Compression Fracture of L1          Electronically signed by:  SRIDHAR VELASQUEZ MD 2023 10:16 AM

## 2023-03-29 DIAGNOSIS — E29.1 HYPOGONADISM IN MALE: ICD-10-CM

## 2023-03-29 RX ORDER — TESTOSTERONE GEL, 1% 10 MG/G
GEL TRANSDERMAL
Qty: 240 G | Refills: 0 | Status: SHIPPED | OUTPATIENT
Start: 2023-03-29 | End: 2023-06-27

## 2023-03-29 NOTE — TELEPHONE ENCOUNTER
Received request via: Pharmacy    Was the patient seen in the last year in this department? Yes    Does the patient have an active prescription (recently filled or refills available) for medication(s) requested? No    Does the patient have intermediate Plus and need 100 day supply (blood pressure, diabetes and cholesterol meds only)? Medication is not for cholesterol, blood pressure or diabetes

## 2023-04-05 ENCOUNTER — OFFICE VISIT (OUTPATIENT)
Dept: MEDICAL GROUP | Facility: PHYSICIAN GROUP | Age: 88
End: 2023-04-05
Payer: COMMERCIAL

## 2023-04-05 VITALS
WEIGHT: 154 LBS | DIASTOLIC BLOOD PRESSURE: 60 MMHG | BODY MASS INDEX: 24.17 KG/M2 | SYSTOLIC BLOOD PRESSURE: 100 MMHG | HEART RATE: 53 BPM | TEMPERATURE: 97.5 F | RESPIRATION RATE: 16 BRPM | OXYGEN SATURATION: 98 % | HEIGHT: 67 IN

## 2023-04-05 DIAGNOSIS — G40.909 SEIZURE DISORDER (HCC): ICD-10-CM

## 2023-04-05 DIAGNOSIS — I48.20 CHRONIC ATRIAL FIBRILLATION (HCC): ICD-10-CM

## 2023-04-05 DIAGNOSIS — E29.1 HYPOGONADISM IN MALE: ICD-10-CM

## 2023-04-05 DIAGNOSIS — S32.010A CLOSED COMPRESSION FRACTURE OF BODY OF L1 VERTEBRA (HCC): ICD-10-CM

## 2023-04-05 PROCEDURE — 99214 OFFICE O/P EST MOD 30 MIN: CPT | Performed by: INTERNAL MEDICINE

## 2023-04-05 RX ORDER — LEVETIRACETAM 500 MG/1
500 TABLET ORAL 2 TIMES DAILY
Qty: 180 TABLET | Refills: 3 | Status: SHIPPED | OUTPATIENT
Start: 2023-04-05

## 2023-04-05 ASSESSMENT — FIBROSIS 4 INDEX: FIB4 SCORE: 4.61

## 2023-04-05 NOTE — ASSESSMENT & PLAN NOTE
Chronic ongoing problem, continue Keppra 500 mg twice daily, no recent seizure activity, we have agreed to continue moving forward to prevent future seizure risk.

## 2023-04-05 NOTE — PROGRESS NOTES
Subjective:   Chief Complaint/History of Present Illness:  Homa Alonso is a 89 y.o. male established patient who presents today to discuss medical problems as listed below. Homa is unaccompanied for today's visit.    Problem   Closed Compression Fracture of Body of L1 Vertebra (Hcc)    He developed acute back pain after a fall, he has history of back surgery.  Imaging demonstrated acute L1 compression fracture.  Prior operative site in the back was stable.  He has been using Aleve and heating pads.  He was referred to interventional radiology to consider kyphoplasty however his insurance did not approve as he did not have it completed bone density on file to confirm the osteoporosis.  He would be open to adding a Lidoderm patch for better pain control.    He is now status post kyphoplasty following completion of bone density and initiation of alendronate.     Hypogonadism in Male       Ref. Range 8/26/2020 09:33   Free Testosterone Latest Ref Range: 6.6 - 18.1 pg/mL 1.4 (L)   Testosterone,Total Latest Ref Range: 264 - 916 ng/dL 18 (L)     He reports radical prostatectomy 1991 following discovery of localized prostate cancer.  Unfortunately, he had complications of both erectile dysfunction as well as urinary incontinence.  He had an artificial sphincter placed around 2009 which has continued to work well.  He notes profound fatigue following his prostatectomy was found to have low testosterone levels and has been using AndroGel since about 2009 with great success.     Chronic Atrial Fibrillation (Hcc)    He has atrial fibrillation since at least 2019. He has followed with [facility] cardiology. In Spring 2021 his ventricular rate had increased and his metoprolol was increased per the note to 100 mg in AM and 50 mg in PM. He needs to confirm exactly what dose he is taking, has not seen cardiology for about 6 months. He notes his ventricular rate has consistently been running the 40-50 range the past few months  and he has some associated fatigue/weakness. He reduced his Eliquis 1.25 mg twice daily after rectal bleeding episode in Sept 2022.    Current regimen: metoprolol tartrate 25 mg twice daily and low dose Eliquis     (HCC) Seizure disorder    Noted in June 2014.  Witnessed by his wife when he had full body convulsions.  He presented to the ER where he was postictal.  He followed up with neurology who performed an EEG and thought that there was epileptiform activity.  He was started on Keppra which he has maintained since that time.  He did try to go off of it once but notes he had recurrence of seizure activity and has gone back on it.  He is able to drive at this time because he has been so well controlled.  No subsequent seizures. He previously saw Dr. Valadez, currently not following with a neurologist.    Current regimen: Keppra 500 mg twice daily          Current Medications:  Current Outpatient Medications Ordered in Epic   Medication Sig Dispense Refill    levETIRAcetam (KEPPRA) 500 MG Tab Take 1 Tablet by mouth 2 times a day. 180 Tablet 3    Apoaequorin (PREVAGEN EXTRA STRENGTH PO) Take 1 Tablet by mouth every day.      apixaban (ELIQUIS) 2.5mg Tab Take 1 Tablet by mouth 2 times a day. 180 Tablet 3    metoprolol tartrate (LOPRESSOR) 25 MG Tab Take 1 Tablet by mouth 2 times a day. 180 Tablet 3    testosterone (TESTIM/ANDROGEL) 50 MG/5GM (1%) Gel gel APPLY 50 MG TOPICALLY ON THE SKIN ONCE DAILY FOR 90 DAYS 240 g 0    bimatoprost (LUMIGAN) 0.01 % Solution Administer 1 Drop into the right eye at bedtime.      alendronate (FOSAMAX) 70 MG Tab Take 1 Tablet by mouth every 7 days. 12 Tablet 3    atorvastatin (LIPITOR) 20 MG Tab Take 1 Tablet by mouth every evening. 90 Tablet 3    ferrous sulfate 325 (65 Fe) MG tablet Take 1 Tablet by mouth every day. 30 Tablet 1    docusate sodium (COLACE) 100 MG Cap Take 1 Capsule by mouth 2 times a day as needed for Constipation. 60 Capsule 2    Ascorbic Acid (VITAMIN C PO) Take 1  "Tablet by mouth every day.      Multiple Vitamins-Minerals (ICAPS AREDS 2) Chew Tab Chew 1 Capsule 2 times a day.      B Complex Vitamins (B COMPLEX B-12 PO) Take 1 Tablet by mouth every day.      Omega-3 Fatty Acids (FISH OIL) 1000 MG Cap capsule Take 1,000 mg by mouth every day.      multivitamin (THERAGRAN) Tab Take 1 Tab by mouth every day.      Cholecalciferol (VITAMIN D3 PO) Take 2,000 Units by mouth every day.      CALCIUM CITRATE PO Take 1 Tablet by mouth 2 times a day.      MAGNESIUM CITRATE PO Take 1 Capsule by mouth every evening.       No current Epic-ordered facility-administered medications on file.          Objective:   Physical Exam:    Vitals: /60 (BP Location: Left arm, Patient Position: Sitting, BP Cuff Size: Adult)   Pulse (!) 53   Temp 36.4 °C (97.5 °F) (Temporal)   Resp 16   Ht 1.689 m (5' 6.5\")   Wt 69.9 kg (154 lb)   SpO2 98%    BMI: Body mass index is 24.48 kg/m².  Physical Exam  Constitutional:       General: He is not in acute distress.     Appearance: Normal appearance. He is not ill-appearing.   HENT:      Ears:      Comments: Hard of hearing  Eyes:      General: No scleral icterus.     Conjunctiva/sclera: Conjunctivae normal.   Cardiovascular:      Rate and Rhythm: Normal rate and regular rhythm.      Pulses: Normal pulses.   Pulmonary:      Effort: Pulmonary effort is normal. No respiratory distress.      Breath sounds: No wheezing or rhonchi.   Musculoskeletal:      Right lower leg: No edema.      Left lower leg: No edema.   Skin:     General: Skin is warm and dry.      Findings: No rash.   Neurological:      Gait: Gait abnormal.      Comments: Using a cane to ambulate   Psychiatric:         Mood and Affect: Mood normal.         Behavior: Behavior normal.         Thought Content: Thought content normal.         Judgment: Judgment normal.             Assessment and Plan:   Homa is a 89 y.o. male with the following:  Problem List Items Addressed This Visit       (HCC) " "Seizure disorder     Chronic ongoing problem, continue Keppra 500 mg twice daily, no recent seizure activity, we have agreed to continue moving forward to prevent future seizure risk.         Relevant Medications    levETIRAcetam (KEPPRA) 500 MG Tab    Chronic atrial fibrillation (HCC)     Chronic ongoing problem.  Continue rate control with metoprolol tartrate 25 mg twice daily and Eliquis 2.5 mg twice daily.  No recent bruising or bleeding complications.         Relevant Medications    apixaban (ELIQUIS) 2.5mg Tab    metoprolol tartrate (LOPRESSOR) 25 MG Tab    Closed compression fracture of body of L1 vertebra (HCC)     Recent issue, status post kyphoplasty with interventional radiology, doing well on alendronate 70 mg weekly on Fridays.  Advised that he take this on an empty stomach first thing in the morning and do not lay down afterward for at least 1/2-hour.  He will monitor for heartburn, he has not had any since starting this a few months ago.         Hypogonadism in male     Chronic ongoing problem, appropriate to continue on AndroGel which she has been using for the past 14 years with good results, he had a radical prostatectomy 1991 complicated by erectile dysfunction and incontinence and has artificial sphincter in place.  Continue follow-up with urology as needed and will continue refilling the testosterone.    Obtained and reviewed patient utilization report from [facility] pharmacy database on 4/5/2023 5:55 PM  prior to writing prescription for controlled substance II, III or IV per [place] bill . Based on assessment of the report, the prescription is medically necessary.     Patient understands this prescription is a controlled substance which is potentially habit-forming and its use is regulated by the SERA. We also discussed the new \"black box\" warning regarding the lethal combination of opioids and benzodiazepines. Refills are subject to terms of a controlled substance agreement and patient " has an updated one on file. Most recent UDS is appropriate. Any refill requires an office visit. Narcotics have may adverse effects and the risks of addiction, accidental overdose and death were emphasized. Provided prescriptions for the next three months.         Relevant Orders    Controlled Substance Treatment Agreement        RTC: Return in about 3 months (around 7/5/2023).    I spent a total of 38 minutes with record review, exam, communication with the patient, communication with other providers, and documentation of this encounter.    PLEASE NOTE: This dictation was created using voice recognition software. I have made every reasonable attempt to correct obvious errors, but I expect that there are errors of grammar and possibly content that I did not discover before finalizing the note.      Sruthi Kincaid, DO  Geriatric and Internal Medicine  [facility]

## 2023-04-06 DIAGNOSIS — H40.9 GLAUCOMA OF RIGHT EYE, UNSPECIFIED GLAUCOMA TYPE: ICD-10-CM

## 2023-04-06 RX ORDER — BIMATOPROST 0.1 MG/ML
SOLUTION/ DROPS OPHTHALMIC
Qty: 7.5 ML | Refills: 3 | Status: SHIPPED | OUTPATIENT
Start: 2023-04-06

## 2023-04-06 NOTE — ASSESSMENT & PLAN NOTE
Chronic ongoing problem.  Continue rate control with metoprolol tartrate 25 mg twice daily and Eliquis 2.5 mg twice daily.  No recent bruising or bleeding complications.

## 2023-04-06 NOTE — TELEPHONE ENCOUNTER
Received request via: Pharmacy    Was the patient seen in the last year in this department? Yes  4/5/2023  Does the patient have an active prescription (recently filled or refills available) for medication(s) requested? No    Does the patient have care home Plus and need 100 day supply (blood pressure, diabetes and cholesterol meds only)? Patient does not have SCP

## 2023-04-06 NOTE — ASSESSMENT & PLAN NOTE
Recent issue, status post kyphoplasty with interventional radiology, doing well on alendronate 70 mg weekly on Fridays.  Advised that he take this on an empty stomach first thing in the morning and do not lay down afterward for at least 1/2-hour.  He will monitor for heartburn, he has not had any since starting this a few months ago.

## 2023-04-06 NOTE — ASSESSMENT & PLAN NOTE
"Chronic ongoing problem, appropriate to continue on AndroGel which she has been using for the past 14 years with good results, he had a radical prostatectomy 1991 complicated by erectile dysfunction and incontinence and has artificial sphincter in place.  Continue follow-up with urology as needed and will continue refilling the testosterone.    Obtained and reviewed patient utilization report from [facility] pharmacy database on 4/5/2023 5:55 PM  prior to writing prescription for controlled substance II, III or IV per [place] bill . Based on assessment of the report, the prescription is medically necessary.     Patient understands this prescription is a controlled substance which is potentially habit-forming and its use is regulated by the SERA. We also discussed the new \"black box\" warning regarding the lethal combination of opioids and benzodiazepines. Refills are subject to terms of a controlled substance agreement and patient has an updated one on file. Most recent UDS is appropriate. Any refill requires an office visit. Narcotics have may adverse effects and the risks of addiction, accidental overdose and death were emphasized. Provided prescriptions for the next three months.  "

## 2023-07-03 ENCOUNTER — OFFICE VISIT (OUTPATIENT)
Dept: MEDICAL GROUP | Facility: PHYSICIAN GROUP | Age: 88
End: 2023-07-03
Payer: COMMERCIAL

## 2023-07-03 VITALS
OXYGEN SATURATION: 96 % | BODY MASS INDEX: 24.75 KG/M2 | WEIGHT: 157.7 LBS | HEIGHT: 67 IN | HEART RATE: 53 BPM | SYSTOLIC BLOOD PRESSURE: 110 MMHG | TEMPERATURE: 96.7 F | RESPIRATION RATE: 16 BRPM | DIASTOLIC BLOOD PRESSURE: 64 MMHG

## 2023-07-03 DIAGNOSIS — N18.31 STAGE 3A CHRONIC KIDNEY DISEASE: ICD-10-CM

## 2023-07-03 DIAGNOSIS — E78.5 DYSLIPIDEMIA: ICD-10-CM

## 2023-07-03 DIAGNOSIS — I48.20 CHRONIC ATRIAL FIBRILLATION (HCC): ICD-10-CM

## 2023-07-03 DIAGNOSIS — Z00.00 ENCOUNTER FOR SUBSEQUENT ANNUAL WELLNESS VISIT (AWV) IN MEDICARE PATIENT: Primary | ICD-10-CM

## 2023-07-03 PROCEDURE — 3078F DIAST BP <80 MM HG: CPT | Performed by: INTERNAL MEDICINE

## 2023-07-03 PROCEDURE — G0439 PPPS, SUBSEQ VISIT: HCPCS | Performed by: INTERNAL MEDICINE

## 2023-07-03 PROCEDURE — 3074F SYST BP LT 130 MM HG: CPT | Performed by: INTERNAL MEDICINE

## 2023-07-03 RX ORDER — TESTOSTERONE GEL, 1% 10 MG/G
GEL TRANSDERMAL
COMMUNITY
End: 2023-09-08

## 2023-07-03 RX ORDER — LATANOPROST 50 UG/ML
SOLUTION/ DROPS OPHTHALMIC
COMMUNITY

## 2023-07-03 ASSESSMENT — ACTIVITIES OF DAILY LIVING (ADL): BATHING_REQUIRES_ASSISTANCE: 0

## 2023-07-03 ASSESSMENT — FIBROSIS 4 INDEX: FIB4 SCORE: 4.66

## 2023-07-03 ASSESSMENT — PATIENT HEALTH QUESTIONNAIRE - PHQ9: CLINICAL INTERPRETATION OF PHQ2 SCORE: 0

## 2023-07-03 ASSESSMENT — ENCOUNTER SYMPTOMS: GENERAL WELL-BEING: EXCELLENT

## 2023-07-03 NOTE — ASSESSMENT & PLAN NOTE
Chronic ongoing problem.  We will update lab level to ensure stability.  Continue medical therapy with atorvastatin 20 mg daily.

## 2023-07-03 NOTE — PROGRESS NOTES
Chief Complaint   Patient presents with    Follow-Up     3 month f/v       HPI:  Homa is a 89 y.o. here for Medicare Annual Wellness Visit    Problem   Stage 3a Chronic Kidney Disease (Hcc)     Latest Reference Range & Units 02/06/23 09:12   Bun 8 - 22 mg/dL 28 (H)   Creatinine 0.50 - 1.40 mg/dL 1.16   GFR (CKD-EPI) >60 mL/min/1.73 m 2 58 !     He has CKD3a in 2023 after vertebral fracture. No nephrotoxic agents on board, he has good oral hydration. Blood pressure and heart rate slightly overtreated, mild prediabetes previously. History of total prostatectomy for prostate cancer with artificial sphincter in place, no recent UTI or hematuria episodes.     Chronic Atrial Fibrillation (Hcc)    He has atrial fibrillation since at least 2019. He has followed with [facility]. In Spring 2021 his ventricular rate had increased and his metoprolol was increased per the note to 100 mg in AM and 50 mg in PM. He needs to confirm exactly what dose he is taking, has not seen cardiology for about 6 months. He notes his ventricular rate has consistently been running the 40-50 range the past few months and he has some associated fatigue/weakness. He reduced his Eliquis to 2.5 mg twice daily after rectal bleeding episode in Sept 2022. Metoprolol dose reducing in July 2023 due to bradycardia.    Current regimen: metoprolol tartrate 12.5 mg twice daily and low dose Eliquis  Previous regimen: metoprolol tartrate 25 mg twice daily     Dyslipidemia       Ref. Range 8/26/2020 09:33   Cholesterol,Tot Latest Ref Range: 100 - 199 mg/dL 154   Triglycerides Latest Ref Range: 0 - 149 mg/dL 178 (H)   HDL Latest Ref Range: >39 mg/dL 52   LDL Latest Ref Range: 0 - 99 mg/dL 66       He reports chronic use of atorvastatin for history of elevated cholesterol panel.  He denies any myalgias or GI upset on this medication.  He is tolerating without difficulty.  He denies history of heart attack or stroke in the past.    Current regimen:  Atorvastatin 20 mg daily          Patient Active Problem List    Diagnosis Date Noted    Closed compression fracture of body of L1 vertebra ([facility]) 01/24/2023    Iron deficiency anemia due to chronic blood loss 10/12/2022    Lower GI bleed 09/06/2022    Shoulder weakness 06/22/2022    Stage 3a chronic kidney disease ([facility]) 04/28/2022    Encounter for completion of form with patient- DMV form 04/28/2022    Pain of right lower leg 12/21/2021    Thrombocytopenia ([facility]) 10/21/2021    Onychomycosis 12/09/2020    Prediabetes 05/18/2020    Status post implantation of artificial urinary sphincter 12/05/2019    Gross hematuria 12/05/2019    Bilateral carotid bruits 08/06/2019    Nonrheumatic aortic valve insufficiency 05/14/2019    H/O radical prostatectomy 02/27/2019    Hypogonadism in male 11/22/2016    H/O prostate cancer 11/22/2016    Glaucoma 11/22/2016    Chronic atrial fibrillation ([facility]) 05/21/2015    ([facility]) Seizure disorder 05/21/2015    Chronic bilateral low back pain 05/18/2015    Dyslipidemia 07/21/2011       Current Outpatient Medications   Medication Sig Dispense Refill    latanoprost (XALATAN) 0.005 % Solution INSTILL 1 DROP INTO AFFECTED EYE(S) BY OPHTHALMIC ROUTE ONCE DAILY INTHE EVENING      metoprolol tartrate (LOPRESSOR) 25 MG Tab Take 0.5 Tablets by mouth 2 times a day. 90 Tablet 3    bimatoprost (LUMIGAN) 0.01 % Solution INSTILL 1 DROP INTO RIGHT EYE EVERY DAY AT BEDTIME Strength: 0.01 % 7.5 mL 3    levETIRAcetam (KEPPRA) 500 MG Tab Take 1 Tablet by mouth 2 times a day. 180 Tablet 3    Apoaequorin (PREVAGEN EXTRA STRENGTH PO) Take 1 Tablet by mouth every day.      apixaban (ELIQUIS) 2.5mg Tab Take 1 Tablet by mouth 2 times a day. 180 Tablet 3    alendronate (FOSAMAX) 70 MG Tab Take 1 Tablet by mouth every 7 days. 12 Tablet 3    atorvastatin (LIPITOR) 20 MG Tab Take 1 Tablet by mouth every evening. 90 Tablet 3    docusate sodium (COLACE) 100 MG Cap Take 1 Capsule by mouth 2 times a day as needed for  Constipation. 60 Capsule 2    Ascorbic Acid (VITAMIN C PO) Take 1 Tablet by mouth every day.      Multiple Vitamins-Minerals (ICAPS AREDS 2) Chew Tab Chew 1 Capsule 2 times a day.      B Complex Vitamins (B COMPLEX B-12 PO) Take 1 Tablet by mouth every day.      Omega-3 Fatty Acids (FISH OIL) 1000 MG Cap capsule Take 1,000 mg by mouth every day.      multivitamin (THERAGRAN) Tab Take 1 Tab by mouth every day.      Cholecalciferol (VITAMIN D3 PO) Take 2,000 Units by mouth every day.      CALCIUM CITRATE PO Take 1 Tablet by mouth 2 times a day.      MAGNESIUM CITRATE PO Take 1 Capsule by mouth every evening.      testosterone (TESTIM/ANDROGEL) 50 MG/5GM (1%) Gel gel Apply 1 packet every day by transdermal route.       No current facility-administered medications for this visit.        Patient is taking medications as noted in medication list.  Current supplements as per medication list.     Allergies: Pcn [penicillins]    Current social contact/activities:     Is patient current with immunizations? No, due for COVID. Patient is interested in receiving NONE today.    He  reports that he quit smoking about 59 years ago. His smoking use included cigarettes. He has never used smokeless tobacco. He reports that he does not currently use alcohol. He reports that he does not use drugs.  Counseling given: Not Answered  Tobacco comments: continued abstinence      ROS:    Gait: Uses a cane   Ostomy: No   Other tubes: No   Amputations: No   Chronic oxygen use No   Last eye exam Needs appt it has been a while    Wears hearing aids: NO   : Denies any urinary leakage during the last 6 months    Screening:    Depression Screening  Little interest or pleasure in doing things?  0 - not at all  Feeling down, depressed, or hopeless? 0 - not at all  Trouble falling or staying asleep, or sleeping too much?     Feeling tired or having little energy?     Poor appetite or overeating?     Feeling bad about yourself - or that you are a  failure or have let yourself or your family down?    Trouble concentrating on things, such as reading the newspaper or watching television?    Moving or speaking so slowly that other people could have noticed.  Or the opposite - being so fidgety or restless that you have been moving around a lot more than usual?     Thoughts that you would be better off dead, or of hurting yourself?     Patient Health Questionnaire Score:      If depressive symptoms identified deferred to follow up visit unless specifically addressed in assessment and plan.    Interpretation of PHQ-9 Total Score   Score Severity   1-4 No Depression   5-9 Mild Depression   10-14 Moderate Depression   15-19 Moderately Severe Depression   20-27 Severe Depression    Screening for Cognitive Impairment  Three Minute Recall (Banana, Sunrise, Chair)  3/3    Draw clock face with all 12 numbers and set the hands to show 20 past 8.  Yes    If cognitive concerns identified, deferred for follow up unless specifically addressed in assessment and plan.    Fall Risk Assessment  Has the patient had two or more falls in the last year or any fall with injury in the last year?  No  If fall risk identified, deferred for follow up unless specifically addressed in assessment and plan.    Safety Assessment  Throw rugs on floor.  Yes  Handrails on all stairs.  Yes  Good lighting in all hallways.  Yes  Difficulty hearing.  No  Patient counseled about all safety risks that were identified.    Functional Assessment ADLs  Are there any barriers preventing you from cooking for yourself or meeting nutritional needs?  No.    Are there any barriers preventing you from driving safely or obtaining transportation?  No.    Are there any barriers preventing you from using a telephone or calling for help?  No.    Are there any barriers preventing you from shopping?  No.    Are there any barriers preventing you from taking care of your own finances?  No.    Are there any barriers  preventing you from managing your medications?  No.    Are there any barriers preventing you from showering, bathing or dressing yourself?  No.    Are you currently engaging in any exercise or physical activity?  Yes.  Walks   What is your perception of your health?  Excellent.    Advance Care Planning  Do you have an Advance Directive, Living Will, Durable Power of , or POLST? Yes    Living Will     is not on file - instructed patient to bring in a copy to scan into their chart    Health Maintenance Summary            Overdue - COVID-19 Vaccine (2 - Pfizer series) Overdue since 4/30/2023 12/31/2022  Imm Admin: MODERNA BIVALENT BOOSTER SARS-COV-2 VACCINE (6+)    05/16/2022  Imm Admin: MODERNA SARS-COV-2 VACCINE (12+)    10/23/2021  Imm Admin: MODERNA SARS-COV-2 VACCINE (12+)    03/13/2021  Imm Admin: MODERNA SARS-COV-2 VACCINE (12+)    02/13/2021  Imm Admin: MODERNA SARS-COV-2 VACCINE (12+)              IMM INFLUENZA (1) Next due on 9/1/2023      10/12/2022  Imm Admin: Influenza Vaccine Adult HD    10/11/2021  Imm Admin: Influenza Vaccine Adult HD    09/25/2020  Imm Admin: Influenza Vaccine Adult HD    09/11/2019  Imm Admin: Influenza Vaccine Adult HD    10/08/2018  Imm Admin: Influenza, Unspecified - HISTORICAL DATA    Only the first 5 history entries have been loaded, but more history exists.              Annual Wellness Visit (Every 366 Days) Next due on 7/3/2024      07/03/2023  Level of Service: ANNUAL WELLNESS VISIT-INCLUDES PPPS SUBSEQUE*    07/08/2021  Visit Dx: Encounter for Medicare annual wellness exam    07/01/2021  Visit Dx: Encounter for Medicare annual wellness exam    06/29/2021  Visit Dx: Encounter for Medicare annual wellness exam    08/26/2020  Visit Dx: Encounter for Medicare annual wellness exam    Only the first 5 history entries have been loaded, but more history exists.              IMM DTaP/Tdap/Td Vaccine (2 - Td or Tdap) Next due on 9/11/2029 09/11/2019  Imm Admin: Tdap  Vaccine              IMM PNEUMOCOCCAL VACCINE: 65+ Years (Series Information) Completed      2019  Imm Admin: Pneumococcal Conjugate Vaccine (Prevnar/PCV-13)    10/01/2014  Imm Admin: Pneumococcal polysaccharide vaccine (PPSV-23)              IMM ZOSTER VACCINES (Series Information) Completed      2021  Imm Admin: Zoster Vaccine Recombinant (RZV) (SHINGRIX)    2020  Imm Admin: Zoster Vaccine Recombinant (RZV) (SHINGRIX)              IMM HEP B VACCINE (Series Information) Aged Out      No completion history exists for this topic.              HPV Vaccines (Series Information) Aged Out      No completion history exists for this topic.              IMM MENINGOCOCCAL ACWY VACCINE (Series Information) Aged Out      No completion history exists for this topic.              Discontinued - COLORECTAL CANCER SCREENING  Discontinued        Frequency changed to Never automatically (Topic No Longer Applies)    2022  Surgical Procedure: AK COLONOSCOPY,DIAGNOSTIC    2016  COLONOSCOPY (Done)                    Patient Care Team:  Sruthi Kincaid D.O. as PCP - General (Internal Medicine)  Ankush Valadez M.D. as Consulting Physician (Neurology)  Brisa Nowak M.D. as Consulting Physician (Ophthalmology)  Colin Garza M.D. (Inactive) as Consulting Physician (Cardiovascular Disease (Cardiology))  Fabio Castro D.D.S. as Consulting Physician (Dentistry)    Social History     Tobacco Use    Smoking status: Former     Packs/day: 0.00     Types: Cigarettes     Quit date: 1964     Years since quittin.5    Smokeless tobacco: Never    Tobacco comments:     continued abstinence   Vaping Use    Vaping Use: Never used   Substance Use Topics    Alcohol use: Not Currently     Comment: several times weekly    Drug use: No     Family History   Problem Relation Age of Onset    Heart Disease Mother         STROKE    Stroke Mother     Cancer Father     Arthritis Sister     Diabetes Neg Hx  "    Hypertension Neg Hx     Hyperlipidemia Neg Hx      He  has a past medical history of Allergy, unspecified not elsewhere classified, Arrhythmia (02/02/2023), Arthritis (02/02/2023), Bleeding disorder (HCC) (02/02/2023), Bowel habit changes (02/02/2023), Cancer (HCC) (02/02/2023), Displaced fracture of medial malleolus of left tibia, initial encounter for closed fracture (09/23/2019), Encounter for therapeutic drug monitoring (05/18/2020), Gall stones, Glaucoma (02/02/2023), Hematoma of right lower extremity (10/21/2021), High cholesterol (02/02/2023), Hyperlipidemia, Left leg cellulitis (09/23/2019), Leg laceration (09/20/2019), Normocytic anemia (05/18/2020), Pain (02/02/2023), Seizure ([facility]) (02/02/2023), Skin lesion of right arm (06/28/2021), Unspecified cataract, and Unspecified hemorrhagic conditions.    He has no past medical history of Encounter for long-term (current) use of other medications.   Past Surgical History:   Procedure Laterality Date    UT COLONOSCOPY,DIAGNOSTIC N/A 09/07/2022    Procedure: COLONOSCOPY;  Surgeon: Topher Mesa M.D.;  Location: [facility];  Service: Gastroenterology    LUMBAR FUSION O-ARM Bilateral 05/18/2015    Procedure: LUMBAR FUSION O-ARM [83.10B] L3-5;  Surgeon: Francesco Joe M.D.;  Location: [facility];  Service:     LUMBAR DECOMPRESSION  05/18/2015    Procedure: LUMBAR DECOMPRESSION;  Surgeon: Francesco Joe M.D.;  Location: [facility];  Service:     CHRISTIE BY LAPAROSCOPY  12/03/2013    Performed by Braden Garner M.D. at [facility]    APPENDECTOMY      EYE SURGERY      KNEE REPLACEMENT, TOTAL Bilateral     OTHER      artificial urinary spincter    PROSTATECTOMY, RADICAL RETRO         Exam:   /64 (BP Location: Right arm, Patient Position: Sitting, BP Cuff Size: Adult)   Pulse (!) 53   Temp 35.9 °C (96.7 °F) (Temporal)   Resp 16   Ht 1.689 m (5' 6.5\")   Wt 71.5 kg (157 lb 11.2 oz)   SpO2 96%  Body " mass index is 25.07 kg/m².    Hearing good.    Dentition fair  Alert, oriented in no acute distress  Eye contact is good, speech goal directed, affect calm      Assessment and Plan. The following treatment and monitoring plan is recommended:    Problem List Items Addressed This Visit       Chronic atrial fibrillation (HCC)     Chronic ongoing problem, heart rate is slightly overtreated at this point so we have agreed to reduce his metoprolol tartrate to 12.5 mg twice daily and continue on apixaban 2.5 mg twice daily.  No concerns at this time.  He tries to follow his heart rate regularly especially when he is exercising.  Rare episodes of lightheadedness.         Relevant Medications    metoprolol tartrate (LOPRESSOR) 25 MG Tab    Dyslipidemia     Chronic ongoing problem.  We will update lab level to ensure stability.  Continue medical therapy with atorvastatin 20 mg daily.         Relevant Orders    FREE THYROXINE    TSH    VITAMIN B12    VITAMIN D,25 HYDROXY (DEFICIENCY)    Lipid Profile    Comp Metabolic Panel    CBC WITH DIFFERENTIAL    Stage 3a chronic kidney disease (HCC)     Chronic ongoing problem, encouraged him to continue good oral hydration, will reduce metoprolol to make sure there is no hypoperfusion.  Recheck kidney function to ensure stability.  Eliquis already renally dose.          Other Visit Diagnoses       Encounter for subsequent annual wellness visit (AWV) in Medicare patient    -  Primary              Services suggested: No services needed at this time  Health Care Screening recommendations as per orders if indicated.  Referrals offered: PT/OT/Nutrition counseling/Behavioral Health/Smoking cessation as per orders if indicated.    Discussion today about general wellness and lifestyle habits:    Prevent falls and reduce trip hazards; Cautioned about securing or removing rugs.  Have a working fire alarm and carbon monoxide detector;   Engage in regular physical activity and social activities.      Follow-up: Return in about 4 months (around 11/3/2023).       I spent a total of 34 minutes with record review, exam, communication with the patient, communication with other providers, and documentation of this encounter.       PLEASE NOTE: This dictation was created using voice recognition software. I have made every reasonable attempt to correct obvious errors, but I expect that there are errors of grammar and possibly content that I did not discover before finalizing the note.      Sruthi Kincaid, DO  Geriatric and Internal Medicine  [facility]

## 2023-07-03 NOTE — ASSESSMENT & PLAN NOTE
Chronic ongoing problem, encouraged him to continue good oral hydration, will reduce metoprolol to make sure there is no hypoperfusion.  Recheck kidney function to ensure stability.  Eliquis already renally dose.

## 2023-07-03 NOTE — ASSESSMENT & PLAN NOTE
Chronic ongoing problem, heart rate is slightly overtreated at this point so we have agreed to reduce his metoprolol tartrate to 12.5 mg twice daily and continue on apixaban 2.5 mg twice daily.  No concerns at this time.  He tries to follow his heart rate regularly especially when he is exercising.  Rare episodes of lightheadedness.

## 2023-12-21 DIAGNOSIS — E78.5 DYSLIPIDEMIA: ICD-10-CM

## 2023-12-21 RX ORDER — ATORVASTATIN CALCIUM 20 MG/1
20 TABLET, FILM COATED ORAL EVERY EVENING
Qty: 90 TABLET | Refills: 3 | Status: SHIPPED | OUTPATIENT
Start: 2023-12-21

## 2023-12-21 RX ORDER — ATORVASTATIN CALCIUM 20 MG/1
20 TABLET, FILM COATED ORAL EVERY EVENING
Qty: 90 TABLET | Refills: 0 | OUTPATIENT
Start: 2023-12-21

## 2023-12-21 NOTE — TELEPHONE ENCOUNTER
Received request via: Homa Alonso     Was the patient seen in the last year in this department? Yes    Does the patient have an active prescription (recently filled or refills available) for medication(s) requested? No    Does the patient have [facility] Plus and need 100 day supply (blood pressure, diabetes and cholesterol meds only)? Yes, quantity updated to 100 days

## 2024-01-18 DIAGNOSIS — S32.010A CLOSED COMPRESSION FRACTURE OF BODY OF L1 VERTEBRA (HCC): ICD-10-CM

## 2024-01-18 DIAGNOSIS — M80.00XA AGE-RELATED OSTEOPOROSIS WITH CURRENT PATHOLOGICAL FRACTURE, INITIAL ENCOUNTER: ICD-10-CM

## 2024-01-18 RX ORDER — ALENDRONATE SODIUM 70 MG/1
70 TABLET ORAL
Qty: 12 TABLET | Refills: 3 | Status: SHIPPED | OUTPATIENT
Start: 2024-01-18

## 2024-01-19 NOTE — TELEPHONE ENCOUNTER
Received request via: Patient    Was the patient seen in the last year in this department? Yes    Does the patient have an active prescription (recently filled or refills available) for medication(s) requested? No    Pharmacy Name: [facility]    Does the patient have long term Plus and need 100 day supply (blood pressure, diabetes and cholesterol meds only)? Yes, quantity updated to 100 days

## 2024-02-29 ENCOUNTER — OFFICE VISIT (OUTPATIENT)
Dept: MEDICAL GROUP | Facility: PHYSICIAN GROUP | Age: 89
End: 2024-02-29
Payer: COMMERCIAL

## 2024-02-29 VITALS
HEIGHT: 66 IN | WEIGHT: 162.9 LBS | OXYGEN SATURATION: 98 % | BODY MASS INDEX: 26.18 KG/M2 | TEMPERATURE: 97.2 F | HEART RATE: 41 BPM | DIASTOLIC BLOOD PRESSURE: 66 MMHG | RESPIRATION RATE: 16 BRPM | SYSTOLIC BLOOD PRESSURE: 142 MMHG

## 2024-02-29 DIAGNOSIS — E29.1 HYPOGONADISM IN MALE: ICD-10-CM

## 2024-02-29 DIAGNOSIS — I48.20 CHRONIC ATRIAL FIBRILLATION (HCC): ICD-10-CM

## 2024-02-29 DIAGNOSIS — N18.31 STAGE 3A CHRONIC KIDNEY DISEASE: ICD-10-CM

## 2024-02-29 DIAGNOSIS — G40.909 SEIZURE DISORDER (HCC): ICD-10-CM

## 2024-02-29 DIAGNOSIS — D69.6 THROMBOCYTOPENIA (HCC): ICD-10-CM

## 2024-02-29 DIAGNOSIS — E78.5 DYSLIPIDEMIA: ICD-10-CM

## 2024-02-29 DIAGNOSIS — I35.0 NONRHEUMATIC AORTIC VALVE STENOSIS: ICD-10-CM

## 2024-02-29 PROCEDURE — 3078F DIAST BP <80 MM HG: CPT | Performed by: INTERNAL MEDICINE

## 2024-02-29 PROCEDURE — 3077F SYST BP >= 140 MM HG: CPT | Performed by: INTERNAL MEDICINE

## 2024-02-29 PROCEDURE — 99214 OFFICE O/P EST MOD 30 MIN: CPT | Performed by: INTERNAL MEDICINE

## 2024-02-29 ASSESSMENT — PATIENT HEALTH QUESTIONNAIRE - PHQ9: CLINICAL INTERPRETATION OF PHQ2 SCORE: 0

## 2024-02-29 ASSESSMENT — FIBROSIS 4 INDEX: FIB4 SCORE: 4.66

## 2024-03-01 NOTE — PROGRESS NOTES
Subjective:   Chief Complaint/History of Present Illness:  Homa Alonso is a 89 y.o. male established patient who presents today to discuss medical problems as listed below. Homa is unaccompanied for today's visit.    Problem   Nonrheumatic Aortic Valve Stenosis    He has systolic murmur on exam, last echocardiogram in 2019 showed aortic sclerosis without stenosis.      Stage 3a Chronic Kidney Disease (Hcc)     Latest Reference Range & Units 02/06/23 09:12   Bun 8 - 22 mg/dL 28 (H)   Creatinine 0.50 - 1.40 mg/dL 1.16   GFR (CKD-EPI) >60 mL/min/1.73 m 2 58 !     He has CKD3a in 2023 after vertebral fracture. No nephrotoxic agents on board, he has good oral hydration. Blood pressure and heart rate slightly overtreated, mild prediabetes previously. History of total prostatectomy for prostate cancer with artificial sphincter in place, no recent UTI or hematuria episodes.     Thrombocytopenia (Hcc)     Latest Reference Range & Units 09/07/22 04:39   Platelet Count 164 - 446 K/uL 141 (L)     He has history of thrombocytopenia. He is taking Eliquis for stroke prophylaxis.     Hypogonadism in Male       Ref. Range 8/26/2020 09:33   Free Testosterone Latest Ref Range: 6.6 - 18.1 pg/mL 1.4 (L)   Testosterone,Total Latest Ref Range: 264 - 916 ng/dL 18 (L)     He reports radical prostatectomy 1991 following discovery of localized prostate cancer.  Unfortunately, he had complications of both erectile dysfunction as well as urinary incontinence.  He had an artificial sphincter placed around 2009 which has continued to work well.  He notes profound fatigue following his prostatectomy was found to have low testosterone levels and has been using AndroGel since about 2009 with great success.     Chronic Atrial Fibrillation (Hcc)    He has atrial fibrillation since at least 2019. He has followed with [facility]. In Spring 2021 his ventricular rate had increased and his metoprolol was increased per the note to 100 mg in  AM and 50 mg in PM. He needs to confirm exactly what dose he is taking, has not seen cardiology for about 6 months. He notes his ventricular rate has consistently been running the 40-50 range the past few months and he has some associated fatigue/weakness. He reduced his Eliquis to 2.5 mg twice daily after rectal bleeding episode in Sept 2022. Metoprolol dose reducing in July 2023 due to bradycardia.    Current regimen: metoprolol tartrate 12.5 mg twice daily and low dose Eliquis  Previous regimen: metoprolol tartrate 25 mg twice daily     ([facility]) Seizure disorder    Noted in June 2014.  Witnessed by his wife when he had full body convulsions.  He presented to the ER where he was postictal.  He followed up with neurology who performed an EEG and thought that there was epileptiform activity.  He was started on Keppra which he has maintained since that time.  He did try to go off of it once but notes he had recurrence of seizure activity and has gone back on it.  He is able to drive at this time because he has been so well controlled.  No subsequent seizures. He previously saw Dr. Valadez, currently not following with a neurologist.    Current regimen: Keppra 500 mg twice daily     Dyslipidemia       Ref. Range 8/26/2020 09:33   Cholesterol,Tot Latest Ref Range: 100 - 199 mg/dL 154   Triglycerides Latest Ref Range: 0 - 149 mg/dL 178 (H)   HDL Latest Ref Range: >39 mg/dL 52   LDL Latest Ref Range: 0 - 99 mg/dL 66       He reports chronic use of atorvastatin for history of elevated cholesterol panel.  He denies any myalgias or GI upset on this medication.  He is tolerating without difficulty.  He denies history of heart attack or stroke in the past.    Current regimen: Atorvastatin 20 mg daily          Current Medications:  Current Outpatient Medications Ordered in Epic   Medication Sig Dispense Refill    alendronate (FOSAMAX) 70 MG Tab Take 1 tablet by mouth once a week 12 Tablet 3    atorvastatin (LIPITOR) 20 MG Tab  "Take 1 Tablet by mouth every evening. 90 Tablet 3    latanoprost (XALATAN) 0.005 % Solution INSTILL 1 DROP INTO AFFECTED EYE(S) BY OPHTHALMIC ROUTE ONCE DAILY INTHE EVENING      metoprolol tartrate (LOPRESSOR) 25 MG Tab Take 0.5 Tablets by mouth 2 times a day. 90 Tablet 3    bimatoprost (LUMIGAN) 0.01 % Solution INSTILL 1 DROP INTO RIGHT EYE EVERY DAY AT BEDTIME Strength: 0.01 % 7.5 mL 3    levETIRAcetam (KEPPRA) 500 MG Tab Take 1 Tablet by mouth 2 times a day. 180 Tablet 3    Apoaequorin (PREVAGEN EXTRA STRENGTH PO) Take 1 Tablet by mouth every day.      apixaban (ELIQUIS) 2.5mg Tab Take 1 Tablet by mouth 2 times a day. 180 Tablet 3    docusate sodium (COLACE) 100 MG Cap Take 1 Capsule by mouth 2 times a day as needed for Constipation. 60 Capsule 2    Ascorbic Acid (VITAMIN C PO) Take 1 Tablet by mouth every day.      Multiple Vitamins-Minerals (ICAPS AREDS 2) Chew Tab Chew 1 Capsule 2 times a day.      B Complex Vitamins (B COMPLEX B-12 PO) Take 1 Tablet by mouth every day.      Omega-3 Fatty Acids (FISH OIL) 1000 MG Cap capsule Take 1,000 mg by mouth every day.      multivitamin (THERAGRAN) Tab Take 1 Tab by mouth every day.      Cholecalciferol (VITAMIN D3 PO) Take 2,000 Units by mouth every day.      CALCIUM CITRATE PO Take 1 Tablet by mouth 2 times a day.      MAGNESIUM CITRATE PO Take 1 Capsule by mouth every evening.       No current Epic-ordered facility-administered medications on file.          Objective:   Physical Exam:    Vitals: BP (!) 142/66 (BP Location: Left arm, Patient Position: Sitting, BP Cuff Size: Adult)   Pulse (!) 41   Temp 36.2 °C (97.2 °F) (Temporal)   Resp 16   Ht 1.676 m (5' 6\")   Wt 73.9 kg (162 lb 14.4 oz)   SpO2 98%    BMI: Body mass index is 26.29 kg/m².  Physical Exam  Constitutional:       General: He is not in acute distress.     Appearance: Normal appearance. He is not ill-appearing.   HENT:      Right Ear: Ear canal and external ear normal. There is no impacted cerumen. "      Left Ear: Ear canal and external ear normal. There is no impacted cerumen.   Eyes:      General: No scleral icterus.     Conjunctiva/sclera: Conjunctivae normal.   Cardiovascular:      Rate and Rhythm: Regular rhythm. Bradycardia present.      Pulses: Normal pulses.      Heart sounds: Murmur heard.   Pulmonary:      Effort: Pulmonary effort is normal. No respiratory distress.      Breath sounds: Normal breath sounds. No wheezing or rhonchi.   Abdominal:      General: Bowel sounds are normal.      Palpations: Abdomen is soft.   Musculoskeletal:      Right lower leg: No edema.      Left lower leg: No edema.   Skin:     General: Skin is warm and dry.      Findings: No rash.   Neurological:      Comments: Cautious gait   Psychiatric:         Mood and Affect: Mood normal.         Behavior: Behavior normal.         Thought Content: Thought content normal.         Judgment: Judgment normal.          Assessment and Plan:   Homa is a 89 y.o. male with the following:  Problem List Items Addressed This Visit       (HCC) Seizure disorder     Chronic ongoing problem, no seizure activity for the past 10 years, continues on medical therapy with Keppra 500 mg twice daily.         Chronic atrial fibrillation (HCC)     Chronic and ongoing problem, rate is fairly bradycardic, he has been taking full dose of metoprolol tartrate, advised him to decrease this to half dose at 12.5 mg twice daily and continue on Eliquis for stroke prophylaxis.         Dyslipidemia     Chronic ongoing problem, overdue for repeat lab work, will update lipid panel, continue atorvastatin 20 mg daily in the interim.         Relevant Orders    FREE THYROXINE    TSH    VITAMIN B12    VITAMIN D,25 HYDROXY (DEFICIENCY)    Lipid Profile    Comp Metabolic Panel    CBC WITH DIFFERENTIAL    Hypogonadism in male     Chronic ongoing problem, continue Androgel which has been helpful for symptomatic hypogonadism since 2009. Testosterone levels are very low off  medical therapy.          Nonrheumatic aortic valve stenosis     Chronic ongoing problem, no recent ultrasound, murmur sounds slightly increased, update echocardiogram to ensure stability.          Relevant Orders    EC-ECHOCARDIOGRAM COMPLETE W/O CONT    Stage 3a chronic kidney disease (HCC)     Chronic problem, will update kidney function to ensure stability, continue current regimen.         Thrombocytopenia (HCC)     Chronic and ongoing problem, has history of easy bruising/bleeding, mainly on the forearms, no significant bruising or bleeding. Continue on Eliquis 2.5 mg twice daily for stroke prophylaxis.               RTC: Return in about 4 months (around 6/29/2024).    I spent a total of 36 minutes with record review, exam, communication with the patient, communication with other providers, and documentation of this encounter.    PLEASE NOTE: This dictation was created using voice recognition software. I have made every reasonable attempt to correct obvious errors, but I expect that there are errors of grammar and possibly content that I did not discover before finalizing the note.      Sruthi Kincaid, DO  Geriatric and Internal Medicine  [facility]

## 2024-03-01 NOTE — ASSESSMENT & PLAN NOTE
Chronic and ongoing problem, rate is fairly bradycardic, he has been taking full dose of metoprolol tartrate, advised him to decrease this to half dose at 12.5 mg twice daily and continue on Eliquis for stroke prophylaxis.

## 2024-03-01 NOTE — ASSESSMENT & PLAN NOTE
Chronic ongoing problem, no recent ultrasound, murmur sounds slightly increased, update echocardiogram to ensure stability.

## 2024-03-01 NOTE — ASSESSMENT & PLAN NOTE
Chronic ongoing problem, continue Androgel which has been helpful for symptomatic hypogonadism since 2009. Testosterone levels are very low off medical therapy.

## 2024-03-01 NOTE — ASSESSMENT & PLAN NOTE
Chronic ongoing problem, overdue for repeat lab work, will update lipid panel, continue atorvastatin 20 mg daily in the interim.

## 2024-03-01 NOTE — ASSESSMENT & PLAN NOTE
Chronic ongoing problem, no seizure activity for the past 10 years, continues on medical therapy with Keppra 500 mg twice daily.

## 2024-03-01 NOTE — ASSESSMENT & PLAN NOTE
Chronic and ongoing problem, has history of easy bruising/bleeding, mainly on the forearms, no significant bruising or bleeding. Continue on Eliquis 2.5 mg twice daily for stroke prophylaxis.

## 2024-03-05 ENCOUNTER — TELEPHONE (OUTPATIENT)
Dept: MEDICAL GROUP | Facility: PHYSICIAN GROUP | Age: 89
End: 2024-03-05
Payer: COMMERCIAL

## 2024-03-05 DIAGNOSIS — E29.1 HYPOGONADISM IN MALE: ICD-10-CM

## 2024-03-05 NOTE — TELEPHONE ENCOUNTER
Received request via: Patient    Was the patient seen in the last year in this department? Yes    Does the patient have an active prescription (recently filled or refills available) for medication(s) requested? No    Pharmacy Name: [facility]     Does the patient have long term Plus and need 100 day supply (blood pressure, diabetes and cholesterol meds only)? Medication is not for cholesterol, blood pressure or diabetes

## 2024-03-06 RX ORDER — TESTOSTERONE GEL, 1% 10 MG/G
GEL TRANSDERMAL
Qty: 450 G | Refills: 0 | Status: SHIPPED | OUTPATIENT
Start: 2024-03-06 | End: 2024-06-04

## 2024-03-07 DIAGNOSIS — E29.1 HYPOGONADISM IN MALE: ICD-10-CM

## 2024-03-11 RX ORDER — TESTOSTERONE GEL, 1% 10 MG/G
GEL TRANSDERMAL
Qty: 450 G | Refills: 0 | OUTPATIENT
Start: 2024-03-11

## 2024-03-12 ENCOUNTER — HOSPITAL ENCOUNTER (OUTPATIENT)
Dept: CARDIOLOGY | Facility: MEDICAL CENTER | Age: 89
End: 2024-03-12
Attending: INTERNAL MEDICINE
Payer: COMMERCIAL

## 2024-03-12 DIAGNOSIS — I35.0 NONRHEUMATIC AORTIC VALVE STENOSIS: ICD-10-CM

## 2024-03-12 PROCEDURE — 93306 TTE W/DOPPLER COMPLETE: CPT

## 2024-03-14 LAB
LV EJECT FRACT  99904: 66
LV EJECT FRACT MOD 2C 99903: 45.56
LV EJECT FRACT MOD 4C 99902: 81.46
LV EJECT FRACT MOD BP 99901: 66.11

## 2024-03-14 PROCEDURE — 93306 TTE W/DOPPLER COMPLETE: CPT | Mod: 26 | Performed by: INTERNAL MEDICINE

## 2024-04-17 DIAGNOSIS — I48.20 CHRONIC ATRIAL FIBRILLATION (HCC): ICD-10-CM

## 2024-04-17 NOTE — TELEPHONE ENCOUNTER
Received request via: Patient    Was the patient seen in the last year in this department? Yes    Does the patient have an active prescription (recently filled or refills available) for medication(s) requested? No    Pharmacy Name: [facility]     Does the patient have custodial Plus and need 100 day supply (blood pressure, diabetes and cholesterol meds only)? Yes, quantity updated to 100 days

## 2024-04-18 RX ORDER — APIXABAN 2.5 MG/1
2.5 TABLET, FILM COATED ORAL 2 TIMES DAILY
Qty: 180 TABLET | Refills: 3 | Status: SHIPPED | OUTPATIENT
Start: 2024-04-18

## 2024-04-27 DIAGNOSIS — G40.909 SEIZURE DISORDER (HCC): ICD-10-CM

## 2024-04-29 RX ORDER — LEVETIRACETAM 500 MG/1
500 TABLET ORAL 2 TIMES DAILY
Qty: 180 TABLET | Refills: 0 | Status: SHIPPED | OUTPATIENT
Start: 2024-04-29

## 2024-05-22 DIAGNOSIS — I48.20 CHRONIC ATRIAL FIBRILLATION (HCC): ICD-10-CM

## 2024-05-22 NOTE — TELEPHONE ENCOUNTER
Requested Prescriptions     Pending Prescriptions Disp Refills    metoprolol tartrate (LOPRESSOR) 25 MG Tab [Pharmacy Med Name: Metoprolol Tartrate 25 MG Oral Tablet] 180 Tablet 0     Sig: Take 1 tablet by mouth twice daily     Amanda Hanna M.D.

## 2024-06-11 ENCOUNTER — TELEPHONE (OUTPATIENT)
Dept: MEDICAL GROUP | Facility: PHYSICIAN GROUP | Age: 89
End: 2024-06-11

## 2024-06-11 NOTE — TELEPHONE ENCOUNTER
1. Caller Name: Homa Alonso                          Call Back Number: [phone] (home)         How would the patient prefer to be contacted with a response: Phone call OK to leave a detailed message    Left message for patient had an appointment today  we missed him.  Please call to reschedule

## 2024-06-13 ENCOUNTER — HOSPITAL ENCOUNTER (OUTPATIENT)
Dept: LAB | Facility: MEDICAL CENTER | Age: 89
End: 2024-06-13
Attending: INTERNAL MEDICINE
Payer: COMMERCIAL

## 2024-06-13 DIAGNOSIS — E78.5 DYSLIPIDEMIA: ICD-10-CM

## 2024-06-13 LAB
25(OH)D3 SERPL-MCNC: 58 NG/ML (ref 30–100)
ALBUMIN SERPL BCP-MCNC: 4.1 G/DL (ref 3.2–4.9)
ALBUMIN/GLOB SERPL: 1.3 G/DL
ALP SERPL-CCNC: 65 U/L (ref 30–99)
ALT SERPL-CCNC: 17 U/L (ref 2–50)
ANION GAP SERPL CALC-SCNC: 11 MMOL/L (ref 7–16)
AST SERPL-CCNC: 28 U/L (ref 12–45)
BASOPHILS # BLD AUTO: 0.4 % (ref 0–1.8)
BASOPHILS # BLD: 0.03 K/UL (ref 0–0.12)
BILIRUB SERPL-MCNC: 0.5 MG/DL (ref 0.1–1.5)
BUN SERPL-MCNC: 20 MG/DL (ref 8–22)
CALCIUM ALBUM COR SERPL-MCNC: 9.5 MG/DL (ref 8.5–10.5)
CALCIUM SERPL-MCNC: 9.6 MG/DL (ref 8.5–10.5)
CHLORIDE SERPL-SCNC: 105 MMOL/L (ref 96–112)
CHOLEST SERPL-MCNC: 118 MG/DL (ref 100–199)
CO2 SERPL-SCNC: 25 MMOL/L (ref 20–33)
CREAT SERPL-MCNC: 1.06 MG/DL (ref 0.5–1.4)
EOSINOPHIL # BLD AUTO: 0.08 K/UL (ref 0–0.51)
EOSINOPHIL NFR BLD: 1.1 % (ref 0–6.9)
ERYTHROCYTE [DISTWIDTH] IN BLOOD BY AUTOMATED COUNT: 49.1 FL (ref 35.9–50)
GFR SERPLBLD CREATININE-BSD FMLA CKD-EPI: 64 ML/MIN/1.73 M 2
GLOBULIN SER CALC-MCNC: 3.2 G/DL (ref 1.9–3.5)
GLUCOSE SERPL-MCNC: 78 MG/DL (ref 65–99)
HCT VFR BLD AUTO: 45.3 % (ref 42–52)
HDLC SERPL-MCNC: 48 MG/DL
HGB BLD-MCNC: 14.1 G/DL (ref 14–18)
IMM GRANULOCYTES # BLD AUTO: 0.01 K/UL (ref 0–0.11)
IMM GRANULOCYTES NFR BLD AUTO: 0.1 % (ref 0–0.9)
LDLC SERPL CALC-MCNC: 43 MG/DL
LYMPHOCYTES # BLD AUTO: 2.3 K/UL (ref 1–4.8)
LYMPHOCYTES NFR BLD: 31.4 % (ref 22–41)
MCH RBC QN AUTO: 29.1 PG (ref 27–33)
MCHC RBC AUTO-ENTMCNC: 31.1 G/DL (ref 32.3–36.5)
MCV RBC AUTO: 93.4 FL (ref 81.4–97.8)
MONOCYTES # BLD AUTO: 0.81 K/UL (ref 0–0.85)
MONOCYTES NFR BLD AUTO: 11.1 % (ref 0–13.4)
NEUTROPHILS # BLD AUTO: 4.1 K/UL (ref 1.82–7.42)
NEUTROPHILS NFR BLD: 55.9 % (ref 44–72)
NRBC # BLD AUTO: 0 K/UL
NRBC BLD-RTO: 0 /100 WBC (ref 0–0.2)
PLATELET # BLD AUTO: 154 K/UL (ref 164–446)
PMV BLD AUTO: 11.7 FL (ref 9–12.9)
POTASSIUM SERPL-SCNC: 4.4 MMOL/L (ref 3.6–5.5)
PROT SERPL-MCNC: 7.3 G/DL (ref 6–8.2)
RBC # BLD AUTO: 4.85 M/UL (ref 4.7–6.1)
SODIUM SERPL-SCNC: 141 MMOL/L (ref 135–145)
T4 FREE SERPL-MCNC: 1.23 NG/DL (ref 0.93–1.7)
TRIGL SERPL-MCNC: 137 MG/DL (ref 0–149)
TSH SERPL DL<=0.005 MIU/L-ACNC: 1.88 UIU/ML (ref 0.38–5.33)
VIT B12 SERPL-MCNC: 262 PG/ML (ref 211–911)
WBC # BLD AUTO: 7.3 K/UL (ref 4.8–10.8)

## 2024-06-13 PROCEDURE — 82306 VITAMIN D 25 HYDROXY: CPT

## 2024-06-13 PROCEDURE — 85025 COMPLETE CBC W/AUTO DIFF WBC: CPT

## 2024-06-13 PROCEDURE — 82607 VITAMIN B-12: CPT

## 2024-06-13 PROCEDURE — 80053 COMPREHEN METABOLIC PANEL: CPT

## 2024-06-13 PROCEDURE — 36415 COLL VENOUS BLD VENIPUNCTURE: CPT

## 2024-06-13 PROCEDURE — 84439 ASSAY OF FREE THYROXINE: CPT

## 2024-06-13 PROCEDURE — 80061 LIPID PANEL: CPT

## 2024-06-13 PROCEDURE — 84443 ASSAY THYROID STIM HORMONE: CPT

## 2024-06-25 ENCOUNTER — OFFICE VISIT (OUTPATIENT)
Dept: MEDICAL GROUP | Facility: PHYSICIAN GROUP | Age: 89
End: 2024-06-25
Payer: COMMERCIAL

## 2024-06-25 VITALS
DIASTOLIC BLOOD PRESSURE: 60 MMHG | TEMPERATURE: 98.2 F | HEART RATE: 46 BPM | BODY MASS INDEX: 26.78 KG/M2 | OXYGEN SATURATION: 97 % | WEIGHT: 166.6 LBS | SYSTOLIC BLOOD PRESSURE: 112 MMHG | HEIGHT: 66 IN

## 2024-06-25 DIAGNOSIS — I48.20 CHRONIC ATRIAL FIBRILLATION (HCC): ICD-10-CM

## 2024-06-25 DIAGNOSIS — D69.6 THROMBOCYTOPENIA (HCC): ICD-10-CM

## 2024-06-25 DIAGNOSIS — S32.010A CLOSED COMPRESSION FRACTURE OF BODY OF L1 VERTEBRA (HCC): ICD-10-CM

## 2024-06-25 DIAGNOSIS — E53.8 B12 DEFICIENCY: ICD-10-CM

## 2024-06-25 DIAGNOSIS — N18.31 STAGE 3A CHRONIC KIDNEY DISEASE: ICD-10-CM

## 2024-06-25 DIAGNOSIS — G40.909 SEIZURE DISORDER (HCC): ICD-10-CM

## 2024-06-25 DIAGNOSIS — E78.5 DYSLIPIDEMIA: ICD-10-CM

## 2024-06-25 PROCEDURE — 3078F DIAST BP <80 MM HG: CPT | Performed by: INTERNAL MEDICINE

## 2024-06-25 PROCEDURE — 3074F SYST BP LT 130 MM HG: CPT | Performed by: INTERNAL MEDICINE

## 2024-06-25 PROCEDURE — 99214 OFFICE O/P EST MOD 30 MIN: CPT | Performed by: INTERNAL MEDICINE

## 2024-06-25 PROCEDURE — G2211 COMPLEX E/M VISIT ADD ON: HCPCS | Performed by: INTERNAL MEDICINE

## 2024-06-25 ASSESSMENT — FIBROSIS 4 INDEX: FIB4 SCORE: 4.28

## 2024-06-26 NOTE — ASSESSMENT & PLAN NOTE
Chronic stable problem, continue metoprolol tartrate 25 daily, denies any lightheadedness dizziness and bradycardia.  Continue on Eliquis renally dosed due to age and history of CKD at 2.5 mg twice daily for stroke prophylaxis.

## 2024-06-26 NOTE — ASSESSMENT & PLAN NOTE
Chronic and stable problem, labs reviewed improved to continue current antiepileptic drug, continue levetiracetam 500 mg twice daily.  No recent seizure activity.

## 2024-06-26 NOTE — ASSESSMENT & PLAN NOTE
Previous problem, we are about a year out from his back fracture pain and mobility are doing a lot better, continue alendronate use lidocaine as needed.  Continue regular walks, currently walks every other day for 30 minutes.

## 2024-06-26 NOTE — ASSESSMENT & PLAN NOTE
Improved problem, GFR now above 60, continue low-dose Eliquis because of history of CKD and age, continue good oral hydration and limit nephrotoxic agents as able.

## 2024-06-26 NOTE — ASSESSMENT & PLAN NOTE
New problem, does not appear to be symptomatic, offered B12 injection clinically he would like to start an oral supplement which I think is reasonable.  Recommend starting with 100 mcg daily.

## 2024-06-26 NOTE — ASSESSMENT & PLAN NOTE
Chronic stable problem, lipids well-controlled on current medical therapy, continue atorvastatin 20 mg daily.

## 2024-06-26 NOTE — PROGRESS NOTES
Patient has been notified    Subjective:   Chief Complaint/History of Present Illness:  Homa Alonso is a 89 y.o. male established patient who presents today to discuss medical problems as listed below. Homa is unaccompanied for today's visit.    History of Present Illness  The patient presents for evaluation of multiple medical concerns.    The patient denies experiencing any edema in his ankles. He acknowledges a slight decrease in his weight below 160 pounds, which he attributes to inadequate physical activity due to warm weather. He attempts to maintain a walking schedule at least every other day, however, his schedule has been inconsistent. His appetite is suboptimal due to inadequate exercise. He reports experiencing arthritic numbness upon awakening in the hands. His sleep pattern is irregular, he typically retires to bed around 12:30 AM or 1:00 AM and wakes up around 7:00 AM. He denies experiencing insomnia. His back pain has resolved following an injection. 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 He has exhausted his supply of vitamin C. He is not currently on any prescription vitamin supplements for B12. He reports no digestive issues.        Problem   B12 Deficiency    He has low running vitamin B12, does not think he is getting any supplementation at this time.  Has episodic numbness in the hands which he relates to neuritis and otherwise does not feel like he is having any symptoms.     Closed Compression Fracture of Body of L1 Vertebra (Hcc)    He developed acute back pain after a fall, he has history of back surgery.  Imaging demonstrated acute L1 compression fracture.  Prior operative site in the back was stable.  He has been using Aleve and heating pads.  He was referred to interventional radiology to consider kyphoplasty however his insurance did not approve as he did not have it completed bone density on  file to confirm the osteoporosis.  He would be open to adding a Lidoderm patch for better pain control.    He is now status post kyphoplasty following completion of bone density and initiation of alendronate.     Stage 3a Chronic Kidney Disease     Latest Reference Range & Units 06/13/24 10:14   Bun 8 - 22 mg/dL 20   Creatinine 0.50 - 1.40 mg/dL 1.06   GFR (CKD-EPI) >60 mL/min/1.73 m 2 64     He has CKD3a in 2023 after vertebral fracture. No nephrotoxic agents on board, he has good oral hydration. Blood pressure and heart rate slightly overtreated, mild prediabetes previously. History of total prostatectomy for prostate cancer with artificial sphincter in place, no recent UTI or hematuria episodes.     Thrombocytopenia ([facility])     Latest Reference Range & Units 06/13/24 10:14   Platelet Count 164 - 446 K/uL 154 (L)     He has history of thrombocytopenia. He is taking Eliquis for stroke prophylaxis.     Chronic Atrial Fibrillation ([facility])    He has atrial fibrillation since at least 2019. He has followed with [place] cardiology. In Spring 2021 his ventricular rate had increased and his metoprolol was increased per the note to 100 mg in AM and 50 mg in PM. He needs to confirm exactly what dose he is taking, has not seen cardiology for about 6 months. He notes his ventricular rate has consistently been running the 40-50 range the past few months and he has some associated fatigue/weakness. He reduced his Eliquis to 2.5 mg twice daily after rectal bleeding episode in Sept 2022. Metoprolol dose reducing in July 2023 due to bradycardia.    Current regimen: metoprolol tartrate 12.5 mg twice daily and low dose Eliquis  Previous regimen: metoprolol tartrate 25 mg twice daily     ([facility]) Seizure disorder    Noted in June 2014.  Witnessed by his wife when he had full body convulsions.  He presented to the ER where he was postictal.  He followed up with neurology who performed an EEG and thought that there was epileptiform  activity.  He was started on Keppra which he has maintained since that time.  He did try to go off of it once but notes he had recurrence of seizure activity and has gone back on it.  He is able to drive at this time because he has been so well controlled.  No subsequent seizures. He previously saw Dr. Valadez, currently not following with a neurologist.    Current regimen: Keppra 500 mg twice daily     Dyslipidemia     Latest Reference Range & Units 06/13/24 10:14   Cholesterol,Tot 100 - 199 mg/dL 118   Triglycerides 0 - 149 mg/dL 137   HDL >=40 mg/dL 48   LDL <100 mg/dL 43     He reports chronic use of atorvastatin for history of elevated cholesterol panel.  He denies any myalgias or GI upset on this medication.  He is tolerating without difficulty.  He denies history of heart attack or stroke in the past.    Current regimen: Atorvastatin 20 mg daily          Current Medications:  Current Outpatient Medications Ordered in Epic   Medication Sig Dispense Refill    metoprolol tartrate (LOPRESSOR) 25 MG Tab Take 1 tablet by mouth twice daily 180 Tablet 0    levETIRAcetam (KEPPRA) 500 MG Tab Take 1 tablet by mouth twice daily 180 Tablet 0    ELIQUIS 2.5 MG Tab Take 1 tablet by mouth twice daily 180 Tablet 3    alendronate (FOSAMAX) 70 MG Tab Take 1 tablet by mouth once a week 12 Tablet 3    atorvastatin (LIPITOR) 20 MG Tab Take 1 Tablet by mouth every evening. 90 Tablet 3    latanoprost (XALATAN) 0.005 % Solution INSTILL 1 DROP INTO AFFECTED EYE(S) BY OPHTHALMIC ROUTE ONCE DAILY INTHE EVENING      bimatoprost (LUMIGAN) 0.01 % Solution INSTILL 1 DROP INTO RIGHT EYE EVERY DAY AT BEDTIME Strength: 0.01 % 7.5 mL 3    Apoaequorin (PREVAGEN EXTRA STRENGTH PO) Take 1 Tablet by mouth every day.      docusate sodium (COLACE) 100 MG Cap Take 1 Capsule by mouth 2 times a day as needed for Constipation. 60 Capsule 2    Ascorbic Acid (VITAMIN C PO) Take 1 Tablet by mouth every day.      Multiple Vitamins-Minerals (ICAPS AREDS 2)  "Chew Tab Chew 1 Capsule 2 times a day.      B Complex Vitamins (B COMPLEX B-12 PO) Take 1 Tablet by mouth every day.      Omega-3 Fatty Acids (FISH OIL) 1000 MG Cap capsule Take 1,000 mg by mouth every day.      multivitamin (THERAGRAN) Tab Take 1 Tab by mouth every day.      Cholecalciferol (VITAMIN D3 PO) Take 2,000 Units by mouth every day.      CALCIUM CITRATE PO Take 1 Tablet by mouth 2 times a day.      MAGNESIUM CITRATE PO Take 1 Capsule by mouth every evening.       No current Epic-ordered facility-administered medications on file.          Objective:   Physical Exam:    Vitals: /60 (BP Location: Left arm, Patient Position: Sitting, BP Cuff Size: Adult)   Pulse (!) 46   Temp 36.8 °C (98.2 °F) (Temporal)   Ht 1.676 m (5' 6\")   Wt 75.6 kg (166 lb 9.6 oz)   SpO2 97%    BMI: Body mass index is 26.89 kg/m².  Physical Exam  Constitutional:       General: He is not in acute distress.     Appearance: Normal appearance. He is not ill-appearing.   HENT:      Right Ear: Ear canal and external ear normal. There is no impacted cerumen.      Left Ear: Ear canal and external ear normal. There is no impacted cerumen.   Eyes:      General: No scleral icterus.     Conjunctiva/sclera: Conjunctivae normal.   Cardiovascular:      Rate and Rhythm: Regular rhythm. Bradycardia present.      Pulses: Normal pulses.   Pulmonary:      Effort: Pulmonary effort is normal. No respiratory distress.      Breath sounds: Normal breath sounds. No wheezing or rhonchi.   Abdominal:      General: Bowel sounds are normal. There is no distension.      Palpations: Abdomen is soft.   Musculoskeletal:      Comments: Trace left > right lower extremity edema   Skin:     General: Skin is warm and dry.      Findings: No rash.   Neurological:      Comments: Cautious gait, uses a walking stick   Psychiatric:         Mood and Affect: Mood normal.         Behavior: Behavior normal.         Thought Content: Thought content normal.         Judgment: " Judgment normal.        Results  Laboratory Studies  White blood cell count is normal. Differential count is normal. Red blood cell count is 14.1. Bone marrow is working appropriately. Platelet count is normal. Kidney function is normal. Electrolyte levels are normal. Liver enzymes are normal. Protein levels are normal. Cholesterol levels are normal. Vitamin D levels are normal. Thyroid levels are normal. Vitamin B12 level is low.    Assessment & Plan  1. Routine follow-up.  The patient's blood pressure is within normal range, however, his heart rate remains slightly low due to medication. His white blood cell count is within the normal range, and his platelet count is within the normal range. His kidney function, electrolyte levels, liver enzymes, protein levels, cholesterol, vitamin D, and thyroid levels are all within normal ranges. However, his vitamin B12 level is low.        Assessment and Plan:   Homa is a 89 y.o. male with the following:  Problem List Items Addressed This Visit       ([facility]) Seizure disorder     Chronic and stable problem, labs reviewed improved to continue current antiepileptic drug, continue levetiracetam 500 mg twice daily.  No recent seizure activity.         B12 deficiency     New problem, does not appear to be symptomatic, offered B12 injection clinically he would like to start an oral supplement which I think is reasonable.  Recommend starting with 100 mcg daily.         Chronic atrial fibrillation ([facility])     Chronic stable problem, continue metoprolol tartrate 25 daily, denies any lightheadedness dizziness and bradycardia.  Continue on Eliquis renally dosed due to age and history of CKD at 2.5 mg twice daily for stroke prophylaxis.         Closed compression fracture of body of L1 vertebra ([facility])     Previous problem, we are about a year out from his back fracture pain and mobility are doing a lot better, continue alendronate use lidocaine as needed.  Continue regular walks, currently  walks every other day for 30 minutes.         Dyslipidemia     Chronic stable problem, lipids well-controlled on current medical therapy, continue atorvastatin 20 mg daily.         Stage 3a chronic kidney disease     Improved problem, GFR now above 60, continue low-dose Eliquis because of history of CKD and age, continue good oral hydration and limit nephrotoxic agents as able.         Thrombocytopenia (HCC)     Chronic and stable problem, no bruising or bleeding, lesions, continue pelvic observation for stability, okay to continue on oral anticoagulant.           Verbal consent was acquired by the patient to use [employer] ambient listening note generation during this visit Yes       Billing : secondary to the complexity of this patient's illnesses and their interactions.  All problems listed were discussed during the office visit, medications were evaluated and complexities were discussed as well as plan for the future.     RTC: Return in about 6 months (around 12/25/2024).    I spent a total of 30 minutes with record review, exam, communication with the patient, communication with other providers, and documentation of this encounter.    PLEASE NOTE: This dictation was created using voice recognition software. I have made every reasonable attempt to correct obvious errors, but I expect that there are errors of grammar and possibly content that I did not discover before finalizing the note.      Sruthi Kincaid, DO  Geriatric and Internal Medicine  [facility]

## 2024-06-26 NOTE — ASSESSMENT & PLAN NOTE
Chronic and stable problem, no bruising or bleeding, lesions, continue pelvic observation for stability, okay to continue on oral anticoagulant.

## 2024-08-15 DIAGNOSIS — E29.1 HYPOGONADISM IN MALE: ICD-10-CM

## 2024-08-15 RX ORDER — TESTOSTERONE GEL, 1% 10 MG/G
GEL TRANSDERMAL
Qty: 450 G | Refills: 0 | Status: SHIPPED | OUTPATIENT
Start: 2024-08-15 | End: 2024-11-13

## 2024-08-15 NOTE — TELEPHONE ENCOUNTER
Received request via: Pharmacy    Was the patient seen in the last year in this department? Yes    Does the patient have an active prescription (recently filled or refills available) for medication(s) requested? No    Pharmacy Name: [facility]      Does the patient have [facility] Plus and need 100-day supply? (This applies to ALL medications) Yes, quantity updated to 100 days

## 2024-09-10 DIAGNOSIS — G40.909 SEIZURE DISORDER (HCC): ICD-10-CM

## 2024-09-10 RX ORDER — LEVETIRACETAM 500 MG/1
500 TABLET ORAL 2 TIMES DAILY
Qty: 180 TABLET | Refills: 3 | Status: SHIPPED | OUTPATIENT
Start: 2024-09-10

## 2024-09-10 NOTE — TELEPHONE ENCOUNTER
Received request via: Pharmacy    Was the patient seen in the last year in this department? Yes    Does the patient have an active prescription (recently filled or refills available) for medication(s) requested? No    Pharmacy Name: [facility]    Does the patient have skilled nursing Plus and need 100-day supply? (This applies to ALL medications) Yes, quantity updated to 100 days

## 2024-11-12 ENCOUNTER — OFFICE VISIT (OUTPATIENT)
Dept: MEDICAL GROUP | Facility: PHYSICIAN GROUP | Age: 89
End: 2024-11-12
Payer: COMMERCIAL

## 2024-11-12 VITALS
DIASTOLIC BLOOD PRESSURE: 62 MMHG | SYSTOLIC BLOOD PRESSURE: 120 MMHG | OXYGEN SATURATION: 98 % | HEIGHT: 66 IN | BODY MASS INDEX: 26.16 KG/M2 | WEIGHT: 162.8 LBS | TEMPERATURE: 97.3 F | RESPIRATION RATE: 16 BRPM | HEART RATE: 53 BPM

## 2024-11-12 DIAGNOSIS — I35.0 MODERATE AORTIC STENOSIS: ICD-10-CM

## 2024-11-12 DIAGNOSIS — D69.6 THROMBOCYTOPENIA (HCC): ICD-10-CM

## 2024-11-12 DIAGNOSIS — N18.31 STAGE 3A CHRONIC KIDNEY DISEASE: ICD-10-CM

## 2024-11-12 DIAGNOSIS — H40.9 GLAUCOMA OF RIGHT EYE, UNSPECIFIED GLAUCOMA TYPE: ICD-10-CM

## 2024-11-12 DIAGNOSIS — E53.8 B12 DEFICIENCY: ICD-10-CM

## 2024-11-12 DIAGNOSIS — Z71.1 CONCERN ABOUT MEMORY: ICD-10-CM

## 2024-11-12 DIAGNOSIS — I48.20 CHRONIC ATRIAL FIBRILLATION (HCC): ICD-10-CM

## 2024-11-12 PROCEDURE — G2211 COMPLEX E/M VISIT ADD ON: HCPCS | Performed by: INTERNAL MEDICINE

## 2024-11-12 PROCEDURE — 3078F DIAST BP <80 MM HG: CPT | Performed by: INTERNAL MEDICINE

## 2024-11-12 PROCEDURE — 3074F SYST BP LT 130 MM HG: CPT | Performed by: INTERNAL MEDICINE

## 2024-11-12 PROCEDURE — 99214 OFFICE O/P EST MOD 30 MIN: CPT | Performed by: INTERNAL MEDICINE

## 2024-11-12 ASSESSMENT — FIBROSIS 4 INDEX: FIB4 SCORE: 4.28

## 2024-11-13 NOTE — PROGRESS NOTES
Subjective:   Chief Complaint/History of Present Illness:  Homa Alonso is a 89 y.o. male established patient who presents today to discuss medical problems as listed below. Homa is unaccompanied for today's visit.    History of Present Illness  The patient presents for evaluation of multiple medical concerns.    He reports a decrease in appetite, preferring smaller meals. His weight has decreased from 163 to 157 pounds. He reports no issues with bowel movements. He is currently taking vitamin B12 supplements and mentions experiencing forgetfulness. He is walking less and relates the appetite loss to now only walking every other day 0.75 miles instead of daily with 2 miles.    He has not recently visited his ophthalmologist for glaucoma pressure checks. He is currently using Lumigan 0.01% eye drops for his glaucoma in the right eye.    He is on a regimen of Eliquis 2.5 mg twice daily, metoprolol tartrate 25 mg twice daily, and atorvastatin 20 mg daily. He does not report any heart-related symptoms. He recalls an episode of atrial fibrillation following a lumbar spine injection. He monitors his pulse daily, noting it was 83 last night, which is higher than his usual 50. He still has minimal left > right leg edema which is currently tolerable. He has a history of moderate aortic stenosis and mitral valve regurgitation, previous cardiologist from [place] has left the practice and he agrees with establishing care with [facility] cardiology.    He has been taking Keppra for years at the recommendation of neurology due to concern of epilepsy and is still using topical testosterone gel without issue.       Current Medications:  Current Outpatient Medications Ordered in Epic   Medication Sig Dispense Refill    levETIRAcetam (KEPPRA) 500 MG Tab Take 1 tablet by mouth twice daily 180 Tablet 3    testosterone (TESTIM/ANDROGEL) 50 MG/5GM (1%) Gel gel APPLY 50 MG TOPICALLY ONCE DAILY 450 g 0    metoprolol tartrate  "(LOPRESSOR) 25 MG Tab Take 1 tablet by mouth twice daily 180 Tablet 0    ELIQUIS 2.5 MG Tab Take 1 tablet by mouth twice daily 180 Tablet 3    alendronate (FOSAMAX) 70 MG Tab Take 1 tablet by mouth once a week 12 Tablet 3    atorvastatin (LIPITOR) 20 MG Tab Take 1 Tablet by mouth every evening. 90 Tablet 3    bimatoprost (LUMIGAN) 0.01 % Solution INSTILL 1 DROP INTO RIGHT EYE EVERY DAY AT BEDTIME Strength: 0.01 % 7.5 mL 3    Apoaequorin (PREVAGEN EXTRA STRENGTH PO) Take 1 Tablet by mouth every day.      docusate sodium (COLACE) 100 MG Cap Take 1 Capsule by mouth 2 times a day as needed for Constipation. 60 Capsule 2    Ascorbic Acid (VITAMIN C PO) Take 1 Tablet by mouth every day.      Multiple Vitamins-Minerals (ICAPS AREDS 2) Chew Tab Chew 1 Capsule 2 times a day.      B Complex Vitamins (B COMPLEX B-12 PO) Take 1 Tablet by mouth every day.      Omega-3 Fatty Acids (FISH OIL) 1000 MG Cap capsule Take 1,000 mg by mouth every day.      multivitamin (THERAGRAN) Tab Take 1 Tab by mouth every day.      Cholecalciferol (VITAMIN D3 PO) Take 2,000 Units by mouth every day.      CALCIUM CITRATE PO Take 1 Tablet by mouth 2 times a day.      MAGNESIUM CITRATE PO Take 1 Capsule by mouth every evening.       No current Epic-ordered facility-administered medications on file.          Objective:   Physical Exam:    Vitals: /62 (BP Location: Left arm, Patient Position: Sitting)   Pulse (!) 53   Temp 36.3 °C (97.3 °F) (Temporal)   Resp 16   Ht 1.676 m (5' 6\")   Wt 73.8 kg (162 lb 12.8 oz)   SpO2 98%    BMI: Body mass index is 26.28 kg/m².  Physical Exam  Constitutional:       General: He is not in acute distress.     Appearance: Normal appearance. He is not ill-appearing.   HENT:      Right Ear: External ear normal.      Left Ear: External ear normal.   Eyes:      General: No scleral icterus.     Conjunctiva/sclera: Conjunctivae normal.   Cardiovascular:      Rate and Rhythm: Bradycardia present. Rhythm irregular.      " Pulses: Normal pulses.   Pulmonary:      Effort: Pulmonary effort is normal. No respiratory distress.      Breath sounds: Normal breath sounds. No wheezing.   Abdominal:      General: Bowel sounds are normal.      Palpations: Abdomen is soft.   Musculoskeletal:      Right lower leg: Edema present.      Left lower leg: Edema present.      Comments: Left > right lower leg edema   Skin:     General: Skin is warm and dry.   Neurological:      Gait: Gait abnormal.   Psychiatric:         Mood and Affect: Mood normal.         Behavior: Behavior normal.         Thought Content: Thought content normal.         Judgment: Judgment normal.      Comments: Short term memory loss and word finding difficulty           Results  Laboratory Studies  B12 was low. Platelet count was borderline low.    Assessment & Plan  1. Vitamin B12 deficiency  His last lab work in June 2024 revealed a low B12 level.  He was advised to maintain his current regimen of vitamin B12 supplements. Recheck with lab work to ensure stability.    2. Glaucoma, right eye.  He was advised to consult his ophthalmologist for a glaucoma pressure check. He continues to use Lumigan eye drops in the right eye.     3. Chronic atrial fibrillation.  He was advised to continue monitoring his pulse rate, okay to continue metoprolol tartrate 25 mg twice daily for rate control. A referral to a cardiologist was made to establish care with [facility] cardiology. Blood work was ordered to monitor his kidney function due to his use of Eliquis.     4. Moderate aortic stenosis.  A referral to a cardiologist was made to evaluate the progression of his aortic stenosis, now moderate. Could consider TAVR in the future pending cardiology discussion.    5. Memory concerns.  He reports occasional forgetfulness, such as forgetting names or why he entered a room. He was advised to continue engaging in activities that promote cognitive health, such as staying active and eating healthy. No issues  with missing taking his medications or getting lost with driving.     6. Medication management.  He continues to take metoprolol, Eliquis, atorvastatin, and topical testosterone gel. He was advised to monitor his pulse rate and report any significant changes.    7. Health Maintenance.  He was reminded to get his flu and COVID-19 vaccines at his local pharmacy.    8. Thrombocytopenia  Chronic and ongoing issue, no significant bruising or bleeding issues, continue with periodic monitoring to ensure stability. No known liver disease.          Assessment and Plan:   Homa is a 89 y.o. male with the following:  Problem List Items Addressed This Visit       B12 deficiency    Relevant Orders    VITAMIN B12    Chronic atrial fibrillation (HCC)    Glaucoma    Moderate aortic stenosis    Relevant Orders    REFERRAL TO CARDIOLOGY    Stage 3a chronic kidney disease    Relevant Orders    Comp Metabolic Panel    Thrombocytopenia (HCC)    Relevant Orders    CBC WITH DIFFERENTIAL     Other Visit Diagnoses       Concern about memory                   RTC: Return in about 3 months (around 2/12/2025).    I spent a total of 32 minutes with record review, exam, communication with the patient, communication with other providers, and documentation of this encounter.    Verbal consent was acquired by the patient to use [employer] ambient listening note generation during this visit Yes     Billing : secondary to the complexity of this patient's illnesses and their interactions.  All problems listed were discussed during the office visit, medications were evaluated and complexities were discussed as well as plan for the future.     PLEASE NOTE: This dictation was created using voice recognition software. I have made every reasonable attempt to correct obvious errors, but I expect that there are errors of grammar and possibly content that I did not discover before finalizing the note.      Sruthi Kincaid, DO  Geriatric and Internal  Medicine  [facility]

## 2024-12-03 DIAGNOSIS — E29.1 HYPOGONADISM IN MALE: ICD-10-CM

## 2024-12-03 RX ORDER — TESTOSTERONE GEL, 1% 10 MG/G
50 GEL TRANSDERMAL DAILY
COMMUNITY

## 2024-12-03 RX ORDER — TESTOSTERONE GEL, 1% 10 MG/G
GEL TRANSDERMAL
Qty: 450 G | Refills: 0 | Status: SHIPPED | OUTPATIENT
Start: 2024-12-03 | End: 2025-03-03

## 2024-12-03 NOTE — TELEPHONE ENCOUNTER
Received request via: Pharmacy    Was the patient seen in the last year in this department? Yes    Does the patient have an active prescription (recently filled or refills available) for medication(s) requested? No    Pharmacy Name: [facility]    Does the patient have [facility] Plus and need 100-day supply? (This applies to ALL medications) Yes, quantity updated to 100 days

## 2025-01-07 DIAGNOSIS — E78.5 DYSLIPIDEMIA: ICD-10-CM

## 2025-01-07 RX ORDER — ATORVASTATIN CALCIUM 20 MG/1
20 TABLET, FILM COATED ORAL EVERY EVENING
Qty: 90 TABLET | Refills: 3 | Status: SHIPPED | OUTPATIENT
Start: 2025-01-07

## 2025-01-08 NOTE — TELEPHONE ENCOUNTER
Received request via: Patient    Was the patient seen in the last year in this department? Yes    Does the patient have an active prescription (recently filled or refills available) for medication(s) requested? No    Pharmacy Name: [facility]    Does the patient have [facility] Plus and need 100-day supply? (This applies to ALL medications) Yes, quantity updated to 100 days

## 2025-02-03 DIAGNOSIS — E29.1 HYPOGONADISM IN MALE: ICD-10-CM

## 2025-02-03 RX ORDER — TESTOSTERONE GEL, 1% 10 MG/G
GEL TRANSDERMAL
Refills: 0 | OUTPATIENT
Start: 2025-02-03

## 2025-02-03 NOTE — TELEPHONE ENCOUNTER
Received request via: Patient    Was the patient seen in the last year in this department? Yes    Does the patient have an active prescription (recently filled or refills available) for medication(s) requested? No    Pharmacy Name: [facility]    Does the patient have [facility] Plus and need 100-day supply? (This applies to ALL medications) Yes, quantity updated to 100 days    Last fill 12/3/24 End date 3/3/25  [facility]

## 2025-02-14 ENCOUNTER — TELEPHONE (OUTPATIENT)
Dept: MEDICAL GROUP | Facility: PHYSICIAN GROUP | Age: OVER 89
End: 2025-02-14
Payer: COMMERCIAL

## 2025-02-14 NOTE — TELEPHONE ENCOUNTER
1. Caller Name: [facility]                         Call Back Number: [phone]      How would the patient prefer to be contacted with a response: Phone call OK to leave a detailed message    [facility] for Testosterone  Key [id number]

## 2025-03-21 ENCOUNTER — TELEPHONE (OUTPATIENT)
Dept: MEDICAL GROUP | Facility: PHYSICIAN GROUP | Age: OVER 89
End: 2025-03-21
Payer: COMMERCIAL

## 2025-03-21 NOTE — TELEPHONE ENCOUNTER
Patient called needing several things:   States he's getting calls from [facility] trying to schedule an EKG. He wants to know if this is something we have arranged.   2. He wants an updated medicine list to make sure he is taking what he is supposed to.   3. Wants to know when next appointment is. It does look like he was a no show for last visit.

## 2025-03-27 ENCOUNTER — OFFICE VISIT (OUTPATIENT)
Dept: MEDICAL GROUP | Facility: PHYSICIAN GROUP | Age: OVER 89
End: 2025-03-27
Payer: COMMERCIAL

## 2025-03-27 VITALS
BODY MASS INDEX: 25.34 KG/M2 | SYSTOLIC BLOOD PRESSURE: 104 MMHG | HEIGHT: 66 IN | OXYGEN SATURATION: 96 % | DIASTOLIC BLOOD PRESSURE: 60 MMHG | TEMPERATURE: 97.8 F | WEIGHT: 157.7 LBS | HEART RATE: 51 BPM

## 2025-03-27 DIAGNOSIS — G40.909 SEIZURE DISORDER (HCC): ICD-10-CM

## 2025-03-27 DIAGNOSIS — I48.20 CHRONIC ATRIAL FIBRILLATION (HCC): ICD-10-CM

## 2025-03-27 DIAGNOSIS — E53.8 B12 DEFICIENCY: ICD-10-CM

## 2025-03-27 DIAGNOSIS — I45.10 RBBB: ICD-10-CM

## 2025-03-27 DIAGNOSIS — N18.31 STAGE 3A CHRONIC KIDNEY DISEASE: ICD-10-CM

## 2025-03-27 DIAGNOSIS — R00.1 BRADYCARDIA: ICD-10-CM

## 2025-03-27 PROCEDURE — 3078F DIAST BP <80 MM HG: CPT | Performed by: INTERNAL MEDICINE

## 2025-03-27 PROCEDURE — 3074F SYST BP LT 130 MM HG: CPT | Performed by: INTERNAL MEDICINE

## 2025-03-27 PROCEDURE — 99214 OFFICE O/P EST MOD 30 MIN: CPT | Performed by: INTERNAL MEDICINE

## 2025-03-27 ASSESSMENT — FIBROSIS 4 INDEX: FIB4 SCORE: 4.28

## 2025-03-27 ASSESSMENT — PATIENT HEALTH QUESTIONNAIRE - PHQ9: CLINICAL INTERPRETATION OF PHQ2 SCORE: 0

## 2025-03-28 NOTE — PROGRESS NOTES
Subjective:   Chief Complaint/History of Present Illness:  Homa Alonso is a 89 y.o. male established patient who presents today to discuss medical problems as listed below. Homa is unaccompanied for today's visit.    History of Present Illness  The patient is a 89-year-old male presenting for an emergency department follow-up. However, there is no record of an emergency department visit in his chart. He has a medical history significant for atrial fibrillation, seizure disorder, chronic kidney disease, and chronically low B12. He is currently taking several medications, including atorvastatin, Keppra, metoprolol, Eliquis, alendronate, and Colace. His blood pressure today is 104/60, and his heart rate is 51. He reports feeling well overall and expresses uncertainty about the reason for his visit but is pleased to engage in conversation. He does not report any headaches, changes in vision, chest pain, shortness of breath, coughing, or other symptoms.    He has a history of atrial fibrillation and is currently on metoprolol. He has been advised to reduce the dosage of metoprolol due to concerns of overtreatment and low blood pressure. He has a history of atrial fibrillation and is currently on metoprolol.    He has a history of seizure disorder and is on Keppra.    He has chronic kidney disease.    He has chronically low B12 levels.    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    MEDICATIONS  Atorvastatin, Keppra, metoprolol, Eliquis, alendronate, Colace       Current Medications:  Current Outpatient Medications Ordered in Epic   Medication Sig Dispense Refill    atorvastatin (LIPITOR) 20 MG Tab Take 1 Tablet by mouth every evening. 90 Tablet 3    testosterone (TESTIM/ANDROGEL) 50 MG/5GM (1%) Gel gel Place 50 mg on the skin every day.      levETIRAcetam (KEPPRA) 500 MG Tab Take 1 tablet by  "mouth twice daily 180 Tablet 3    ELIQUIS 2.5 MG Tab Take 1 tablet by mouth twice daily 180 Tablet 3    alendronate (FOSAMAX) 70 MG Tab Take 1 tablet by mouth once a week 12 Tablet 3    bimatoprost (LUMIGAN) 0.01 % Solution INSTILL 1 DROP INTO RIGHT EYE EVERY DAY AT BEDTIME Strength: 0.01 % 7.5 mL 3    Apoaequorin (PREVAGEN EXTRA STRENGTH PO) Take 1 Tablet by mouth every day.      docusate sodium (COLACE) 100 MG Cap Take 1 Capsule by mouth 2 times a day as needed for Constipation. 60 Capsule 2    Ascorbic Acid (VITAMIN C PO) Take 1 Tablet by mouth every day.      Multiple Vitamins-Minerals (ICAPS AREDS 2) Chew Tab Chew 1 Capsule 2 times a day.      B Complex Vitamins (B COMPLEX B-12 PO) Take 1 Tablet by mouth every day.      Omega-3 Fatty Acids (FISH OIL) 1000 MG Cap capsule Take 1,000 mg by mouth every day.      multivitamin (THERAGRAN) Tab Take 1 Tab by mouth every day.      Cholecalciferol (VITAMIN D3 PO) Take 2,000 Units by mouth every day.      CALCIUM CITRATE PO Take 1 Tablet by mouth 2 times a day.      MAGNESIUM CITRATE PO Take 1 Capsule by mouth every evening.       No current Epic-ordered facility-administered medications on file.          Objective:   Physical Exam:    Vitals: /60   Pulse (!) 51   Temp 36.6 °C (97.8 °F) (Temporal)   Ht 1.676 m (5' 6\")   Wt 71.5 kg (157 lb 11.2 oz)   SpO2 96%    BMI: Body mass index is 25.45 kg/m².  Physical Exam  Constitutional:       General: He is not in acute distress.  HENT:      Right Ear: External ear normal.      Left Ear: External ear normal.   Eyes:      General: No scleral icterus.     Conjunctiva/sclera: Conjunctivae normal.   Cardiovascular:      Rate and Rhythm: Bradycardia present. Rhythm irregular.      Pulses: Normal pulses.   Pulmonary:      Effort: Pulmonary effort is normal. No respiratory distress.      Breath sounds: Normal breath sounds. No wheezing.   Abdominal:      General: Bowel sounds are normal.      Palpations: Abdomen is soft. "   Musculoskeletal:      Comments: 1+ pitting edema b/l   Skin:     General: Skin is warm and dry.      Findings: No rash.   Neurological:      Gait: Gait abnormal.   Psychiatric:         Mood and Affect: Mood normal.         Behavior: Behavior normal.         Judgment: Judgment normal.      Comments: Short term memory loss        EKG in clinic with variable rate of 34-58 with atrial flutter, ventricular bigeminy, and RBBB.  Assessment & Plan  1. Atrial Fibrillation.  2. Bradycardia with RBBB  He has a history of atrial fibrillation and is currently on metoprolol. His blood pressure is 104/60, and his heart rate is 51, which may indicate overtreatment with metoprolol. The plan is to hold metoprolol 25 mg twice daily. An EKG will be performed next week to monitor his condition. If he does not have a cardiologist then we can request evaluation to consider pacemaker intervention.    3. Seizure Disorder.  He is currently taking Keppra for seizure control. No changes to his seizure management plan are necessary at this time.    4. 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  He has a history of chronic kidney disease. No changes to his current management plan are necessary at this time.    5. Vitamin B12 Deficiency.  He has a history of chronically low B12 levels. No changes to his current management plan are necessary at this time.      Assessment and Plan:   Homa is a 89 y.o. male with the following:  Problem List Items Addressed This Visit       (HCC) Seizure disorder    B12 deficiency    Chronic atrial fibrillation (HCC)    Stage 3a chronic kidney disease     Other Visit Diagnoses         Bradycardia        Relevant Orders    EKG - Clinic Performed      RBBB                   RTC: Return in about 1 week (around 4/3/2025).    I spent a total of 31 minutes with record review, exam, communication with the patient, communication with other providers, and documentation of this encounter.    Verbal consent was acquired by the  patient to use [employer] ambient listening note generation during this visit Yes       PLEASE NOTE: This dictation was created using voice recognition software. I have made every reasonable attempt to correct obvious errors, but I expect that there are errors of grammar and possibly content that I did not discover before finalizing the note.      Sruthi Kincaid, DO  Geriatric and Internal Medicine  [facility]

## 2025-04-02 ENCOUNTER — OFFICE VISIT (OUTPATIENT)
Dept: MEDICAL GROUP | Facility: PHYSICIAN GROUP | Age: OVER 89
End: 2025-04-02
Payer: COMMERCIAL

## 2025-04-02 VITALS
SYSTOLIC BLOOD PRESSURE: 122 MMHG | HEART RATE: 31 BPM | BODY MASS INDEX: 25.33 KG/M2 | WEIGHT: 157.63 LBS | OXYGEN SATURATION: 97 % | RESPIRATION RATE: 14 BRPM | HEIGHT: 66 IN | DIASTOLIC BLOOD PRESSURE: 52 MMHG | TEMPERATURE: 97.7 F

## 2025-04-02 DIAGNOSIS — R00.1 BRADYCARDIA: ICD-10-CM

## 2025-04-02 DIAGNOSIS — I45.10 RBBB: ICD-10-CM

## 2025-04-02 DIAGNOSIS — I48.3 TYPICAL ATRIAL FLUTTER (HCC): ICD-10-CM

## 2025-04-02 PROCEDURE — 93000 ELECTROCARDIOGRAM COMPLETE: CPT | Performed by: INTERNAL MEDICINE

## 2025-04-02 PROCEDURE — 3078F DIAST BP <80 MM HG: CPT | Performed by: INTERNAL MEDICINE

## 2025-04-02 PROCEDURE — 99213 OFFICE O/P EST LOW 20 MIN: CPT | Performed by: INTERNAL MEDICINE

## 2025-04-02 PROCEDURE — 3074F SYST BP LT 130 MM HG: CPT | Performed by: INTERNAL MEDICINE

## 2025-04-02 ASSESSMENT — FIBROSIS 4 INDEX: FIB4 SCORE: 4.28

## 2025-04-03 NOTE — PROGRESS NOTES
Subjective:   Chief Complaint/History of Present Illness:  Homa Alonso is a 89 y.o. male established patient who presents today to discuss medical problems as listed below. Homa is unaccompanied for today's visit.    History of Present Illness  The patient presents for evaluation of bradycardia.    He has discontinued the use of metoprolol, a medication he previously found beneficial. His heart rate, which was around 150 during his marathon running days, has since decreased to the low 50s and high 40s following his FDC. He reports no symptoms associated with this lower heart rate however at clinic last week his HR was in the low-mid 30's. Since holding metoprolol he notes improved energy levels and feels a bit more steady on his feet.    Supplemental Information  He has ceased running due to lower back pain but maintains a comfortable walking routine. He also reports satisfactory sleep patterns.    MEDICATIONS  Discontinued: Metoprolol       Current Medications:  Current Outpatient Medications Ordered in Epic   Medication Sig Dispense Refill    atorvastatin (LIPITOR) 20 MG Tab Take 1 Tablet by mouth every evening. 90 Tablet 3    testosterone (TESTIM/ANDROGEL) 50 MG/5GM (1%) Gel gel Place 50 mg on the skin every day.      levETIRAcetam (KEPPRA) 500 MG Tab Take 1 tablet by mouth twice daily 180 Tablet 3    ELIQUIS 2.5 MG Tab Take 1 tablet by mouth twice daily 180 Tablet 3    alendronate (FOSAMAX) 70 MG Tab Take 1 tablet by mouth once a week 12 Tablet 3    bimatoprost (LUMIGAN) 0.01 % Solution INSTILL 1 DROP INTO RIGHT EYE EVERY DAY AT BEDTIME Strength: 0.01 % 7.5 mL 3    Apoaequorin (PREVAGEN EXTRA STRENGTH PO) Take 1 Tablet by mouth every day.      docusate sodium (COLACE) 100 MG Cap Take 1 Capsule by mouth 2 times a day as needed for Constipation. 60 Capsule 2    Ascorbic Acid (VITAMIN C PO) Take 1 Tablet by mouth every day.      Multiple Vitamins-Minerals (ICAPS AREDS 2) Chew Tab Chew 1 Capsule 2 times  "a day.      B Complex Vitamins (B COMPLEX B-12 PO) Take 1 Tablet by mouth every day.      Omega-3 Fatty Acids (FISH OIL) 1000 MG Cap capsule Take 1,000 mg by mouth every day.      multivitamin (THERAGRAN) Tab Take 1 Tab by mouth every day.      Cholecalciferol (VITAMIN D3 PO) Take 2,000 Units by mouth every day.      CALCIUM CITRATE PO Take 1 Tablet by mouth 2 times a day.      MAGNESIUM CITRATE PO Take 1 Capsule by mouth every evening.       No current Epic-ordered facility-administered medications on file.          Objective:   Physical Exam:    Vitals: /52 (BP Location: Left arm, Patient Position: Sitting, BP Cuff Size: Adult)   Pulse (!) 31   Temp 36.5 °C (97.7 °F) (Temporal)   Resp 14   Ht 1.676 m (5' 6\")   Wt 71.5 kg (157 lb 10.1 oz)   SpO2 97%    BMI: Body mass index is 25.44 kg/m².  Physical Exam  Constitutional:       General: He is not in acute distress.     Appearance: Normal appearance. He is not ill-appearing.   HENT:      Right Ear: External ear normal.      Left Ear: External ear normal.   Eyes:      General: No scleral icterus.     Conjunctiva/sclera: Conjunctivae normal.   Cardiovascular:      Rate and Rhythm: Regular rhythm. Bradycardia present.      Pulses: Normal pulses.      Heart sounds: Murmur heard.   Pulmonary:      Effort: Pulmonary effort is normal. No respiratory distress.   Skin:     General: Skin is warm and dry.   Neurological:      Gait: Gait abnormal.      Comments: Uses a walking stick to ambulate   Psychiatric:         Mood and Affect: Mood normal.         Behavior: Behavior normal.         Judgment: Judgment normal.      Comments: Short term memory loss          Assessment & Plan  1. Bradycardia, improved.  2. RBBB  3. Atrial flutter  His heart rate has shown a significant improvement since the last visit, nearly doubling from the previous reading of mid 30's up to mid 60's with holding metoprolol tartrate 25 mg twice daily. The current plan is to discontinue " metoprolol completely. He is advised to monitor his heart rate closely. If he experiences palpitations or a heart rate exceeding 110 beats per minute, he should take one tablet of metoprolol and contact the office immediately. A prescription for a short-acting version of metoprolol will be provided for use in such situations. He voiced understanding and was appreciate of the recheck.      Assessment and Plan:   Homa is a 89 y.o. male with the following:  Problem List Items Addressed This Visit       Bradycardia    Relevant Orders    EKG - Clinic Performed (Completed)    RBBB    Typical atrial flutter (HCC)          RTC: Return in about 3 months (around 7/2/2025).    I spent a total of 25 minutes with record review, exam, communication with the patient, communication with other providers, and documentation of this encounter.    Verbal consent was acquired by the patient to use [email or web address] ambient listening note generation during this visit Yes       PLEASE NOTE: This dictation was created using voice recognition software. I have made every reasonable attempt to correct obvious errors, but I expect that there are errors of grammar and possibly content that I did not discover before finalizing the note.      Sruthi Kincaid, DO  Geriatric and Internal Medicine  [facility]

## 2025-04-14 ENCOUNTER — RESULTS FOLLOW-UP (OUTPATIENT)
Dept: MEDICAL GROUP | Facility: PHYSICIAN GROUP | Age: OVER 89
End: 2025-04-14

## 2025-06-30 DIAGNOSIS — I48.20 CHRONIC ATRIAL FIBRILLATION (HCC): ICD-10-CM

## 2025-06-30 RX ORDER — APIXABAN 2.5 MG/1
2.5 TABLET, FILM COATED ORAL 2 TIMES DAILY
Qty: 180 TABLET | Refills: 3 | Status: SHIPPED | OUTPATIENT
Start: 2025-06-30

## 2025-08-16 DIAGNOSIS — E29.1 HYPOGONADISM IN MALE: Primary | ICD-10-CM

## 2025-08-19 RX ORDER — TESTOSTERONE GEL, 1% 10 MG/G
50 GEL TRANSDERMAL DAILY
Qty: 450 G | Refills: 0 | Status: SHIPPED | OUTPATIENT
Start: 2025-08-19 | End: 2025-11-17

## (undated) DEVICE — KIT CUSTOM PROCEDURE SINGLE FOR ENDO  (15/CA)

## (undated) DEVICE — DEVICE HEMOSTATIC CLIPPING RESOLUTION 360 DEGREES (20EA/BX)

## (undated) DEVICE — GLOVE, LITE (PAIR)

## (undated) DEVICE — GOWN WARMING STANDARD FLEX - (30/CA)

## (undated) DEVICE — SET EXTENSION WITH 2 PORTS (48EA/CA) ***PART #2C8610 IS A SUBSTITUTE*****

## (undated) DEVICE — SYRINGE SAFETY 10 ML 18 GA X 1 1/2 BLUNT LL (100/BX 4BX/CA)

## (undated) DEVICE — FORCEP RADIAL JAW 4 STANDARD CAPACITY W/NEEDLE 240CM (40EA/BX)

## (undated) DEVICE — TUBING CLEARLINK DUO-VENT - C-FLO (48EA/CA)

## (undated) DEVICE — SENSOR OXIMETER ADULT SPO2 RD SET (20EA/BX)

## (undated) DEVICE — SYRINGE SAFETY 5 ML 18 GA X 1-1/2 BLUNT LL (100/BX 4BX/CA)

## (undated) DEVICE — LACTATED RINGERS INJ 1000 ML - (14EA/CA 60CA/PF)

## (undated) DEVICE — TRAP POLYP E-TRAP (25EA/BX)

## (undated) DEVICE — CAPTIVATOR II-10MM ROUND STIFF  (40/BX)

## (undated) DEVICE — GOWN SURGEONS LARGE - (32/CA)

## (undated) DEVICE — TUBE CONNECTING SUCTION - CLEAR PLASTIC STERILE 72 IN (50EA/CA)

## (undated) DEVICE — SYRINGE SAFETY 3 ML 18 GA X 1 1/2 BLUNT LL (100/BX 8BX/CA)

## (undated) DEVICE — PAD PREP 24 X 48 CUFFED - (100/CA)

## (undated) DEVICE — WATER IRRIGATION STERILE 1000ML (12EA/CA)

## (undated) DEVICE — CANISTER SUCTION RIGID RED 1500CC (40EA/CA)

## (undated) DEVICE — SPONGE GAUZE NON-STERILE 4X4 - (2000/CA 10PK/CA)